# Patient Record
Sex: MALE | Race: WHITE | NOT HISPANIC OR LATINO | URBAN - METROPOLITAN AREA
[De-identification: names, ages, dates, MRNs, and addresses within clinical notes are randomized per-mention and may not be internally consistent; named-entity substitution may affect disease eponyms.]

---

## 2022-08-03 ENCOUNTER — INPATIENT (INPATIENT)
Facility: HOSPITAL | Age: 77
LOS: 7 days | Discharge: NURSING, REHAB OR LONG-TERM CARE | End: 2022-08-11
Attending: STUDENT IN AN ORGANIZED HEALTH CARE EDUCATION/TRAINING PROGRAM | Admitting: STUDENT IN AN ORGANIZED HEALTH CARE EDUCATION/TRAINING PROGRAM

## 2022-08-03 VITALS
HEART RATE: 78 BPM | TEMPERATURE: 97 F | SYSTOLIC BLOOD PRESSURE: 198 MMHG | WEIGHT: 214.95 LBS | OXYGEN SATURATION: 96 % | DIASTOLIC BLOOD PRESSURE: 114 MMHG | RESPIRATION RATE: 20 BRPM

## 2022-08-03 LAB
ALBUMIN SERPL ELPH-MCNC: 4.1 G/DL — SIGNIFICANT CHANGE UP (ref 3.5–5.2)
ALP SERPL-CCNC: 96 U/L — SIGNIFICANT CHANGE UP (ref 30–115)
ALT FLD-CCNC: 13 U/L — SIGNIFICANT CHANGE UP (ref 0–41)
ANION GAP SERPL CALC-SCNC: 12 MMOL/L — SIGNIFICANT CHANGE UP (ref 7–14)
APTT BLD: 29.6 SEC — SIGNIFICANT CHANGE UP (ref 27–39.2)
AST SERPL-CCNC: 14 U/L — SIGNIFICANT CHANGE UP (ref 0–41)
BASOPHILS # BLD AUTO: 0.05 K/UL — SIGNIFICANT CHANGE UP (ref 0–0.2)
BASOPHILS NFR BLD AUTO: 0.5 % — SIGNIFICANT CHANGE UP (ref 0–1)
BILIRUB SERPL-MCNC: 0.9 MG/DL — SIGNIFICANT CHANGE UP (ref 0.2–1.2)
BUN SERPL-MCNC: 18 MG/DL — SIGNIFICANT CHANGE UP (ref 10–20)
CALCIUM SERPL-MCNC: 9.1 MG/DL — SIGNIFICANT CHANGE UP (ref 8.5–10.1)
CHLORIDE SERPL-SCNC: 101 MMOL/L — SIGNIFICANT CHANGE UP (ref 98–110)
CO2 SERPL-SCNC: 26 MMOL/L — SIGNIFICANT CHANGE UP (ref 17–32)
CREAT SERPL-MCNC: 1.2 MG/DL — SIGNIFICANT CHANGE UP (ref 0.7–1.5)
EGFR: 63 ML/MIN/1.73M2 — SIGNIFICANT CHANGE UP
EOSINOPHIL # BLD AUTO: 0.32 K/UL — SIGNIFICANT CHANGE UP (ref 0–0.7)
EOSINOPHIL NFR BLD AUTO: 3.5 % — SIGNIFICANT CHANGE UP (ref 0–8)
GLUCOSE BLDC GLUCOMTR-MCNC: 137 MG/DL — HIGH (ref 70–99)
GLUCOSE BLDC GLUCOMTR-MCNC: 189 MG/DL — HIGH (ref 70–99)
GLUCOSE SERPL-MCNC: 142 MG/DL — HIGH (ref 70–99)
HCT VFR BLD CALC: 41.5 % — LOW (ref 42–52)
HGB BLD-MCNC: 14.6 G/DL — SIGNIFICANT CHANGE UP (ref 14–18)
IMM GRANULOCYTES NFR BLD AUTO: 0.2 % — SIGNIFICANT CHANGE UP (ref 0.1–0.3)
INR BLD: 0.97 RATIO — SIGNIFICANT CHANGE UP (ref 0.65–1.3)
LYMPHOCYTES # BLD AUTO: 2.29 K/UL — SIGNIFICANT CHANGE UP (ref 1.2–3.4)
LYMPHOCYTES # BLD AUTO: 25.1 % — SIGNIFICANT CHANGE UP (ref 20.5–51.1)
MCHC RBC-ENTMCNC: 29.3 PG — SIGNIFICANT CHANGE UP (ref 27–31)
MCHC RBC-ENTMCNC: 35.2 G/DL — SIGNIFICANT CHANGE UP (ref 32–37)
MCV RBC AUTO: 83.2 FL — SIGNIFICANT CHANGE UP (ref 80–94)
MONOCYTES # BLD AUTO: 1.02 K/UL — HIGH (ref 0.1–0.6)
MONOCYTES NFR BLD AUTO: 11.2 % — HIGH (ref 1.7–9.3)
NEUTROPHILS # BLD AUTO: 5.41 K/UL — SIGNIFICANT CHANGE UP (ref 1.4–6.5)
NEUTROPHILS NFR BLD AUTO: 59.5 % — SIGNIFICANT CHANGE UP (ref 42.2–75.2)
NRBC # BLD: 0 /100 WBCS — SIGNIFICANT CHANGE UP (ref 0–0)
PLATELET # BLD AUTO: 190 K/UL — SIGNIFICANT CHANGE UP (ref 130–400)
POTASSIUM SERPL-MCNC: 3.4 MMOL/L — LOW (ref 3.5–5)
POTASSIUM SERPL-SCNC: 3.4 MMOL/L — LOW (ref 3.5–5)
PROT SERPL-MCNC: 7 G/DL — SIGNIFICANT CHANGE UP (ref 6–8)
PROTHROM AB SERPL-ACNC: 11.2 SEC — SIGNIFICANT CHANGE UP (ref 9.95–12.87)
RBC # BLD: 4.99 M/UL — SIGNIFICANT CHANGE UP (ref 4.7–6.1)
RBC # FLD: 13.2 % — SIGNIFICANT CHANGE UP (ref 11.5–14.5)
SARS-COV-2 RNA SPEC QL NAA+PROBE: SIGNIFICANT CHANGE UP
SODIUM SERPL-SCNC: 139 MMOL/L — SIGNIFICANT CHANGE UP (ref 135–146)
TROPONIN T SERPL-MCNC: <0.01 NG/ML — SIGNIFICANT CHANGE UP
WBC # BLD: 9.11 K/UL — SIGNIFICANT CHANGE UP (ref 4.8–10.8)
WBC # FLD AUTO: 9.11 K/UL — SIGNIFICANT CHANGE UP (ref 4.8–10.8)

## 2022-08-03 PROCEDURE — 0042T: CPT | Mod: MA

## 2022-08-03 PROCEDURE — 99291 CRITICAL CARE FIRST HOUR: CPT

## 2022-08-03 PROCEDURE — 99223 1ST HOSP IP/OBS HIGH 75: CPT

## 2022-08-03 PROCEDURE — 70496 CT ANGIOGRAPHY HEAD: CPT | Mod: 26,MA

## 2022-08-03 PROCEDURE — 70450 CT HEAD/BRAIN W/O DYE: CPT | Mod: 26,59

## 2022-08-03 PROCEDURE — 93010 ELECTROCARDIOGRAM REPORT: CPT

## 2022-08-03 PROCEDURE — 70498 CT ANGIOGRAPHY NECK: CPT | Mod: 26,MA

## 2022-08-03 RX ORDER — GLUCAGON INJECTION, SOLUTION 0.5 MG/.1ML
1 INJECTION, SOLUTION SUBCUTANEOUS ONCE
Refills: 0 | Status: DISCONTINUED | OUTPATIENT
Start: 2022-08-03 | End: 2022-08-11

## 2022-08-03 RX ORDER — ALTEPLASE 100 MG
81 KIT INTRAVENOUS ONCE
Refills: 0 | Status: COMPLETED | OUTPATIENT
Start: 2022-08-03 | End: 2022-08-03

## 2022-08-03 RX ORDER — SODIUM CHLORIDE 9 MG/ML
500 INJECTION INTRAMUSCULAR; INTRAVENOUS; SUBCUTANEOUS ONCE
Refills: 0 | Status: COMPLETED | OUTPATIENT
Start: 2022-08-03 | End: 2022-08-03

## 2022-08-03 RX ORDER — LABETALOL HCL 100 MG
20 TABLET ORAL ONCE
Refills: 0 | Status: COMPLETED | OUTPATIENT
Start: 2022-08-03 | End: 2022-08-03

## 2022-08-03 RX ORDER — INSULIN LISPRO 100/ML
VIAL (ML) SUBCUTANEOUS
Refills: 0 | Status: DISCONTINUED | OUTPATIENT
Start: 2022-08-03 | End: 2022-08-11

## 2022-08-03 RX ORDER — NICARDIPINE HYDROCHLORIDE 30 MG/1
5 CAPSULE, EXTENDED RELEASE ORAL
Qty: 40 | Refills: 0 | Status: DISCONTINUED | OUTPATIENT
Start: 2022-08-03 | End: 2022-08-04

## 2022-08-03 RX ORDER — SODIUM CHLORIDE 9 MG/ML
1000 INJECTION, SOLUTION INTRAVENOUS
Refills: 0 | Status: DISCONTINUED | OUTPATIENT
Start: 2022-08-03 | End: 2022-08-11

## 2022-08-03 RX ORDER — DEXTROSE 50 % IN WATER 50 %
25 SYRINGE (ML) INTRAVENOUS ONCE
Refills: 0 | Status: DISCONTINUED | OUTPATIENT
Start: 2022-08-03 | End: 2022-08-11

## 2022-08-03 RX ORDER — DEXTROSE 50 % IN WATER 50 %
15 SYRINGE (ML) INTRAVENOUS ONCE
Refills: 0 | Status: DISCONTINUED | OUTPATIENT
Start: 2022-08-03 | End: 2022-08-11

## 2022-08-03 RX ORDER — ALTEPLASE 100 MG
9 KIT INTRAVENOUS ONCE
Refills: 0 | Status: COMPLETED | OUTPATIENT
Start: 2022-08-03 | End: 2022-08-03

## 2022-08-03 RX ORDER — DEXTROSE 50 % IN WATER 50 %
12.5 SYRINGE (ML) INTRAVENOUS ONCE
Refills: 0 | Status: DISCONTINUED | OUTPATIENT
Start: 2022-08-03 | End: 2022-08-11

## 2022-08-03 RX ORDER — LANOLIN ALCOHOL/MO/W.PET/CERES
5 CREAM (GRAM) TOPICAL AT BEDTIME
Refills: 0 | Status: DISCONTINUED | OUTPATIENT
Start: 2022-08-03 | End: 2022-08-11

## 2022-08-03 RX ORDER — CLEVIDIPINE BUTYRATE 50MG/100ML
2 VIAL (ML) INTRAVENOUS
Qty: 50 | Refills: 0 | Status: DISCONTINUED | OUTPATIENT
Start: 2022-08-03 | End: 2022-08-03

## 2022-08-03 RX ORDER — POTASSIUM CHLORIDE 20 MEQ
20 PACKET (EA) ORAL ONCE
Refills: 0 | Status: COMPLETED | OUTPATIENT
Start: 2022-08-03 | End: 2022-08-03

## 2022-08-03 RX ADMIN — SODIUM CHLORIDE 1000 MILLILITER(S): 9 INJECTION INTRAMUSCULAR; INTRAVENOUS; SUBCUTANEOUS at 18:26

## 2022-08-03 RX ADMIN — ALTEPLASE 81 MILLIGRAM(S): KIT at 09:49

## 2022-08-03 RX ADMIN — ALTEPLASE 540 MILLIGRAM(S): KIT at 08:48

## 2022-08-03 RX ADMIN — Medication 5 MILLIGRAM(S): at 21:34

## 2022-08-03 RX ADMIN — Medication 20 MILLIGRAM(S): at 08:30

## 2022-08-03 RX ADMIN — ALTEPLASE 81 MILLIGRAM(S): KIT at 08:49

## 2022-08-03 RX ADMIN — NICARDIPINE HYDROCHLORIDE 25 MG/HR: 30 CAPSULE, EXTENDED RELEASE ORAL at 08:56

## 2022-08-03 RX ADMIN — Medication 50 MILLIEQUIVALENT(S): at 11:29

## 2022-08-03 RX ADMIN — Medication 20 MILLIGRAM(S): at 08:54

## 2022-08-03 NOTE — H&P ADULT - HISTORY OF PRESENT ILLNESS
Patient is a 77 year old Male with HTN, DM, HLD, GERD, CAD with stents who presents with a chief complaint of RUE weakness, L facial, slurred speech, presents to the ED BIBEMS as a Stroke activation at 8:14am. NIH at the time was 2 for RUE and RLE drift. Facial and slurred speech at this point has resolved. mRS of 0. CTH was completed and was negative for bleed. CTA/P negative for perfusion deficit or LVO. Patient only takes ASA 81 at home for heart ppx. Denies any neurologic history or stroke in the past. tPA given at 8:48am as well as started on Cardene for BP control. BP prior to tPA administration was 183/93.

## 2022-08-03 NOTE — ED ADULT NURSE NOTE - NSIMPLEMENTINTERV_GEN_ALL_ED
Implemented All Fall with Harm Risk Interventions:  [place] to call system. Call bell, personal items and telephone within reach. Instruct patient to call for assistance. Room bathroom lighting operational. Non-slip footwear when patient is off stretcher. Physically safe environment: no spills, clutter or unnecessary equipment. Stretcher in lowest position, wheels locked, appropriate side rails in place. Provide visual cue, wrist band, yellow gown, etc. Monitor gait and stability. Monitor for mental status changes and reorient to person, place, and time. Review medications for side effects contributing to fall risk. Reinforce activity limits and safety measures with patient and family. Provide visual clues: red socks.

## 2022-08-03 NOTE — ED PROVIDER NOTE - OBJECTIVE STATEMENT
37-year-old male with past medical history of hyperlipidemia and hypertension who presents with right arm weakness that started 1 hour prior to arrival.  Denies history of CVA and anticoagulant use.  Denies change in vision, slurred speech.  Denies fever, shortness breath, chest pain, nausea, vomiting, abdominal pain, urinary symptoms, change in bowel movement.

## 2022-08-03 NOTE — CHART NOTE - NSCHARTNOTEFT_GEN_A_CORE
Nurse notified day team that patient c/o of difficulty expressing words. Night neurovascular team examined the patient. Patient's attention and concentration intact. Fund of knowledge appropriate Language: Naming, repetition, fluency, and comprehension intact. Would give patient an NIH-3/4 for the right arm, able to shrug the shoulder minimally but could not keep up the arm antigravity and no wiggling of the fingers. Discussed with Dr. koehler, recommend to repeat CTH. CTH-negative for acute findings.

## 2022-08-03 NOTE — CONSULT NOTE ADULT - SUBJECTIVE AND OBJECTIVE BOX
Neurology Consult    Patient is a 77y old  Male with HTN, DM, CAD with stents who presents with a chief complaint of RUE weakness, L facial, slurred speech, presents to the ED BIBEMS as a Stroke activation at 8:14am. NIH at the time was 2 for RUE and RLE drift. Facial and slurred speech at this point has resolved. mRS of 0. CTH was completed and was negative for bleed. CTA/P negative for perfusion deficit or LVO. Patient only takes ASA 81 at home for heart ppx. Denies any neurologic history or stroke in the past. tPA given at 8:48am as well as started on Cardene for BP control. BP prior to tPA administration was 183/93.       PAST MEDICAL & SURGICAL HISTORY:  HTN  DMII   CAD had multiple stents in the past    FAMILY HISTORY:    Social History: (-) x 3  Allergies  Allergy Status Unknown  Intolerances    MEDICATIONS  (STANDING):  niCARdipine Infusion 5 mG/Hr (25 mL/Hr) IV Continuous <Continuous>  potassium chloride  20 mEq/100 mL IVPB 20 milliEquivalent(s) IV Intermittent once  MEDICATIONS  (PRN):      Review of systems:    Constitutional: as per HPI  Neurological: As per HPI    Vital Signs Last 24 Hrs  T(C): 36.4 (03 Aug 2022 08:15), Max: 36.4 (03 Aug 2022 08:15)  T(F): 97.5 (03 Aug 2022 08:15), Max: 97.5 (03 Aug 2022 08:15)  HR: 95 (03 Aug 2022 09:40) (78 - 96)  BP: 150/73 (03 Aug 2022 09:40) (133/80 - 205/101)  BP(mean): --  RR: 20 (03 Aug 2022 09:40) (16 - 25)  SpO2: 97% (03 Aug 2022 09:40) (96% - 99%)    Parameters below as of 03 Aug 2022 09:29  Patient On (Oxygen Delivery Method): room air      Examination:  (on arrival in ED)  AxOx3 speech fluent, able to follow simple and complex commands  PERRL, EMOI, VFF, FS  RUE Drift but still strong 4/5, LUE 5/5, RLE Drift, LLE 5/5   Sensations intact   Able to walk on his own but slightly unsteady and slow (says its not his baseline)    (after tPA administration)  AxOx3 speech fluent, able to follow simple and complex commands  PERRL, EMOI, questionable R lower quadrant blurry vision, FS   RUE 2/5, RLE Drift but strong, L side 5/5   Sensations intact    Labs:   CBC Full  -  ( 03 Aug 2022 08:28 )  WBC Count : 9.11 K/uL  RBC Count : 4.99 M/uL  Hemoglobin : 14.6 g/dL  Hematocrit : 41.5 %  Platelet Count - Automated : 190 K/uL  Mean Cell Volume : 83.2 fL  Mean Cell Hemoglobin : 29.3 pg  Mean Cell Hemoglobin Concentration : 35.2 g/dL  Auto Neutrophil # : 5.41 K/uL  Auto Lymphocyte # : 2.29 K/uL  Auto Monocyte # : 1.02 K/uL  Auto Eosinophil # : 0.32 K/uL  Auto Basophil # : 0.05 K/uL  Auto Neutrophil % : 59.5 %  Auto Lymphocyte % : 25.1 %  Auto Monocyte % : 11.2 %  Auto Eosinophil % : 3.5 %  Auto Basophil % : 0.5 %    08-03    139  |  101  |  18  ----------------------------<  142<H>  3.4<L>   |  26  |  1.2    Ca    9.1      03 Aug 2022 08:28    TPro  7.0  /  Alb  4.1  /  TBili  0.9  /  DBili  x   /  AST  14  /  ALT  13  /  AlkPhos  96  08-03    LIVER FUNCTIONS - ( 03 Aug 2022 08:28 )  Alb: 4.1 g/dL / Pro: 7.0 g/dL / ALK PHOS: 96 U/L / ALT: 13 U/L / AST: 14 U/L / GGT: x           PT/INR - ( 03 Aug 2022 08:28 )   PT: 11.20 sec;   INR: 0.97 ratio    PTT - ( 03 Aug 2022 08:28 )  PTT:29.6 sec    Neuroimaging:  NCHCT: CT Head No Cont:   ACC: [id number] EXAM:  CT BRAIN                          PROCEDURE DATE:  08/03/2022    INTERPRETATION:  CLINICAL INDICATION: CVA. Right arm weakness, bilateral   leg weakness, slurred speech and left facial droop.    Technique: CT of the head was performed without contrast.    Multiple contiguous axial images were acquired from the skullbase to the   vertex without the administration of intravenous contrast.  Coronal and   sagittal reformations were made.    COMPARISON:  prior head CT dated 8/3/2022 at 8:24 AM.    FINDINGS:    There is hyperdensity noted of the dura and intracranial vessels from   recent contrast administration which limits evaluation for intracranial   hemorrhage..    The ventricles and sulci are unremarkable in appearance.     There is no intraparenchymal hematoma, mass effect or midline shift. No   abnormal extra-axial fluid collections are present.    The calvarium is intact. The visualized intraorbital compartments,   paranasal sinuses and mastoid complexes appear free of acute disease.   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    IMPRESSION:  No CT evidence of large acute territorial infarct.    Chronic left occipital lobe infarct.    --- End of Report ---    JESS COTO MD; Attending Radiologist  This document has been electronically signed. Aug  3 2022  9:34AM (08-03-22 @ 09:28)  08-03-22 @ 09:47

## 2022-08-03 NOTE — CONSULT NOTE ADULT - ASSESSMENT
77y old  Male with HTN, DM, CAD with stents who presents with a chief complaint of RUE weakness, L facial, slurred speech, presents to the ED BIBEMS as a Stroke activation at 8:14am. NIH at the time was 2 for RUE and RLE drift. Facial and slurred speech at this point has resolved. mRS of 0. CTH was completed and was negative for bleed. CTA/P negative for perfusion deficit or LVO. Patient only takes ASA 81 and PLX 75 at home for heart ppx. Denies any neurologic history or stroke in the past. tPA given at 8:48am as well as started on Cardene and 40mg labetalol for BP control. BP prior to tPA administration was 183/93.     Of note CTH shows chronic L occipital infarct. Patient admitted to 3E stroke unit for rest of neurologic work up and post tPA monitoring   Home medications:   - Glipizide 5mg BID   - Metformin 1g BID  - Lantus 30U nightly  - Ramipril 10mg daily  - HCTZ tablet 25mg daily   - Plavix 75mg daily  - Metop 50mg BID  - Simvastatin 40mg daily  - Famotidine 40mg daily    Plan:  Admit to 3E stroke unit for monitoring  - once alteplase drip completed, please flush IV line with 50cc NS 0.9%  - Please perform dysphagia screen now   - Once dysphagia screen passed, start atorvastatin 80 once daily  - Notify provider if patient passes dysphasgia screen able to have diet if no pending intervention  - Keep BP <180/105, notify provider if out of range  - During infusion, neuro exams every 15 minutes.   - Check every 15 minutes for first hour after infusion is stopped; every 30 minutes for the next 6 hours; q1 until 24 hours from end of infusion.   - Minimize arterial and venous punctures, able to draw blood 4hr s/p alteplase  - Keep temp <100F and maintain glucose 140-180.   - Notify provider for "HR >100 or <55 or SaO2 <95%"  - Maintain strict bed rest for 12 hours with HOB <30 degrees  - Repeat CT head with any acute change in mental status.   - No antiplatelet, anticoagulation, and heparin sq until 24 hour CT head negative for bleed.   - Continue Cardene 7.5mg gtt  - ISS   - Labs: Obtain Hemoglobin A1c, Lipid profile set, and TSH  Other tests:  - Repeat CT head noncon 24 hours post-alteplase to rule out bleed  - MRI brain without contrast   - echo with bubble, may eventually need EDSON  - PT/OT order  - Swallow evaluation if fails dysphagia screen  - SLP order if patient with dysarthria  - Stroke education    4)DVT ppx - SCDs (NO heparin SQ as post alteplase protocol)    Discussed with Neurology Attending Dr. Irwin   Case discussed with Admitting 3E resident, sign out given.   Patient pending transport and RN report for bed. Confirmed has bed in the stroke unit

## 2022-08-03 NOTE — ED ADULT NURSE NOTE - OBJECTIVE STATEMENT
Pt reports to ed w/ L side facial droop & R arm weakness noted upon waking up this am. Slurred speech since waking up this am.  @ triage. Escorted to CT.

## 2022-08-03 NOTE — ED PROVIDER NOTE - NS ED ROS FT
Constitutional: Negative for fever, chills, and fatigue.  HENT: Negative for headache, hearing change, and sore throat.  Eyes: Negative for eye pain, and vision change.  Cardiovascular: Negative for chest pain, and palpitation.  Respiratory: Negative for SOB, wheezing, cough and sputum production.  Gastrointestinal: Negative for nausea, vomiting, abdominal pain, constipation, diarrhea, hematochezia, and melena.  Genitourinary: Negative for flank pain, dysuria, frequency, and hematuria.  Neurological: + R arm weakness. Negative for dizziness, syncope, and loss of consciousness.  Musculoskeletal: Negative for joint swelling, arthralgias, back pain, neck pain, and calf cramps.  Hematological: Does not bruise/bleed easily.

## 2022-08-03 NOTE — PATIENT PROFILE ADULT - FALL HARM RISK - HARM RISK INTERVENTIONS

## 2022-08-03 NOTE — ED PROVIDER NOTE - CLINICAL SUMMARY MEDICAL DECISION MAKING FREE TEXT BOX
77 year old Male with HTN, DM, HLD, GERD, CAD with stents who presents with a chief complaint of RUE weakness, L facial, slurred speech, presents to the ED BIBEMS as a Stroke activation at 8:14am. NIH at the time was 2 for RUE and RLE drift. Facial and slurred speech at this point has resolved. mRS of 0. CTH was completed and was negative for bleed. CTA/P negative for perfusion deficit or LVO. Patient only takes ASA 81 at home for heart ppx. Denies any neurologic history or stroke in the past. tPA given at 8:48am as well as started on Cardene for BP control. BP prior to tPA administration was 183/93.

## 2022-08-03 NOTE — H&P ADULT - NSICDXPASTMEDICALHX_GEN_ALL_CORE_FT
PAST MEDICAL HISTORY:  Coronary artery disease     Diabetes     GERD (gastroesophageal reflux disease)     Hyperlipidemia     Hypertension

## 2022-08-03 NOTE — ED ADULT TRIAGE NOTE - CHIEF COMPLAINT QUOTE
c/o right arm weakness x 30 min ago, +drift noted, code stroke called in ED, +bilateral leg weakness, +slurred speech x this morning with left sided facial droop,  in triage

## 2022-08-03 NOTE — H&P ADULT - NSHPPHYSICALEXAM_GEN_ALL_CORE
T(C): 36.8 (08-04-22 @ 07:48), Max: 36.8 (08-04-22 @ 03:56)  HR: 100 (08-04-22 @ 07:55) (87 - 110)  BP: 186/103 (08-04-22 @ 07:55) (106/79 - 205/101)  RR: 18 (08-04-22 @ 07:55) (16 - 25)  SpO2: 94% (08-04-22 @ 07:55) (93% - 99%)    CONSTITUTIONAL: Well groomed, no apparent distress    EYES: PERRLA and symmetric, EOMI, No conjunctival or scleral injection, non-icteric    ENMT: Oral mucosa with moist membranes. No external nasal lesions; nasal mucosa not inflamed; normal dentition; no pharyngeal injection or exudates. Otoscopic exam with normal tympanic membranes; no gross hearing impairment noted.  	NECK: Supple, symmetric and without tracheal deviation; thyroid gland not enlarged and without palpable masses    RESPIRATORY: No respiratory distress, no use of accessory muscles; CTA b/l, no wheezes, rales or rhonchi, no dullness or hyperresonance to percussion, no tactile fremitus, no subcutaneous emphysema    CARDIOVASCULAR: RRRR, +S1S2, no murmurs, no rubs, no gallops; no JVD; no peripheral edema  	Vascular: no carotid bruits; no abdominal bruit; carotid pulse palpable, radial pulse palpable, femoral pulse palpable, dorsalis pedis pulse palpable, posterior tibialis pulse palpable    GASTROINTESTINAL: Soft, non tender, non distended, no rebound, no guarding; No palpable masses; no hepatosplenomegaly; no hernia palpated;  	Rectal: normal sphincter tone and no masses palpated; stool negative for blood    LYMPHATIC: No cervical LAD or tenderness; no axillary LAD or tenderness; no inguinal LAD or tenderness    MUSCULOSKELETAL: Normal gait and station; no digital clubbing or cyanosis; examination of the (head/neck, spine/ribs/pelvis, RUE, LUE, RLE, LLE) without misalignment, normal range of motion without pain, no spinal tenderness, normal muscle strength/tone    SKIN: No rashes or ulcers noted; no subcutaneous nodules or induration palpable    PSYCHIATRIC: Appropriate insight/judgment; mood and affect appropriate, recent/remote memory intact    AxOx3 speech fluent, able to follow simple and complex commands  PERRL, EMOI, VFF, FS  RUE Drift but still strong 4/5, LUE 5/5, RLE Drift, LLE 5/5   Sensations intact   Able to walk on his own but slightly unsteady and slow (says its not his baseline)    (after tPA administration)  AxOx3 speech fluent, able to follow simple and complex commands  PERRL, EMOI, questionable R lower quadrant blurry vision, FS   RUE 2/5, RLE Drift but strong, L side 5/5   Sensations intact

## 2022-08-03 NOTE — H&P ADULT - NSHPLABSRESULTS_GEN_ALL_CORE
Labs:  CBC Full  -  ( 03 Aug 2022 08:28 )  WBC Count : 9.11 K/uL  RBC Count : 4.99 M/uL  Hemoglobin : 14.6 g/dL  Hematocrit : 41.5 %  Platelet Count - Automated : 190 K/uL  Mean Cell Volume : 83.2 fL  Mean Cell Hemoglobin : 29.3 pg  Mean Cell Hemoglobin Concentration : 35.2 g/dL  Auto Neutrophil # : 5.41 K/uL  Auto Lymphocyte # : 2.29 K/uL  Auto Monocyte # : 1.02 K/uL  Auto Eosinophil # : 0.32 K/uL  Auto Basophil # : 0.05 K/uL  Auto Neutrophil % : 59.5 %  Auto Lymphocyte % : 25.1 %  Auto Monocyte % : 11.2 %  Auto Eosinophil % : 3.5 %  Auto Basophil % : 0.5 %    08-03    139  |  101  |  18  ----------------------------<  142<H>  3.4<L>   |  26  |  1.2    Ca    9.1      03 Aug 2022 08:28    TPro  7.0  /  Alb  4.1  /  TBili  0.9  /  DBili  x   /  AST  14  /  ALT  13  /  AlkPhos  96  08-03    LIVER FUNCTIONS - ( 03 Aug 2022 08:28 )  Alb: 4.1 g/dL / Pro: 7.0 g/dL / ALK PHOS: 96 U/L / ALT: 13 U/L / AST: 14 U/L / GGT: x           PT/INR - ( 03 Aug 2022 08:28 )   PT: 11.20 sec;   INR: 0.97 ratio       PTT - ( 03 Aug 2022 08:28 )  PTT:29.6 sec    < from: CT Brain Stroke Protocol (08.03.22 @ 08:30) >    PROCEDURE DATE:  08/03/2022          INTERPRETATION:  Clinical History / Reason for exam: Stroke code. Right   arm weakness, bilateral leg weakness, slurred speech, left facial droop.    Technique: Noncontrast head CT.  Contiguous unenhanced CT axial images of   the head from the base to the vertex with coronal and sagittal reformats.    Comparison: None available    Findings:    The ventricles and cortical sulci are compatible with a moderate degree   of parenchymal volume loss.    There are patchy hypodensities throughout the hemispheric white matter   without mass effect compatible with mild chronic microvascular changes.    There is no acute intracranial hemorrhage, extra-axial fluid collection   or midline shift.  Gray white matter differentiation is maintained.   ASPECT score is 10.    Vascular calcifications are noted.    The visualized paranasal sinuses and mastoids are clear. There is an   osteoma within theleft frontal sinus measuring 1 cm.    IMPRESSION:    No evidence of acute intracranial hemorrhage or large territory infarct.    Case was discussed with Saima Heard 8:32 AM 8/3/2022.    --- End of Report ---    < end of copied text >    < from: CT Angio Brain Stroke Protocol  w/ IV Cont (08.03.22 @ 08:55) >

## 2022-08-03 NOTE — H&P ADULT - ASSESSMENT
This is a 77 year old Male with PMHx HTN, DM, CAD with stents who presents with a chief complaint of RUE weakness, L facial, slurred speech, presents to the ED BIBEMS as a Stroke activation at 8:14am. NIH at the time was 2 for RUE and RLE drift. Facial and slurred speech at this point has resolved. mRS of 0. CTH was completed and was negative for bleed. CTA/P negative for perfusion deficit or LVO. Patient only takes ASA 81 and PLX 75 at home for heart ppx. Denies any neurologic history or stroke in the past. tPA given at 8:48am 8/3 as well as started on Cardene and 40 mg labetalol for BP control. BP prior to tPA administration was 183/93. Of note CTH shows chronic L occipital infarct. Patient admitted to 3E stroke unit for rest of neurologic work up and post tPA monitoring.    Plan:  - Admitted to 3E stroke unit for monitoring  - Once alteplase drip completed, please flush IV line with 50cc NS 0.9%  - Please perform dysphagia screen now   - Once dysphagia screen passed, start atorvastatin 80 once daily  - Notify provider if patient passes dysphasgia screen able to have diet if no pending intervention  - Keep BP <180/105, notify provider if out of range  - During infusion, neuro exams every 15 minutes.   - Check every 15 minutes for first hour after infusion is stopped; every 30 minutes for the next 6 hours; q1 until 24 hours from end of infusion.   - Minimize arterial and venous punctures, able to draw blood 4hr s/p alteplase  - Keep temp <100F and maintain glucose 140-180.   - Notify provider for "HR >100 or <55 or SaO2 <95%"  - Maintain strict bed rest for 12 hours with HOB <30 degrees  - Repeat CT head with any acute change in mental status.   - No antiplatelet, anticoagulation, and heparin sq until 24 hour CT head negative for bleed.   - Continue Cardene 7.5mg gtt  - ISS   - Labs: Obtain Hemoglobin A1c, Lipid profile set, and TSH  Other tests:  - Repeat CT head noncon 24 hours post-alteplase to rule out bleed  - MRI brain without contrast pending  - TTE with bubble pending, may eventually need EDSON  - PT/OT eval  - Swallow evaluation if fails dysphagia screen  - SLP order if patient with dysarthria  - Stroke education    DVT ppx - SCDs (NO heparin SQ as post alteplase protocol)

## 2022-08-03 NOTE — CONSULT NOTE ADULT - NS ATTEND AMEND GEN_ALL_CORE FT
Pt is a 78 yo M with PMHx of HTN, HLD  CAD s/p PCI, who presented with left arm weakness and slurred speech. LKW 0700. NIHSS 2 for LUE drift on arrival. CT head without acute process. Discussed risks vs benefit of IV alteplase with patient, agreed to proceed. Blood pressure elevated, given labetalol 10mg IV x 2, BP decreased to  184/93. Bolus of IV alteplase administered at 0849. Repeat BP elevated again, cardene started immediately (was already hanging), increased rate and given an additional 20mg IV labetalol push. BP decreased to <180/105 within 6 minutes. LUE paresis increased, NIHSS 5 with mild RLQ visual defect. pt taken for STAT CT head, which did not show any hemorrhagic transformation.   Admit to stroke unit  Please use post alteplase stroke orderset  Maintain BP <180/105 x 24 hrs then slowly decrease to 120-160  CT head tomorrow at 0900  MRI brain without cotnrast, TTE, tele monitoring, SCD's for DVT ppx  Neurochecks and vitals as per pot tPA stroke protocol Pt is a 76 yo M, right handed, with PMHx of HTN, HLD  CAD s/p PCI, who presented with right arm weakness and slurred speech. LKW 0700. NIHSS 2 for RUE drift on arrival. CT head without acute process. Discussed risks vs benefit of IV alteplase with patient, agreed to proceed. Blood pressure elevated, given labetalol 10mg IV x 2, BP decreased to  184/93. Bolus of IV alteplase administered at 0849. Repeat BP elevated again, cardene started immediately (was already hanging), increased rate and given an additional 20mg IV labetalol push. BP decreased to <180/105 within 6 minutes. RUE paresis increased, NIHSS 5 with mild RLQ visual defect. pt taken for STAT CT head, which did not show any hemorrhagic transformation. On re-examination, NIHSS 3 for LUE paresis (distal >proximal)  Admit to stroke unit  Please use post alteplase stroke orderset  Maintain BP <180/105 x 24 hrs then slowly decrease to 120-160  CT head tomorrow at 0900  MRI brain without cotnrast, TTE, tele monitoring, SCD's for DVT ppx  Neurochecks and vitals as per pot tPA stroke protocol

## 2022-08-04 LAB
A1C WITH ESTIMATED AVERAGE GLUCOSE RESULT: 7.9 % — HIGH (ref 4–5.6)
A1C WITH ESTIMATED AVERAGE GLUCOSE RESULT: 8.2 % — HIGH (ref 4–5.6)
ALBUMIN SERPL ELPH-MCNC: 3.9 G/DL — SIGNIFICANT CHANGE UP (ref 3.5–5.2)
ALP SERPL-CCNC: 109 U/L — SIGNIFICANT CHANGE UP (ref 30–115)
ALT FLD-CCNC: 13 U/L — SIGNIFICANT CHANGE UP (ref 0–41)
ANION GAP SERPL CALC-SCNC: 12 MMOL/L — SIGNIFICANT CHANGE UP (ref 7–14)
AST SERPL-CCNC: 13 U/L — SIGNIFICANT CHANGE UP (ref 0–41)
BILIRUB SERPL-MCNC: 1 MG/DL — SIGNIFICANT CHANGE UP (ref 0.2–1.2)
BUN SERPL-MCNC: 14 MG/DL — SIGNIFICANT CHANGE UP (ref 10–20)
CALCIUM SERPL-MCNC: 9.2 MG/DL — SIGNIFICANT CHANGE UP (ref 8.5–10.1)
CHLORIDE SERPL-SCNC: 100 MMOL/L — SIGNIFICANT CHANGE UP (ref 98–110)
CHOLEST SERPL-MCNC: 156 MG/DL — SIGNIFICANT CHANGE UP
CO2 SERPL-SCNC: 26 MMOL/L — SIGNIFICANT CHANGE UP (ref 17–32)
CREAT SERPL-MCNC: 1.1 MG/DL — SIGNIFICANT CHANGE UP (ref 0.7–1.5)
EGFR: 70 ML/MIN/1.73M2 — SIGNIFICANT CHANGE UP
ESTIMATED AVERAGE GLUCOSE: 180 MG/DL — HIGH (ref 68–114)
ESTIMATED AVERAGE GLUCOSE: 189 MG/DL — HIGH (ref 68–114)
GLUCOSE BLDC GLUCOMTR-MCNC: 188 MG/DL — HIGH (ref 70–99)
GLUCOSE BLDC GLUCOMTR-MCNC: 190 MG/DL — HIGH (ref 70–99)
GLUCOSE BLDC GLUCOMTR-MCNC: 214 MG/DL — HIGH (ref 70–99)
GLUCOSE BLDC GLUCOMTR-MCNC: 255 MG/DL — HIGH (ref 70–99)
GLUCOSE SERPL-MCNC: 240 MG/DL — HIGH (ref 70–99)
HCT VFR BLD CALC: 41.1 % — LOW (ref 42–52)
HDLC SERPL-MCNC: 63 MG/DL — SIGNIFICANT CHANGE UP
HGB BLD-MCNC: 14.5 G/DL — SIGNIFICANT CHANGE UP (ref 14–18)
LIPID PNL WITH DIRECT LDL SERPL: 72 MG/DL — SIGNIFICANT CHANGE UP
MAGNESIUM SERPL-MCNC: 1.4 MG/DL — LOW (ref 1.8–2.4)
MCHC RBC-ENTMCNC: 29.4 PG — SIGNIFICANT CHANGE UP (ref 27–31)
MCHC RBC-ENTMCNC: 35.3 G/DL — SIGNIFICANT CHANGE UP (ref 32–37)
MCV RBC AUTO: 83.4 FL — SIGNIFICANT CHANGE UP (ref 80–94)
NON HDL CHOLESTEROL: 93 MG/DL — SIGNIFICANT CHANGE UP
NRBC # BLD: 0 /100 WBCS — SIGNIFICANT CHANGE UP (ref 0–0)
PHOSPHATE SERPL-MCNC: 3.2 MG/DL — SIGNIFICANT CHANGE UP (ref 2.1–4.9)
PLATELET # BLD AUTO: 209 K/UL — SIGNIFICANT CHANGE UP (ref 130–400)
POTASSIUM SERPL-MCNC: 3.4 MMOL/L — LOW (ref 3.5–5)
POTASSIUM SERPL-SCNC: 3.4 MMOL/L — LOW (ref 3.5–5)
PROT SERPL-MCNC: 7 G/DL — SIGNIFICANT CHANGE UP (ref 6–8)
RBC # BLD: 4.93 M/UL — SIGNIFICANT CHANGE UP (ref 4.7–6.1)
RBC # FLD: 13.3 % — SIGNIFICANT CHANGE UP (ref 11.5–14.5)
SODIUM SERPL-SCNC: 138 MMOL/L — SIGNIFICANT CHANGE UP (ref 135–146)
TRIGL SERPL-MCNC: 105 MG/DL — SIGNIFICANT CHANGE UP
TSH SERPL-MCNC: 1.71 UIU/ML — SIGNIFICANT CHANGE UP (ref 0.27–4.2)
WBC # BLD: 8.76 K/UL — SIGNIFICANT CHANGE UP (ref 4.8–10.8)
WBC # FLD AUTO: 8.76 K/UL — SIGNIFICANT CHANGE UP (ref 4.8–10.8)

## 2022-08-04 PROCEDURE — 70450 CT HEAD/BRAIN W/O DYE: CPT | Mod: 26

## 2022-08-04 PROCEDURE — 99232 SBSQ HOSP IP/OBS MODERATE 35: CPT

## 2022-08-04 PROCEDURE — 99221 1ST HOSP IP/OBS SF/LOW 40: CPT

## 2022-08-04 PROCEDURE — 99223 1ST HOSP IP/OBS HIGH 75: CPT

## 2022-08-04 PROCEDURE — 93306 TTE W/DOPPLER COMPLETE: CPT | Mod: 26

## 2022-08-04 RX ORDER — METOPROLOL TARTRATE 50 MG
1 TABLET ORAL
Qty: 0 | Refills: 0 | DISCHARGE

## 2022-08-04 RX ORDER — LABETALOL HCL 100 MG
10 TABLET ORAL ONCE
Refills: 0 | Status: COMPLETED | OUTPATIENT
Start: 2022-08-04 | End: 2022-08-04

## 2022-08-04 RX ORDER — ASPIRIN/CALCIUM CARB/MAGNESIUM 324 MG
1 TABLET ORAL
Qty: 0 | Refills: 0 | DISCHARGE

## 2022-08-04 RX ORDER — AMLODIPINE BESYLATE 2.5 MG/1
5 TABLET ORAL DAILY
Refills: 0 | Status: DISCONTINUED | OUTPATIENT
Start: 2022-08-04 | End: 2022-08-11

## 2022-08-04 RX ORDER — POTASSIUM CHLORIDE 20 MEQ
40 PACKET (EA) ORAL ONCE
Refills: 0 | Status: COMPLETED | OUTPATIENT
Start: 2022-08-04 | End: 2022-08-04

## 2022-08-04 RX ORDER — MAGNESIUM SULFATE 500 MG/ML
2 VIAL (ML) INJECTION
Refills: 0 | Status: COMPLETED | OUTPATIENT
Start: 2022-08-04 | End: 2022-08-04

## 2022-08-04 RX ORDER — CLOPIDOGREL BISULFATE 75 MG/1
75 TABLET, FILM COATED ORAL DAILY
Refills: 0 | Status: DISCONTINUED | OUTPATIENT
Start: 2022-08-04 | End: 2022-08-06

## 2022-08-04 RX ORDER — METFORMIN HYDROCHLORIDE 850 MG/1
1 TABLET ORAL
Qty: 0 | Refills: 0 | DISCHARGE

## 2022-08-04 RX ORDER — FAMOTIDINE 10 MG/ML
20 INJECTION INTRAVENOUS DAILY
Refills: 0 | Status: DISCONTINUED | OUTPATIENT
Start: 2022-08-04 | End: 2022-08-11

## 2022-08-04 RX ORDER — ATORVASTATIN CALCIUM 80 MG/1
80 TABLET, FILM COATED ORAL AT BEDTIME
Refills: 0 | Status: DISCONTINUED | OUTPATIENT
Start: 2022-08-04 | End: 2022-08-11

## 2022-08-04 RX ORDER — ACETAMINOPHEN 500 MG
650 TABLET ORAL ONCE
Refills: 0 | Status: COMPLETED | OUTPATIENT
Start: 2022-08-04 | End: 2022-08-04

## 2022-08-04 RX ORDER — ASPIRIN/CALCIUM CARB/MAGNESIUM 324 MG
81 TABLET ORAL DAILY
Refills: 0 | Status: DISCONTINUED | OUTPATIENT
Start: 2022-08-04 | End: 2022-08-11

## 2022-08-04 RX ORDER — FAMOTIDINE 10 MG/ML
1 INJECTION INTRAVENOUS
Qty: 0 | Refills: 0 | DISCHARGE

## 2022-08-04 RX ORDER — MAGNESIUM OXIDE 400 MG ORAL TABLET 241.3 MG
400 TABLET ORAL
Refills: 0 | Status: DISCONTINUED | OUTPATIENT
Start: 2022-08-04 | End: 2022-08-08

## 2022-08-04 RX ORDER — METOPROLOL TARTRATE 50 MG
50 TABLET ORAL
Refills: 0 | Status: DISCONTINUED | OUTPATIENT
Start: 2022-08-04 | End: 2022-08-11

## 2022-08-04 RX ORDER — LISINOPRIL 2.5 MG/1
40 TABLET ORAL DAILY
Refills: 0 | Status: DISCONTINUED | OUTPATIENT
Start: 2022-08-04 | End: 2022-08-11

## 2022-08-04 RX ORDER — ENOXAPARIN SODIUM 100 MG/ML
40 INJECTION SUBCUTANEOUS EVERY 24 HOURS
Refills: 0 | Status: DISCONTINUED | OUTPATIENT
Start: 2022-08-04 | End: 2022-08-11

## 2022-08-04 RX ORDER — HYDROCHLOROTHIAZIDE 25 MG
25 TABLET ORAL DAILY
Refills: 0 | Status: DISCONTINUED | OUTPATIENT
Start: 2022-08-04 | End: 2022-08-08

## 2022-08-04 RX ORDER — ASPIRIN/CALCIUM CARB/MAGNESIUM 324 MG
300 TABLET ORAL DAILY
Refills: 0 | Status: DISCONTINUED | OUTPATIENT
Start: 2022-08-04 | End: 2022-08-04

## 2022-08-04 RX ADMIN — AMLODIPINE BESYLATE 5 MILLIGRAM(S): 2.5 TABLET ORAL at 18:26

## 2022-08-04 RX ADMIN — MAGNESIUM OXIDE 400 MG ORAL TABLET 400 MILLIGRAM(S): 241.3 TABLET ORAL at 17:23

## 2022-08-04 RX ADMIN — Medication 10 MILLIGRAM(S): at 08:21

## 2022-08-04 RX ADMIN — CLOPIDOGREL BISULFATE 75 MILLIGRAM(S): 75 TABLET, FILM COATED ORAL at 12:14

## 2022-08-04 RX ADMIN — LISINOPRIL 40 MILLIGRAM(S): 2.5 TABLET ORAL at 13:54

## 2022-08-04 RX ADMIN — Medication 650 MILLIGRAM(S): at 20:30

## 2022-08-04 RX ADMIN — FAMOTIDINE 20 MILLIGRAM(S): 10 INJECTION INTRAVENOUS at 12:14

## 2022-08-04 RX ADMIN — Medication 3: at 12:09

## 2022-08-04 RX ADMIN — Medication 40 MILLIEQUIVALENT(S): at 15:46

## 2022-08-04 RX ADMIN — MAGNESIUM OXIDE 400 MG ORAL TABLET 400 MILLIGRAM(S): 241.3 TABLET ORAL at 15:28

## 2022-08-04 RX ADMIN — Medication 10 MILLIGRAM(S): at 16:51

## 2022-08-04 RX ADMIN — Medication 50 MILLIGRAM(S): at 17:23

## 2022-08-04 RX ADMIN — Medication 1: at 08:26

## 2022-08-04 RX ADMIN — Medication 10 MILLIGRAM(S): at 06:17

## 2022-08-04 RX ADMIN — ENOXAPARIN SODIUM 40 MILLIGRAM(S): 100 INJECTION SUBCUTANEOUS at 12:14

## 2022-08-04 RX ADMIN — Medication 650 MILLIGRAM(S): at 20:06

## 2022-08-04 RX ADMIN — Medication 25 GRAM(S): at 15:27

## 2022-08-04 RX ADMIN — Medication 25 GRAM(S): at 17:24

## 2022-08-04 RX ADMIN — Medication 5 MILLIGRAM(S): at 21:24

## 2022-08-04 RX ADMIN — Medication 25 MILLIGRAM(S): at 13:54

## 2022-08-04 RX ADMIN — Medication 81 MILLIGRAM(S): at 12:14

## 2022-08-04 RX ADMIN — Medication 2: at 16:26

## 2022-08-04 NOTE — PROVIDER CONTACT NOTE (OTHER) - ACTION/TREATMENT ORDERED:
Labatolol 10mg IVP given as ordered x1
MD to order Norvasc and to repeat BP 30 min after dose is given.
MD to order Labetalol 10 mg IVP.

## 2022-08-04 NOTE — PROVIDER CONTACT NOTE (OTHER) - SITUATION
/103 . SBP parameter 120-160.
/98 
BP has been above parameter throughout shift. Dr. Arvizu and Dr. Harrell aware. No interventions done after 17:20 pm. /96 HR 86.

## 2022-08-04 NOTE — CONSULT NOTE ADULT - ASSESSMENT
IMPRESSION: Rehab of CVA / R HP    PRECAUTIONS: [   ] Cardiac  [   ] Respiratory  [   ] Seizures [   ] Contact Isolation  [   ] Droplet Isolation  [   ] Other    Weight Bearing Status:     RECOMMENDATION:    Out of Bed to Chair     DVT/Decubiti Prophylaxis    REHAB PLAN:     [ x   ] Bedside P/T 3-5 times a week   [   x ]   Bedside O/T  2-3 times a week             [  x  ] Speech Therapy               [    ]  No Rehab Therapy Indicated   Conditioning/ROM                                    ADL  Bed Mobility                                               Conditioning/ROM  Transfers                                                     Bed Mobility  Sitting /Standing Balance                         Transfers                                        Gait Training                                               Sitting/Standing Balance  Stair Training [   ]Applicable                    Home equipment Eval                                                                        Splinting  [   ] Only      GOALS:   ADL   [ x   ]   Independent                    Transfers  [x    ] Independent                          Ambulation  [ x   ] Independent     [  x   ] With device                            [    ]  CG                                                         [    ]  CG                                                                  [    ] CG                            [    ] Min A                                                   [    ] Min A                                                              [    ] Min  A          DISCHARGE PLAN:   [    ]  Good candidate for Intensive Rehabilitation/Hospital based                                             Will tolerate 3hrs Intensive Rehab Daily                                       [     ]  Short Term Rehab in Skilled Nursing Facility                                       [     ]  Home with Outpatient or  services                                         [   x  ]  Possible Candidate for Intensive Hospital based Rehab

## 2022-08-04 NOTE — SWALLOW BEDSIDE ASSESSMENT ADULT - NS SPL SWALLOW CLINIC TRIAL FT
No overt s/s of penetration/aspiration observed with regular consistency, thin liquids.  Recommend a regular diet with thin liquids.

## 2022-08-04 NOTE — CONSULT NOTE ADULT - SUBJECTIVE AND OBJECTIVE BOX
HPI:  Patient is a 77 year old Male with HTN, DM, HLD, GERD, CAD with stents who presents with a chief complaint of RUE weakness, L facial, slurred speech, presents to the ED BIBEMS as a Stroke activation at 8:14am. NIH at the time was 2 for RUE and RLE drift. Facial and slurred speech at this point has resolved. mRS of 0. CTH was completed and was negative for bleed. CTA/P negative for perfusion deficit or LVO. Patient only takes ASA 81 at home for heart ppx. Denies any neurologic history or stroke in the past. tPA given at 8:48am as well as started on Cardene for BP control. BP prior to tPA administration was 183/93.    #Acute ischemic stroke s/p tPA  - Admitted to 3E stroke unit for monitoring  - atorvastatin 80 once daily  - Notify provider if patient passes dysphasgia screen able to have diet if no pending intervention  - Keep BP <180/105, notify provider if out of range  - repeat CTH stable  - MRI brain without contrast pending  - TTE with bubble pending, may eventually need EDSON      PAST MEDICAL & SURGICAL HISTORY:  Hypertension      Diabetes      Hyperlipidemia      GERD (gastroesophageal reflux disease)      Coronary artery disease          Hospital Course:    TODAY'S SUBJECTIVE & REVIEW OF SYMPTOMS:     Constitutional WNL   Cardio WNL   Resp WNL   GI WNL  Heme WNL  Endo WNL  Skin WNL  MSK WNL  Neuro right UE weakness / slurred  speech  Cognitive WNL  Psych WNL      MEDICATIONS  (STANDING):  aspirin enteric coated 81 milliGRAM(s) Oral daily  atorvastatin 80 milliGRAM(s) Oral at bedtime  clopidogrel Tablet 75 milliGRAM(s) Oral daily  dextrose 5%. 1000 milliLiter(s) (100 mL/Hr) IV Continuous <Continuous>  dextrose 5%. 1000 milliLiter(s) (50 mL/Hr) IV Continuous <Continuous>  dextrose 50% Injectable 25 Gram(s) IV Push once  dextrose 50% Injectable 12.5 Gram(s) IV Push once  dextrose 50% Injectable 25 Gram(s) IV Push once  enoxaparin Injectable 40 milliGRAM(s) SubCutaneous every 24 hours  famotidine    Tablet 20 milliGRAM(s) Oral daily  glucagon  Injectable 1 milliGRAM(s) IntraMuscular once  hydrochlorothiazide 25 milliGRAM(s) Oral daily  insulin lispro (ADMELOG) corrective regimen sliding scale   SubCutaneous three times a day before meals  lisinopril 40 milliGRAM(s) Oral daily  magnesium oxide 400 milliGRAM(s) Oral three times a day with meals  metoprolol tartrate 50 milliGRAM(s) Oral two times a day    MEDICATIONS  (PRN):  dextrose Oral Gel 15 Gram(s) Oral once PRN Blood Glucose LESS THAN 70 milliGRAM(s)/deciliter  melatonin 5 milliGRAM(s) Oral at bedtime PRN Insomnia      FAMILY HISTORY:      Allergies    No Known Allergies    Intolerances        SOCIAL HISTORY:    [  ] Etoh  [  ] Smoking  [  ] Substance abuse     Home Environment:  [ x  ] Home Alone  [   ] Lives with Family  [   ] Home Health Aid    Dwelling:  [   ] Apartment  [ x  ] Private House  [   ] Adult Home  [   ] Skilled Nursing Facility      [   ] Short Term  [   ] Long Term  [ x  ] Stairs       Elevator [   ]    FUNCTIONAL STATUS PTA: (Check all that apply)  Ambulation: [  x  ]Independent    [   ] Dependent     [   ] Non-Ambulatory  Assistive Device: [   ] SA Cane  [   ]  Q Cane  [   ] Walker  [   ]  Wheelchair  ADL : [ x  ] Independent  [    ]  Dependent       Vital Signs Last 24 Hrs  T(C): 36.8 (04 Aug 2022 07:48), Max: 36.8 (04 Aug 2022 03:56)  T(F): 98.3 (04 Aug 2022 07:48), Max: 98.3 (04 Aug 2022 03:56)  HR: 92 (04 Aug 2022 16:18) (92 - 110)  BP: 170/92 (04 Aug 2022 16:18) (140/79 - 186/103)  BP(mean): 129 (04 Aug 2022 15:06) (90 - 148)  RR: 18 (04 Aug 2022 16:18) (18 - 18)  SpO2: 95% (04 Aug 2022 16:18) (93% - 97%)    Parameters below as of 04 Aug 2022 16:18  Patient On (Oxygen Delivery Method): room air          PHYSICAL EXAM: Awake & Alert  GENERAL: NAD  HEAD:  Normocephalic  CHEST/LUNG: Clear   HEART: S1S2+  ABDOMEN: Soft, Nontender  EXTREMITIES:  no calf tenderness    NERVOUS SYSTEM:  Cranial Nerves 2-12 intact [ x  ] Abnormal  [   ]  ROM: WFL all extremities [   ]  Abnormal [ x  ]limited RUE  Motor Strength: WFL all extremities  [   ]  Abnormal [ x  ]0/5 RUE, 4+/5 RLE, 5/5 left side   Sensation: intact to light touch [ x  ] Abnormal [   ]    FUNCTIONAL STATUS:  Bed Mobility: Independent [   ]  Supervision [   ]  Needs Assistance [  x ]  N/A [   ]  Transfers: Independent [   ]  Supervision [   ]  Needs Assistance [  x ]  N/A [   ]   Ambulation: Independent [   ]  Supervision [   ]  Needs Assistance [ x  ]  N/A [   ]  ADL: Independent [   ] Requires Assistance [   ] N/A [   ]      LABS:                        14.5   8.76  )-----------( 209      ( 04 Aug 2022 13:08 )             41.1     08-04    138  |  100  |  14  ----------------------------<  240<H>  3.4<L>   |  26  |  1.1    Ca    9.2      04 Aug 2022 13:08  Phos  3.2     08-04  Mg     1.4     08-04    TPro  7.0  /  Alb  3.9  /  TBili  1.0  /  DBili  x   /  AST  13  /  ALT  13  /  AlkPhos  109  08-04    PT/INR - ( 03 Aug 2022 08:28 )   PT: 11.20 sec;   INR: 0.97 ratio         PTT - ( 03 Aug 2022 08:28 )  PTT:29.6 sec      RADIOLOGY & ADDITIONAL STUDIES:

## 2022-08-04 NOTE — CONSULT NOTE ADULT - SUBJECTIVE AND OBJECTIVE BOX
MARIA M CROFT  77y  Male    Patient is a 77y old  Male who presents with a chief complaint of RUE weakness    HPI:  Patient is a 77 year old Male with HTN, DM, HLD, GERD, CAD with stents who presents with a chief complaint of RUE weakness, L facial, slurred speech, presents to the ED BIBEMS as a Stroke activation at 8:14am. NIH at the time was 2 for RUE and RLE drift. Facial and slurred speech at this point has resolved. mRS of 0. CTH was completed and was negative for bleed. CTA/P negative for perfusion deficit or LVO. Patient only takes ASA 81 at home for heart ppx. Denies any neurologic history or stroke in the past. tPA given at 8:48am as well as started on Cardene for BP control. BP prior to tPA administration was 183/93.    (03 Aug 2022 11:22)    S: Patient was examined and seen at bedside. This morning pt is resting comfortably in bed and reports no new issues or overnight events. No complaints, feels better  Denies CP, SOB, N/V/D/C/AP, cough, F, chills, dizziness, new focal weakness, HA, vision changes, dysuria, or urinary symptoms, blood in stool.  ROS: all other systems reviewed and are negative    PAST MEDICAL & SURGICAL HISTORY:  Hypertension      Diabetes      Hyperlipidemia      GERD (gastroesophageal reflux disease)      Coronary artery disease        SOCIAL HISTORY:  Tobacco: denies  Illicit drugs: denies  Alcohol: social  Family history reviewed and otherwise non-contributory No clotting disorders, CVAs at early age.  ALLERGIES: NKDA    MEDICATIONS  (STANDING):  aspirin enteric coated 81 milliGRAM(s) Oral daily  atorvastatin 80 milliGRAM(s) Oral at bedtime  clopidogrel Tablet 75 milliGRAM(s) Oral daily  dextrose 5%. 1000 milliLiter(s) (100 mL/Hr) IV Continuous <Continuous>  dextrose 5%. 1000 milliLiter(s) (50 mL/Hr) IV Continuous <Continuous>  dextrose 50% Injectable 25 Gram(s) IV Push once  dextrose 50% Injectable 12.5 Gram(s) IV Push once  dextrose 50% Injectable 25 Gram(s) IV Push once  enoxaparin Injectable 40 milliGRAM(s) SubCutaneous every 24 hours  famotidine    Tablet 20 milliGRAM(s) Oral daily  glucagon  Injectable 1 milliGRAM(s) IntraMuscular once  hydrochlorothiazide 25 milliGRAM(s) Oral daily  insulin lispro (ADMELOG) corrective regimen sliding scale   SubCutaneous three times a day before meals  lisinopril 40 milliGRAM(s) Oral daily  metoprolol tartrate 50 milliGRAM(s) Oral two times a day  niCARdipine Infusion 5 mG/Hr (25 mL/Hr) IV Continuous <Continuous>    MEDICATIONS  (PRN):  dextrose Oral Gel 15 Gram(s) Oral once PRN Blood Glucose LESS THAN 70 milliGRAM(s)/deciliter  melatonin 5 milliGRAM(s) Oral at bedtime PRN Insomnia    Home Medications:  famotidine 40 mg oral tablet: 1 tab(s) orally once a day (at bedtime) (04 Aug 2022 08:06)  hydroCHLOROthiazide 25 mg oral tablet: 1 tab(s) orally once a day (04 Aug 2022 08:05)  Lantus 100 units/mL subcutaneous solution: 30 unit(s) subcutaneous once a day (at bedtime) (04 Aug 2022 08:01)  metoprolol tartrate 50 mg oral tablet: 1 tab(s) orally 2 times a day (04 Aug 2022 08:06)  ramipril 10 mg oral capsule: 1 cap(s) orally once a day (04 Aug 2022 08:01)  simvastatin 40 mg oral tablet: 1 tab(s) orally once a day (at bedtime) (04 Aug 2022 08:06)          Vital Signs Last 24 Hrs  T(C): 36.8 (04 Aug 2022 07:48), Max: 36.8 (04 Aug 2022 03:56)  T(F): 98.3 (04 Aug 2022 07:48), Max: 98.3 (04 Aug 2022 03:56)  HR: 94 (04 Aug 2022 09:06) (94 - 110)  BP: 173/97 (04 Aug 2022 09:06) (106/79 - 186/103)  BP(mean): 122 (04 Aug 2022 09:06) (90 - 148)  RR: 18 (04 Aug 2022 09:06) (16 - 24)  SpO2: 96% (04 Aug 2022 09:06) (93% - 99%)    Parameters below as of 04 Aug 2022 09:06  Patient On (Oxygen Delivery Method): room air      CAPILLARY BLOOD GLUCOSE      POCT Blood Glucose.: 190 mg/dL (04 Aug 2022 08:14)  POCT Blood Glucose.: 189 mg/dL (03 Aug 2022 21:12)  POCT Blood Glucose.: 137 mg/dL (03 Aug 2022 16:55)      General: NAD. Looks stated age.  HEENT: clean oropharynx, EOMI, no LAD  Neck: trachea midline, no thyromegaly  CV: nl S1 S2; no m/r/g  Resp: decreased breath sounds at base  GI: NT/ND/S +BS  MS: no clubbing/cyanosis/edema, +pulses  Neuro: Ry side drift, sensory intact; + reflexes  Skin: no rashes, nl turgor  Psychiatric: AA0x3 w/ intact insight and judgement    tele: SR, nonspecific changes (on my own evaluation of tele monitor)        LABS:                        14.6   9.11  )-----------( 190      ( 03 Aug 2022 08:28 )             41.5     08-03    139  |  101  |  18  ----------------------------<  142<H>  3.4<L>   |  26  |  1.2    Ca    9.1      03 Aug 2022 08:28    TPro  7.0  /  Alb  4.1  /  TBili  0.9  /  DBili  x   /  AST  14  /  ALT  13  /  AlkPhos  96  08-03    LIVER FUNCTIONS - ( 03 Aug 2022 08:28 )  Alb: 4.1 g/dL / Pro: 7.0 g/dL / ALK PHOS: 96 U/L / ALT: 13 U/L / AST: 14 U/L / GGT: x           CARDIAC MARKERS ( 03 Aug 2022 08:28 )  x     / <0.01 ng/mL / x     / x     / x          PT/INR - ( 03 Aug 2022 08:28 )   PT: 11.20 sec;   INR: 0.97 ratio         PTT - ( 03 Aug 2022 08:28 )  PTT:29.6 sec          EKG - SR, nonspecific changes (my read)  Chart and consultant noted personally reviewed.  Care Discussed with Consultants/Other Providers/ Housestaff [ x] YES [ ] NO   Radiology, labs, old records personally reviewed.           MARIA M CROFT  77y  Male    Patient is a 77y old  Male who presents with a chief complaint of RUE weakness    HPI:  Patient is a 77 year old Male with HTN, DM, HLD, GERD, CAD with stents who presents with a chief complaint of RUE weakness, L facial, slurred speech, presents to the ED BIBEMS as a Stroke activation at 8:14am. NIH at the time was 2 for RUE and RLE drift. Facial and slurred speech at this point has resolved. mRS of 0. CTH was completed and was negative for bleed. CTA/P negative for perfusion deficit or LVO. Patient only takes ASA 81 at home for heart ppx. Denies any neurologic history or stroke in the past. tPA given at 8:48am as well as started on Cardene for BP control. BP prior to tPA administration was 183/93.    (03 Aug 2022 11:22)    S: Patient was examined and seen at bedside. This morning pt is resting comfortably in bed and reports no new issues or overnight events. No new complaints. Daughter at bedside.  Denies CP, SOB, N/V/D/C/AP, cough, F, chills, dizziness, new focal weakness, HA, vision changes, dysuria, or urinary symptoms, blood in stool.  ROS: all other systems reviewed and are negative    PAST MEDICAL & SURGICAL HISTORY:  Hypertension      Diabetes      Hyperlipidemia      GERD (gastroesophageal reflux disease)      Coronary artery disease        SOCIAL HISTORY:  Tobacco: denies  Illicit drugs: denies  Alcohol: social  Family history reviewed and otherwise non-contributory No clotting disorders, CVAs at early age.  ALLERGIES: NKDA    MEDICATIONS  (STANDING):  aspirin enteric coated 81 milliGRAM(s) Oral daily  atorvastatin 80 milliGRAM(s) Oral at bedtime  clopidogrel Tablet 75 milliGRAM(s) Oral daily  dextrose 5%. 1000 milliLiter(s) (100 mL/Hr) IV Continuous <Continuous>  dextrose 5%. 1000 milliLiter(s) (50 mL/Hr) IV Continuous <Continuous>  dextrose 50% Injectable 25 Gram(s) IV Push once  dextrose 50% Injectable 12.5 Gram(s) IV Push once  dextrose 50% Injectable 25 Gram(s) IV Push once  enoxaparin Injectable 40 milliGRAM(s) SubCutaneous every 24 hours  famotidine    Tablet 20 milliGRAM(s) Oral daily  glucagon  Injectable 1 milliGRAM(s) IntraMuscular once  hydrochlorothiazide 25 milliGRAM(s) Oral daily  insulin lispro (ADMELOG) corrective regimen sliding scale   SubCutaneous three times a day before meals  lisinopril 40 milliGRAM(s) Oral daily  metoprolol tartrate 50 milliGRAM(s) Oral two times a day  niCARdipine Infusion 5 mG/Hr (25 mL/Hr) IV Continuous <Continuous>    MEDICATIONS  (PRN):  dextrose Oral Gel 15 Gram(s) Oral once PRN Blood Glucose LESS THAN 70 milliGRAM(s)/deciliter  melatonin 5 milliGRAM(s) Oral at bedtime PRN Insomnia    Home Medications:  famotidine 40 mg oral tablet: 1 tab(s) orally once a day (at bedtime) (04 Aug 2022 08:06)  hydroCHLOROthiazide 25 mg oral tablet: 1 tab(s) orally once a day (04 Aug 2022 08:05)  Lantus 100 units/mL subcutaneous solution: 30 unit(s) subcutaneous once a day (at bedtime) (04 Aug 2022 08:01)  metoprolol tartrate 50 mg oral tablet: 1 tab(s) orally 2 times a day (04 Aug 2022 08:06)  ramipril 10 mg oral capsule: 1 cap(s) orally once a day (04 Aug 2022 08:01)  simvastatin 40 mg oral tablet: 1 tab(s) orally once a day (at bedtime) (04 Aug 2022 08:06)          Vital Signs Last 24 Hrs  T(C): 36.8 (04 Aug 2022 07:48), Max: 36.8 (04 Aug 2022 03:56)  T(F): 98.3 (04 Aug 2022 07:48), Max: 98.3 (04 Aug 2022 03:56)  HR: 94 (04 Aug 2022 09:06) (94 - 110)  BP: 173/97 (04 Aug 2022 09:06) (106/79 - 186/103)  BP(mean): 122 (04 Aug 2022 09:06) (90 - 148)  RR: 18 (04 Aug 2022 09:06) (16 - 24)  SpO2: 96% (04 Aug 2022 09:06) (93% - 99%)    Parameters below as of 04 Aug 2022 09:06  Patient On (Oxygen Delivery Method): room air      CAPILLARY BLOOD GLUCOSE      POCT Blood Glucose.: 190 mg/dL (04 Aug 2022 08:14)  POCT Blood Glucose.: 189 mg/dL (03 Aug 2022 21:12)  POCT Blood Glucose.: 137 mg/dL (03 Aug 2022 16:55)      General: NAD. Looks stated age.  HEENT: clean oropharynx, EOMI, no LAD  Neck: trachea midline, no thyromegaly  CV: nl S1 S2; no m/r/g  Resp: decreased breath sounds at base  GI: NT/ND/S +BS  MS: no clubbing/cyanosis/edema, +pulses  Neuro: Ry side drift, sensory intact; + reflexes  Skin: no rashes, nl turgor  Psychiatric: AA0x3 w/ intact insight and judgement    tele: SR, nonspecific changes (on my own evaluation of tele monitor)        LABS:                        14.6   9.11  )-----------( 190      ( 03 Aug 2022 08:28 )             41.5     08-03    139  |  101  |  18  ----------------------------<  142<H>  3.4<L>   |  26  |  1.2    Ca    9.1      03 Aug 2022 08:28    TPro  7.0  /  Alb  4.1  /  TBili  0.9  /  DBili  x   /  AST  14  /  ALT  13  /  AlkPhos  96  08-03    LIVER FUNCTIONS - ( 03 Aug 2022 08:28 )  Alb: 4.1 g/dL / Pro: 7.0 g/dL / ALK PHOS: 96 U/L / ALT: 13 U/L / AST: 14 U/L / GGT: x           CARDIAC MARKERS ( 03 Aug 2022 08:28 )  x     / <0.01 ng/mL / x     / x     / x          PT/INR - ( 03 Aug 2022 08:28 )   PT: 11.20 sec;   INR: 0.97 ratio         PTT - ( 03 Aug 2022 08:28 )  PTT:29.6 sec          EKG - SR, nonspecific changes (my read)  Chart and consultant noted personally reviewed.  Care Discussed with Consultants/Other Providers/ Housestaff [ x] YES [ ] NO   Radiology, labs, old records personally reviewed.

## 2022-08-04 NOTE — CONSULT NOTE ADULT - ASSESSMENT
77 year old patient, known to have CAD s/p 3 stents (at [employer]), HTN, DM, HLD and GERD, presented with RUE and left facial weakness with slurred speech. Code stroke was activated and CT imaging negative for acute pathology. On admission, he had hypertensive urgency and he is s/p Cardene drip.   Patient denies CP, SOB, palpitations. No headache, light-headedness or pre-syncope/syncope.     Impression  Stroke   Hypertensive urgency s/p Cardene drip   CAD s/p 3 stents (at [employer])  HTN, DM, HLD     Plan  - CT head, CTP and CTA H and N: negative for acute pathology  - TTE pending report  - Will do EDSON to rule out cardioembolic cause   - c/w current anti-hypertensives     INCOMPLETE NOTE  77 year old patient, known to have CAD s/p 3 stents (at [employer]), HTN, DM, HLD and GERD, presented with RUE and left facial weakness with slurred speech. Code stroke was activated and CT imaging negative for acute pathology. On admission, he had hypertensive urgency and he is s/p Cardene drip.   Patient denies CP, SOB, palpitations. No headache, light-headedness or pre-syncope/syncope. No dysphagia.     Impression  Stroke   Hypertensive urgency s/p Cardene drip   CAD s/p 3 stents (at [employer])  HTN, DM, HLD     Plan  - CT head, CTP and CTA H and N: negative for acute pathology  - TTE: EF 59%  - Will proceed with EDSON tomorrow to rule out cardioembolic cause   - Keep NPO after midnight   - c/w current anti-hypertensives

## 2022-08-04 NOTE — PHYSICAL THERAPY INITIAL EVALUATION ADULT - PERTINENT HX OF CURRENT PROBLEM, REHAB EVAL
Patient is a 77 year old Male with HTN, DM, HLD, GERD, CAD with stents who presents with a chief complaint of RUE weakness, L facial, slurred speech, presents to the ED BIBEMS as a Stroke activation at 8:14am. Facial and slurred speech at this point has resolved.

## 2022-08-04 NOTE — PROGRESS NOTE ADULT - ATTENDING COMMENTS
Pt is a 78 yo M, right handed, with PMHx of HTN, HLD  CAD s/p PCI, who presented with right arm weakness and slurred speech.    Impr: RUE plegia, suspicion for left frontal cortical acute ischemic stroke. S/p IV tPA on 8/3  MRI brain pending  24 hr f/u CT head with ICH, resume home DAPT  TTE unrevealing, plan for EDSON and likely ILR placement  PT/OT/ST, tele, -160

## 2022-08-04 NOTE — CONSULT NOTE ADULT - ATTENDING COMMENTS
pt seen and examined  risks and benefits of EDSON d/w the pt  keep npo after midnight  COVID PCR within 72 hours

## 2022-08-04 NOTE — CONSULT NOTE ADULT - ASSESSMENT
This is a 77 year old Male with PMHx HTN, DM, CAD with stents who presents with a chief complaint of RUE weakness, L facial, slurred speech, presents to the ED BIBEMS as a Stroke activation at 8:14am. NIH at the time was 2 for RUE and RLE drift. Facial and slurred speech at this point has resolved. mRS of 0. CTH was completed and was negative for bleed. CTA/P negative for perfusion deficit or LVO. Patient only takes ASA 81 and PLX 75 at home for heart ppx. Denies any neurologic history or stroke in the past. tPA given at 8:48am 8/3 as well as started on Cardene and 40 mg labetalol for BP control. BP prior to tPA administration was 183/93. Of note CTH shows chronic L occipital infarct. Patient admitted to 3E stroke unit for rest of neurologic work up and post tPA monitoring.    #Acute ischemic stroke s/p tPA  - Admitted to 3E stroke unit for monitoring  - atorvastatin 80 once daily  - Notify provider if patient passes dysphasgia screen able to have diet if no pending intervention  - Keep BP <180/105, notify provider if out of range  - repeat CTH stable  - MRI brain without contrast pending  - TTE with bubble pending, may eventually need EDSON  - PT/OT eval  - Stroke education  -antiplatelets as per neuro  -tele    #DM w/ hyperglycemia  -Insulin  -monitor FS. Goal 140-180 inpt  -A1C=8.2    #HTN  -permissive HTN for now w/ gradual lowering     #HLD  -HD statin  -LDL pending  -pt counselled    #CAD  -cont antiplatelets, BB, statin      DVT ppx - SCDs, Lovenox    #Progress Note Handoff  Pending: Consults____Clinical improvement and stability__x___Tests__MRI, TTE______PT____x____  Pt/Family discussion: Pt/daughter informed and agree with the current plan  Disposition: Home______/SNF_______/4A______/To be determined____x____    My note supersedes the residents note should a discrepancy arise.    Chart and notes personally reviewed.  Care Discussed with Consultants/Other Providers/ Housestaff [ x] YES [ ] NO   Radiology, labs, old records personally reviewed.    discussed w/ housestaff, nursing, case management, neuro team, daughter

## 2022-08-04 NOTE — CONSULT NOTE ADULT - SUBJECTIVE AND OBJECTIVE BOX
Outpt cardiologist:    HPI:  Patient is a 77 year old Male with HTN, DM, HLD, GERD, CAD with stents who presents with a chief complaint of RUE weakness, L facial, slurred speech, presents to the ED BIBEMS as a Stroke activation at 8:14am. NIH at the time was 2 for RUE and RLE drift. Facial and slurred speech at this point has resolved. mRS of 0. CTH was completed and was negative for bleed. CTA/P negative for perfusion deficit or LVO. Patient only takes ASA 81 at home for heart ppx. Denies any neurologic history or stroke in the past. tPA given at 8:48am as well as started on Cardene for BP control. BP prior to tPA administration was 183/93.    (03 Aug 2022 11:22)      ---  cardio fellow additional notes:        PAST MEDICAL & SURGICAL HISTORY  Hypertension    Diabetes    Hyperlipidemia    GERD (gastroesophageal reflux disease)    Coronary artery disease        FAMILY HISTORY:  FAMILY HISTORY:      SOCIAL HISTORY:  Social History:  Social History: (-) x 3 (03 Aug 2022 11:22)      ALLERGIES:  No Known Allergies      MEDICATIONS:  aspirin enteric coated 81 milliGRAM(s) Oral daily  atorvastatin 80 milliGRAM(s) Oral at bedtime  clopidogrel Tablet 75 milliGRAM(s) Oral daily  dextrose 5%. 1000 milliLiter(s) (100 mL/Hr) IV Continuous <Continuous>  dextrose 5%. 1000 milliLiter(s) (50 mL/Hr) IV Continuous <Continuous>  dextrose 50% Injectable 25 Gram(s) IV Push once  dextrose 50% Injectable 12.5 Gram(s) IV Push once  dextrose 50% Injectable 25 Gram(s) IV Push once  enoxaparin Injectable 40 milliGRAM(s) SubCutaneous every 24 hours  famotidine    Tablet 20 milliGRAM(s) Oral daily  glucagon  Injectable 1 milliGRAM(s) IntraMuscular once  hydrochlorothiazide 25 milliGRAM(s) Oral daily  insulin lispro (ADMELOG) corrective regimen sliding scale   SubCutaneous three times a day before meals  lisinopril 40 milliGRAM(s) Oral daily  metoprolol tartrate 50 milliGRAM(s) Oral two times a day    PRN:  dextrose Oral Gel 15 Gram(s) Oral once PRN  melatonin 5 milliGRAM(s) Oral at bedtime PRN      HOME MEDICATIONS:  Home Medications:  famotidine 40 mg oral tablet: 1 tab(s) orally once a day (at bedtime) (04 Aug 2022 08:06)  hydroCHLOROthiazide 25 mg oral tablet: 1 tab(s) orally once a day (04 Aug 2022 08:05)  Lantus 100 units/mL subcutaneous solution: 30 unit(s) subcutaneous once a day (at bedtime) (04 Aug 2022 08:01)  metoprolol tartrate 50 mg oral tablet: 1 tab(s) orally 2 times a day (04 Aug 2022 08:06)  ramipril 10 mg oral capsule: 1 cap(s) orally once a day (04 Aug 2022 08:01)  simvastatin 40 mg oral tablet: 1 tab(s) orally once a day (at bedtime) (04 Aug 2022 08:06)      VITALS:   T(F): 98.3 (08-04 @ 07:48), Max: 98.3 (08-04 @ 03:56)  HR: 94 (08-04 @ 09:06) (78 - 110)  BP: 173/97 (08-04 @ 09:06) (106/79 - 205/101)  BP(mean): 122 (08-04 @ 09:06) (90 - 148)  RR: 18 (08-04 @ 09:06) (16 - 25)  SpO2: 96% (08-04 @ 09:06) (93% - 99%)    I&O's Summary    03 Aug 2022 07:01  -  04 Aug 2022 07:00  --------------------------------------------------------  IN: 0 mL / OUT: 450 mL / NET: -450 mL    04 Aug 2022 07:01  -  04 Aug 2022 13:18  --------------------------------------------------------  IN: 0 mL / OUT: 525 mL / NET: -525 mL        REVIEW OF SYSTEMS:  CONSTITUTIONAL: No weakness, fevers or chills  HEENT: No visual changes, neck/ear pain  RESPIRATORY: No cough, sob  CARDIOVASCULAR: See HPI  GASTROINTESTINAL: No abdominal pain. No nausea, vomiting, diarrhea   GENITOURINARY: No dysuria, frequency or hematuria  NEUROLOGICAL: No new focal deficits  SKIN: No new rashes    PHYSICAL EXAM:  General: Not in distress.  Non-toxic appearing.  Cardio: regular, S1, S2, no murmur  Pulm: B/L BS.  No wheezing / crackles / rales  Abdomen: Soft, non-tender, non-distended  Extremities: No edema b/l le  Neuro: A&O x3. Right sided UE weakness; 0/5 motor power RUE compared to LUE      LABS:                        14.6   9.11  )-----------( 190      ( 03 Aug 2022 08:28 )             41.5     08-03    139  |  101  |  18  ----------------------------<  142<H>  3.4<L>   |  26  |  1.2    Ca    9.1      03 Aug 2022 08:28    TPro  7.0  /  Alb  4.1  /  TBili  0.9  /  DBili  x   /  AST  14  /  ALT  13  /  AlkPhos  96  08-03    PT/INR - ( 03 Aug 2022 08:28 )   PT: 11.20 sec;   INR: 0.97 ratio         PTT - ( 03 Aug 2022 08:28 )  PTT:29.6 sec    CARDIAC MARKERS ( 03 Aug 2022 08:28 )  x     / <0.01 ng/mL / x     / x     / x            Troponin trend:        COVID-19 PCR: NotDetec (03 Aug 2022 09:03)      RADIOLOGY:  -CXR: none  -TTE: pending   -CCTA: none  -STRESS TEST: none  -CATHETERIZATION: not available  -OTHER:  ECG:      TELEMETRY EVENTS:   Outpt cardiologist:    HPI:  Patient is a 77 year old Male with HTN, DM, HLD, GERD, CAD with stents who presents with a chief complaint of RUE weakness, L facial, slurred speech, presents to the ED BIBEMS as a Stroke activation at 8:14am. NIH at the time was 2 for RUE and RLE drift. Facial and slurred speech at this point has resolved. mRS of 0. CTH was completed and was negative for bleed. CTA/P negative for perfusion deficit or LVO. Patient only takes ASA 81 at home for heart ppx. Denies any neurologic history or stroke in the past. tPA given at 8:48am as well as started on Cardene for BP control. BP prior to tPA administration was 183/93.    (03 Aug 2022 11:22)        cardio fellow additional notes:        PAST MEDICAL & SURGICAL HISTORY  Hypertension    Diabetes    Hyperlipidemia    GERD (gastroesophageal reflux disease)    Coronary artery disease        FAMILY HISTORY:  FAMILY HISTORY: NA      SOCIAL HISTORY:  Social History:  Social History: (-) x 3 (03 Aug 2022 11:22)      ALLERGIES:  No Known Allergies      MEDICATIONS:  aspirin enteric coated 81 milliGRAM(s) Oral daily  atorvastatin 80 milliGRAM(s) Oral at bedtime  clopidogrel Tablet 75 milliGRAM(s) Oral daily  dextrose 5%. 1000 milliLiter(s) (100 mL/Hr) IV Continuous <Continuous>  dextrose 5%. 1000 milliLiter(s) (50 mL/Hr) IV Continuous <Continuous>  dextrose 50% Injectable 25 Gram(s) IV Push once  dextrose 50% Injectable 12.5 Gram(s) IV Push once  dextrose 50% Injectable 25 Gram(s) IV Push once  enoxaparin Injectable 40 milliGRAM(s) SubCutaneous every 24 hours  famotidine    Tablet 20 milliGRAM(s) Oral daily  glucagon  Injectable 1 milliGRAM(s) IntraMuscular once  hydrochlorothiazide 25 milliGRAM(s) Oral daily  insulin lispro (ADMELOG) corrective regimen sliding scale   SubCutaneous three times a day before meals  lisinopril 40 milliGRAM(s) Oral daily  metoprolol tartrate 50 milliGRAM(s) Oral two times a day    PRN:  dextrose Oral Gel 15 Gram(s) Oral once PRN  melatonin 5 milliGRAM(s) Oral at bedtime PRN      HOME MEDICATIONS:  Home Medications:  famotidine 40 mg oral tablet: 1 tab(s) orally once a day (at bedtime) (04 Aug 2022 08:06)  hydroCHLOROthiazide 25 mg oral tablet: 1 tab(s) orally once a day (04 Aug 2022 08:05)  Lantus 100 units/mL subcutaneous solution: 30 unit(s) subcutaneous once a day (at bedtime) (04 Aug 2022 08:01)  metoprolol tartrate 50 mg oral tablet: 1 tab(s) orally 2 times a day (04 Aug 2022 08:06)  ramipril 10 mg oral capsule: 1 cap(s) orally once a day (04 Aug 2022 08:01)  simvastatin 40 mg oral tablet: 1 tab(s) orally once a day (at bedtime) (04 Aug 2022 08:06)      VITALS:   T(F): 98.3 (08-04 @ 07:48), Max: 98.3 (08-04 @ 03:56)  HR: 94 (08-04 @ 09:06) (78 - 110)  BP: 173/97 (08-04 @ 09:06) (106/79 - 205/101)  BP(mean): 122 (08-04 @ 09:06) (90 - 148)  RR: 18 (08-04 @ 09:06) (16 - 25)  SpO2: 96% (08-04 @ 09:06) (93% - 99%)    I&O's Summary    03 Aug 2022 07:01  -  04 Aug 2022 07:00  --------------------------------------------------------  IN: 0 mL / OUT: 450 mL / NET: -450 mL    04 Aug 2022 07:01  -  04 Aug 2022 13:18  --------------------------------------------------------  IN: 0 mL / OUT: 525 mL / NET: -525 mL        REVIEW OF SYSTEMS:  CONSTITUTIONAL: No weakness, fevers or chills  HEENT: No visual changes, neck/ear pain  RESPIRATORY: No cough, sob  CARDIOVASCULAR: See HPI  GASTROINTESTINAL: No abdominal pain. No nausea, vomiting, diarrhea   GENITOURINARY: No dysuria, frequency or hematuria  NEUROLOGICAL: RUE weakness  SKIN: No new rashes    PHYSICAL EXAM:  General: Not in distress.  Non-toxic appearing.  Cardio: regular, S1, S2, no murmur  Pulm: B/L BS.  No wheezing / crackles / rales  Abdomen: Soft, non-tender, non-distended  Extremities: No edema b/l le  Neuro: A&O x3. Right sided UE weakness; 0/5 motor power RUE compared to LUE      LABS:                        14.6   9.11  )-----------( 190      ( 03 Aug 2022 08:28 )             41.5     08-03    139  |  101  |  18  ----------------------------<  142<H>  3.4<L>   |  26  |  1.2    Ca    9.1      03 Aug 2022 08:28    TPro  7.0  /  Alb  4.1  /  TBili  0.9  /  DBili  x   /  AST  14  /  ALT  13  /  AlkPhos  96  08-03    PT/INR - ( 03 Aug 2022 08:28 )   PT: 11.20 sec;   INR: 0.97 ratio         PTT - ( 03 Aug 2022 08:28 )  PTT:29.6 sec    CARDIAC MARKERS ( 03 Aug 2022 08:28 )  x     / <0.01 ng/mL / x     / x     / x            Troponin trend:        COVID-19 PCR: NotDetec (03 Aug 2022 09:03)      RADIOLOGY:  -CXR: none  -TTE: EF 59%  -CCTA: none  -STRESS TEST: none  -CATHETERIZATION: not available  -OTHER:  ECG:      TELEMETRY EVENTS:

## 2022-08-04 NOTE — PROGRESS NOTE ADULT - SUBJECTIVE AND OBJECTIVE BOX
Neurology Progress Note    Interval History:  The patient was seen and examined at the bedside. There were no acute overnight events. NIH was 2 for RUE and RLE drift.       PAST MEDICAL & SURGICAL HISTORY:  Hypertension    Diabetes    Hyperlipidemia    GERD (gastroesophageal reflux disease)            Medications:  atorvastatin 80 milliGRAM(s) Oral at bedtime  dextrose 5%. 1000 milliLiter(s) IV Continuous <Continuous>  dextrose 5%. 1000 milliLiter(s) IV Continuous <Continuous>  dextrose 50% Injectable 25 Gram(s) IV Push once  dextrose 50% Injectable 12.5 Gram(s) IV Push once  dextrose 50% Injectable 25 Gram(s) IV Push once  dextrose Oral Gel 15 Gram(s) Oral once PRN  famotidine    Tablet 20 milliGRAM(s) Oral daily  glucagon  Injectable 1 milliGRAM(s) IntraMuscular once  hydrochlorothiazide 25 milliGRAM(s) Oral daily  insulin lispro (ADMELOG) corrective regimen sliding scale   SubCutaneous three times a day before meals  lisinopril 40 milliGRAM(s) Oral daily  melatonin 5 milliGRAM(s) Oral at bedtime PRN  metoprolol tartrate 50 milliGRAM(s) Oral two times a day  niCARdipine Infusion 5 mG/Hr IV Continuous <Continuous>      Vital Signs Last 24 Hrs  T(C): 36.8 (04 Aug 2022 07:48), Max: 36.8 (04 Aug 2022 03:56)  T(F): 98.3 (04 Aug 2022 07:48), Max: 98.3 (04 Aug 2022 03:56)  HR: 100 (04 Aug 2022 07:55) (87 - 110)  BP: 186/103 (04 Aug 2022 07:55) (106/79 - 205/101)  BP(mean): 148 (04 Aug 2022 07:55) (90 - 148)  RR: 18 (04 Aug 2022 07:55) (16 - 25)  SpO2: 94% (04 Aug 2022 07:55) (93% - 99%)    Parameters below as of 04 Aug 2022 07:48  Patient On (Oxygen Delivery Method): room air        Neurological Exam:   Mental status: Awake, alert and oriented x3.  Naming, repetition and comprehension intact.  No dysarthria, no aphasia.    Cranial nerves: Pupils equally round and reactive to light, visual fields full, no nystagmus, extraocular muscles intact, V1 through V3 intact bilaterally and symmetric, face symmetric, hearing intact to finger rub, palate elevation symmetric, tongue was midline.  Motor:  MRC grading 5/5 b/l UE/LE.   strength 5/5.  Normal tone and bulk.  No abnormal movements.    Sensation: Intact to light touch, proprioception, and pinprick.   Coordination: No dysmetria on finger-to-nose and heel-to-shin.  No dysdiadokinesia.  Reflexes: 2+ in bilateral UE/LE, downgoing toes bilaterally. (-) Beltre.  Gait: Narrow and steady. No ataxia.  Romberg negative  RUE and RLE drift   NIH 2     Labs:  CBC Full  -  ( 03 Aug 2022 08:28 )  WBC Count : 9.11 K/uL  RBC Count : 4.99 M/uL  Hemoglobin : 14.6 g/dL  Hematocrit : 41.5 %  Platelet Count - Automated : 190 K/uL  Mean Cell Volume : 83.2 fL  Mean Cell Hemoglobin : 29.3 pg  Mean Cell Hemoglobin Concentration : 35.2 g/dL  Auto Neutrophil # : 5.41 K/uL  Auto Lymphocyte # : 2.29 K/uL  Auto Monocyte # : 1.02 K/uL  Auto Eosinophil # : 0.32 K/uL  Auto Basophil # : 0.05 K/uL  Auto Neutrophil % : 59.5 %  Auto Lymphocyte % : 25.1 %  Auto Monocyte % : 11.2 %  Auto Eosinophil % : 3.5 %  Auto Basophil % : 0.5 %    08-03    139  |  101  |  18  ----------------------------<  142<H>  3.4<L>   |  26  |  1.2    Ca    9.1      03 Aug 2022 08:28    TPro  7.0  /  Alb  4.1  /  TBili  0.9  /  DBili  x   /  AST  14  /  ALT  13  /  AlkPhos  96  08-03    LIVER FUNCTIONS - ( 03 Aug 2022 08:28 )  Alb: 4.1 g/dL / Pro: 7.0 g/dL / ALK PHOS: 96 U/L / ALT: 13 U/L / AST: 14 U/L / GGT: x           PT/INR - ( 03 Aug 2022 08:28 )   PT: 11.20 sec;   INR: 0.97 ratio         PTT - ( 03 Aug 2022 08:28 )  PTT:29.6 sec       Neurology Progress Note    Interval History:  The patient was seen and examined at the bedside. There were no acute overnight events. This morning reports inability to lift R arm or keep it up for long. Otherwise denies any other issues.     PAST MEDICAL & SURGICAL HISTORY:  Hypertension    Diabetes    Hyperlipidemia    GERD (gastroesophageal reflux disease)      Medications:  atorvastatin 80 milliGRAM(s) Oral at bedtime  dextrose 5%. 1000 milliLiter(s) IV Continuous <Continuous>  dextrose 5%. 1000 milliLiter(s) IV Continuous <Continuous>  dextrose 50% Injectable 25 Gram(s) IV Push once  dextrose 50% Injectable 12.5 Gram(s) IV Push once  dextrose 50% Injectable 25 Gram(s) IV Push once  dextrose Oral Gel 15 Gram(s) Oral once PRN  famotidine    Tablet 20 milliGRAM(s) Oral daily  glucagon  Injectable 1 milliGRAM(s) IntraMuscular once  hydrochlorothiazide 25 milliGRAM(s) Oral daily  insulin lispro (ADMELOG) corrective regimen sliding scale   SubCutaneous three times a day before meals  lisinopril 40 milliGRAM(s) Oral daily  melatonin 5 milliGRAM(s) Oral at bedtime PRN  metoprolol tartrate 50 milliGRAM(s) Oral two times a day  niCARdipine Infusion 5 mG/Hr IV Continuous <Continuous>      Vital Signs Last 24 Hrs  T(C): 36.8 (04 Aug 2022 07:48), Max: 36.8 (04 Aug 2022 03:56)  T(F): 98.3 (04 Aug 2022 07:48), Max: 98.3 (04 Aug 2022 03:56)  HR: 100 (04 Aug 2022 07:55) (87 - 110)  BP: 186/103 (04 Aug 2022 07:55) (106/79 - 205/101)  BP(mean): 148 (04 Aug 2022 07:55) (90 - 148)  RR: 18 (04 Aug 2022 07:55) (16 - 25)  SpO2: 94% (04 Aug 2022 07:55) (93% - 99%)    Parameters below as of 04 Aug 2022 07:48  Patient On (Oxygen Delivery Method): room air            Neurological Exam:   Mental status: Awake, alert and oriented x3.  Naming, repetition and comprehension intact.  No dysarthria, no aphasia.    Cranial nerves: Pupils equally round and reactive to light, visual fields full, no nystagmus, extraocular muscles intact, V1 through V3 intact bilaterally and symmetric, face symmetric, hearing intact to finger rub, palate elevation symmetric, tongue was midline.  Motor:  MRC grading 5/5 b/l UE/LE.   strength 5/5.  Normal tone and bulk.  No abnormal movements.    Sensation: Intact to light touch, proprioception, and pinprick.   Coordination: No dysmetria on finger-to-nose and heel-to-shin.  No dysdiadokinesia.  Reflexes: 2+ in bilateral UE/LE, downgoing toes bilaterally. (-) Beltre.  Gait: Narrow and steady. No ataxia.  Romberg negative  RUE and RLE drift   NIH 2     Labs:  CBC Full  -  ( 03 Aug 2022 08:28 )  WBC Count : 9.11 K/uL  RBC Count : 4.99 M/uL  Hemoglobin : 14.6 g/dL  Hematocrit : 41.5 %  Platelet Count - Automated : 190 K/uL  Mean Cell Volume : 83.2 fL  Mean Cell Hemoglobin : 29.3 pg  Mean Cell Hemoglobin Concentration : 35.2 g/dL  Auto Neutrophil # : 5.41 K/uL  Auto Lymphocyte # : 2.29 K/uL  Auto Monocyte # : 1.02 K/uL  Auto Eosinophil # : 0.32 K/uL  Auto Basophil # : 0.05 K/uL  Auto Neutrophil % : 59.5 %  Auto Lymphocyte % : 25.1 %  Auto Monocyte % : 11.2 %  Auto Eosinophil % : 3.5 %  Auto Basophil % : 0.5 %    08-03    139  |  101  |  18  ----------------------------<  142<H>  3.4<L>   |  26  |  1.2    Ca    9.1      03 Aug 2022 08:28    TPro  7.0  /  Alb  4.1  /  TBili  0.9  /  DBili  x   /  AST  14  /  ALT  13  /  AlkPhos  96  08-03    LIVER FUNCTIONS - ( 03 Aug 2022 08:28 )  Alb: 4.1 g/dL / Pro: 7.0 g/dL / ALK PHOS: 96 U/L / ALT: 13 U/L / AST: 14 U/L / GGT: x           PT/INR - ( 03 Aug 2022 08:28 )   PT: 11.20 sec;   INR: 0.97 ratio         PTT - ( 03 Aug 2022 08:28 )  PTT:29.6 sec       Neurology Progress Note    Interval History:  The patient was seen and examined at the bedside. There were no acute overnight events. This morning reports inability to lift R arm or keep it up for long. Otherwise denies any other issues.     PAST MEDICAL & SURGICAL HISTORY:  Hypertension    Diabetes    Hyperlipidemia    GERD (gastroesophageal reflux disease)      Medications:  atorvastatin 80 milliGRAM(s) Oral at bedtime  dextrose 5%. 1000 milliLiter(s) IV Continuous <Continuous>  dextrose 5%. 1000 milliLiter(s) IV Continuous <Continuous>  dextrose 50% Injectable 25 Gram(s) IV Push once  dextrose 50% Injectable 12.5 Gram(s) IV Push once  dextrose 50% Injectable 25 Gram(s) IV Push once  dextrose Oral Gel 15 Gram(s) Oral once PRN  famotidine    Tablet 20 milliGRAM(s) Oral daily  glucagon  Injectable 1 milliGRAM(s) IntraMuscular once  hydrochlorothiazide 25 milliGRAM(s) Oral daily  insulin lispro (ADMELOG) corrective regimen sliding scale   SubCutaneous three times a day before meals  lisinopril 40 milliGRAM(s) Oral daily  melatonin 5 milliGRAM(s) Oral at bedtime PRN  metoprolol tartrate 50 milliGRAM(s) Oral two times a day  niCARdipine Infusion 5 mG/Hr IV Continuous <Continuous>      Vital Signs Last 24 Hrs  T(C): 36.8 (04 Aug 2022 07:48), Max: 36.8 (04 Aug 2022 03:56)  T(F): 98.3 (04 Aug 2022 07:48), Max: 98.3 (04 Aug 2022 03:56)  HR: 100 (04 Aug 2022 07:55) (87 - 110)  BP: 186/103 (04 Aug 2022 07:55) (106/79 - 205/101)  BP(mean): 148 (04 Aug 2022 07:55) (90 - 148)  RR: 18 (04 Aug 2022 07:55) (16 - 25)  SpO2: 94% (04 Aug 2022 07:55) (93% - 99%)    Parameters below as of 04 Aug 2022 07:48  Patient On (Oxygen Delivery Method): room air      Neurological Exam:   General: No acute distress, awake and alert  Eyes: Anicteric sclerae, moist conjunctivae, see below for CNs  Neck: trachea midline, FROM, supple, no thyromegaly or lymphadenopathy  Cardiovascular: Regular rate and rhythm, no murmurs  Pulmonary: Anterior breath sounds clear bilaterally, no crackles or wheezing. No use of accessory muscles  GI: Abdomen soft, non-distended, non-tender  Extremities:  no edema    Neurologic:  -Mental status: Awake, alert, oriented to person, place, and time. Speech is fluent with intact naming, repetition, and comprehension, no dysarthria. Recent and remote memory intact. Follows commands. Attention/concentration intact. Fund of knowledge appropriate.  -Cranial nerves:   II: Visual fields are full to confrontation.  III, IV, VI: Extraocular movements are intact without nystagmus. Pupils equally round and reactive to light  V:  Facial sensation V1-V3 equal and intact   VII: Face is symmetric with normal eye closure and smile  VIII: Hearing is bilaterally intact to finger rub  IX, X: Uvula is midline and soft palate rises symmetrically  XI: Head turning and shoulder shrug are intact.  XII: Tongue protrudes midline  Motor: Normal bulk and tone. No pronator drift. Strength bilateral lower extremities 5/5, 5/5 in LUE and 2/5 in RUE  Rapid alternating movements intact and symmetric  Sensation: Intact to light touch bilaterally. No neglect or extinction on double simultaneous testing.  Coordination: No dysmetria on finger-to-nose and heel-to-shin bilaterally  Reflexes: Downgoing toes bilaterally   Gait: Deferred  NIH 2 for RUE drift to bed        Labs:  CBC Full  -  ( 03 Aug 2022 08:28 )  WBC Count : 9.11 K/uL  RBC Count : 4.99 M/uL  Hemoglobin : 14.6 g/dL  Hematocrit : 41.5 %  Platelet Count - Automated : 190 K/uL  Mean Cell Volume : 83.2 fL  Mean Cell Hemoglobin : 29.3 pg  Mean Cell Hemoglobin Concentration : 35.2 g/dL  Auto Neutrophil # : 5.41 K/uL  Auto Lymphocyte # : 2.29 K/uL  Auto Monocyte # : 1.02 K/uL  Auto Eosinophil # : 0.32 K/uL  Auto Basophil # : 0.05 K/uL  Auto Neutrophil % : 59.5 %  Auto Lymphocyte % : 25.1 %  Auto Monocyte % : 11.2 %  Auto Eosinophil % : 3.5 %  Auto Basophil % : 0.5 %    08-03    139  |  101  |  18  ----------------------------<  142<H>  3.4<L>   |  26  |  1.2    Ca    9.1      03 Aug 2022 08:28    TPro  7.0  /  Alb  4.1  /  TBili  0.9  /  DBili  x   /  AST  14  /  ALT  13  /  AlkPhos  96  08-03    LIVER FUNCTIONS - ( 03 Aug 2022 08:28 )  Alb: 4.1 g/dL / Pro: 7.0 g/dL / ALK PHOS: 96 U/L / ALT: 13 U/L / AST: 14 U/L / GGT: x           PT/INR - ( 03 Aug 2022 08:28 )   PT: 11.20 sec;   INR: 0.97 ratio         PTT - ( 03 Aug 2022 08:28 )  PTT:29.6 sec

## 2022-08-04 NOTE — PROGRESS NOTE ADULT - ASSESSMENT
Assessment: 77y old  Male with HTN, DM, CAD with stents who presents with a chief complaint of RUE weakness, L facial, slurred speech, presents to the ED BIBEMS as a Stroke activation at 8:14am. NIH at the time was 2 for RUE and RLE drift. Facial and slurred speech at this point has resolved. mRS of 0. CTH was completed and was negative for bleed. CTA/P negative for perfusion deficit or LVO. Patient only takes ASA 81 and PLX 75 at home for heart ppx. Denies any neurologic history or stroke in the past. tPA given at 8:48am as well as started on Cardene and 40mg labetalol for BP control. BP prior to tPA administration was 183/93.       Plan:     - Labs: Obtain Hemoglobin A1c, Lipid profile set, and TSH  Other tests:  - Repeat CT head noncon 24 hours post-alteplase to rule out bleed  - MRI brain without contrast   - echo with bubble, may eventually need EDSON  - PT/OT order  - Swallow evaluation if fails dysphagia screen  - SLP order if patient with dysarthria  - Stroke education    )DVT ppx - SCDs (NO heparin SQ as post alteplase protocol)    Discussed with Neurology Attending Dr. Irwin   Case discussed with Admitting 3E resident, sign out given.   Patient pending transport and RN report for bed. Confirmed has bed in the stroke unit    Assessment: 77y old  Male with HTN, DM, CAD with stents who presents with a chief complaint of RUE weakness, L facial, slurred speech, presents to the ED BIBEMS as a Stroke activation at 8:14am. NIH at the time was 2 for RUE and RLE drift. Facial and slurred speech at this point has resolved. mRS of 0. CTH was completed and was negative for bleed. CTA/P negative for perfusion deficit or LVO. Patient only takes ASA 81 and PLX 75 at home for heart ppx. Denies any neurologic history or stroke in the past. tPA given at 8:48am as well as started on Cardene and 40mg labetalol for BP control. BP prior to tPA administration was 183/93.       Plan:     - Labs: Obtain Hemoglobin A1c, Lipid profile set, and TSH  Other tests:  - Repeat CT head noncon 24 hours post-alteplase to rule out bleed  - MRI brain without contrast   - echo with bubble  -Order EDSON and loop   - PT/OT order  - Swallow evaluation if fails dysphagia screen  - SLP order if patient with dysarthria  - Stroke education    DVT ppx - SCDs (NO heparin SQ as post alteplase protocol)    Discussed with Neurology Attending Dr. Irwin   Case discussed with Admitting 3E resident, sign out given.   Patient pending transport and RN report for bed. Confirmed has bed in the stroke unit    This is a 77y old  Male with HTN, DM, CAD with stents who presented to the ED via EMS with a chief complaint of RUE weakness, L facial, slurred speech, most likely due to left sided hemispheric dysfunction in the frontal MCA territory from suspected cardioembolic cause.     #RUE hemiparesis most likely 2/2 L MCA infarct  - On admission NIH 2 for RUE and RLE weakness  - CTH: No evidence of acute intracranial hemorrhage or large territory infarct  - S/p Tpa 8/3   - s/p Cardene drip for BP control post tPA  - CTA H&N: No evidence of major vascular stenosis or occlusion. Normal perfusion images.Scattered mild atheromatous changes as above.  - CTP: There is no evidence of focal perfusion deficit to suggest acute cerebral ischemia  - repeat CTH 24h after tPA- no bleed or acute pathology  - Labs: Obtain Hemoglobin A1c 8.2, LDL 72  - MRI brain without contrast pending  - echo with bubble: 59% G1DD  - Cardiology consulted for EDSON   - patient will most likely need a ILR  - PT/OT, physiatry  - Stroke education    #HTN  - resume home meds  - c/w lisinopril 40mg  - c/w metoprolol tart 50mg  - goal -160    #DM   - A1c 8.2  - monitor fs, if consistently >180, then start regimen    Msc  Diet: carb consistent  GI ppx: none  DVT ppx: lovenox  Activity: Out of bed with assist  Code: FUll    Pending: EDSON, possible ILR, MRI H

## 2022-08-04 NOTE — SWALLOW BEDSIDE ASSESSMENT ADULT - SLP PERTINENT HISTORY OF CURRENT PROBLEM
Patient is a 77 year old Male with HTN, DM, HLD, GERD, CAD with stents who presents with a chief complaint of RUE weakness, L facial, slurred speech, presented to [facility] ED as a Stroke activation at 8:14am on 08/03/22.  No prior h/o dysphagia or speech-language difficulties. Patient is a 77 year old Male with HTN, DM, HLD, GERD, CAD with stents who presents with a chief complaint of RUE weakness, L facial, slurred speech, presented to [facility] ED as a Stroke activation at 8:14am on 08/03/22, with tpa administration at 8:48am.  No prior h/o dysphagia or speech-language difficulties.

## 2022-08-04 NOTE — SWALLOW BEDSIDE ASSESSMENT ADULT - SLP GENERAL OBSERVATIONS
Pt received semi-reclined in bed, alert and oriented to all concepts. Daughter present for evaluation. Pt with clear speech, adequately responded to interview questions and followed multi-step commands. Pt received semi-reclined in bed, alert and oriented to all concepts. Pt denied pain. Daughter present for evaluation. Pt with clear speech, adequately responded to interview questions and followed multi-step commands.

## 2022-08-04 NOTE — PHYSICAL THERAPY INITIAL EVALUATION ADULT - GENERAL OBSERVATIONS, REHAB EVAL
2:20pm-3:05pm Pt encountered semi reclined in bed in No apparent distress with daughter, Cami Villarreal, in room, +PulseOx, +tele, +BP cuff. Pt agreed to PT initial evaluation.

## 2022-08-05 LAB
ALBUMIN SERPL ELPH-MCNC: 4.2 G/DL — SIGNIFICANT CHANGE UP (ref 3.5–5.2)
ALP SERPL-CCNC: 114 U/L — SIGNIFICANT CHANGE UP (ref 30–115)
ALT FLD-CCNC: 13 U/L — SIGNIFICANT CHANGE UP (ref 0–41)
ANION GAP SERPL CALC-SCNC: 19 MMOL/L — HIGH (ref 7–14)
AST SERPL-CCNC: 17 U/L — SIGNIFICANT CHANGE UP (ref 0–41)
BASOPHILS # BLD AUTO: 0.04 K/UL — SIGNIFICANT CHANGE UP (ref 0–0.2)
BASOPHILS NFR BLD AUTO: 0.5 % — SIGNIFICANT CHANGE UP (ref 0–1)
BILIRUB SERPL-MCNC: 1.1 MG/DL — SIGNIFICANT CHANGE UP (ref 0.2–1.2)
BUN SERPL-MCNC: 12 MG/DL — SIGNIFICANT CHANGE UP (ref 10–20)
CALCIUM SERPL-MCNC: 9.5 MG/DL — SIGNIFICANT CHANGE UP (ref 8.5–10.1)
CHLORIDE SERPL-SCNC: 99 MMOL/L — SIGNIFICANT CHANGE UP (ref 98–110)
CO2 SERPL-SCNC: 21 MMOL/L — SIGNIFICANT CHANGE UP (ref 17–32)
CREAT SERPL-MCNC: 1.1 MG/DL — SIGNIFICANT CHANGE UP (ref 0.7–1.5)
EGFR: 70 ML/MIN/1.73M2 — SIGNIFICANT CHANGE UP
EOSINOPHIL # BLD AUTO: 0.28 K/UL — SIGNIFICANT CHANGE UP (ref 0–0.7)
EOSINOPHIL NFR BLD AUTO: 3.2 % — SIGNIFICANT CHANGE UP (ref 0–8)
GLUCOSE BLDC GLUCOMTR-MCNC: 203 MG/DL — HIGH (ref 70–99)
GLUCOSE BLDC GLUCOMTR-MCNC: 211 MG/DL — HIGH (ref 70–99)
GLUCOSE BLDC GLUCOMTR-MCNC: 256 MG/DL — HIGH (ref 70–99)
GLUCOSE BLDC GLUCOMTR-MCNC: 263 MG/DL — HIGH (ref 70–99)
GLUCOSE SERPL-MCNC: 234 MG/DL — HIGH (ref 70–99)
HCT VFR BLD CALC: 45 % — SIGNIFICANT CHANGE UP (ref 42–52)
HGB BLD-MCNC: 15.9 G/DL — SIGNIFICANT CHANGE UP (ref 14–18)
IMM GRANULOCYTES NFR BLD AUTO: 0.2 % — SIGNIFICANT CHANGE UP (ref 0.1–0.3)
LYMPHOCYTES # BLD AUTO: 1.37 K/UL — SIGNIFICANT CHANGE UP (ref 1.2–3.4)
LYMPHOCYTES # BLD AUTO: 15.6 % — LOW (ref 20.5–51.1)
MAGNESIUM SERPL-MCNC: 2.1 MG/DL — SIGNIFICANT CHANGE UP (ref 1.8–2.4)
MCHC RBC-ENTMCNC: 29.9 PG — SIGNIFICANT CHANGE UP (ref 27–31)
MCHC RBC-ENTMCNC: 35.3 G/DL — SIGNIFICANT CHANGE UP (ref 32–37)
MCV RBC AUTO: 84.6 FL — SIGNIFICANT CHANGE UP (ref 80–94)
MONOCYTES # BLD AUTO: 0.82 K/UL — HIGH (ref 0.1–0.6)
MONOCYTES NFR BLD AUTO: 9.4 % — HIGH (ref 1.7–9.3)
NEUTROPHILS # BLD AUTO: 6.23 K/UL — SIGNIFICANT CHANGE UP (ref 1.4–6.5)
NEUTROPHILS NFR BLD AUTO: 71.1 % — SIGNIFICANT CHANGE UP (ref 42.2–75.2)
NRBC # BLD: 0 /100 WBCS — SIGNIFICANT CHANGE UP (ref 0–0)
PLATELET # BLD AUTO: 205 K/UL — SIGNIFICANT CHANGE UP (ref 130–400)
POTASSIUM SERPL-MCNC: 4.4 MMOL/L — SIGNIFICANT CHANGE UP (ref 3.5–5)
POTASSIUM SERPL-SCNC: 4.4 MMOL/L — SIGNIFICANT CHANGE UP (ref 3.5–5)
PROT SERPL-MCNC: 7.3 G/DL — SIGNIFICANT CHANGE UP (ref 6–8)
RBC # BLD: 5.32 M/UL — SIGNIFICANT CHANGE UP (ref 4.7–6.1)
RBC # FLD: 13.6 % — SIGNIFICANT CHANGE UP (ref 11.5–14.5)
SODIUM SERPL-SCNC: 139 MMOL/L — SIGNIFICANT CHANGE UP (ref 135–146)
WBC # BLD: 8.76 K/UL — SIGNIFICANT CHANGE UP (ref 4.8–10.8)
WBC # FLD AUTO: 8.76 K/UL — SIGNIFICANT CHANGE UP (ref 4.8–10.8)

## 2022-08-05 PROCEDURE — 93312 ECHO TRANSESOPHAGEAL: CPT | Mod: 26,XU

## 2022-08-05 PROCEDURE — 93320 DOPPLER ECHO COMPLETE: CPT | Mod: 26

## 2022-08-05 PROCEDURE — 99232 SBSQ HOSP IP/OBS MODERATE 35: CPT

## 2022-08-05 PROCEDURE — 99233 SBSQ HOSP IP/OBS HIGH 50: CPT

## 2022-08-05 PROCEDURE — 93325 DOPPLER ECHO COLOR FLOW MAPG: CPT | Mod: 26

## 2022-08-05 RX ORDER — BENZOCAINE AND MENTHOL 5; 1 G/100ML; G/100ML
1 LIQUID ORAL DAILY
Refills: 0 | Status: DISCONTINUED | OUTPATIENT
Start: 2022-08-05 | End: 2022-08-05

## 2022-08-05 RX ORDER — HYDRALAZINE HCL 50 MG
10 TABLET ORAL ONCE
Refills: 0 | Status: COMPLETED | OUTPATIENT
Start: 2022-08-05 | End: 2022-08-05

## 2022-08-05 RX ORDER — BENZOCAINE 10 %
1 GEL (GRAM) MUCOUS MEMBRANE ONCE
Refills: 0 | Status: DISCONTINUED | OUTPATIENT
Start: 2022-08-05 | End: 2022-08-11

## 2022-08-05 RX ORDER — POLYETHYLENE GLYCOL 3350 17 G/17G
17 POWDER, FOR SOLUTION ORAL
Refills: 0 | Status: DISCONTINUED | OUTPATIENT
Start: 2022-08-05 | End: 2022-08-11

## 2022-08-05 RX ORDER — SENNA PLUS 8.6 MG/1
2 TABLET ORAL AT BEDTIME
Refills: 0 | Status: DISCONTINUED | OUTPATIENT
Start: 2022-08-05 | End: 2022-08-11

## 2022-08-05 RX ADMIN — SENNA PLUS 2 TABLET(S): 8.6 TABLET ORAL at 12:17

## 2022-08-05 RX ADMIN — Medication 10 MILLIGRAM(S): at 04:23

## 2022-08-05 RX ADMIN — Medication 81 MILLIGRAM(S): at 11:22

## 2022-08-05 RX ADMIN — ATORVASTATIN CALCIUM 80 MILLIGRAM(S): 80 TABLET, FILM COATED ORAL at 00:19

## 2022-08-05 RX ADMIN — Medication 50 MILLIGRAM(S): at 05:38

## 2022-08-05 RX ADMIN — Medication 2: at 11:42

## 2022-08-05 RX ADMIN — LISINOPRIL 40 MILLIGRAM(S): 2.5 TABLET ORAL at 05:37

## 2022-08-05 RX ADMIN — Medication 25 MILLIGRAM(S): at 05:37

## 2022-08-05 RX ADMIN — Medication 50 MILLIGRAM(S): at 17:18

## 2022-08-05 RX ADMIN — FAMOTIDINE 20 MILLIGRAM(S): 10 INJECTION INTRAVENOUS at 11:22

## 2022-08-05 RX ADMIN — POLYETHYLENE GLYCOL 3350 17 GRAM(S): 17 POWDER, FOR SOLUTION ORAL at 11:25

## 2022-08-05 RX ADMIN — SENNA PLUS 2 TABLET(S): 8.6 TABLET ORAL at 21:19

## 2022-08-05 RX ADMIN — MAGNESIUM OXIDE 400 MG ORAL TABLET 400 MILLIGRAM(S): 241.3 TABLET ORAL at 17:18

## 2022-08-05 RX ADMIN — ATORVASTATIN CALCIUM 80 MILLIGRAM(S): 80 TABLET, FILM COATED ORAL at 21:19

## 2022-08-05 RX ADMIN — Medication 5 MILLIGRAM(S): at 21:19

## 2022-08-05 RX ADMIN — AMLODIPINE BESYLATE 5 MILLIGRAM(S): 2.5 TABLET ORAL at 05:37

## 2022-08-05 RX ADMIN — CLOPIDOGREL BISULFATE 75 MILLIGRAM(S): 75 TABLET, FILM COATED ORAL at 11:22

## 2022-08-05 RX ADMIN — MAGNESIUM OXIDE 400 MG ORAL TABLET 400 MILLIGRAM(S): 241.3 TABLET ORAL at 12:15

## 2022-08-05 RX ADMIN — POLYETHYLENE GLYCOL 3350 17 GRAM(S): 17 POWDER, FOR SOLUTION ORAL at 17:19

## 2022-08-05 RX ADMIN — ENOXAPARIN SODIUM 40 MILLIGRAM(S): 100 INJECTION SUBCUTANEOUS at 11:22

## 2022-08-05 RX ADMIN — Medication 3: at 17:17

## 2022-08-05 NOTE — CONSULT NOTE ADULT - ASSESSMENT
Imp ; rehab of CVA / right hemiparesis   Plan : continue bedside therapy as tolerated            Pt would be able to tolerate 3hr/day PT / OT / speech  and motivated           Good 4a acute inpatient rehab candidate

## 2022-08-05 NOTE — PROGRESS NOTE ADULT - ASSESSMENT
This is a 77 year old Male with PMHx HTN, DM, CAD with stents who presents with a chief complaint of RUE weakness, L facial, slurred speech, presents to the ED BIBEMS as a Stroke activation at 8:14am. NIH at the time was 2 for RUE and RLE drift. Facial and slurred speech at this point has resolved. mRS of 0. CTH was completed and was negative for bleed. CTA/P negative for perfusion deficit or LVO. Patient only takes ASA 81 and PLX 75 at home for heart ppx. Denies any neurologic history or stroke in the past. tPA given at 8:48am 8/3 as well as started on Cardene and 40 mg labetalol for BP control. BP prior to tPA administration was 183/93. Of note CTH shows chronic L occipital infarct. Patient admitted to 3E stroke unit for rest of neurologic work up and post tPA monitoring.    #Acute ischemic stroke s/p tPA  - Admitted to 3E stroke unit for monitoring  - atorvastatin 80 once daily  - Notify provider if patient passes dysphasgia screen able to have diet if no pending intervention  - Keep BP <180/105, notify provider if out of range  - repeat CTH stable  - MRI brain without contrast pending  - EDSON: nl EF, no PFO  - PT/OT eval  - Stroke education  - antiplatelets as per neuro  - tele  -antiplatelets as per neuro    #DM w/ hyperglycemia  -Insulin  -monitor FS. Goal 140-180 inpt  -A1C=8.2  -pt counselled    #HTN  -permissive HTN for now w/ gradual lowering     #HLD  -HD statin  -LDL 72    #CAD  -cont antiplatelets, BB, statin      DVT ppx - SCDs, Lovenox    #Progress Note Handoff  Pending: Consults____Clinical improvement and stability__x___Tests__MRI___PT____x____  Pt/Family discussion: Pt/daughter informed and agree with the current plan  Disposition: Home______/SNF_______/4A______/To be determined____x____    My note supersedes the residents note should a discrepancy arise.    Chart and notes personally reviewed.  Care Discussed with Consultants/Other Providers/ Housestaff [ x] YES [ ] NO   Radiology, labs, old records personally reviewed.    discussed w/ housestaff, nursing, case management, neuro team

## 2022-08-05 NOTE — PROGRESS NOTE ADULT - ASSESSMENT
This is a 77y old  Male with HTN, DM, CAD with stents who presented to the ED via EMS with a chief complaint of RUE weakness, L facial, slurred speech, most likely due to left sided hemispheric dysfunction in the frontal MCA territory from suspected cardioembolic cause.     #RUE hemiparesis most likely 2/2 L MCA infarct  - On admission NIH 2 for RUE and RLE weakness  - CTH: No evidence of acute intracranial hemorrhage or large territory infarct  - S/p Tpa 8/3   - s/p Cardene drip for BP control post tPA  - CTA H&N: No evidence of major vascular stenosis or occlusion. Normal perfusion images.Scattered mild atheromatous changes as above.  - CTP: There is no evidence of focal perfusion deficit to suggest acute cerebral ischemia  - repeat CTH 24h after tPA- no bleed or acute pathology  - Labs: Obtain Hemoglobin A1c 8.2, LDL 72  - MRI brain without contrast pending  - echo with bubble: 59% G1DD  - s/p EDSON 8/5 - EF 55-60, no PFO or clot burden  - patient will most likely need a ILR  - PT/OT, physiatry  - Stroke education    #HTN  - resume home meds  - c/w lisinopril 40mg  - c/w metoprolol tart 50mg  - goal -160    #DM   - A1c 8.2  - monitor fs, if consistently >180, then start regimen    Msc  Diet: carb consistent  GI ppx: none  DVT ppx: lovenox  Activity: Out of bed with assist  Code: Full    Pending:  possible ILR, MRI H, possible DC to 4a on Monday

## 2022-08-05 NOTE — PROGRESS NOTE ADULT - SUBJECTIVE AND OBJECTIVE BOX
MARIA M CROFT  77y  Male    Patient is a 77y old  Male who presents with a chief complaint of RUE weakness    HPI:  Patient is a 77 year old Male with HTN, DM, HLD, GERD, CAD with stents who presents with a chief complaint of RUE weakness, L facial, slurred speech, presents to the ED BIBEMS as a Stroke activation at 8:14am. NIH at the time was 2 for RUE and RLE drift. Facial and slurred speech at this point has resolved. mRS of 0. CTH was completed and was negative for bleed. CTA/P negative for perfusion deficit or LVO. Patient only takes ASA 81 at home for heart ppx. Denies any neurologic history or stroke in the past. tPA given at 8:48am as well as started on Cardene for BP control. BP prior to tPA administration was 183/93.    (03 Aug 2022 11:22)    S: Patient was examined and seen at bedside. This morning pt is resting comfortably in bed and reports no new issues or overnight events. No new complaints.   Denies CP, SOB, N/V/D/C/AP, cough, F, chills, dizziness, new focal weakness, HA, vision changes, dysuria, or urinary symptoms, blood in stool.  ROS: all other systems reviewed and are negative    PAST MEDICAL & SURGICAL HISTORY:  Hypertension      Diabetes      Hyperlipidemia      GERD (gastroesophageal reflux disease)      Coronary artery disease        SOCIAL HISTORY:  Tobacco: denies  Illicit drugs: denies  Alcohol: social  Family history reviewed and otherwise non-contributory No clotting disorders, CVAs at early age.  ALLERGIES: NKDA    General: NAD. Looks stated age.  HEENT: clean oropharynx, EOMI, no LAD  Neck: trachea midline, no thyromegaly  CV: nl S1 S2; no m/r/g  Resp: decreased breath sounds at base  GI: NT/ND/S +BS  MS: no clubbing/cyanosis/edema, +pulses  Neuro: RUE 1-/5, RLE 5-/5, sensory intact; + reflexes  Skin: no rashes, nl turgor  Psychiatric: AA0x3 w/ intact insight and judgement    tele: SR, nonspecific changes (on my own evaluation of tele monitor)      MEDICATIONS  (STANDING):  amLODIPine   Tablet 5 milliGRAM(s) Oral daily  aspirin enteric coated 81 milliGRAM(s) Oral daily  atorvastatin 80 milliGRAM(s) Oral at bedtime  benzocaine 20%/menthol 0.5% Spray 1 Spray(s) Topical once  clopidogrel Tablet 75 milliGRAM(s) Oral daily  dextrose 5%. 1000 milliLiter(s) (100 mL/Hr) IV Continuous <Continuous>  dextrose 5%. 1000 milliLiter(s) (50 mL/Hr) IV Continuous <Continuous>  dextrose 50% Injectable 25 Gram(s) IV Push once  dextrose 50% Injectable 12.5 Gram(s) IV Push once  dextrose 50% Injectable 25 Gram(s) IV Push once  enoxaparin Injectable 40 milliGRAM(s) SubCutaneous every 24 hours  famotidine    Tablet 20 milliGRAM(s) Oral daily  glucagon  Injectable 1 milliGRAM(s) IntraMuscular once  hydrochlorothiazide 25 milliGRAM(s) Oral daily  insulin lispro (ADMELOG) corrective regimen sliding scale   SubCutaneous three times a day before meals  lisinopril 40 milliGRAM(s) Oral daily  magnesium oxide 400 milliGRAM(s) Oral three times a day with meals  metoprolol tartrate 50 milliGRAM(s) Oral two times a day  polyethylene glycol 3350 17 Gram(s) Oral two times a day  senna 2 Tablet(s) Oral at bedtime    MEDICATIONS  (PRN):  dextrose Oral Gel 15 Gram(s) Oral once PRN Blood Glucose LESS THAN 70 milliGRAM(s)/deciliter  melatonin 5 milliGRAM(s) Oral at bedtime PRN Insomnia    Home Medications:  famotidine 40 mg oral tablet: 1 tab(s) orally once a day (at bedtime) (04 Aug 2022 08:06)  hydroCHLOROthiazide 25 mg oral tablet: 1 tab(s) orally once a day (04 Aug 2022 08:05)  Lantus 100 units/mL subcutaneous solution: 30 unit(s) subcutaneous once a day (at bedtime) (04 Aug 2022 08:01)  metoprolol tartrate 50 mg oral tablet: 1 tab(s) orally 2 times a day (04 Aug 2022 08:06)  ramipril 10 mg oral capsule: 1 cap(s) orally once a day (04 Aug 2022 08:01)  simvastatin 40 mg oral tablet: 1 tab(s) orally once a day (at bedtime) (04 Aug 2022 08:06)    Vital Signs Last 24 Hrs  T(C): 36.1 (05 Aug 2022 11:54), Max: 36.7 (04 Aug 2022 20:12)  T(F): 97 (05 Aug 2022 11:54), Max: 98 (04 Aug 2022 20:12)  HR: 90 (05 Aug 2022 11:54) (70 - 98)  BP: 155/99 (05 Aug 2022 11:54) (153/88 - 185/104)  BP(mean): 120 (05 Aug 2022 11:54) (110 - 143)  RR: 18 (05 Aug 2022 11:54) (16 - 18)  SpO2: 96% (05 Aug 2022 11:54) (95% - 100%)    Parameters below as of 05 Aug 2022 11:54  Patient On (Oxygen Delivery Method): room air      CAPILLARY BLOOD GLUCOSE      POCT Blood Glucose.: 211 mg/dL (05 Aug 2022 11:34)  POCT Blood Glucose.: 263 mg/dL (05 Aug 2022 07:55)  POCT Blood Glucose.: 188 mg/dL (04 Aug 2022 22:14)  POCT Blood Glucose.: 214 mg/dL (04 Aug 2022 16:22)    LABS:                        15.9   8.76  )-----------( 205      ( 05 Aug 2022 06:22 )             45.0     08-05    139  |  99  |  12  ----------------------------<  234<H>  4.4   |  21  |  1.1    Ca    9.5      05 Aug 2022 06:22  Phos  3.2     08-04  Mg     2.1     08-05    TPro  7.3  /  Alb  4.2  /  TBili  1.1  /  DBili  x   /  AST  17  /  ALT  13  /  AlkPhos  114  08-05    LIVER FUNCTIONS - ( 05 Aug 2022 06:22 )  Alb: 4.2 g/dL / Pro: 7.3 g/dL / ALK PHOS: 114 U/L / ALT: 13 U/L / AST: 17 U/L / GGT: x                           Consultant Notes Reviewed:  [x ] YES  [ ] NO  Care Discussed with Consultants/Other Providers/ Housestaff [ x] YES  [ ] NO  Radiology, labs, new studies personally reviewed.                                  EKG - SR, nonspecific changes (my read)  Chart and consultant noted personally reviewed.  Care Discussed with Consultants/Other Providers/ Housestaff [ x] YES [ ] NO   Radiology, labs, old records personally reviewed.

## 2022-08-05 NOTE — CONSULT NOTE ADULT - SUBJECTIVE AND OBJECTIVE BOX
Patient is a 77y old  Male who presents with a chief complaint of stroke like Sx       HPI:  Patient is a 77 year old Male with HTN, DM, HLD, GERD, CAD with stents who presents with a chief complaint of RUE weakness, L facial, slurred speech, presents to the ED BIBEMS as a Stroke activation at 8:14am. NIH at the time was 2 for RUE and RLE drift. Facial and slurred speech at this point has resolved. mRS of 0. CTH was completed and was negative for bleed. CTA/P negative for perfusion deficit or LVO. Patient only takes ASA 81 at home for heart ppx. Denies any neurologic history or stroke in the past. tPA given at 8:48am as well as started on Cardene for BP control. BP prior to tPA administration was 183/93.   #RUE hemiparesis most likely 2/2 L MCA infarct  - On admission NIH 2 for RUE and RLE weakness  - CTH: No evidence of acute intracranial hemorrhage or large territory infarct  - S/p Tpa 8/3   - s/p Cardene drip for BP control post tPA  - CTA H&N: No evidence of major vascular stenosis or occlusion. Normal perfusion images.Scattered mild atheromatous changes as above.  - CTP: There is no evidence of focal perfusion deficit to suggest acute cerebral ischemia  - repeat CTH 24h after tPA- no bleed or acute pathology  - Labs: Obtain Hemoglobin A1c 8.2, LDL 72  - MRI brain without contrast pending  - echo with bubble: 59% G1DD  - s/p EDSON 8/5 - EF 55-60, no PFO or clot burden  - patient will most likely need a ILR          PHYSICAL EXAM    Vital Signs Last 24 Hrs  T(C): 36.1 (05 Aug 2022 11:54), Max: 36.7 (04 Aug 2022 20:12)  T(F): 97 (05 Aug 2022 11:54), Max: 98 (04 Aug 2022 20:12)  HR: 90 (05 Aug 2022 11:54) (70 - 98)  BP: 155/99 (05 Aug 2022 11:54) (153/88 - 185/104)  BP(mean): 120 (05 Aug 2022 11:54) (110 - 143)  RR: 18 (05 Aug 2022 11:54) (16 - 18)  SpO2: 96% (05 Aug 2022 11:54) (95% - 100%)    Parameters below as of 05 Aug 2022 11:54  Patient On (Oxygen Delivery Method): room air        Constitutional - NAD  Chest - CTA  Cardiovascular - S1S2+  Abdomen -  Soft  Extremities -  No calf tenderness   Function : bed mobility max A / transfer mod A                 ambulate bed to chair mod A    amLODIPine   Tablet 5 milliGRAM(s) Oral daily  aspirin enteric coated 81 milliGRAM(s) Oral daily  atorvastatin 80 milliGRAM(s) Oral at bedtime  benzocaine 20%/menthol 0.5% Spray 1 Spray(s) Topical once  clopidogrel Tablet 75 milliGRAM(s) Oral daily  dextrose 5%. 1000 milliLiter(s) IV Continuous <Continuous>  dextrose 5%. 1000 milliLiter(s) IV Continuous <Continuous>  dextrose 50% Injectable 25 Gram(s) IV Push once  dextrose 50% Injectable 12.5 Gram(s) IV Push once  dextrose 50% Injectable 25 Gram(s) IV Push once  dextrose Oral Gel 15 Gram(s) Oral once PRN  enoxaparin Injectable 40 milliGRAM(s) SubCutaneous every 24 hours  famotidine    Tablet 20 milliGRAM(s) Oral daily  glucagon  Injectable 1 milliGRAM(s) IntraMuscular once  hydrochlorothiazide 25 milliGRAM(s) Oral daily  insulin lispro (ADMELOG) corrective regimen sliding scale   SubCutaneous three times a day before meals  lisinopril 40 milliGRAM(s) Oral daily  magnesium oxide 400 milliGRAM(s) Oral three times a day with meals  melatonin 5 milliGRAM(s) Oral at bedtime PRN  metoprolol tartrate 50 milliGRAM(s) Oral two times a day  polyethylene glycol 3350 17 Gram(s) Oral two times a day  senna 2 Tablet(s) Oral at bedtime      RECENT LABS/IMAGING                        15.9   8.76  )-----------( 205      ( 05 Aug 2022 06:22 )             45.0     08-05    139  |  99  |  12  ----------------------------<  234<H>  4.4   |  21  |  1.1    Ca    9.5      05 Aug 2022 06:22  Phos  3.2     08-04  Mg     2.1     08-05    TPro  7.3  /  Alb  4.2  /  TBili  1.1  /  DBili  x   /  AST  17  /  ALT  13  /  AlkPhos  114  08-05

## 2022-08-05 NOTE — OCCUPATIONAL THERAPY INITIAL EVALUATION ADULT - GENERAL OBSERVATIONS, REHAB EVAL
Pt encountered semi reclined in bed. NAD. +tele, +pulse ox, Agreeable to OT IE. /99 mmHg. RN aware. Pt left in b/s chair  in NAD. All lines and monitors intact.  RN aware.

## 2022-08-05 NOTE — PROGRESS NOTE ADULT - ATTENDING COMMENTS
Pt is a 76 yo M, right handed, with PMHx of HTN, HLD  CAD s/p PCI, who presented with right arm weakness and slurred speech.    Impr: RUE plegia, suspicion for left frontal cortical acute ischemic stroke. S/p IV tPA on 8/3  MRI brain pending  EDSON without evidence of clot/shunt  resumed home DAPT  likely ILR placement  PT/OT/ST, tele, -160 .

## 2022-08-05 NOTE — OCCUPATIONAL THERAPY INITIAL EVALUATION ADULT - RANGE OF MOTION, PT EVAL
Pt will demo good understanding and carryover of self ROM exercises/ positioning strategies for RUE to maintain mobility, optimize functional gains.

## 2022-08-05 NOTE — PROGRESS NOTE ADULT - SUBJECTIVE AND OBJECTIVE BOX
Neurology Stroke Progress Note    INTERVAL HPI/OVERNIGHT EVENTS:  Patient seen and examined. No acute events overnight. Patient denies any pain or complaints this am.     MEDICATIONS  (STANDING):  amLODIPine   Tablet 5 milliGRAM(s) Oral daily  aspirin enteric coated 81 milliGRAM(s) Oral daily  atorvastatin 80 milliGRAM(s) Oral at bedtime  benzocaine 20%/menthol 0.5% Spray 1 Spray(s) Topical once  clopidogrel Tablet 75 milliGRAM(s) Oral daily  dextrose 5%. 1000 milliLiter(s) (100 mL/Hr) IV Continuous <Continuous>  dextrose 5%. 1000 milliLiter(s) (50 mL/Hr) IV Continuous <Continuous>  dextrose 50% Injectable 25 Gram(s) IV Push once  dextrose 50% Injectable 12.5 Gram(s) IV Push once  dextrose 50% Injectable 25 Gram(s) IV Push once  enoxaparin Injectable 40 milliGRAM(s) SubCutaneous every 24 hours  famotidine    Tablet 20 milliGRAM(s) Oral daily  glucagon  Injectable 1 milliGRAM(s) IntraMuscular once  hydrochlorothiazide 25 milliGRAM(s) Oral daily  insulin lispro (ADMELOG) corrective regimen sliding scale   SubCutaneous three times a day before meals  lisinopril 40 milliGRAM(s) Oral daily  magnesium oxide 400 milliGRAM(s) Oral three times a day with meals  metoprolol tartrate 50 milliGRAM(s) Oral two times a day  polyethylene glycol 3350 17 Gram(s) Oral two times a day  senna 2 Tablet(s) Oral at bedtime    MEDICATIONS  (PRN):  dextrose Oral Gel 15 Gram(s) Oral once PRN Blood Glucose LESS THAN 70 milliGRAM(s)/deciliter  melatonin 5 milliGRAM(s) Oral at bedtime PRN Insomnia    Allergies    No Known Allergies    Intolerances      Vital Signs Last 24 Hrs  T(C): 36.1 (05 Aug 2022 11:54), Max: 36.7 (04 Aug 2022 20:12)  T(F): 97 (05 Aug 2022 11:54), Max: 98 (04 Aug 2022 20:12)  HR: 90 (05 Aug 2022 11:54) (70 - 98)  BP: 155/99 (05 Aug 2022 11:54) (153/88 - 185/104)  BP(mean): 120 (05 Aug 2022 11:54) (110 - 143)  RR: 18 (05 Aug 2022 11:54) (16 - 18)  SpO2: 96% (05 Aug 2022 11:54) (95% - 100%)    Parameters below as of 05 Aug 2022 11:54  Patient On (Oxygen Delivery Method): room air        Physical exam:  General: No acute distress, awake and alert  Eyes: Anicteric sclerae, moist conjunctivae, see below for CNs  Neck: trachea midline, FROM, supple, no thyromegaly or lymphadenopathy  Cardiovascular: Regular rate and rhythm, no murmurs  Pulmonary: Anterior breath sounds clear bilaterally, no crackles or wheezing. No use of accessory muscles  GI: Abdomen soft, non-distended, non-tender  Extremities:  no edema    Neurologic:  -Mental status: Awake, alert, oriented to person, place, and time. Speech is fluent with intact naming, repetition, and comprehension, no dysarthria. Recent and remote memory intact. Follows commands. Attention/concentration intact. Fund of knowledge appropriate.  -Cranial nerves:   II: Visual fields are full to confrontation.  III, IV, VI: Extraocular movements are intact without nystagmus. Pupils equally round and reactive to light  V:  Facial sensation V1-V3 equal and intact   VII: Face is symmetric with normal eye closure and smile  VIII: Hearing is bilaterally intact to finger rub  IX, X: Uvula is midline and soft palate rises symmetrically  XI: Head turning and shoulder shrug are intact.  XII: Tongue protrudes midline  Motor: Normal bulk and tone. No pronator drift. Strength bilateral lower extremities 5/5, 5/5 in LUE and 2/5 in RUE  Rapid alternating movements intact and symmetric  Sensation: Intact to light touch bilaterally. No neglect or extinction on double simultaneous testing.  Coordination: No dysmetria on finger-to-nose and heel-to-shin bilaterally  Reflexes: Downgoing toes bilaterally   Gait: Deferred  NIH 2/3 for RUE drift to bed    LABS:                        15.9   8.76  )-----------( 205      ( 05 Aug 2022 06:22 )             45.0     08-05    139  |  99  |  12  ----------------------------<  234<H>  4.4   |  21  |  1.1    Ca    9.5      05 Aug 2022 06:22  Phos  3.2     08-04  Mg     2.1     08-05    TPro  7.3  /  Alb  4.2  /  TBili  1.1  /  DBili  x   /  AST  17  /  ALT  13  /  AlkPhos  114  08-05          RADIOLOGY & ADDITIONAL TESTS:

## 2022-08-05 NOTE — PROVIDER CONTACT NOTE (MEDICATION) - ACTION/TREATMENT ORDERED:
Continue to keep Nicardipine drip off at this time. Continue V/S
Hydralazine 10mg IVP x1 given as ordered

## 2022-08-05 NOTE — OCCUPATIONAL THERAPY INITIAL EVALUATION ADULT - PERTINENT HX OF CURRENT PROBLEM, REHAB EVAL
Pt is a right hand dominant male admitted 8/03 with c/o R UE weakness, L facial droop, and slurred speech. CTH(-), TPA administered. MRI pending.

## 2022-08-05 NOTE — OCCUPATIONAL THERAPY INITIAL EVALUATION ADULT - TRANSFER TRAINING, PT EVAL
Pt will increase bed to chair transfer to Min A x1 by discharge to increase [place] with functional transfers

## 2022-08-05 NOTE — OCCUPATIONAL THERAPY INITIAL EVALUATION ADULT - PLANNED THERAPY INTERVENTIONS, OT EVAL
ADL retraining/balance training/bed mobility training/fine motor coordination training/joint mobilization/motor coordination training/neuromuscular re-education/ROM/strengthening/stretching/transfer training

## 2022-08-05 NOTE — CHART NOTE - NSCHARTNOTEFT_GEN_A_CORE
POST OPERATIVE PROCEDURAL DOCUMENTATION  PRE-OP DIAGNOSIS:  CVA      POST-OP DIAGNOSIS:  CVA  No complex atheromas / masses / ASDs detected.     PROCEDURE: Transesophageal echocardiogram    Primary Physician:  Dr. PINK  Assistant: Dr. Elkins    ANESTHESIA TYPE  [  ] General Anesthesia  [ x ] Conscious Sedation  [  ] Local/Regional    CONDITION  [  ] Critical  [  ] Serious  [  ] Fair  [ x ] Good    SPECIMENS REMOVED (IF APPLICABLE): N/A    IMPLANTS (IF APPLICABLE): None    ESTIMATED BLOOD LOSS: None    COMPLICATIONS: None      FINDINGS:    After risks and benefits of procedures were explained, informed consent was obtained and placed in chart. Refer to Anesthesia note for sedation details.  The EDSON probe was passed into the esophagus without difficulty.  Transesophageal and transgastric images were obtained.  The EDSON probe was removed without difficulty and examined.  There was no evidence for bleeding.  The patient tolerated the procedure well without any immediate EDSON-related complications.      Preliminary Findings:    ALTAGRACIA: Left atrial appendage was clear of clot and smoke.  LV: LVEF nL  MV: mild MR, no evidence of MS.   AV: No evidence of AI, no evidence of AS.   RA:  TV: likely mild TR.   PV: trace PI.   IAS: no PFO. No R-> L shunt by bubbles.         DIAGNOSIS/IMPRESSION:  CVA  No complex atheromas / masses / ASDs detected.     PLAN OF CARE:  return to floor  f/u with neurology team. POST OPERATIVE PROCEDURAL DOCUMENTATION  PRE-OP DIAGNOSIS:  CVA      POST-OP DIAGNOSIS:  CVA  No complex atheromas / masses / ASDs detected.     PROCEDURE: Transesophageal echocardiogram    Primary Physician:  Dr. APRYL VALLE [facility]  Assistant: Dr. Elkins    ANESTHESIA TYPE  [  ] General Anesthesia  [ x ] Conscious Sedation  [  ] Local/Regional    CONDITION  [  ] Critical  [  ] Serious  [  ] Fair  [ x ] Good    SPECIMENS REMOVED (IF APPLICABLE): N/A    IMPLANTS (IF APPLICABLE): None    ESTIMATED BLOOD LOSS: None    COMPLICATIONS: None      FINDINGS:    After risks and benefits of procedures were explained, informed consent was obtained and placed in chart. Refer to Anesthesia note for sedation details.  The EDSON probe was passed into the esophagus without difficulty.  Transesophageal and transgastric images were obtained.  The EDSON probe was removed without difficulty and examined.  There was no evidence for bleeding.  The patient tolerated the procedure well without any immediate EDSON-related complications.      Preliminary Findings:    ALTAGRACIA: Left atrial appendage was clear of clot and smoke.  LV: LVEF nL  MV: mild MR, no evidence of MS.   AV: No evidence of AI, no evidence of AS.   RA:  TV: likely mild TR.   PV: trace PI.   IAS: no PFO. No R-> L shunt by bubbles.         DIAGNOSIS/IMPRESSION:  CVA  No complex atheromas / masses / ASDs detected.     PLAN OF CARE:  return to floor  f/u with neurology team.

## 2022-08-05 NOTE — OCCUPATIONAL THERAPY INITIAL EVALUATION ADULT - SPECIFY REASON(S)
Attempted to see pt for OT IE. Pt currently off unit at a test. Will follow up/complete assessment once pt returns to floor

## 2022-08-06 LAB
ALBUMIN SERPL ELPH-MCNC: 4.1 G/DL — SIGNIFICANT CHANGE UP (ref 3.5–5.2)
ALP SERPL-CCNC: 122 U/L — HIGH (ref 30–115)
ALT FLD-CCNC: 14 U/L — SIGNIFICANT CHANGE UP (ref 0–41)
ANION GAP SERPL CALC-SCNC: 18 MMOL/L — HIGH (ref 7–14)
AST SERPL-CCNC: 25 U/L — SIGNIFICANT CHANGE UP (ref 0–41)
BASOPHILS # BLD AUTO: 0.03 K/UL — SIGNIFICANT CHANGE UP (ref 0–0.2)
BASOPHILS NFR BLD AUTO: 0.2 % — SIGNIFICANT CHANGE UP (ref 0–1)
BILIRUB SERPL-MCNC: 1.4 MG/DL — HIGH (ref 0.2–1.2)
BUN SERPL-MCNC: 16 MG/DL — SIGNIFICANT CHANGE UP (ref 10–20)
CALCIUM SERPL-MCNC: 9.3 MG/DL — SIGNIFICANT CHANGE UP (ref 8.5–10.1)
CHLORIDE SERPL-SCNC: 97 MMOL/L — LOW (ref 98–110)
CO2 SERPL-SCNC: 21 MMOL/L — SIGNIFICANT CHANGE UP (ref 17–32)
CREAT SERPL-MCNC: 1.3 MG/DL — SIGNIFICANT CHANGE UP (ref 0.7–1.5)
EGFR: 57 ML/MIN/1.73M2 — LOW
EOSINOPHIL # BLD AUTO: 0.3 K/UL — SIGNIFICANT CHANGE UP (ref 0–0.7)
EOSINOPHIL NFR BLD AUTO: 2.3 % — SIGNIFICANT CHANGE UP (ref 0–8)
GLUCOSE BLDC GLUCOMTR-MCNC: 227 MG/DL — HIGH (ref 70–99)
GLUCOSE BLDC GLUCOMTR-MCNC: 248 MG/DL — HIGH (ref 70–99)
GLUCOSE BLDC GLUCOMTR-MCNC: 252 MG/DL — HIGH (ref 70–99)
GLUCOSE BLDC GLUCOMTR-MCNC: 342 MG/DL — HIGH (ref 70–99)
GLUCOSE SERPL-MCNC: 233 MG/DL — HIGH (ref 70–99)
HCT VFR BLD CALC: 47.1 % — SIGNIFICANT CHANGE UP (ref 42–52)
HGB BLD-MCNC: 16.4 G/DL — SIGNIFICANT CHANGE UP (ref 14–18)
IMM GRANULOCYTES NFR BLD AUTO: 0.4 % — HIGH (ref 0.1–0.3)
LYMPHOCYTES # BLD AUTO: 1.6 K/UL — SIGNIFICANT CHANGE UP (ref 1.2–3.4)
LYMPHOCYTES # BLD AUTO: 12.5 % — LOW (ref 20.5–51.1)
MAGNESIUM SERPL-MCNC: 2.1 MG/DL — SIGNIFICANT CHANGE UP (ref 1.8–2.4)
MCHC RBC-ENTMCNC: 29.4 PG — SIGNIFICANT CHANGE UP (ref 27–31)
MCHC RBC-ENTMCNC: 34.8 G/DL — SIGNIFICANT CHANGE UP (ref 32–37)
MCV RBC AUTO: 84.6 FL — SIGNIFICANT CHANGE UP (ref 80–94)
MONOCYTES # BLD AUTO: 1.46 K/UL — HIGH (ref 0.1–0.6)
MONOCYTES NFR BLD AUTO: 11.4 % — HIGH (ref 1.7–9.3)
NEUTROPHILS # BLD AUTO: 9.33 K/UL — HIGH (ref 1.4–6.5)
NEUTROPHILS NFR BLD AUTO: 73.2 % — SIGNIFICANT CHANGE UP (ref 42.2–75.2)
NRBC # BLD: 0 /100 WBCS — SIGNIFICANT CHANGE UP (ref 0–0)
PLATELET # BLD AUTO: 250 K/UL — SIGNIFICANT CHANGE UP (ref 130–400)
POTASSIUM SERPL-MCNC: 4.6 MMOL/L — SIGNIFICANT CHANGE UP (ref 3.5–5)
POTASSIUM SERPL-SCNC: 4.6 MMOL/L — SIGNIFICANT CHANGE UP (ref 3.5–5)
PROT SERPL-MCNC: 7.2 G/DL — SIGNIFICANT CHANGE UP (ref 6–8)
RBC # BLD: 5.57 M/UL — SIGNIFICANT CHANGE UP (ref 4.7–6.1)
RBC # FLD: 13.5 % — SIGNIFICANT CHANGE UP (ref 11.5–14.5)
SODIUM SERPL-SCNC: 136 MMOL/L — SIGNIFICANT CHANGE UP (ref 135–146)
WBC # BLD: 12.77 K/UL — HIGH (ref 4.8–10.8)
WBC # FLD AUTO: 12.77 K/UL — HIGH (ref 4.8–10.8)

## 2022-08-06 PROCEDURE — 99232 SBSQ HOSP IP/OBS MODERATE 35: CPT

## 2022-08-06 PROCEDURE — 99233 SBSQ HOSP IP/OBS HIGH 50: CPT

## 2022-08-06 PROCEDURE — 93880 EXTRACRANIAL BILAT STUDY: CPT | Mod: 26

## 2022-08-06 PROCEDURE — 70551 MRI BRAIN STEM W/O DYE: CPT | Mod: 26

## 2022-08-06 RX ORDER — TICAGRELOR 90 MG/1
90 TABLET ORAL EVERY 12 HOURS
Refills: 0 | Status: DISCONTINUED | OUTPATIENT
Start: 2022-08-06 | End: 2022-08-11

## 2022-08-06 RX ORDER — INSULIN GLARGINE 100 [IU]/ML
18 INJECTION, SOLUTION SUBCUTANEOUS AT BEDTIME
Refills: 0 | Status: DISCONTINUED | OUTPATIENT
Start: 2022-08-06 | End: 2022-08-08

## 2022-08-06 RX ORDER — INSULIN GLARGINE 100 [IU]/ML
15 INJECTION, SOLUTION SUBCUTANEOUS ONCE
Refills: 0 | Status: DISCONTINUED | OUTPATIENT
Start: 2022-08-06 | End: 2022-08-06

## 2022-08-06 RX ORDER — LACTULOSE 10 G/15ML
20 SOLUTION ORAL
Refills: 0 | Status: DISCONTINUED | OUTPATIENT
Start: 2022-08-06 | End: 2022-08-11

## 2022-08-06 RX ADMIN — TICAGRELOR 90 MILLIGRAM(S): 90 TABLET ORAL at 12:11

## 2022-08-06 RX ADMIN — MAGNESIUM OXIDE 400 MG ORAL TABLET 400 MILLIGRAM(S): 241.3 TABLET ORAL at 07:26

## 2022-08-06 RX ADMIN — Medication 2: at 07:25

## 2022-08-06 RX ADMIN — TICAGRELOR 90 MILLIGRAM(S): 90 TABLET ORAL at 17:03

## 2022-08-06 RX ADMIN — Medication 2: at 16:59

## 2022-08-06 RX ADMIN — POLYETHYLENE GLYCOL 3350 17 GRAM(S): 17 POWDER, FOR SOLUTION ORAL at 17:03

## 2022-08-06 RX ADMIN — LISINOPRIL 40 MILLIGRAM(S): 2.5 TABLET ORAL at 05:48

## 2022-08-06 RX ADMIN — POLYETHYLENE GLYCOL 3350 17 GRAM(S): 17 POWDER, FOR SOLUTION ORAL at 05:47

## 2022-08-06 RX ADMIN — ATORVASTATIN CALCIUM 80 MILLIGRAM(S): 80 TABLET, FILM COATED ORAL at 21:43

## 2022-08-06 RX ADMIN — Medication 50 MILLIGRAM(S): at 05:48

## 2022-08-06 RX ADMIN — Medication 81 MILLIGRAM(S): at 11:37

## 2022-08-06 RX ADMIN — ENOXAPARIN SODIUM 40 MILLIGRAM(S): 100 INJECTION SUBCUTANEOUS at 11:36

## 2022-08-06 RX ADMIN — Medication 25 MILLIGRAM(S): at 05:48

## 2022-08-06 RX ADMIN — INSULIN GLARGINE 18 UNIT(S): 100 INJECTION, SOLUTION SUBCUTANEOUS at 21:52

## 2022-08-06 RX ADMIN — MAGNESIUM OXIDE 400 MG ORAL TABLET 400 MILLIGRAM(S): 241.3 TABLET ORAL at 11:37

## 2022-08-06 RX ADMIN — FAMOTIDINE 20 MILLIGRAM(S): 10 INJECTION INTRAVENOUS at 11:36

## 2022-08-06 RX ADMIN — AMLODIPINE BESYLATE 5 MILLIGRAM(S): 2.5 TABLET ORAL at 05:48

## 2022-08-06 RX ADMIN — Medication 4: at 11:35

## 2022-08-06 RX ADMIN — MAGNESIUM OXIDE 400 MG ORAL TABLET 400 MILLIGRAM(S): 241.3 TABLET ORAL at 17:00

## 2022-08-06 RX ADMIN — LACTULOSE 20 GRAM(S): 10 SOLUTION ORAL at 11:37

## 2022-08-06 RX ADMIN — Medication 50 MILLIGRAM(S): at 17:04

## 2022-08-06 NOTE — PROGRESS NOTE ADULT - ASSESSMENT
This is a 77 year old Male with PMHx HTN, DM, CAD with stents who presents with a chief complaint of RUE weakness, L facial, slurred speech, presents to the ED BIBEMS as a Stroke activation at 8:14am. NIH at the time was 2 for RUE and RLE drift. Facial and slurred speech at this point has resolved. mRS of 0. CTH was completed and was negative for bleed. CTA/P negative for perfusion deficit or LVO. Patient only takes ASA 81 and PLX 75 at home for heart ppx. Denies any neurologic history or stroke in the past. tPA given at 8:48am 8/3 as well as started on Cardene and 40 mg labetalol for BP control. BP prior to tPA administration was 183/93. Of note CTH shows chronic L occipital infarct. Patient admitted to 3E stroke unit for rest of neurologic work up and post tPA monitoring.    #Acute ischemic stroke s/p tPA  - Admitted to 3E stroke unit for monitoring  - atorvastatin 80 once daily  - Notify provider if patient passes dysphasgia screen able to have diet if no pending intervention  - Keep BP <180/105, notify provider if out of range  - repeat CTH stable  - MRI brain without contrast pending  - EDSON: nl EF, no PFO  - PT/OT eval  - Stroke education  - antiplatelets as per neuro  - tele       #DM w/ hyperglycemia  -Insulin  -monitor FS. Goal 140-180 inpt  -A1C=8.2  -pt counselled  changing lantus to 18     #HTN  -permissive HTN for now w/ gradual lowering     #HLD  -HD statin  -LDL 72    #CAD  -cont antiplatelets, BB, statin      DVT ppx - SCDs, Lovenox

## 2022-08-06 NOTE — PROGRESS NOTE ADULT - ASSESSMENT
This is a 77y old  Male with HTN, DM, CAD with stents who presented to the ED via EMS with a chief complaint of RUE weakness, L facial, slurred speech, most likely due to left sided hemispheric dysfunction in the frontal MCA territory from suspected cardioembolic cause.       #RUE hemiparesis most likely 2/2 L MCA infarct (pt was taking aspirin and clopidogrel at home)  - Stroke Code On admission NIH 2 for RUE and RLE weakness - s/p tpa 08/03  - HTN Emergency POA   - s/p Cardene drip for BP control post tPA  - CTA H&N: No evidence of major vascular stenosis or occlusion. Normal perfusion images. Scattered mild atheromatous changes as above.  - CTP: There is no evidence of focal perfusion deficit to suggest acute cerebral ischemia  - CTH 24h post tPA- no bleed or acute pathology  - MRB w/ multiple infarcts w/ prominence in the MCA, PCA territory  and left hemisphere   - A1c 8.2, LDL 72  - echo with bubble: 59% G1DD  - s/p EDSON 8/5 - EF 55-60, no PFO or clot burden  - cont PT/OT  - in light of acute infarct likely failed Clopidogrel therapy -- start Ticagrelor 90mg Q12  - discussed at bedside need for possible ILR   - Stroke education    #HTN  - HTN Emergency on admission s/p Cardene drip for BP control  - c/w Amlodipine 5mg QD  - c/w Hydrochlorothiazide 25mg QD  - c/w metoprolol tart 50mg  - goal -160    #DM - uncontrolled   - A1c 8.2  - start Lantus 18U HS and continue sliding scale   - keep BG < 180    #Constipation - unresolved   - c/w Miralax Q12  - c/w Senna 2 tabs HS  - c/w Lactulose Q12 PRN    Msc  Diet: carb consistent  GI ppx: none  DVT ppx: lovenox  Activity: Out of bed with assist  Code: Full    Dsipo: possible DC to 4a on Monday   This is a 77y old  Male with HTN, DM, CAD with stents who presented to the ED via EMS with a chief complaint of RUE weakness, L facial, slurred speech, most likely due to left sided hemispheric dysfunction in the frontal MCA territory from suspected cardioembolic cause.       #RUE hemiparesis most likely 2/2 L MCA infarct (pt was taking aspirin and clopidogrel at home)  - Stroke Code On admission NIH 2 for RUE and RLE weakness - s/p tpa 08/03  - HTN Emergency POA   - s/p Cardene drip for BP control post tPA  - CTA H&N: No evidence of major vascular stenosis or occlusion. Normal perfusion images. Scattered mild atheromatous changes as above.  - CTP: There is no evidence of focal perfusion deficit to suggest acute cerebral ischemia  - CTH 24h post tPA- no bleed or acute pathology  - MRB w/ multiple infarcts w/ prominence in the MCA, PCA territory  and left hemisphere   - A1c 8.2, LDL 72  - echo with bubble: 59% G1DD  - s/p EDSON 8/5 - EF 55-60, no PFO or clot burden  - cont PT/OT  - in light of acute infarct likely failed Clopidogrel therapy -- start Ticagrelor 90mg Q12  - discussed at bedside need for possible ILR   - Stroke education  f/u Carotid Doppler and MRA neck    #HTN  - HTN Emergency on admission s/p Cardene drip for BP control  - c/w Amlodipine 5mg QD  - c/w Hydrochlorothiazide 25mg QD  - c/w metoprolol tart 50mg  - goal -160    #DM - uncontrolled   - A1c 8.2  - start Lantus 18U HS and continue sliding scale   - keep BG < 180    #Constipation - unresolved   - c/w Miralax Q12  - c/w Senna 2 tabs HS  - c/w Lactulose Q12 PRN    Msc  Diet: carb consistent  GI ppx: none  DVT ppx: lovenox  Activity: Out of bed with assist  Code: Full    Dsipo: possible DC to 4a on Monday

## 2022-08-06 NOTE — PROGRESS NOTE ADULT - SUBJECTIVE AND OBJECTIVE BOX
SUBJECTIVE:    Patient is a 77y old Male who presents with a chief complaint of stroke like Sx (05 Aug 2022 13:41)    Currently admitted to medicine with the primary diagnosis of CVA (cerebrovascular accident)       Today is hospital day 3d. This morning he is resting comfortably in bed and reports no new issues or overnight events.     PAST MEDICAL & SURGICAL HISTORY  Hypertension    Diabetes    Hyperlipidemia    GERD (gastroesophageal reflux disease)    Coronary artery disease      SOCIAL HISTORY:  Negative for smoking/alcohol/drug use.     ALLERGIES:  No Known Allergies    MEDICATIONS:  STANDING MEDICATIONS  amLODIPine   Tablet 5 milliGRAM(s) Oral daily  aspirin enteric coated 81 milliGRAM(s) Oral daily  atorvastatin 80 milliGRAM(s) Oral at bedtime  benzocaine 20%/menthol 0.5% Spray 1 Spray(s) Topical once  clopidogrel Tablet 75 milliGRAM(s) Oral daily  dextrose 5%. 1000 milliLiter(s) IV Continuous <Continuous>  dextrose 5%. 1000 milliLiter(s) IV Continuous <Continuous>  dextrose 50% Injectable 25 Gram(s) IV Push once  dextrose 50% Injectable 12.5 Gram(s) IV Push once  dextrose 50% Injectable 25 Gram(s) IV Push once  enoxaparin Injectable 40 milliGRAM(s) SubCutaneous every 24 hours  famotidine    Tablet 20 milliGRAM(s) Oral daily  glucagon  Injectable 1 milliGRAM(s) IntraMuscular once  hydrochlorothiazide 25 milliGRAM(s) Oral daily  insulin glargine Injectable (LANTUS) 18 Unit(s) SubCutaneous at bedtime  insulin lispro (ADMELOG) corrective regimen sliding scale   SubCutaneous three times a day before meals  lisinopril 40 milliGRAM(s) Oral daily  magnesium oxide 400 milliGRAM(s) Oral three times a day with meals  metoprolol tartrate 50 milliGRAM(s) Oral two times a day  polyethylene glycol 3350 17 Gram(s) Oral two times a day  senna 2 Tablet(s) Oral at bedtime    PRN MEDICATIONS  dextrose Oral Gel 15 Gram(s) Oral once PRN  melatonin 5 milliGRAM(s) Oral at bedtime PRN    VITALS:   T(F): 97.4  HR: 76  BP: 141/85  RR: 18  SpO2: 95%    PHYSICAL EXAM:  GEN: No acute distress  LUNGS: Clear to auscultation bilaterally   HEART: S1/S2 present.   ABD: Soft, non-tender, non-distended. Bowel sounds present  NEURO: AAOX3      LABS:                        15.9   8.76  )-----------( 205      ( 05 Aug 2022 06:22 )             45.0     08-06    136  |  97<L>  |  16  ----------------------------<  233<H>  4.6   |  21  |  1.3    Ca    9.3      06 Aug 2022 05:28  Phos  3.2     08-04  Mg     2.1     08-06    TPro  7.2  /  Alb  4.1  /  TBili  1.4<H>  /  DBili  x   /  AST  25  /  ALT  14  /  AlkPhos  122<H>  08-06                      RADIOLOGY:

## 2022-08-06 NOTE — PROGRESS NOTE ADULT - ATTENDING COMMENTS
Pt is a 78 yo M, right handed, with PMHx of HTN, HLD  CAD s/p PCI, who presented with right arm weakness and slurred speech. S/p IV tPA on 8/3    Impr: multifocal acute ischemic stroke in L MCA/PCA territories, most prominently in left high frontal region  CTA head/neck with mild-moderate bilateral carotid stenosis (estimated at <50%), also with fetal left PCA. Possible underestimation fo left carotid stenosis. Obtain other imaging modalities for confirmation. CUS and MRA neck with/without. Discussed with Dr. Multani.   EDSON without evidence of clot/shunt  Switch from ASA/plavix-> ASA/brilinta  Continue high intensity statin, DM optimization  possible ILR placement  PT/OT/ST, tele, -160 .

## 2022-08-06 NOTE — PROGRESS NOTE ADULT - SUBJECTIVE AND OBJECTIVE BOX
Neurology Progress Note    Interval History:    Patient was seen and examined, no acute event over night. Pt stated that he is still unable to move his right hand. He also expressed haven't going to bathroom even after taking bowel regiment.     Medications:  amLODIPine   Tablet 5 milliGRAM(s) Oral daily  aspirin enteric coated 81 milliGRAM(s) Oral daily  atorvastatin 80 milliGRAM(s) Oral at bedtime  benzocaine 20%/menthol 0.5% Spray 1 Spray(s) Topical once  dextrose 5%. 1000 milliLiter(s) IV Continuous <Continuous>  dextrose 5%. 1000 milliLiter(s) IV Continuous <Continuous>  dextrose 50% Injectable 25 Gram(s) IV Push once  dextrose 50% Injectable 12.5 Gram(s) IV Push once  dextrose 50% Injectable 25 Gram(s) IV Push once  dextrose Oral Gel 15 Gram(s) Oral once PRN  enoxaparin Injectable 40 milliGRAM(s) SubCutaneous every 24 hours  famotidine    Tablet 20 milliGRAM(s) Oral daily  glucagon  Injectable 1 milliGRAM(s) IntraMuscular once  hydrochlorothiazide 25 milliGRAM(s) Oral daily  insulin glargine Injectable (LANTUS) 18 Unit(s) SubCutaneous at bedtime  insulin lispro (ADMELOG) corrective regimen sliding scale   SubCutaneous three times a day before meals  lactulose Syrup 20 Gram(s) Oral two times a day PRN  lisinopril 40 milliGRAM(s) Oral daily  magnesium oxide 400 milliGRAM(s) Oral three times a day with meals  melatonin 5 milliGRAM(s) Oral at bedtime PRN  metoprolol tartrate 50 milliGRAM(s) Oral two times a day  polyethylene glycol 3350 17 Gram(s) Oral two times a day  senna 2 Tablet(s) Oral at bedtime  ticagrelor 90 milliGRAM(s) Oral every 12 hours      Vital Signs Last 24 Hrs  T(C): 36.3 (06 Aug 2022 07:08), Max: 36.7 (05 Aug 2022 14:33)  T(F): 97.4 (06 Aug 2022 07:08), Max: 98 (05 Aug 2022 14:33)  HR: 76 (06 Aug 2022 07:08) (76 - 92)  BP: 141/85 (06 Aug 2022 07:08) (123/76 - 163/100)  BP(mean): 103 (06 Aug 2022 07:08) (91 - 118)  RR: 18 (06 Aug 2022 07:08) (16 - 18)  SpO2: 95% (06 Aug 2022 07:08) (94% - 97%)    Parameters below as of 06 Aug 2022 07:08  Patient On (Oxygen Delivery Method): room air        Neurological Examination:  General:  Appearance is consistent with chronologic age.   Cognitive/Language:  Awake, alert, and oriented to person, place, time and date.   Cranial Nerves  - Eyes: Visual acuity intact, Visual fields full.  EOMI w/o nystagmus    - Face:  Facial sensation normal V1 - 3, no facial asymmetry.    - Ears/Nose/Throat:  Hearing grossly intact b/l to finger rub.  Palate elevates midline.  Tongue and uvula midline.   Motor examination:  (MRC grade R/L) 5/5 LUE; 0/5 RUE; 5/5 b/l LE. No observable drift of LUE and b/l LE. Normal tone and bulk. No tenderness, twitching, tremors or involuntary movements.  Sensory examination:  Intact to light touch and pinprick, vibration in all extremities.  Reflexes:   2+ L biceps, L brachioradialis, L triceps and b/l patella. 0 R biceps and brachiradialis  Cerebellum:   FTN LUE /HKS intact.  No dysmetria.        Labs:  CBC Full  -  ( 05 Aug 2022 06:22 )  WBC Count : 8.76 K/uL  RBC Count : 5.32 M/uL  Hemoglobin : 15.9 g/dL  Hematocrit : 45.0 %  Platelet Count - Automated : 205 K/uL  Mean Cell Volume : 84.6 fL  Mean Cell Hemoglobin : 29.9 pg  Mean Cell Hemoglobin Concentration : 35.3 g/dL  Auto Neutrophil # : 6.23 K/uL  Auto Lymphocyte # : 1.37 K/uL  Auto Monocyte # : 0.82 K/uL  Auto Eosinophil # : 0.28 K/uL  Auto Basophil # : 0.04 K/uL  Auto Neutrophil % : 71.1 %  Auto Lymphocyte % : 15.6 %  Auto Monocyte % : 9.4 %  Auto Eosinophil % : 3.2 %  Auto Basophil % : 0.5 %    08-06    136  |  97<L>  |  16  ----------------------------<  233<H>  4.6   |  21  |  1.3    Ca    9.3      06 Aug 2022 05:28  Phos  3.2     08-0Mg     2.1     08-06    TPro  7.2  /  Alb  4.1  /  TBili  1.4<H>  /  DBili  x   /  AST  25  /  ALT  14  /  AlkPhos  122<H>  08-06    LIVER FUNCTIONS - ( 06 Aug 2022 05:28 )  Alb: 4.1 g/dL / Pro: 7.2 g/dL / ALK PHOS: 122 U/L / ALT: 14 U/L / AST: 25 U/L / GGT: x                 RADIOLOGY & ADDITIONAL TESTS:

## 2022-08-07 LAB
ALBUMIN SERPL ELPH-MCNC: 4 G/DL — SIGNIFICANT CHANGE UP (ref 3.5–5.2)
ALP SERPL-CCNC: 154 U/L — HIGH (ref 30–115)
ALT FLD-CCNC: 20 U/L — SIGNIFICANT CHANGE UP (ref 0–41)
ANION GAP SERPL CALC-SCNC: 15 MMOL/L — HIGH (ref 7–14)
AST SERPL-CCNC: 17 U/L — SIGNIFICANT CHANGE UP (ref 0–41)
BASOPHILS # BLD AUTO: 0.04 K/UL — SIGNIFICANT CHANGE UP (ref 0–0.2)
BASOPHILS NFR BLD AUTO: 0.3 % — SIGNIFICANT CHANGE UP (ref 0–1)
BILIRUB SERPL-MCNC: 1.7 MG/DL — HIGH (ref 0.2–1.2)
BUN SERPL-MCNC: 22 MG/DL — HIGH (ref 10–20)
CALCIUM SERPL-MCNC: 9.5 MG/DL — SIGNIFICANT CHANGE UP (ref 8.5–10.1)
CHLORIDE SERPL-SCNC: 94 MMOL/L — LOW (ref 98–110)
CO2 SERPL-SCNC: 26 MMOL/L — SIGNIFICANT CHANGE UP (ref 17–32)
CREAT SERPL-MCNC: 1.4 MG/DL — SIGNIFICANT CHANGE UP (ref 0.7–1.5)
EGFR: 52 ML/MIN/1.73M2 — LOW
EOSINOPHIL # BLD AUTO: 0.27 K/UL — SIGNIFICANT CHANGE UP (ref 0–0.7)
EOSINOPHIL NFR BLD AUTO: 2.2 % — SIGNIFICANT CHANGE UP (ref 0–8)
GLUCOSE BLDC GLUCOMTR-MCNC: 252 MG/DL — HIGH (ref 70–99)
GLUCOSE BLDC GLUCOMTR-MCNC: 257 MG/DL — HIGH (ref 70–99)
GLUCOSE BLDC GLUCOMTR-MCNC: 279 MG/DL — HIGH (ref 70–99)
GLUCOSE BLDC GLUCOMTR-MCNC: 320 MG/DL — HIGH (ref 70–99)
GLUCOSE SERPL-MCNC: 264 MG/DL — HIGH (ref 70–99)
HCT VFR BLD CALC: 47.1 % — SIGNIFICANT CHANGE UP (ref 42–52)
HGB BLD-MCNC: 16.7 G/DL — SIGNIFICANT CHANGE UP (ref 14–18)
IMM GRANULOCYTES NFR BLD AUTO: 0.4 % — HIGH (ref 0.1–0.3)
LYMPHOCYTES # BLD AUTO: 16.5 % — LOW (ref 20.5–51.1)
LYMPHOCYTES # BLD AUTO: 2.05 K/UL — SIGNIFICANT CHANGE UP (ref 1.2–3.4)
MAGNESIUM SERPL-MCNC: 2.3 MG/DL — SIGNIFICANT CHANGE UP (ref 1.8–2.4)
MCHC RBC-ENTMCNC: 29.7 PG — SIGNIFICANT CHANGE UP (ref 27–31)
MCHC RBC-ENTMCNC: 35.5 G/DL — SIGNIFICANT CHANGE UP (ref 32–37)
MCV RBC AUTO: 83.8 FL — SIGNIFICANT CHANGE UP (ref 80–94)
MONOCYTES # BLD AUTO: 1.55 K/UL — HIGH (ref 0.1–0.6)
MONOCYTES NFR BLD AUTO: 12.5 % — HIGH (ref 1.7–9.3)
NEUTROPHILS # BLD AUTO: 8.44 K/UL — HIGH (ref 1.4–6.5)
NEUTROPHILS NFR BLD AUTO: 68.1 % — SIGNIFICANT CHANGE UP (ref 42.2–75.2)
NRBC # BLD: 0 /100 WBCS — SIGNIFICANT CHANGE UP (ref 0–0)
PLATELET # BLD AUTO: 252 K/UL — SIGNIFICANT CHANGE UP (ref 130–400)
POTASSIUM SERPL-MCNC: 3.8 MMOL/L — SIGNIFICANT CHANGE UP (ref 3.5–5)
POTASSIUM SERPL-SCNC: 3.8 MMOL/L — SIGNIFICANT CHANGE UP (ref 3.5–5)
PROT SERPL-MCNC: 7.2 G/DL — SIGNIFICANT CHANGE UP (ref 6–8)
RBC # BLD: 5.62 M/UL — SIGNIFICANT CHANGE UP (ref 4.7–6.1)
RBC # FLD: 13.4 % — SIGNIFICANT CHANGE UP (ref 11.5–14.5)
SARS-COV-2 RNA SPEC QL NAA+PROBE: SIGNIFICANT CHANGE UP
SODIUM SERPL-SCNC: 135 MMOL/L — SIGNIFICANT CHANGE UP (ref 135–146)
WBC # BLD: 12.4 K/UL — HIGH (ref 4.8–10.8)
WBC # FLD AUTO: 12.4 K/UL — HIGH (ref 4.8–10.8)

## 2022-08-07 PROCEDURE — 93010 ELECTROCARDIOGRAM REPORT: CPT

## 2022-08-07 PROCEDURE — 99223 1ST HOSP IP/OBS HIGH 75: CPT | Mod: 57

## 2022-08-07 PROCEDURE — 99232 SBSQ HOSP IP/OBS MODERATE 35: CPT

## 2022-08-07 PROCEDURE — 99233 SBSQ HOSP IP/OBS HIGH 50: CPT | Mod: 57

## 2022-08-07 PROCEDURE — 70549 MR ANGIOGRAPH NECK W/O&W/DYE: CPT | Mod: 26

## 2022-08-07 RX ADMIN — MAGNESIUM OXIDE 400 MG ORAL TABLET 400 MILLIGRAM(S): 241.3 TABLET ORAL at 08:21

## 2022-08-07 RX ADMIN — ATORVASTATIN CALCIUM 80 MILLIGRAM(S): 80 TABLET, FILM COATED ORAL at 21:32

## 2022-08-07 RX ADMIN — Medication 4: at 12:03

## 2022-08-07 RX ADMIN — Medication 3: at 08:20

## 2022-08-07 RX ADMIN — AMLODIPINE BESYLATE 5 MILLIGRAM(S): 2.5 TABLET ORAL at 05:41

## 2022-08-07 RX ADMIN — LISINOPRIL 40 MILLIGRAM(S): 2.5 TABLET ORAL at 06:10

## 2022-08-07 RX ADMIN — FAMOTIDINE 20 MILLIGRAM(S): 10 INJECTION INTRAVENOUS at 11:21

## 2022-08-07 RX ADMIN — MAGNESIUM OXIDE 400 MG ORAL TABLET 400 MILLIGRAM(S): 241.3 TABLET ORAL at 11:55

## 2022-08-07 RX ADMIN — Medication 81 MILLIGRAM(S): at 11:21

## 2022-08-07 RX ADMIN — Medication 50 MILLIGRAM(S): at 17:20

## 2022-08-07 RX ADMIN — TICAGRELOR 90 MILLIGRAM(S): 90 TABLET ORAL at 17:20

## 2022-08-07 RX ADMIN — TICAGRELOR 90 MILLIGRAM(S): 90 TABLET ORAL at 05:42

## 2022-08-07 RX ADMIN — POLYETHYLENE GLYCOL 3350 17 GRAM(S): 17 POWDER, FOR SOLUTION ORAL at 17:20

## 2022-08-07 RX ADMIN — Medication 3: at 17:19

## 2022-08-07 RX ADMIN — SENNA PLUS 2 TABLET(S): 8.6 TABLET ORAL at 21:48

## 2022-08-07 RX ADMIN — Medication 50 MILLIGRAM(S): at 05:41

## 2022-08-07 RX ADMIN — ENOXAPARIN SODIUM 40 MILLIGRAM(S): 100 INJECTION SUBCUTANEOUS at 11:20

## 2022-08-07 RX ADMIN — MAGNESIUM OXIDE 400 MG ORAL TABLET 400 MILLIGRAM(S): 241.3 TABLET ORAL at 17:20

## 2022-08-07 RX ADMIN — Medication 25 MILLIGRAM(S): at 06:09

## 2022-08-07 RX ADMIN — INSULIN GLARGINE 18 UNIT(S): 100 INJECTION, SOLUTION SUBCUTANEOUS at 21:30

## 2022-08-07 NOTE — PROGRESS NOTE ADULT - SUBJECTIVE AND OBJECTIVE BOX
SUBJECTIVE:    Patient is a 77y old Male who presents with a chief complaint of RUE weakness (06 Aug 2022 12:00)    Currently admitted to medicine with the primary diagnosis of CVA (cerebrovascular accident)       Today is hospital day 4d. This morning he is resting comfortably in bed and reports no new issues or overnight events.     PAST MEDICAL & SURGICAL HISTORY  Hypertension    Diabetes    Hyperlipidemia    GERD (gastroesophageal reflux disease)    Coronary artery disease      SOCIAL HISTORY:  Negative for smoking/alcohol/drug use.     ALLERGIES:  No Known Allergies    MEDICATIONS:  STANDING MEDICATIONS  amLODIPine   Tablet 5 milliGRAM(s) Oral daily  aspirin enteric coated 81 milliGRAM(s) Oral daily  atorvastatin 80 milliGRAM(s) Oral at bedtime  benzocaine 20%/menthol 0.5% Spray 1 Spray(s) Topical once  dextrose 5%. 1000 milliLiter(s) IV Continuous <Continuous>  dextrose 5%. 1000 milliLiter(s) IV Continuous <Continuous>  dextrose 50% Injectable 25 Gram(s) IV Push once  dextrose 50% Injectable 12.5 Gram(s) IV Push once  dextrose 50% Injectable 25 Gram(s) IV Push once  enoxaparin Injectable 40 milliGRAM(s) SubCutaneous every 24 hours  famotidine    Tablet 20 milliGRAM(s) Oral daily  glucagon  Injectable 1 milliGRAM(s) IntraMuscular once  hydrochlorothiazide 25 milliGRAM(s) Oral daily  insulin glargine Injectable (LANTUS) 18 Unit(s) SubCutaneous at bedtime  insulin lispro (ADMELOG) corrective regimen sliding scale   SubCutaneous three times a day before meals  lisinopril 40 milliGRAM(s) Oral daily  magnesium oxide 400 milliGRAM(s) Oral three times a day with meals  metoprolol tartrate 50 milliGRAM(s) Oral two times a day  polyethylene glycol 3350 17 Gram(s) Oral two times a day  senna 2 Tablet(s) Oral at bedtime  ticagrelor 90 milliGRAM(s) Oral every 12 hours    PRN MEDICATIONS  bisacodyl Suppository 10 milliGRAM(s) Rectal daily PRN  dextrose Oral Gel 15 Gram(s) Oral once PRN  lactulose Syrup 20 Gram(s) Oral two times a day PRN  melatonin 5 milliGRAM(s) Oral at bedtime PRN    VITALS:   T(F): 97.8  HR: 78  BP: 162/94  RR: 18  SpO2: 96%    PHYSICAL EXAM:  GEN: No acute distress  LUNGS: Clear to auscultation bilaterally   HEART: S1/S2 present.   ABD: Soft, non-tender, non-distended. Bowel sounds present  NEURO: AAOX3      LABS:                        16.7   12.40 )-----------( 252      ( 07 Aug 2022 06:50 )             47.1     08-06    136  |  97<L>  |  16  ----------------------------<  233<H>  4.6   |  21  |  1.3    Ca    9.3      06 Aug 2022 05:28  Mg     2.1     08-06    TPro  7.2  /  Alb  4.1  /  TBili  1.4<H>  /  DBili  x   /  AST  25  /  ALT  14  /  AlkPhos  122<H>  08-06                      RADIOLOGY:

## 2022-08-07 NOTE — CONSULT NOTE ADULT - CONSULT REQUESTED DATE/TIME
07-Aug-2022 13:07
04-Aug-2022
04-Aug-2022 13:17
03-Aug-2022 09:47
04-Aug-2022 17:33
05-Aug-2022 15:00

## 2022-08-07 NOTE — CONSULT NOTE ADULT - ASSESSMENT
77y Male HTN, DM, HLD, GERD, CAD, PCI, presented with RUE weakness and slurred speech, was treated with TPA. MRI revealed scattered acute left sided infarcts in MCA and PCA territory.  EF 59%    CVA  CAD, PCI  HTN,  DM    con't tele  patient is recommended for ILR  Please sed COVID PCR ASAP, in order to schedule the procedure 77y Male HTN, DM, HLD, GERD, CAD, PCI, presented with RUE weakness and slurred speech, was treated with TPA. MRI revealed scattered acute left sided infarcts in MCA and PCA territory.  EF 59%    CVA  CAD, PCI  HTN,  DM    con't tele  patient is recommended for ILR, family at [facility]  Patient will think about it and decides by AM    Please sed COVID PCR ASAP, in order to schedule the procedure, ot agrees  NO need NPO  will follow up in AM about decision and COVID status update

## 2022-08-07 NOTE — CONSULT NOTE ADULT - SUBJECTIVE AND OBJECTIVE BOX
Patient is a 77y old  Male who presents with a chief complaint of stroke s/p tPA (07 Aug 2022 07:31)        HPI:  Patient is a 77 year old Male with HTN, DM, HLD, GERD, CAD with stents who presents with a chief complaint of RUE weakness, L facial, slurred speech, presents to the ED BIBEMS as a Stroke activation at 8:14am. NIH at the time was 2 for RUE and RLE drift. Facial and slurred speech at this point has resolved. mRS of 0. CTH was completed and was negative for bleed. CTA/P negative for perfusion deficit or LVO. Patient only takes ASA 81 at home for heart ppx. Denies any neurologic history or stroke in the past. tPA given at 8:48am as well as started on Cardene for BP control. BP prior to tPA administration was 183/93.    (03 Aug 2022 11:22)      Electrophysiology:  77y Male HTN, DM, HLD, GERD, CAD, PCI, presented with RUE weakness and slurred speech, was treated with TPA. MRI revealed scattered acute left sided infarcts in MCA and PCA territory. Echocardiogram and EDSON did not reveal any abnormalities  Patient is now recommended for implantable loop reocrder for long term cardiac monitoring to evaluate for cardiac etiology of cryptogenic stroke    REVIEW OF SYSTEMS    [ ] A ten-point review of systems was otherwise negative except as noted.  [ ] Due to altered mental status/intubation, subjective information were not able to be obtained from the patient. History was obtained, to the extent possible, from review of the chart and collateral sources of information.      PAST MEDICAL & SURGICAL HISTORY:  Hypertension      Diabetes      Hyperlipidemia      GERD (gastroesophageal reflux disease)      Coronary artery disease          Home Medications:  famotidine 40 mg oral tablet: 1 tab(s) orally once a day (at bedtime) (04 Aug 2022 08:06)  hydroCHLOROthiazide 25 mg oral tablet: 1 tab(s) orally once a day (04 Aug 2022 08:05)  Lantus 100 units/mL subcutaneous solution: 30 unit(s) subcutaneous once a day (at bedtime) (04 Aug 2022 08:01)  metoprolol tartrate 50 mg oral tablet: 1 tab(s) orally 2 times a day (04 Aug 2022 08:06)  ramipril 10 mg oral capsule: 1 cap(s) orally once a day (04 Aug 2022 08:01)  simvastatin 40 mg oral tablet: 1 tab(s) orally once a day (at bedtime) (04 Aug 2022 08:06)      Allergies:  No Known Allergies      FAMILY HISTORY:      SOCIAL HISTORY: denies tobacco / ETOH / illicit drug use     CIGARETTES:  ALCOHOL:  MARIJUANA:  ILLICIT DRUGS:        PREVIOUS DIAGNOSTIC TESTING:      ECHO  FINDINGS:  < from: TTE Echo Complete w/o Contrast w/ Doppler (08.04.22 @ 09:20) >  Summary:   1. LV Ejection Fraction by Last's Method with a biplane EF of 59 %.   2. Mild left ventricular hypertrophy.   3. Spectral Doppler shows impaired relaxation pattern of left   ventricular myocardial filling (Grade I diastolic dysfunction).   4. Mitral annular calcification.   5. No evidence of mitral valve regurgitation.   6. Sclerotic aortic valve with normal opening.    PHYSICIAN INTERPRETATION:  LeftVentricle: The left ventricular internal cavity size is normal.   There is mild left ventricular hypertrophy. Spectral Doppler shows   impaired relaxation pattern of left ventricular myocardial filling (Grade   I diastolic dysfunction).  Right Ventricle: Normal right ventricular size and function.  Pericardium: There is no evidence of pericardial effusion.  Mitral Valve: Structurally normal mitral valve, with normal leaflet   excursion. There is mitral annular calcification. No evidence of mitral  valve regurgitation is seen.  Tricuspid Valve: Structurally normal tricuspid valve, with normal leaflet   excursion. No tricuspid regurgitation is visualized.  Aortic Valve: The aortic valve is trileaflet. No evidence of aortic   stenosis. Sclerotic aortic valve with normal opening. No evidence of   aortic valve regurgitation is seen.    < end of copied text >    < from: Transesophageal Echocardiogram (08.05.22 @ 09:49) >  Summary:   1. Left ventricular ejection fraction, by visual estimation, is 55 to   60%.   2. Normal global left ventricular systolic function.   3. Normal left ventricular internal cavity size.   4. Normal left atrial size.   5. Normal right atrial size.   6. Mild mitral valve regurgitation.   7. Color flow doppler and intravenous injection of agitated saline   demonstrates the presence of an intact intra atrial septum.    < end of copied text >      STRESS  FINDINGS:    CATHETERIZATION  FINDINGS:    ELECTROPHYSIOLOGY STUDY  FINDINGS:    CAROTID ULTRASOUND:  FINDINGS    VENOUS DUPLEX SCAN:  FINDINGS:    CHEST CT PULMONARY ANGIO with IV Contrast:  FINDINGS:      MEDICATIONS  (STANDING):  amLODIPine   Tablet 5 milliGRAM(s) Oral daily  aspirin enteric coated 81 milliGRAM(s) Oral daily  atorvastatin 80 milliGRAM(s) Oral at bedtime  benzocaine 20%/menthol 0.5% Spray 1 Spray(s) Topical once  dextrose 5%. 1000 milliLiter(s) (100 mL/Hr) IV Continuous <Continuous>  dextrose 5%. 1000 milliLiter(s) (50 mL/Hr) IV Continuous <Continuous>  dextrose 50% Injectable 25 Gram(s) IV Push once  dextrose 50% Injectable 12.5 Gram(s) IV Push once  dextrose 50% Injectable 25 Gram(s) IV Push once  enoxaparin Injectable 40 milliGRAM(s) SubCutaneous every 24 hours  famotidine    Tablet 20 milliGRAM(s) Oral daily  glucagon  Injectable 1 milliGRAM(s) IntraMuscular once  hydrochlorothiazide 25 milliGRAM(s) Oral daily  insulin glargine Injectable (LANTUS) 18 Unit(s) SubCutaneous at bedtime  insulin lispro (ADMELOG) corrective regimen sliding scale   SubCutaneous three times a day before meals  lisinopril 40 milliGRAM(s) Oral daily  magnesium oxide 400 milliGRAM(s) Oral three times a day with meals  metoprolol tartrate 50 milliGRAM(s) Oral two times a day  polyethylene glycol 3350 17 Gram(s) Oral two times a day  senna 2 Tablet(s) Oral at bedtime  ticagrelor 90 milliGRAM(s) Oral every 12 hours    MEDICATIONS  (PRN):  bisacodyl Suppository 10 milliGRAM(s) Rectal daily PRN Constipation  dextrose Oral Gel 15 Gram(s) Oral once PRN Blood Glucose LESS THAN 70 milliGRAM(s)/deciliter  lactulose Syrup 20 Gram(s) Oral two times a day PRN Constipation  melatonin 5 milliGRAM(s) Oral at bedtime PRN Insomnia      Vital Signs Last 24 Hrs  T(C): 36.6 (07 Aug 2022 04:02), Max: 36.8 (06 Aug 2022 23:57)  T(F): 97.8 (07 Aug 2022 04:02), Max: 98.2 (06 Aug 2022 23:57)  HR: 92 (07 Aug 2022 12:02) (78 - 106)  BP: 134/87 (07 Aug 2022 12:02) (121/85 - 178/95)  BP(mean): 104 (07 Aug 2022 12:02) (96 - 126)  RR: 18 (07 Aug 2022 08:02) (16 - 18)  SpO2: 95% (07 Aug 2022 12:02) (95% - 98%)    Parameters below as of 07 Aug 2022 05:46  Patient On (Oxygen Delivery Method): room air        PHYSICAL EXAM:    GENERAL: In no apparent distress, well nourished, and hydrated.  HEAD:  Atraumatic, Normocephalic  EYES: EOMI, PERRLA, conjunctiva and sclera clear  NECK: Supple and normal thyroid.  No JVD or carotid bruit.  Carotid pulse is 2+ bilaterally.  HEART: Regular rate and rhythm; No murmurs, rubs, or gallops.  PULMONARY: Clear to auscultation and perfusion.  No rales, wheezing, or rhonchi bilaterally.  ABDOMEN: Soft, Nontender, Nondistended; Bowel sounds present  EXTREMITIES:  2+ Peripheral Pulses, No clubbing, cyanosis, or edema  NEUROLOGICAL: Grossly nonfocal    I&O's Detail    06 Aug 2022 07:01  -  07 Aug 2022 07:00  --------------------------------------------------------  IN:  Total IN: 0 mL    OUT:    Voided (mL): 300 mL  Total OUT: 300 mL    Total NET: -300 mL        Daily     Daily     INTERPRETATION OF TELEMETRY:    ECG:        LABS:                        16.7   12.40 )-----------( 252      ( 07 Aug 2022 06:50 )             47.1     08-07    135  |  94<L>  |  22<H>  ----------------------------<  264<H>  3.8   |  26  |  1.4    Ca    9.5      07 Aug 2022 06:50  Mg     2.3     08-07    TPro  7.2  /  Alb  4.0  /  TBili  1.7<H>  /  DBili  x   /  AST  17  /  ALT  20  /  AlkPhos  154<H>  08-07      Thyroid Stimulating Hormone, Serum: 1.71 uIU/mL (08.04.22 @ 13:08)       BNP            RADIOLOGY & ADDITIONAL STUDIES:      < from: CT Brain Perfusion Maps Stroke (08.03.22 @ 08:33) >  IMPRESSION:    2.  No evidence of major vascular stenosis or occlusion. Normal perfusion   images.    3.  Scattered mild atheromatous changes as above.    < end of copied text >      < from: MR Head No Cont (08.06.22 @ 08:49) >    IMPRESSION:  Scattered left-sided acute cortical infarcts within the MCA and PCA   territories. Suggestion of trace petechial hemorrhage involving the high  left frontal region    Mild chronic microvascular type changes as well as a chronic left   occipital lobe infarct..    < end of copied text >

## 2022-08-07 NOTE — PROGRESS NOTE ADULT - ASSESSMENT
This is a 77y old  Male with HTN, DM, CAD with stents who presented to the ED via EMS with a chief complaint of RUE weakness, L facial, slurred speech, most likely due to left sided hemispheric dysfunction in the frontal MCA territory from suspected cardioembolic cause.       #RUE hemiparesis most likely 2/2 L MCA infarct (pt was taking aspirin and clopidogrel at home)  - Stroke Code On admission NIH 2 for RUE and RLE weakness - s/p tpa 08/03  - HTN Emergency POA   - s/p Cardene drip for BP control post tPA  - CTA H&N: No evidence of major vascular stenosis or occlusion. Normal perfusion images. Scattered mild atheromatous changes as above.  - CTP: There is no evidence of focal perfusion deficit to suggest acute cerebral ischemia  - CTH 24h post tPA- no bleed or acute pathology  - MRB w/ multiple infarcts w/ prominence in the MCA, PCA territory  and left hemisphere   - A1c 8.2, LDL 72  - echo with bubble: 59% G1DD  - s/p EDSON 8/5 - EF 55-60, no PFO or clot burden  - cont PT/OT  - in light of acute infarct likely failed Clopidogrel therapy -- start Ticagrelor 90mg Q12  - discussed at bedside need for possible ILR   - Stroke education  f/u Carotid Doppler and MRA neck    #HTN  - HTN Emergency on admission s/p Cardene drip for BP control  - c/w Amlodipine 5mg QD  - c/w Hydrochlorothiazide 25mg QD  - c/w metoprolol tart 50mg  - goal -160    #DM - uncontrolled   - A1c 8.2  - start Lantus 18U HS and continue sliding scale   - keep BG < 180    #Constipation - unresolved   - c/w Miralax Q12  - c/w Senna 2 tabs HS  - c/w Lactulose Q12 PRN    Msc  Diet: carb consistent  GI ppx: none  DVT ppx: lovenox  Activity: Out of bed with assist  Code: Full    Dsipo: possible DC to 4a on Monday  ***Preliminary Note***

## 2022-08-07 NOTE — PROGRESS NOTE ADULT - ASSESSMENT
This is a 77 year old Male with PMHx HTN, DM, CAD with stents who presents with a chief complaint of RUE weakness, L facial, slurred speech, presents to the ED BIBEMS as a Stroke activation at 8:14am. NIH at the time was 2 for RUE and RLE drift. Facial and slurred speech at this point has resolved. mRS of 0. CTH was completed and was negative for bleed. CTA/P negative for perfusion deficit or LVO. Patient only takes ASA 81 and PLX 75 at home for heart ppx. Denies any neurologic history or stroke in the past. tPA given at 8:48am 8/3 as well as started on Cardene and 40 mg labetalol for BP control. BP prior to tPA administration was 183/93. Of note CTH shows chronic L occipital infarct. Patient admitted to 3E stroke unit for rest of neurologic work up and post tPA monitoring.    #Acute ischemic stroke s/p tPA  - Admitted to 3E stroke unit for monitoring  - atorvastatin 80 once daily  - Notify provider if patient passes dysphasgia screen able to have diet if no pending intervention  - Keep BP <180/105, notify provider if out of range  - repeat CTH stable  - MRI brain noted   - EDSON: nl EF, no PFO  - PT/OT eval  - Stroke education  - antiplatelets as per neuro  - tele       #DM w/ hyperglycemia  -Insulin  -monitor FS. Goal 140-180 inpt  -A1C=8.2  -pt counselled  changing lantus to 18     #HTN  -permissive HTN for now w/ gradual lowering     #HLD  -HD statin  -LDL 72    #CAD  -cont antiplatelets, BB, statin      DVT ppx - SCDs, Lovenox

## 2022-08-07 NOTE — PROGRESS NOTE ADULT - ATTENDING COMMENTS
Pt is a 78 yo M, right handed, with PMHx of HTN, HLD  CAD s/p PCI, who presented with right arm weakness and slurred speech. S/p IV tPA on 8/3    Impr: multifocal acute ischemic stroke in L MCA/PCA territories, most prominently in left high frontal region  CTA head/neck with mild-moderate bilateral carotid stenosis (estimated at <50%), also with fetal left PCA. Possible underestimation fo left carotid stenosis. Obtain other imaging modalities for confirmation. CUS and MRA neck with/without. Discussed with Dr. Multani.   EDSON without evidence of clot/shunt  Switch from ASA/plavix-> ASA/brilinta  Continue high intensity statin, DM optimization  EP consult for ILR placement  PT/OT/ST, tele, -160. downgrade to 3E Pt is a 78 yo M, right handed, with PMHx of HTN, HLD  CAD s/p PCI, who presented with right arm weakness and slurred speech. S/p IV tPA on 8/3    Impr: multifocal acute ischemic stroke in L MCA/PCA territories, most prominently in left high frontal region  CTA head/neck with mild-moderate bilateral carotid stenosis (estimated at <50%), also with fetal left PCA. Possible underestimation fo left carotid stenosis. Obtain other imaging modalities for confirmation. CUS and MRA neck with/without. Discussed with Dr. Multani.   EDSON without evidence of clot/shunt  Switch from ASA/plavix-> ASA/brilinta  Continue high intensity statin, DM optimization  EP consult for ILR placement  T bili and alk phos elevated, check RUQ US  PT/OT/ST, tele, -160. downgrade to 3E

## 2022-08-07 NOTE — CONSULT NOTE ADULT - TIME BILLING
Review of imaging and chart; obtaining history; examination of pt; discussion and coordination of care.
time spent on review of labs, imaging studies, old records, obtaining history, personally examining patient, counselling and communicating with patient/ family, entering orders for medications/tests/etc, discussions with other health care providers, documentation in electronic health records, independent interpretation of labs, imaging/procedure results and care coordination.
Stroke

## 2022-08-07 NOTE — PROGRESS NOTE ADULT - SUBJECTIVE AND OBJECTIVE BOX
Neurology Progress Note    Interval History:    Patient was seen and examined, no acute event over night.     Medications:  amLODIPine   Tablet 5 milliGRAM(s) Oral daily  aspirin enteric coated 81 milliGRAM(s) Oral daily  atorvastatin 80 milliGRAM(s) Oral at bedtime  benzocaine 20%/menthol 0.5% Spray 1 Spray(s) Topical once  bisacodyl Suppository 10 milliGRAM(s) Rectal daily PRN  dextrose 5%. 1000 milliLiter(s) IV Continuous <Continuous>  dextrose 5%. 1000 milliLiter(s) IV Continuous <Continuous>  dextrose 50% Injectable 25 Gram(s) IV Push once  dextrose 50% Injectable 12.5 Gram(s) IV Push once  dextrose 50% Injectable 25 Gram(s) IV Push once  dextrose Oral Gel 15 Gram(s) Oral once PRN  enoxaparin Injectable 40 milliGRAM(s) SubCutaneous every 24 hours  famotidine    Tablet 20 milliGRAM(s) Oral daily  glucagon  Injectable 1 milliGRAM(s) IntraMuscular once  hydrochlorothiazide 25 milliGRAM(s) Oral daily  insulin glargine Injectable (LANTUS) 18 Unit(s) SubCutaneous at bedtime  insulin lispro (ADMELOG) corrective regimen sliding scale   SubCutaneous three times a day before meals  lactulose Syrup 20 Gram(s) Oral two times a day PRN  lisinopril 40 milliGRAM(s) Oral daily  magnesium oxide 400 milliGRAM(s) Oral three times a day with meals  melatonin 5 milliGRAM(s) Oral at bedtime PRN  metoprolol tartrate 50 milliGRAM(s) Oral two times a day  polyethylene glycol 3350 17 Gram(s) Oral two times a day  senna 2 Tablet(s) Oral at bedtime  ticagrelor 90 milliGRAM(s) Oral every 12 hours      Vital Signs Last 24 Hrs  T(C): 36.6 (07 Aug 2022 04:02), Max: 36.8 (06 Aug 2022 23:57)  T(F): 97.8 (07 Aug 2022 04:02), Max: 98.2 (06 Aug 2022 23:57)  HR: 93 (07 Aug 2022 05:46) (85 - 106)  BP: 144/91 (07 Aug 2022 05:46) (121/85 - 178/95)  BP(mean): 108 (07 Aug 2022 05:46) (96 - 122)  RR: 18 (07 Aug 2022 05:46) (16 - 18)  SpO2: 98% (07 Aug 2022 05:46) (95% - 98%)    Parameters below as of 07 Aug 2022 05:46  Patient On (Oxygen Delivery Method): room air        Neurological Examination:  General:  Appearance is consistent with chronologic age.   Cognitive/Language:  Awake, alert, and oriented to person, place, time and date.   Cranial Nerves  - Eyes: Visual acuity intact, Visual fields full.  EOMI w/o nystagmus    - Face:  Facial sensation normal V1 - 3, no facial asymmetry.    - Ears/Nose/Throat:  Hearing grossly intact b/l to finger rub.  Palate elevates midline.  Tongue and uvula midline.   Motor examination:  (MRC grade R/L) 5/5 LUE; 0/5 RUE; 5/5 b/l LE. No observable drift of LUE and b/l LE. Normal tone and bulk. No tenderness, twitching, tremors or involuntary movements.  Sensory examination:  Intact to light touch and pinprick, vibration in all extremities.  Reflexes:   2+ L biceps, L brachioradialis, L triceps and b/l patella. 0 R biceps and brachiradialis  Cerebellum:   FTN LUE /HKS intact.  No dysmetria.      Labs:  CBC Full  -  ( 06 Aug 2022 05:28 )  WBC Count : 12.77 K/uL  RBC Count : 5.57 M/uL  Hemoglobin : 16.4 g/dL  Hematocrit : 47.1 %  Platelet Count - Automated : 250 K/uL  Mean Cell Volume : 84.6 fL  Mean Cell Hemoglobin : 29.4 pg  Mean Cell Hemoglobin Concentration : 34.8 g/dL  Auto Neutrophil # : 9.33 K/uL  Auto Lymphocyte # : 1.60 K/uL  Auto Monocyte # : 1.46 K/uL  Auto Eosinophil # : 0.30 K/uL  Auto Basophil # : 0.03 K/uL  Auto Neutrophil % : 73.2 %  Auto Lymphocyte % : 12.5 %  Auto Monocyte % : 11.4 %  Auto Eosinophil % : 2.3 %  Auto Basophil % : 0.2 %    08-06    136  |  97<L>  |  16  ----------------------------<  233<H>  4.6   |  21  |  1.3    Ca    9.3      06 Aug 2022 05:28  Mg     2.1     08-06    TPro  7.2  /  Alb  4.1  /  TBili  1.4<H>  /  DBili  x   /  AST  25  /  ALT  14  /  AlkPhos  122<H>  08-06    LIVER FUNCTIONS - ( 06 Aug 2022 05:28 )  Alb: 4.1 g/dL / Pro: 7.2 g/dL / ALK PHOS: 122 U/L / ALT: 14 U/L / AST: 25 U/L / GGT: x           RADIOLOGY & ADDITIONAL TESTS:  < from: CT Head No Cont (08.04.22 @ 09:40) >    IMPRESSION:    There is questionable gray-white distinction loss involving the left   frontal lobe though possibly related to artifact. A brain MRI is   recommended for further evaluation if not clinically contraindicated.    Chronic left occipital lobe infarct as well as mild chronic microvascular   type changes.    --- End of Report ---    < end of copied text >

## 2022-08-08 PROBLEM — Z00.00 ENCOUNTER FOR PREVENTIVE HEALTH EXAMINATION: Status: ACTIVE | Noted: 2022-08-08

## 2022-08-08 LAB
ALBUMIN SERPL ELPH-MCNC: 4.1 G/DL — SIGNIFICANT CHANGE UP (ref 3.5–5.2)
ALP SERPL-CCNC: 157 U/L — HIGH (ref 30–115)
ALT FLD-CCNC: 26 U/L — SIGNIFICANT CHANGE UP (ref 0–41)
ANION GAP SERPL CALC-SCNC: 13 MMOL/L — SIGNIFICANT CHANGE UP (ref 7–14)
AST SERPL-CCNC: 22 U/L — SIGNIFICANT CHANGE UP (ref 0–41)
BASOPHILS # BLD AUTO: 0.03 K/UL — SIGNIFICANT CHANGE UP (ref 0–0.2)
BASOPHILS NFR BLD AUTO: 0.2 % — SIGNIFICANT CHANGE UP (ref 0–1)
BILIRUB SERPL-MCNC: 1.5 MG/DL — HIGH (ref 0.2–1.2)
BUN SERPL-MCNC: 30 MG/DL — HIGH (ref 10–20)
CALCIUM SERPL-MCNC: 9.5 MG/DL — SIGNIFICANT CHANGE UP (ref 8.5–10.1)
CHLORIDE SERPL-SCNC: 96 MMOL/L — LOW (ref 98–110)
CO2 SERPL-SCNC: 29 MMOL/L — SIGNIFICANT CHANGE UP (ref 17–32)
CREAT SERPL-MCNC: 1.5 MG/DL — SIGNIFICANT CHANGE UP (ref 0.7–1.5)
EGFR: 48 ML/MIN/1.73M2 — LOW
EOSINOPHIL # BLD AUTO: 0.36 K/UL — SIGNIFICANT CHANGE UP (ref 0–0.7)
EOSINOPHIL NFR BLD AUTO: 2.8 % — SIGNIFICANT CHANGE UP (ref 0–8)
GLUCOSE BLDC GLUCOMTR-MCNC: 190 MG/DL — HIGH (ref 70–99)
GLUCOSE BLDC GLUCOMTR-MCNC: 235 MG/DL — HIGH (ref 70–99)
GLUCOSE BLDC GLUCOMTR-MCNC: 268 MG/DL — HIGH (ref 70–99)
GLUCOSE SERPL-MCNC: 241 MG/DL — HIGH (ref 70–99)
HCT VFR BLD CALC: 48.3 % — SIGNIFICANT CHANGE UP (ref 42–52)
HGB BLD-MCNC: 16.5 G/DL — SIGNIFICANT CHANGE UP (ref 14–18)
IMM GRANULOCYTES NFR BLD AUTO: 0.8 % — HIGH (ref 0.1–0.3)
LYMPHOCYTES # BLD AUTO: 1.84 K/UL — SIGNIFICANT CHANGE UP (ref 1.2–3.4)
LYMPHOCYTES # BLD AUTO: 14.2 % — LOW (ref 20.5–51.1)
MAGNESIUM SERPL-MCNC: 2.5 MG/DL — HIGH (ref 1.8–2.4)
MCHC RBC-ENTMCNC: 29 PG — SIGNIFICANT CHANGE UP (ref 27–31)
MCHC RBC-ENTMCNC: 34.2 G/DL — SIGNIFICANT CHANGE UP (ref 32–37)
MCV RBC AUTO: 84.9 FL — SIGNIFICANT CHANGE UP (ref 80–94)
MONOCYTES # BLD AUTO: 1.47 K/UL — HIGH (ref 0.1–0.6)
MONOCYTES NFR BLD AUTO: 11.4 % — HIGH (ref 1.7–9.3)
NEUTROPHILS # BLD AUTO: 9.12 K/UL — HIGH (ref 1.4–6.5)
NEUTROPHILS NFR BLD AUTO: 70.6 % — SIGNIFICANT CHANGE UP (ref 42.2–75.2)
NRBC # BLD: 0 /100 WBCS — SIGNIFICANT CHANGE UP (ref 0–0)
PHOSPHATE SERPL-MCNC: 3.7 MG/DL — SIGNIFICANT CHANGE UP (ref 2.1–4.9)
PLATELET # BLD AUTO: 277 K/UL — SIGNIFICANT CHANGE UP (ref 130–400)
POTASSIUM SERPL-MCNC: 4.3 MMOL/L — SIGNIFICANT CHANGE UP (ref 3.5–5)
POTASSIUM SERPL-SCNC: 4.3 MMOL/L — SIGNIFICANT CHANGE UP (ref 3.5–5)
PROT SERPL-MCNC: 7.3 G/DL — SIGNIFICANT CHANGE UP (ref 6–8)
RBC # BLD: 5.69 M/UL — SIGNIFICANT CHANGE UP (ref 4.7–6.1)
RBC # FLD: 13.3 % — SIGNIFICANT CHANGE UP (ref 11.5–14.5)
SODIUM SERPL-SCNC: 138 MMOL/L — SIGNIFICANT CHANGE UP (ref 135–146)
WBC # BLD: 12.92 K/UL — HIGH (ref 4.8–10.8)
WBC # FLD AUTO: 12.92 K/UL — HIGH (ref 4.8–10.8)

## 2022-08-08 PROCEDURE — 95816 EEG AWAKE AND DROWSY: CPT | Mod: 26

## 2022-08-08 PROCEDURE — 99232 SBSQ HOSP IP/OBS MODERATE 35: CPT

## 2022-08-08 PROCEDURE — 33285 INSJ SUBQ CAR RHYTHM MNTR: CPT

## 2022-08-08 RX ORDER — SODIUM CHLORIDE 9 MG/ML
250 INJECTION INTRAMUSCULAR; INTRAVENOUS; SUBCUTANEOUS ONCE
Refills: 0 | Status: COMPLETED | OUTPATIENT
Start: 2022-08-08 | End: 2022-08-08

## 2022-08-08 RX ORDER — CEPHALEXIN 500 MG
500 CAPSULE ORAL ONCE
Refills: 0 | Status: DISCONTINUED | OUTPATIENT
Start: 2022-08-08 | End: 2022-08-11

## 2022-08-08 RX ORDER — SODIUM CHLORIDE 9 MG/ML
500 INJECTION INTRAMUSCULAR; INTRAVENOUS; SUBCUTANEOUS ONCE
Refills: 0 | Status: COMPLETED | OUTPATIENT
Start: 2022-08-08 | End: 2022-08-08

## 2022-08-08 RX ORDER — INSULIN LISPRO 100/ML
4 VIAL (ML) SUBCUTANEOUS
Refills: 0 | Status: DISCONTINUED | OUTPATIENT
Start: 2022-08-08 | End: 2022-08-08

## 2022-08-08 RX ORDER — INSULIN LISPRO 100/ML
7 VIAL (ML) SUBCUTANEOUS
Refills: 0 | Status: DISCONTINUED | OUTPATIENT
Start: 2022-08-08 | End: 2022-08-10

## 2022-08-08 RX ORDER — CITALOPRAM 10 MG/1
5 TABLET, FILM COATED ORAL DAILY
Refills: 0 | Status: DISCONTINUED | OUTPATIENT
Start: 2022-08-08 | End: 2022-08-11

## 2022-08-08 RX ORDER — INSULIN GLARGINE 100 [IU]/ML
24 INJECTION, SOLUTION SUBCUTANEOUS EVERY MORNING
Refills: 0 | Status: DISCONTINUED | OUTPATIENT
Start: 2022-08-09 | End: 2022-08-10

## 2022-08-08 RX ADMIN — Medication 50 MILLIGRAM(S): at 05:31

## 2022-08-08 RX ADMIN — SODIUM CHLORIDE 500 MILLILITER(S): 9 INJECTION INTRAMUSCULAR; INTRAVENOUS; SUBCUTANEOUS at 18:09

## 2022-08-08 RX ADMIN — LISINOPRIL 40 MILLIGRAM(S): 2.5 TABLET ORAL at 05:31

## 2022-08-08 RX ADMIN — ATORVASTATIN CALCIUM 80 MILLIGRAM(S): 80 TABLET, FILM COATED ORAL at 21:15

## 2022-08-08 RX ADMIN — AMLODIPINE BESYLATE 5 MILLIGRAM(S): 2.5 TABLET ORAL at 05:31

## 2022-08-08 RX ADMIN — Medication 2: at 08:19

## 2022-08-08 RX ADMIN — Medication 25 MILLIGRAM(S): at 06:31

## 2022-08-08 RX ADMIN — TICAGRELOR 90 MILLIGRAM(S): 90 TABLET ORAL at 05:41

## 2022-08-08 RX ADMIN — SENNA PLUS 2 TABLET(S): 8.6 TABLET ORAL at 21:16

## 2022-08-08 RX ADMIN — Medication 50 MILLIGRAM(S): at 18:08

## 2022-08-08 RX ADMIN — Medication 7 UNIT(S): at 16:33

## 2022-08-08 RX ADMIN — Medication 81 MILLIGRAM(S): at 11:55

## 2022-08-08 RX ADMIN — SODIUM CHLORIDE 1000 MILLILITER(S): 9 INJECTION INTRAMUSCULAR; INTRAVENOUS; SUBCUTANEOUS at 01:36

## 2022-08-08 RX ADMIN — Medication 4 UNIT(S): at 11:51

## 2022-08-08 RX ADMIN — MAGNESIUM OXIDE 400 MG ORAL TABLET 400 MILLIGRAM(S): 241.3 TABLET ORAL at 08:19

## 2022-08-08 RX ADMIN — Medication 3: at 11:52

## 2022-08-08 RX ADMIN — MAGNESIUM OXIDE 400 MG ORAL TABLET 400 MILLIGRAM(S): 241.3 TABLET ORAL at 11:56

## 2022-08-08 RX ADMIN — CITALOPRAM 5 MILLIGRAM(S): 10 TABLET, FILM COATED ORAL at 11:53

## 2022-08-08 RX ADMIN — ENOXAPARIN SODIUM 40 MILLIGRAM(S): 100 INJECTION SUBCUTANEOUS at 11:56

## 2022-08-08 RX ADMIN — POLYETHYLENE GLYCOL 3350 17 GRAM(S): 17 POWDER, FOR SOLUTION ORAL at 05:41

## 2022-08-08 RX ADMIN — TICAGRELOR 90 MILLIGRAM(S): 90 TABLET ORAL at 18:08

## 2022-08-08 RX ADMIN — FAMOTIDINE 20 MILLIGRAM(S): 10 INJECTION INTRAVENOUS at 11:56

## 2022-08-08 RX ADMIN — Medication 2: at 16:33

## 2022-08-08 NOTE — PROGRESS NOTE ADULT - ATTENDING COMMENTS
Pt is a 78 yo M, right handed, with PMHx of HTN, HLD  CAD s/p PCI, who presented with right arm weakness and slurred speech. S/p IV tPA on 8/3    Impr: multifocal acute ischemic stroke in L MCA/PCA territories, most prominently in left high frontal region  CTA head/neck with mild-moderate bilateral carotid stenosis (estimated at <50%), also with fetal left PCA. Possible underestimation fo left carotid stenosis. Obtain other imaging modalities for confirmation. CUS and MRA neck consistent with <50% of left ICA, thus not a candidate for intervention.   EDSON without evidence of clot/shunt  Switch from ASA/plavix-> ASA/brilinta x 1 mo, then resume ASA/plavix  Continue high intensity statin, DM optimization  S/p ILR placement  T bili and alk phos elevated, RUQ US pending  Start escitalopram 5mg daily for post-stroke anxiety/depression  PT/OT/ST, tele, -160. dispo planning- likely to 4A tomorrow

## 2022-08-08 NOTE — PROGRESS NOTE ADULT - ASSESSMENT
This is a 77y old  Male with HTN, DM, CAD with stents who presented to the ED via EMS with a chief complaint of RUE weakness, L facial, slurred speech, most likely due to left sided hemispheric dysfunction in the frontal MCA territory from suspected cardioembolic cause.       #RUE hemiparesis most likely 2/2 L MCA infarcts (pt was taking aspirin and clopidogrel at home) in multiple lobes  - Stroke Code On admission NIH 2 for RUE and RLE weakness - s/p tpa 08/03  - HTN Emergency POA   - s/p Cardene drip for BP control post tPA  - CTA H&N: No evidence of major vascular stenosis or occlusion. Normal perfusion images. Scattered mild atheromatous changes as above.  - CTP: There is no evidence of focal perfusion deficit to suggest acute cerebral ischemia  - CTH 24h post tPA- no bleed or acute pathology  - MRB w/ multiple infarcts w/ prominence in the MCA, PCA territory  and left hemisphere   - A1c 8.2, LDL 72  - echo with bubble: 59% G1DD  - s/p EDSON 8/5 - EF 55-60, no PFO or clot burden  - cont PT/OT  - in light of acute infarct likely failed Clopidogrel therapy -- start Ticagrelor 90mg Q12  - s/p ILR today by EP. Will need to follow up outpatient  - Stroke education  - Carotid Doppler: 20-39% stenosis in BL ICA  - MRA neck: Moderate stenosis in right ICA  - Start 5mg celexa  - c/w ASA 81mg  - c/w Brilanta 90mg BID  - c/w lipitor 80mg daily    #HTN  - HTN Emergency on admission s/p Cardene drip for BP control  - c/w Amlodipine 5mg QD  - c/w Hydrochlorothiazide 25mg QD  - c/w metoprolol tart 50mg  - goal -160    #DM - uncontrolled   - A1c 8.2  - increase lantus to 24U HS and start pre-meal at 4units  - keep BG < 180  - diet: Dash and CC    #Constipation - unresolved   - c/w Miralax Q12  - c/w Senna 2 tabs HS  - c/w Lactulose Q12 PRN  - cont. to monitor    #Elevated T bili/Alk Phos  - patient asymptomatic  - will get RUQ US      Msc  Diet: carb consistent, DASH  GI ppx: none  DVT ppx: lovenox  Activity: Out of bed with assist  Code: Full    Dsipo: possible DC to 4a on Tuesday

## 2022-08-08 NOTE — PROGRESS NOTE ADULT - SUBJECTIVE AND OBJECTIVE BOX
Electrophysiology Brief Post-Op Note    I have personally seen and examined the patient.  I agree with the history and physical which I have reviewed and noted any changes below.  08-08-22 @ 11:05    PRE-OP DIAGNOSIS: Cryptogenic CVA    POST-OP DIAGNOSIS:  Cryptogenic CVA    PROCEDURE: Loop Implnat    Physician: FABIAN Palma MD  Assistant: Sincere LUCIANO    ESTIMATED BLOOD LOSS:  2    mL    ANESTHESIA TYPE:  [  ]General Anesthesia  [  ] Sedation  [X  ] Local/Regional    CONDITION  [  ] Critical  [  ] Serious  [  ]Fair  [ X ]Good    SPECIMENS REMOVED (IF APPLICABLE):  none    IMPLANTS (IF APPLICABLE)  Loop Recorder ([employer])    FINDINGS  PLAN OF CARE  - May remove bandaid in 2 days  - May shower in 2 days  Follow up in EP office ___with NP in 1 month for wound check  Dr Beaver office   [address] [place] [address], [address]  [phone]

## 2022-08-08 NOTE — PROGRESS NOTE ADULT - ASSESSMENT
This is a 77 year old Male with PMHx HTN, DM, CAD with stents who presents with a chief complaint of RUE weakness, L facial, slurred speech, presents to the ED BIBEMS as a Stroke activation at 8:14am. NIH at the time was 2 for RUE and RLE drift. Facial and slurred speech at this point has resolved. mRS of 0. CTH was completed and was negative for bleed. CTA/P negative for perfusion deficit or LVO. Patient only takes ASA 81 and PLX 75 at home for heart ppx. Denies any neurologic history or stroke in the past. tPA given at 8:48am 8/3 as well as started on Cardene and 40 mg labetalol for BP control. BP prior to tPA administration was 183/93. Of note CTH shows chronic L occipital infarct. Patient admitted to 3E stroke unit for rest of neurologic work up and post tPA monitoring.    #Acute ischemic stroke s/p tPA  - Admitted to 3E stroke unit for monitoring  - atorvastatin 80 once daily  - Keep BP <180/105, notify provider if out of range  - repeat CTH stable  - MRI brain without contrast w/ +stroke  - EDSON: nl EF, no PFO  - PT/OT eval  - Stroke education  - antiplatelets as per neuro  - s/p ILR    #DM w/ hyperglycemia  -Insulin adjusted and can cont tapering up as needed  -monitor FS. Goal 140-180 inpt  -A1C=8.2  -pt counselled    #HTN  -permissive HTN for now w/ gradual lowering   -avoid hypotension    #HLD  -HD statin  -LDL 72    #CAD  -cont antiplatelets, BB, statin      DVT ppx - SCDs, Lovenox    #Progress Note Handoff  Pending: Consults____Clinical improvement and stability__x_____PT____x____  Pt/Family discussion: Pt/family informed and agree with the current plan  Disposition: Home______/SNF_______/4A__?____/To be determined____x____    My note supersedes the residents note should a discrepancy arise.    Chart and notes personally reviewed.  Care Discussed with Consultants/Other Providers/ Housestaff [ x] YES [ ] NO   Radiology, labs, old records personally reviewed.    discussed w/ housestaff, nursing, case management, neuro team, family (daughter, son, etc)   This is a 77 year old Male with PMHx HTN, DM, CAD with stents who presents with a chief complaint of RUE weakness, L facial, slurred speech, presents to the ED BIBEMS as a Stroke activation at 8:14am. NIH at the time was 2 for RUE and RLE drift. Facial and slurred speech at this point has resolved. mRS of 0. CTH was completed and was negative for bleed. CTA/P negative for perfusion deficit or LVO. Patient only takes ASA 81 and PLX 75 at home for heart ppx. Denies any neurologic history or stroke in the past. tPA given at 8:48am 8/3 as well as started on Cardene and 40 mg labetalol for BP control. BP prior to tPA administration was 183/93. Of note CTH shows chronic L occipital infarct. Patient admitted to 3E stroke unit for rest of neurologic work up and post tPA monitoring.    #Acute ischemic stroke s/p tPA  - Admitted to 3E stroke unit for monitoring  - atorvastatin 80 once daily  - Keep BP <180/105, notify provider if out of range  - repeat CTH stable  - MRI brain without contrast w/ +stroke  - EDSON: nl EF, no PFO  - PT/OT eval  - Stroke education  - antiplatelets as per neuro  - s/p ILR    #DM w/ hyperglycemia  -Insulin adjusted and can cont tapering up as needed  -monitor FS. Goal 140-180 inpt  -A1C=8.2  -pt counselled    #HTN/DONOVAN  -permissive HTN for now w/ gradual lowering   -avoid hypotension  -recommend holding HCTZ    #HLD  -HD statin  -LDL 72    #CAD  -cont antiplatelets, BB, statin      DVT ppx - SCDs, Lovenox    #Progress Note Handoff  Pending: Consults____Clinical improvement and stability__x_____PT____x____  Pt/Family discussion: Pt/family informed and agree with the current plan  Disposition: Home______/SNF_______/4A__?____/To be determined____x____    My note supersedes the residents note should a discrepancy arise.    Chart and notes personally reviewed.  Care Discussed with Consultants/Other Providers/ Housestaff [ x] YES [ ] NO   Radiology, labs, old records personally reviewed.    discussed w/ housestaff, nursing, case management, neuro team, family (daughter, son, etc)   This is a 77 year old Male with PMHx HTN, DM, CAD with stents who presents with a chief complaint of RUE weakness, L facial, slurred speech, presents to the ED BIBEMS as a Stroke activation at 8:14am. NIH at the time was 2 for RUE and RLE drift. Facial and slurred speech at this point has resolved. mRS of 0. CTH was completed and was negative for bleed. CTA/P negative for perfusion deficit or LVO. Patient only takes ASA 81 and PLX 75 at home for heart ppx. Denies any neurologic history or stroke in the past. tPA given at 8:48am 8/3 as well as started on Cardene and 40 mg labetalol for BP control. BP prior to tPA administration was 183/93. Of note CTH shows chronic L occipital infarct. Patient admitted to 3E stroke unit for rest of neurologic work up and post tPA monitoring.    #Acute ischemic stroke s/p tPA  - Admitted to 3E stroke unit for monitoring  - atorvastatin 80 once daily  - Keep BP <180/105, notify provider if out of range  - repeat CTH stable  - MRI brain without contrast w/ +stroke  - EDSON: nl EF, no PFO  - PT/OT eval  - Stroke education  - antiplatelets as per neuro  - s/p ILR    #DM w/ hyperglycemia  -Insulin adjusted and can cont tapering up as needed  -monitor FS. Goal 140-180 inpt  -A1C=8.2  -pt counselled    #HTN/DONOVAN  -permissive HTN for now w/ gradual lowering   -avoid hypotension  -recommend holding HCTZ    #HLD  -HD statin  -LDL 72    #CAD  -cont antiplatelets, BB, statin    #Leukocytosis  a-sx  monitor      DVT ppx - SCDs, Lovenox    #Progress Note Handoff  Pending: Consults____Clinical improvement and stability__x_____PT____x____  Pt/Family discussion: Pt/family informed and agree with the current plan  Disposition: Home______/SNF_______/4A__?____/To be determined____x____    My note supersedes the residents note should a discrepancy arise.    Chart and notes personally reviewed.  Care Discussed with Consultants/Other Providers/ Housestaff [ x] YES [ ] NO   Radiology, labs, old records personally reviewed.    discussed w/ housestaff, nursing, case management, neuro team, family (daughter, son, etc)

## 2022-08-08 NOTE — PROGRESS NOTE ADULT - SUBJECTIVE AND OBJECTIVE BOX
MARIA M CROFT  77y  Male    Patient is a 77y old  Male who presents with a chief complaint of RUE weakness    HPI:  Patient is a 77 year old Male with HTN, DM, HLD, GERD, CAD with stents who presents with a chief complaint of RUE weakness, L facial, slurred speech, presents to the ED BIBEMS as a Stroke activation at 8:14am. NIH at the time was 2 for RUE and RLE drift. Facial and slurred speech at this point has resolved. mRS of 0. CTH was completed and was negative for bleed. CTA/P negative for perfusion deficit or LVO. Patient only takes ASA 81 at home for heart ppx. Denies any neurologic history or stroke in the past. tPA given at 8:48am as well as started on Cardene for BP control. BP prior to tPA administration was 183/93.    (03 Aug 2022 11:22)    S: Patient was examined and seen at bedside. This morning pt is resting comfortably in bed and reports no new issues or overnight events. No new complaints. Still constipated.  Denies CP, SOB, N/V/D/AP, cough, F, chills, dizziness, new focal weakness, HA, vision changes, dysuria, or urinary symptoms, blood in stool.  ROS: all other systems reviewed and are negative    PAST MEDICAL & SURGICAL HISTORY:  Hypertension      Diabetes      Hyperlipidemia      GERD (gastroesophageal reflux disease)      Coronary artery disease        SOCIAL HISTORY:  Tobacco: denies  Illicit drugs: denies  Alcohol: social  Family history reviewed and otherwise non-contributory No clotting disorders, CVAs at early age.  ALLERGIES: NKDA    General: NAD. Looks stated age.  HEENT: clean oropharynx, EOMI, no LAD  Neck: trachea midline, no thyromegaly  CV: nl S1 S2; no m/r/g  Resp: decreased breath sounds at base  GI: NT/ND/S +BS  MS: no clubbing/cyanosis/edema, +pulses  Neuro: RUE 0/5, RLE 5-/5, sensory intact; + reflexes  Skin: no rashes, nl turgor  Psychiatric: AA0x3 w/ intact insight and judgement    tele: SR, nonspecific changes (on my own evaluation of tele monitor)            MEDICATIONS  (STANDING):  amLODIPine   Tablet 5 milliGRAM(s) Oral daily  aspirin enteric coated 81 milliGRAM(s) Oral daily  atorvastatin 80 milliGRAM(s) Oral at bedtime  benzocaine 20%/menthol 0.5% Spray 1 Spray(s) Topical once  cephalexin 500 milliGRAM(s) Oral once  citalopram 5 milliGRAM(s) Oral daily  dextrose 5%. 1000 milliLiter(s) (100 mL/Hr) IV Continuous <Continuous>  dextrose 5%. 1000 milliLiter(s) (50 mL/Hr) IV Continuous <Continuous>  dextrose 50% Injectable 25 Gram(s) IV Push once  dextrose 50% Injectable 12.5 Gram(s) IV Push once  dextrose 50% Injectable 25 Gram(s) IV Push once  enoxaparin Injectable 40 milliGRAM(s) SubCutaneous every 24 hours  famotidine    Tablet 20 milliGRAM(s) Oral daily  glucagon  Injectable 1 milliGRAM(s) IntraMuscular once  hydrochlorothiazide 25 milliGRAM(s) Oral daily  insulin lispro (ADMELOG) corrective regimen sliding scale   SubCutaneous three times a day before meals  insulin lispro Injectable (ADMELOG) 4 Unit(s) SubCutaneous three times a day before meals  lisinopril 40 milliGRAM(s) Oral daily  magnesium oxide 400 milliGRAM(s) Oral three times a day with meals  metoprolol tartrate 50 milliGRAM(s) Oral two times a day  polyethylene glycol 3350 17 Gram(s) Oral two times a day  senna 2 Tablet(s) Oral at bedtime  ticagrelor 90 milliGRAM(s) Oral every 12 hours    MEDICATIONS  (PRN):  bisacodyl Suppository 10 milliGRAM(s) Rectal daily PRN Constipation  dextrose Oral Gel 15 Gram(s) Oral once PRN Blood Glucose LESS THAN 70 milliGRAM(s)/deciliter  lactulose Syrup 20 Gram(s) Oral two times a day PRN Constipation  melatonin 5 milliGRAM(s) Oral at bedtime PRN Insomnia    Home Medications:  famotidine 40 mg oral tablet: 1 tab(s) orally once a day (at bedtime) (04 Aug 2022 08:06)  hydroCHLOROthiazide 25 mg oral tablet: 1 tab(s) orally once a day (04 Aug 2022 08:05)  Lantus 100 units/mL subcutaneous solution: 30 unit(s) subcutaneous once a day (at bedtime) (04 Aug 2022 08:01)  metoprolol tartrate 50 mg oral tablet: 1 tab(s) orally 2 times a day (04 Aug 2022 08:06)  ramipril 10 mg oral capsule: 1 cap(s) orally once a day (04 Aug 2022 08:01)  simvastatin 40 mg oral tablet: 1 tab(s) orally once a day (at bedtime) (04 Aug 2022 08:06)    Vital Signs Last 24 Hrs  T(C): 36.4 (08 Aug 2022 12:04), Max: 36.9 (08 Aug 2022 00:05)  T(F): 97.6 (08 Aug 2022 12:04), Max: 98.4 (08 Aug 2022 00:05)  HR: 97 (08 Aug 2022 12:04) (76 - 102)  BP: 134/82 (08 Aug 2022 12:04) (106/64 - 165/99)  BP(mean): 98 (08 Aug 2022 12:04) (77 - 122)  RR: 18 (08 Aug 2022 12:04) (18 - 18)  SpO2: 97% (08 Aug 2022 12:04) (95% - 98%)    Parameters below as of 08 Aug 2022 12:04  Patient On (Oxygen Delivery Method): room air      CAPILLARY BLOOD GLUCOSE      POCT Blood Glucose.: 268 mg/dL (08 Aug 2022 11:42)  POCT Blood Glucose.: 252 mg/dL (07 Aug 2022 21:26)  POCT Blood Glucose.: 279 mg/dL (07 Aug 2022 17:15)    LABS:                        16.5   12.92 )-----------( 277      ( 08 Aug 2022 06:22 )             48.3     08-08    138  |  96<L>  |  30<H>  ----------------------------<  241<H>  4.3   |  29  |  1.5    Ca    9.5      08 Aug 2022 06:22  Phos  3.7     08-08  Mg     2.5     08-08    TPro  7.3  /  Alb  4.1  /  TBili  1.5<H>  /  DBili  x   /  AST  22  /  ALT  26  /  AlkPhos  157<H>  08-08    LIVER FUNCTIONS - ( 08 Aug 2022 06:22 )  Alb: 4.1 g/dL / Pro: 7.3 g/dL / ALK PHOS: 157 U/L / ALT: 26 U/L / AST: 22 U/L / GGT: x                           Consultant Notes Reviewed:  [x ] YES  [ ] NO  Care Discussed with Consultants/Other Providers/ Housestaff [ x] YES  [ ] NO  Radiology, labs, new studies personally reviewed.                                                        EKG - SR, nonspecific changes (my read)  Chart and consultant noted personally reviewed.  Care Discussed with Consultants/Other Providers/ Housestaff [ x] YES [ ] NO   Radiology, labs, old records personally reviewed.

## 2022-08-08 NOTE — CHART NOTE - NSCHARTNOTEFT_GEN_A_CORE
EP PROCEDURE NOTE    Date of Procedure: 08-08-22  EP Attending:   Assistant: JAY GARCIA  Referring Physician:   Procedure: Loop Recorder Implant    Indication: 77y Male with history of ***CVA referred for implantable loop recorder.     Anesthesia: Local    EQUIPMENT IMPLANTED  : [employer] Lin[employer]  Model Number: [id number]  Serial Number: [id number]    PROCEDURE DESCRIPTION  The patient was brought to the electrophysiology procedure area in a non-sedated state and received preoperative antibiotics. Informed, written consent was obtained prior to the procedure. The left anterior chest region was prepped and cleaned from the nipple to the upper left clavicular border with serial applications of Chlorhexidine. Patient was then covered with sterile drapes in the usual manner. Blood pressure, oxygenation, and level of comfort were stable throughout.     Following infiltration with local anesthetic, a 1-cm incision was made along the left sternal border at the fourth intercostal space. Using the tunneling device the implantable loop recorder was inserted into the subcutaneous tissue. Direct pressure was applied to the wound to obtain hemostasis. The wound was approximated with 4.0 Vicryl,  Dermabond. Steri-strips and a dry, sterile dressing were placed over the wound. R waves were noted to be ***0.41mV.     The patient tolerated the procedure well.     COMPLICATIONS  No immediate complications    CONCLUSIONS  Successful loop recorder implant    EBL: 2 cc EP PROCEDURE NOTE    Date of Procedure: 08-08-22  EP Attending: FABIAN Palma MD  Assistant: JAY GARCIA  Referring Physician: DIMITRIS Beaver MD  Procedure: Loop Recorder Implant    Indication: 77y Male with history of ***CVA referred for implantable loop recorder.     Anesthesia: Local    EQUIPMENT IMPLANTED  : [employer] Linq  Model Number: [id number]  Serial Number: [id number]    PROCEDURE DESCRIPTION  The patient was brought to the electrophysiology procedure area in a non-sedated state and received preoperative antibiotics. Informed, written consent was obtained prior to the procedure. The left anterior chest region was prepped and cleaned from the nipple to the upper left clavicular border with serial applications of Chlorhexidine. Patient was then covered with sterile drapes in the usual manner. Blood pressure, oxygenation, and level of comfort were stable throughout.     Following infiltration with local anesthetic, a 1-cm incision was made along the left sternal border at the fourth intercostal space. Using the tunneling device the implantable loop recorder was inserted into the subcutaneous tissue. Direct pressure was applied to the wound to obtain hemostasis. The wound was approximated with 4.0 Vicryl,  Dermabond. Steri-strips and a dry, sterile dressing were placed over the wound. R waves were noted to be ***0.41mV.     The patient tolerated the procedure well.     COMPLICATIONS  No immediate complications    CONCLUSIONS  Successful loop recorder implant    EBL: 2 cc EP PROCEDURE NOTE    Date of Procedure: 08-08-22  EP Attending: FABIAN Palma MD  Assistant: JAY GARCIA  Referring Physician: DIMITRIS Beaver MD  Procedure: Loop Recorder Implant    Indication: 77y Male with history of ***CVA referred for implantable loop recorder.     Anesthesia: Local    EQUIPMENT IMPLANTED  : [employer] Lin[employer]  Model Number: [id number]  Serial Number: [id number]    PROCEDURE DESCRIPTION  The patient was brought to the electrophysiology procedure area in a non-sedated state and received preoperative antibiotics. Informed, written consent was obtained prior to the procedure. The left anterior chest region was prepped and cleaned from the nipple to the upper left clavicular border with serial applications of Chlorhexidine. Patient was then covered with sterile drapes in the usual manner. Blood pressure, oxygenation, and level of comfort were stable throughout.     Following infiltration with local anesthetic, a 1-cm incision was made along the left sternal border at the fourth intercostal space. Using the tunneling device the implantable loop recorder was inserted into the subcutaneous tissue. Direct pressure was applied to the wound to obtain hemostasis. The wound was approximated with 4.0 Vicryl,  Dermabond. Steri-strips and a dry, sterile dressing were placed over the wound. R waves were noted to be ***0.41mV.     The patient tolerated the procedure well.     COMPLICATIONS  No immediate complications    CONCLUSIONS  Successful loop recorder implant    EBL: 2 cc      Attending Attestation  I was physically present for the key portion of the evaluation and management service provide.

## 2022-08-08 NOTE — PROGRESS NOTE ADULT - SUBJECTIVE AND OBJECTIVE BOX
Neurology Stroke Progress Note    INTERVAL HPI/OVERNIGHT EVENTS:  Patient seen and examined. No acute events overnight. Patient denies any CP, sob, abd pain this am. No nausea, fever or chills.     MEDICATIONS  (STANDING):  amLODIPine   Tablet 5 milliGRAM(s) Oral daily  aspirin enteric coated 81 milliGRAM(s) Oral daily  atorvastatin 80 milliGRAM(s) Oral at bedtime  benzocaine 20%/menthol 0.5% Spray 1 Spray(s) Topical once  cephalexin 500 milliGRAM(s) Oral once  citalopram 5 milliGRAM(s) Oral daily  dextrose 5%. 1000 milliLiter(s) (100 mL/Hr) IV Continuous <Continuous>  dextrose 5%. 1000 milliLiter(s) (50 mL/Hr) IV Continuous <Continuous>  dextrose 50% Injectable 25 Gram(s) IV Push once  dextrose 50% Injectable 12.5 Gram(s) IV Push once  dextrose 50% Injectable 25 Gram(s) IV Push once  enoxaparin Injectable 40 milliGRAM(s) SubCutaneous every 24 hours  famotidine    Tablet 20 milliGRAM(s) Oral daily  glucagon  Injectable 1 milliGRAM(s) IntraMuscular once  hydrochlorothiazide 25 milliGRAM(s) Oral daily  insulin lispro (ADMELOG) corrective regimen sliding scale   SubCutaneous three times a day before meals  insulin lispro Injectable (ADMELOG) 4 Unit(s) SubCutaneous three times a day before meals  lisinopril 40 milliGRAM(s) Oral daily  magnesium oxide 400 milliGRAM(s) Oral three times a day with meals  metoprolol tartrate 50 milliGRAM(s) Oral two times a day  polyethylene glycol 3350 17 Gram(s) Oral two times a day  senna 2 Tablet(s) Oral at bedtime  ticagrelor 90 milliGRAM(s) Oral every 12 hours    MEDICATIONS  (PRN):  bisacodyl Suppository 10 milliGRAM(s) Rectal daily PRN Constipation  dextrose Oral Gel 15 Gram(s) Oral once PRN Blood Glucose LESS THAN 70 milliGRAM(s)/deciliter  lactulose Syrup 20 Gram(s) Oral two times a day PRN Constipation  melatonin 5 milliGRAM(s) Oral at bedtime PRN Insomnia    Allergies    No Known Allergies    Intolerances      Vital Signs Last 24 Hrs  T(C): 36.4 (08 Aug 2022 12:04), Max: 36.9 (08 Aug 2022 00:05)  T(F): 97.6 (08 Aug 2022 12:04), Max: 98.4 (08 Aug 2022 00:05)  HR: 97 (08 Aug 2022 12:04) (76 - 102)  BP: 134/82 (08 Aug 2022 12:04) (106/64 - 165/99)  BP(mean): 98 (08 Aug 2022 12:04) (77 - 122)  RR: 18 (08 Aug 2022 12:04) (18 - 18)  SpO2: 97% (08 Aug 2022 12:04) (95% - 98%)    Parameters below as of 08 Aug 2022 12:04  Patient On (Oxygen Delivery Method): room air        Physical exam:  General: No acute distress, awake and alert  Eyes: Anicteric sclerae, moist conjunctivae, see below for CNs  Neck: trachea midline, FROM, supple, no thyromegaly or lymphadenopathy  Cardiovascular: Regular rate and rhythm, no murmurs  Pulmonary: Anterior breath sounds clear bilaterally, no crackles or wheezing. No use of accessory muscles  GI: Abdomen soft, non-distended, non-tender  Extremities:  no edema    Neurologic:  -Mental status: Awake, alert, oriented to person, place, and time. Speech is fluent with intact naming, repetition, and comprehension, no dysarthria. Recent and remote memory intact. Follows commands. Attention/concentration intact. Fund of knowledge appropriate.  -Cranial nerves:   II: Visual fields are full to confrontation.  III, IV, VI: Extraocular movements are intact without nystagmus. Pupils equally round and reactive to light  V:  Facial sensation V1-V3 equal and intact   VII: Face is symmetric with normal eye closure and smile  VIII: Hearing is bilaterally intact to finger rub  IX, X: Uvula is midline and soft palate rises symmetrically  XI: Head turning and shoulder shrug are intact.  XII: Tongue protrudes midline  Motor: Normal bulk and tone. No pronator drift. Strength bilateral lower extremities 5/5, 5/5 in LUE and 2/5 in RUE  Rapid alternating movements intact and symmetric  Sensation: Intact to light touch bilaterally. No neglect or extinction on double simultaneous testing.  Coordination: No dysmetria on finger-to-nose and heel-to-shin on L  Reflexes: Downgoing toes bilaterally   Gait: Deferred  NIH 3 for RUE       LABS:                        16.5   12.92 )-----------( 277      ( 08 Aug 2022 06:22 )             48.3     08-08    138  |  96<L>  |  30<H>  ----------------------------<  241<H>  4.3   |  29  |  1.5    Ca    9.5      08 Aug 2022 06:22  Phos  3.7     08-08  Mg     2.5     08-08    TPro  7.3  /  Alb  4.1  /  TBili  1.5<H>  /  DBili  x   /  AST  22  /  ALT  26  /  AlkPhos  157<H>  08-08          RADIOLOGY & ADDITIONAL TESTS:

## 2022-08-09 ENCOUNTER — TRANSCRIPTION ENCOUNTER (OUTPATIENT)
Age: 77
End: 2022-08-09

## 2022-08-09 PROBLEM — E78.5 HYPERLIPIDEMIA, UNSPECIFIED: Chronic | Status: ACTIVE | Noted: 2022-08-04

## 2022-08-09 PROBLEM — K21.9 GASTRO-ESOPHAGEAL REFLUX DISEASE WITHOUT ESOPHAGITIS: Chronic | Status: ACTIVE | Noted: 2022-08-04

## 2022-08-09 PROBLEM — I10 ESSENTIAL (PRIMARY) HYPERTENSION: Chronic | Status: ACTIVE | Noted: 2022-08-04

## 2022-08-09 PROBLEM — E11.9 TYPE 2 DIABETES MELLITUS WITHOUT COMPLICATIONS: Chronic | Status: ACTIVE | Noted: 2022-08-04

## 2022-08-09 PROBLEM — I25.10 ATHEROSCLEROTIC HEART DISEASE OF NATIVE CORONARY ARTERY WITHOUT ANGINA PECTORIS: Chronic | Status: ACTIVE | Noted: 2022-08-04

## 2022-08-09 LAB
ALBUMIN SERPL ELPH-MCNC: 4 G/DL — SIGNIFICANT CHANGE UP (ref 3.5–5.2)
ALP SERPL-CCNC: 161 U/L — HIGH (ref 30–115)
ALT FLD-CCNC: 25 U/L — SIGNIFICANT CHANGE UP (ref 0–41)
ANION GAP SERPL CALC-SCNC: 15 MMOL/L — HIGH (ref 7–14)
AST SERPL-CCNC: 23 U/L — SIGNIFICANT CHANGE UP (ref 0–41)
BASOPHILS # BLD AUTO: 0.04 K/UL — SIGNIFICANT CHANGE UP (ref 0–0.2)
BASOPHILS NFR BLD AUTO: 0.3 % — SIGNIFICANT CHANGE UP (ref 0–1)
BILIRUB SERPL-MCNC: 1.5 MG/DL — HIGH (ref 0.2–1.2)
BUN SERPL-MCNC: 39 MG/DL — HIGH (ref 10–20)
CALCIUM SERPL-MCNC: 9.5 MG/DL — SIGNIFICANT CHANGE UP (ref 8.5–10.1)
CHLORIDE SERPL-SCNC: 97 MMOL/L — LOW (ref 98–110)
CO2 SERPL-SCNC: 27 MMOL/L — SIGNIFICANT CHANGE UP (ref 17–32)
CREAT SERPL-MCNC: 1.7 MG/DL — HIGH (ref 0.7–1.5)
EGFR: 42 ML/MIN/1.73M2 — LOW
EOSINOPHIL # BLD AUTO: 0.39 K/UL — SIGNIFICANT CHANGE UP (ref 0–0.7)
EOSINOPHIL NFR BLD AUTO: 2.8 % — SIGNIFICANT CHANGE UP (ref 0–8)
GGT SERPL-CCNC: 28 U/L — SIGNIFICANT CHANGE UP (ref 1–40)
GLUCOSE BLDC GLUCOMTR-MCNC: 137 MG/DL — HIGH (ref 70–99)
GLUCOSE BLDC GLUCOMTR-MCNC: 137 MG/DL — HIGH (ref 70–99)
GLUCOSE BLDC GLUCOMTR-MCNC: 222 MG/DL — HIGH (ref 70–99)
GLUCOSE BLDC GLUCOMTR-MCNC: 223 MG/DL — HIGH (ref 70–99)
GLUCOSE SERPL-MCNC: 230 MG/DL — HIGH (ref 70–99)
HCT VFR BLD CALC: 46.7 % — SIGNIFICANT CHANGE UP (ref 42–52)
HGB BLD-MCNC: 16.4 G/DL — SIGNIFICANT CHANGE UP (ref 14–18)
IMM GRANULOCYTES NFR BLD AUTO: 0.4 % — HIGH (ref 0.1–0.3)
LYMPHOCYTES # BLD AUTO: 1.7 K/UL — SIGNIFICANT CHANGE UP (ref 1.2–3.4)
LYMPHOCYTES # BLD AUTO: 12 % — LOW (ref 20.5–51.1)
MAGNESIUM SERPL-MCNC: 2.7 MG/DL — HIGH (ref 1.8–2.4)
MCHC RBC-ENTMCNC: 30 PG — SIGNIFICANT CHANGE UP (ref 27–31)
MCHC RBC-ENTMCNC: 35.1 G/DL — SIGNIFICANT CHANGE UP (ref 32–37)
MCV RBC AUTO: 85.4 FL — SIGNIFICANT CHANGE UP (ref 80–94)
MONOCYTES # BLD AUTO: 1.51 K/UL — HIGH (ref 0.1–0.6)
MONOCYTES NFR BLD AUTO: 10.7 % — HIGH (ref 1.7–9.3)
NEUTROPHILS # BLD AUTO: 10.43 K/UL — HIGH (ref 1.4–6.5)
NEUTROPHILS NFR BLD AUTO: 73.8 % — SIGNIFICANT CHANGE UP (ref 42.2–75.2)
NRBC # BLD: 0 /100 WBCS — SIGNIFICANT CHANGE UP (ref 0–0)
PLATELET # BLD AUTO: 306 K/UL — SIGNIFICANT CHANGE UP (ref 130–400)
POTASSIUM SERPL-MCNC: 4.8 MMOL/L — SIGNIFICANT CHANGE UP (ref 3.5–5)
POTASSIUM SERPL-SCNC: 4.8 MMOL/L — SIGNIFICANT CHANGE UP (ref 3.5–5)
PROT SERPL-MCNC: 7.2 G/DL — SIGNIFICANT CHANGE UP (ref 6–8)
RBC # BLD: 5.47 M/UL — SIGNIFICANT CHANGE UP (ref 4.7–6.1)
RBC # FLD: 13.5 % — SIGNIFICANT CHANGE UP (ref 11.5–14.5)
SODIUM SERPL-SCNC: 139 MMOL/L — SIGNIFICANT CHANGE UP (ref 135–146)
WBC # BLD: 14.12 K/UL — HIGH (ref 4.8–10.8)
WBC # FLD AUTO: 14.12 K/UL — HIGH (ref 4.8–10.8)

## 2022-08-09 PROCEDURE — 93970 EXTREMITY STUDY: CPT | Mod: 26

## 2022-08-09 PROCEDURE — 99233 SBSQ HOSP IP/OBS HIGH 50: CPT

## 2022-08-09 PROCEDURE — 76857 US EXAM PELVIC LIMITED: CPT | Mod: 26

## 2022-08-09 PROCEDURE — 71045 X-RAY EXAM CHEST 1 VIEW: CPT | Mod: 26

## 2022-08-09 PROCEDURE — 71045 X-RAY EXAM CHEST 1 VIEW: CPT | Mod: 26,77

## 2022-08-09 PROCEDURE — 76705 ECHO EXAM OF ABDOMEN: CPT | Mod: 26

## 2022-08-09 PROCEDURE — 99232 SBSQ HOSP IP/OBS MODERATE 35: CPT

## 2022-08-09 RX ORDER — INSULIN LISPRO 100/ML
10 VIAL (ML) SUBCUTANEOUS
Refills: 0 | Status: DISCONTINUED | OUTPATIENT
Start: 2022-08-09 | End: 2022-08-11

## 2022-08-09 RX ORDER — INSULIN GLARGINE 100 [IU]/ML
30 INJECTION, SOLUTION SUBCUTANEOUS EVERY MORNING
Refills: 0 | Status: DISCONTINUED | OUTPATIENT
Start: 2022-08-10 | End: 2022-08-11

## 2022-08-09 RX ORDER — SODIUM CHLORIDE 9 MG/ML
1000 INJECTION INTRAMUSCULAR; INTRAVENOUS; SUBCUTANEOUS
Refills: 0 | Status: DISCONTINUED | OUTPATIENT
Start: 2022-08-09 | End: 2022-08-11

## 2022-08-09 RX ADMIN — Medication 10 UNIT(S): at 17:40

## 2022-08-09 RX ADMIN — SODIUM CHLORIDE 60 MILLILITER(S): 9 INJECTION INTRAMUSCULAR; INTRAVENOUS; SUBCUTANEOUS at 13:24

## 2022-08-09 RX ADMIN — SODIUM CHLORIDE 60 MILLILITER(S): 9 INJECTION INTRAMUSCULAR; INTRAVENOUS; SUBCUTANEOUS at 21:34

## 2022-08-09 RX ADMIN — POLYETHYLENE GLYCOL 3350 17 GRAM(S): 17 POWDER, FOR SOLUTION ORAL at 17:48

## 2022-08-09 RX ADMIN — TICAGRELOR 90 MILLIGRAM(S): 90 TABLET ORAL at 17:48

## 2022-08-09 RX ADMIN — Medication 81 MILLIGRAM(S): at 13:15

## 2022-08-09 RX ADMIN — POLYETHYLENE GLYCOL 3350 17 GRAM(S): 17 POWDER, FOR SOLUTION ORAL at 05:22

## 2022-08-09 RX ADMIN — Medication 50 MILLIGRAM(S): at 17:47

## 2022-08-09 RX ADMIN — Medication 50 MILLIGRAM(S): at 05:22

## 2022-08-09 RX ADMIN — CITALOPRAM 5 MILLIGRAM(S): 10 TABLET, FILM COATED ORAL at 13:15

## 2022-08-09 RX ADMIN — ATORVASTATIN CALCIUM 80 MILLIGRAM(S): 80 TABLET, FILM COATED ORAL at 21:35

## 2022-08-09 RX ADMIN — Medication 7 UNIT(S): at 08:21

## 2022-08-09 RX ADMIN — TICAGRELOR 90 MILLIGRAM(S): 90 TABLET ORAL at 05:22

## 2022-08-09 RX ADMIN — INSULIN GLARGINE 24 UNIT(S): 100 INJECTION, SOLUTION SUBCUTANEOUS at 08:23

## 2022-08-09 RX ADMIN — Medication 10 UNIT(S): at 12:11

## 2022-08-09 RX ADMIN — Medication 7 UNIT(S): at 13:25

## 2022-08-09 RX ADMIN — Medication 2: at 08:20

## 2022-08-09 RX ADMIN — LISINOPRIL 40 MILLIGRAM(S): 2.5 TABLET ORAL at 05:22

## 2022-08-09 RX ADMIN — Medication 2: at 12:10

## 2022-08-09 RX ADMIN — ENOXAPARIN SODIUM 40 MILLIGRAM(S): 100 INJECTION SUBCUTANEOUS at 13:17

## 2022-08-09 RX ADMIN — AMLODIPINE BESYLATE 5 MILLIGRAM(S): 2.5 TABLET ORAL at 05:22

## 2022-08-09 RX ADMIN — FAMOTIDINE 20 MILLIGRAM(S): 10 INJECTION INTRAVENOUS at 13:15

## 2022-08-09 NOTE — PROGRESS NOTE ADULT - ASSESSMENT
This is a 77y old  Male with HTN, DM, CAD with stents who presented to the ED via EMS with a chief complaint of RUE weakness, L facial, slurred speech, most likely due to left sided hemispheric dysfunction in the frontal MCA territory from suspected cardioembolic cause.       #RUE hemiparesis most likely 2/2 L MCA infarcts (pt was taking aspirin and clopidogrel at home) in multiple lobes  - Stroke Code On admission NIH 2 for RUE and RLE weakness - s/p tpa 08/03  - HTN Emergency POA   - s/p Cardene drip for BP control post tPA  - CTA H&N: No evidence of major vascular stenosis or occlusion. Normal perfusion images. Scattered mild atheromatous changes as above.  - CTP: There is no evidence of focal perfusion deficit to suggest acute cerebral ischemia  - CTH 24h post tPA- no bleed or acute pathology  - MRB w/ multiple infarcts w/ prominence in the MCA, PCA territory  and left hemisphere   - A1c 8.2, LDL 72  - echo with bubble: 59% G1DD  - s/p EDSON 8/5 - EF 55-60, no PFO or clot burden  - cont PT/OT  - in light of acute infarct likely failed Clopidogrel therapy -- c/w Ticagrelor 90mg Q12  - s/p ILR by EP. Will need to follow up outpatient  - Stroke education  - Carotid Doppler: 20-39% stenosis in BL ICA  - MRA neck: Moderate stenosis in right ICA  - c/w 5mg celexa  - c/w ASA 81mg  - c/w Brilanta 90mg BID  - c/w lipitor 80mg daily    #HTN  - HTN Emergency on admission s/p Cardene drip for BP control  - c/w Amlodipine 5mg QD  - c/w Hydrochlorothiazide 25mg QD  - c/w metoprolol tart 50mg  - goal -160    #DM - uncontrolled   - A1c 8.2  - increase lantus to 30U HS and start pre-meal at 10units  - keep BG < 180  - diet: Dash and CC    #Constipation - unresolved   - c/w Miralax Q12  - c/w Senna 2 tabs HS  - c/w Lactulose Q6 standing  - dulcolax supp  - cont. to monitor    #Elevated T bili/Alk Phos  - patient asymptomatic  - Pending RUQ US    #Leukocytosis - most likely reactive  - CXR - neg  - cont. to monitor off abx  - bladder scan to residual urine  - obtain LE duplex    Msc  Diet: carb consistent, DASH  GI ppx: none  DVT ppx: lovenox  Activity: Out of bed with assist  Code: Full    Dsipo: possible DC to 4a pending auth

## 2022-08-09 NOTE — DISCHARGE NOTE PROVIDER - NSDCCPCAREPLAN_GEN_ALL_CORE_FT
PRINCIPAL DISCHARGE DIAGNOSIS  Diagnosis: Stroke  Assessment and Plan of Treatment: During this hospital admission, you had an ischemic stroke. During an ischemic stroke, blood stops flowing to part of your brain because of a blockage in the blood vessel. This can damage areas in the brain that control other parts of the body.  Please take your aspirin and Brilanta (for one month, then switch back to aspirin and plavix) for blood thinning and Atorvastatin for cholesterol medication/blood vessel protection as prescribed to prevent further strokes. Do not skip doses and do not run low on your medication. If you run low on your medication, please contact your doctor.  You will follow up outpatient with the stroke Nurse Practitioner as scheduled below.  You will also need to follow up with the Cardiologist for the loop recorder monitoring.  Doing your regular tasks may be difficult after you've had a stroke, but you can learn new ways to manage your daily activities. In fact, doing daily activities may help you to regain muscle strength. Be patient, give yourself time to adjust, and appreciate the progress you make. For example, when showering or bathing, test the water temperature with a hand or foot that was not affected by the stroke, use grab bars, a shower seat, a hand-held showerhead, etc. It is normal to feel fatigue after a stroke, while some days may be worse than others, you will continue to improve.  Call 911 right away if you have any of the following symptoms of another stroke:  B: Balance: Sudden: Dizziness, loss of balance, or a sense of falling, difficulty with coordinating movement  E: Eyes: Sudden double vision or trouble seeing in one or both eyes  F: Face: Sudden uneven face  A: Arms (Legs): Sudden weakness, tingling, or loss of feeling on one side of your face or body  S: Speech: Sudden trouble talking or slurred speech, sudden difficulty understanding others  T: Time: Please call 911 right away and go to the emergency room  •Sudden, severe headache  •Blackouts or seizures

## 2022-08-09 NOTE — PROGRESS NOTE ADULT - SUBJECTIVE AND OBJECTIVE BOX
Neurology Stroke Progress Note    INTERVAL HPI/OVERNIGHT EVENTS:  Patient seen and examined. No acute events overnight. Patient denies any pain or discomfort. Currently able to move his bowel.     MEDICATIONS  (STANDING):  amLODIPine   Tablet 5 milliGRAM(s) Oral daily  aspirin enteric coated 81 milliGRAM(s) Oral daily  atorvastatin 80 milliGRAM(s) Oral at bedtime  benzocaine 20%/menthol 0.5% Spray 1 Spray(s) Topical once  cephalexin 500 milliGRAM(s) Oral once  citalopram 5 milliGRAM(s) Oral daily  dextrose 5%. 1000 milliLiter(s) (100 mL/Hr) IV Continuous <Continuous>  dextrose 5%. 1000 milliLiter(s) (50 mL/Hr) IV Continuous <Continuous>  dextrose 50% Injectable 25 Gram(s) IV Push once  dextrose 50% Injectable 12.5 Gram(s) IV Push once  dextrose 50% Injectable 25 Gram(s) IV Push once  enoxaparin Injectable 40 milliGRAM(s) SubCutaneous every 24 hours  famotidine    Tablet 20 milliGRAM(s) Oral daily  glucagon  Injectable 1 milliGRAM(s) IntraMuscular once  insulin glargine Injectable (LANTUS) 24 Unit(s) SubCutaneous every morning  insulin lispro (ADMELOG) corrective regimen sliding scale   SubCutaneous three times a day before meals  insulin lispro Injectable (ADMELOG) 10 Unit(s) SubCutaneous three times a day before meals  insulin lispro Injectable (ADMELOG) 7 Unit(s) SubCutaneous three times a day before meals  lisinopril 40 milliGRAM(s) Oral daily  metoprolol tartrate 50 milliGRAM(s) Oral two times a day  polyethylene glycol 3350 17 Gram(s) Oral two times a day  senna 2 Tablet(s) Oral at bedtime  sodium chloride 0.9%. 1000 milliLiter(s) (60 mL/Hr) IV Continuous <Continuous>  ticagrelor 90 milliGRAM(s) Oral every 12 hours    MEDICATIONS  (PRN):  bisacodyl Suppository 10 milliGRAM(s) Rectal daily PRN Constipation  dextrose Oral Gel 15 Gram(s) Oral once PRN Blood Glucose LESS THAN 70 milliGRAM(s)/deciliter  lactulose Syrup 20 Gram(s) Oral two times a day PRN Constipation  melatonin 5 milliGRAM(s) Oral at bedtime PRN Insomnia    Allergies    No Known Allergies    Intolerances      Vital Signs Last 24 Hrs  T(C): 36.4 (09 Aug 2022 14:01), Max: 36.4 (08 Aug 2022 20:37)  T(F): 97.5 (09 Aug 2022 14:01), Max: 97.6 (08 Aug 2022 20:37)  HR: 92 (09 Aug 2022 14:01) (77 - 92)  BP: 133/82 (09 Aug 2022 14:01) (110/71 - 150/85)  BP(mean): 100 (08 Aug 2022 21:00) (82 - 100)  RR: 18 (09 Aug 2022 05:07) (18 - 18)  SpO2: 95% (08 Aug 2022 18:03) (95% - 95%)    Parameters below as of 08 Aug 2022 16:03  Patient On (Oxygen Delivery Method): room air        Physical exam:  General: No acute distress, awake and alert  Eyes: Anicteric sclerae, moist conjunctivae, see below for CNs  Neck: trachea midline, FROM, supple, no thyromegaly or lymphadenopathy  Cardiovascular: Regular rate and rhythm, no murmurs  Pulmonary: Anterior breath sounds clear bilaterally, no crackles or wheezing. No use of accessory muscles  GI: Abdomen soft, non-distended, non-tender  Extremities:  no edema    Neurologic:  -Mental status: Awake, alert, oriented to person, place, and time. Speech is fluent with intact naming, repetition, and comprehension, no dysarthria. Recent and remote memory intact. Follows commands. Attention/concentration intact. Fund of knowledge appropriate.  -Cranial nerves:   II: Visual fields are full to confrontation.  III, IV, VI: Extraocular movements are intact without nystagmus. Pupils equally round and reactive to light  V:  Facial sensation V1-V3 equal and intact   VII: Face is symmetric with normal eye closure and smile  VIII: Hearing is bilaterally intact to finger rub  IX, X: Uvula is midline and soft palate rises symmetrically  XI: Head turning and shoulder shrug are intact.  XII: Tongue protrudes midline  Motor: Normal bulk and tone. No pronator drift. Strength bilateral lower extremities 5/5, 5/5 in LUE and 2/5 in RUE  Rapid alternating movements intact and symmetric  Sensation: Intact to light touch bilaterally. No neglect or extinction on double simultaneous testing.  Coordination: No dysmetria on finger-to-nose and heel-to-shin on L  Reflexes: Downgoing toes bilaterally   Gait: Deferred  NIH 3 for RUE     LABS:                        16.4   14.12 )-----------( 306      ( 09 Aug 2022 07:34 )             46.7     08-09    139  |  97<L>  |  39<H>  ----------------------------<  230<H>  4.8   |  27  |  1.7<H>    Ca    9.5      09 Aug 2022 07:34  Phos  3.7     08-08  Mg     2.7     08-09    TPro  7.2  /  Alb  4.0  /  TBili  1.5<H>  /  DBili  x   /  AST  23  /  ALT  25  /  AlkPhos  161<H>  08-09          RADIOLOGY & ADDITIONAL TESTS:

## 2022-08-09 NOTE — PROGRESS NOTE ADULT - ASSESSMENT
This is a 77 year old Male with PMHx HTN, DM, CAD with stents who presents with a chief complaint of RUE weakness, L facial, slurred speech, presents to the ED BIBEMS as a Stroke activation at 8:14am. NIH at the time was 2 for RUE and RLE drift. Facial and slurred speech at this point has resolved. mRS of 0. CTH was completed and was negative for bleed. CTA/P negative for perfusion deficit or LVO. Patient only takes ASA 81 and PLX 75 at home for heart ppx. Denies any neurologic history or stroke in the past. tPA given at 8:48am 8/3 as well as started on Cardene and 40 mg labetalol for BP control. BP prior to tPA administration was 183/93. Of note CTH shows chronic L occipital infarct. Patient admitted to 3E stroke unit for rest of neurologic work up and post tPA monitoring.    #Acute ischemic stroke s/p tPA  - Admitted to 3E stroke unit for monitoring  - atorvastatin 80 once daily  - Keep BP <180/105, notify provider if out of range  - repeat CTH stable  - MRI brain without contrast w/ +stroke  - EDSON: nl EF, no PFO  - PT/OT eval  - Stroke education  - antiplatelets as per neuro  - s/p ILR    #DM w/ hyperglycemia  -Insulin adjusted and can cont tapering up as needed  -monitor FS. Goal 140-180 inpt  -A1C=8.2  -pt counselled    #HTN/DONOVAN  -avoid hypotension  - holding HCTZ  -gentle hydration  -check bladder U/s, urine creat, sodium  -daily BMP    #HLD  -HD statin  -LDL 72    #CAD  -cont antiplatelets, BB, statin    #Leukocytosis  a-sx  monitor  check LE doppler      DVT ppx - SCDs, Lovenox    #Progress Note Handoff  Pending: Consults____Clinical improvement and stability__x__LE doppler, Renal U/s, lytes___PT____x____  Pt/Family discussion: Pt/family informed and agree with the current plan  Disposition: Home______/SNF_______/4A__?____/To be determined____x____    My note supersedes the residents note should a discrepancy arise.    Chart and notes personally reviewed.  Care Discussed with Consultants/Other Providers/ Housestaff [ x] YES [ ] NO   Radiology, labs, old records personally reviewed.    discussed w/ housestaff, nursing, case management, neuro team, son

## 2022-08-09 NOTE — DISCHARGE NOTE PROVIDER - CARE PROVIDERS DIRECT ADDRESSES
,DirectAddress_Unknown ,DirectAddress_Unknown,[email or web address]

## 2022-08-09 NOTE — DISCHARGE NOTE PROVIDER - NSDCFUADDAPPT_GEN_ALL_CORE_FT
Electrophysiologist Cardiology  Dr. Beaver, [address], [address]  [phone]   Follow up in 1 month

## 2022-08-09 NOTE — DISCHARGE NOTE PROVIDER - PROVIDER TOKENS
PROVIDER:[TOKEN:[[id number]],FOLLOWUP:[1 month]] PROVIDER:[TOKEN:[[id number]],FOLLOWUP:[1 month]],PROVIDER:[TOKEN:[[id number]],FOLLOWUP:[1 month]]

## 2022-08-09 NOTE — DISCHARGE NOTE PROVIDER - NSDCMRMEDTOKEN_GEN_ALL_CORE_FT
famotidine 40 mg oral tablet: 1 tab(s) orally once a day (at bedtime)  hydroCHLOROthiazide 25 mg oral tablet: 1 tab(s) orally once a day  Lantus 100 units/mL subcutaneous solution: 30 unit(s) subcutaneous once a day (at bedtime)  metoprolol tartrate 50 mg oral tablet: 1 tab(s) orally 2 times a day  ramipril 10 mg oral capsule: 1 cap(s) orally once a day  simvastatin 40 mg oral tablet: 1 tab(s) orally once a day (at bedtime)   amLODIPine 5 mg oral tablet: 1 tab(s) orally once a day  aspirin 81 mg oral delayed release tablet: 1 tab(s) orally once a day  atorvastatin 80 mg oral tablet: 1 tab(s) orally once a day (at bedtime)  citalopram 10 mg oral tablet: 0.5 tab(s) orally once a day  famotidine 40 mg oral tablet: 1 tab(s) orally once a day (at bedtime)  insulin lispro 100 units/mL injectable solution: 10 unit(s) injectable 3 times a day (before meals)  Lantus 100 units/mL subcutaneous solution: 30 unit(s) subcutaneous once a day (at bedtime)  lisinopril 40 mg oral tablet: 1 tab(s) orally once a day  metoprolol tartrate 50 mg oral tablet: 1 tab(s) orally 2 times a day  polyethylene glycol 3350 oral powder for reconstitution: 17 gram(s) orally 2 times a day  senna leaf extract oral tablet: 2 tab(s) orally once a day (at bedtime)  ticagrelor 90 mg oral tablet: 1 tab(s) orally every 12 hours

## 2022-08-09 NOTE — DISCHARGE NOTE PROVIDER - HOSPITAL COURSE
Hospital course:  77y Male with PMH     During this hospital course, patient had a (ischemic/hemorrhagic) stroke located in (left/right.....) as seen on (MRI/CT).   The stroke etiology is likely secondary to:  [] Atrial fibrillation  [] Small vessel disease from atherosclerotic risk factors  [] Other:  [] Etiology workup still in progress    Patient had the following workup done in house:  CT Head:   MR Head Non Contrast:  CT Angio Head:  CT Angio Neck:  [] Echo  [] Labs  [] Other    Physical exam at discharge:    New medications on discharge:  Labs to be followed up:  Imaging to be done as outpatient:  Further outpatient workup:   Hospital course:  Patient is a 77 year old Male with HTN, DM, HLD, GERD, CAD with stents who presented with a chief complaint of RUE weakness, L facial, slurred speech. NIH at the time was 2 for RUE and RLE drift. Facial and slurred speech at this point has resolved. mRS of 0. CTH was completed and was negative for bleed. CTA/P negative for perfusion deficit or LVO. Patient only takes ASA 81 at home for heart ppx. Denies any neurologic history or stroke in the past. tPA given at 8:48am as well as started on Cardene for BP control. BP prior to tPA administration was 183/93.     During this hospital course, patient had a (ischemic/hemorrhagic) stroke located in (left/right.....) as seen on (MRI/CT).   The stroke etiology is likely secondary to:  [] Atrial fibrillation  [] Small vessel disease from atherosclerotic risk factors  [] Other:  [] Etiology workup still in progress    Patient had the following workup done in house:  CT Head:   MR Head Non Contrast:  CT Angio Head:  CT Angio Neck:  [] Echo  [] Labs  [] Other    Physical exam at discharge:    New medications on discharge:  Labs to be followed up:  Imaging to be done as outpatient:  Further outpatient workup:   Hospital course:  Patient is a 77 year old Male with HTN, DM, HLD, GERD, CAD with stents who presented with a chief complaint of RUE weakness, L facial, slurred speech. NIH at the time was 2 for RUE and RLE drift. Facial and slurred speech at this point has resolved. mRS of 0. CTH was completed and was negative for bleed. CTA/P negative for perfusion deficit or LVO. Patient only takes ASA 81 at home for heart ppx. Denies any neurologic history or stroke in the past. tPA given at 8:48am as well as started on Cardene for BP control. BP prior to tPA administration was 183/93.     During this hospital course, patient had a (ischemic/hemorrhagic) stroke located in (left/right.....) as seen on (MRI/CT).   The stroke etiology is likely secondary to:  [] Atrial fibrillation  [] Small vessel disease from atherosclerotic risk factors  [] Other:  [] Etiology workup still in progress    Patient had the following workup done in house:  CT Head:   MR Head Non Contrast:  CT Angio Head:  CT Angio Neck:  [] Echo  [] Labs  [] Other    Physical exam at discharge:    New medications on discharge:  Labs to be followed up:  Imaging to be done as outpatient:  Further outpatient workup:      Stroke attending attestation:  Pt is a 78 yo M, right handed, with PMHx of HTN, HLD  CAD s/p PCI, who presented with right arm weakness and slurred speech. S/p IV tPA on 8/3. NIHSS 4 (RUE plegia), mRS 2.     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 NIH at the time was 2 for RUE and RLE drift. Facial and slurred speech at this point has resolved. mRS of 0. CTH was completed and was negative for bleed. CTA/P negative for perfusion deficit or LVO. Patient only takes ASA 81 at home for heart ppx. Denies any neurologic history or stroke in the past. tPA given at 8:48am as well as started on Cardene for BP control. BP prior to tPA administration was 183/93.     During this hospital course, patient had  multiple infarcts w/ prominence in the MCA, PCA territory  and left hemisphere MRI  The stroke etiology is likely secondary to:  [] Atrial fibrillation  [] Small vessel disease from atherosclerotic risk factors  [X] Other: Cardioembolic  [] Etiology workup still in progress    Patient had the following workup done in house:   CT Brain Stroke Protocol  No evidence of acute intracranial hemorrhage or large territory infarct.    CT Brain Perfusion Maps Stroke (08.03.22 @ 08:33)    No evidence of major vascular stenosis or occlusion. Normal perfusion   images.   Scattered mild atheromatous changes as above.    MR Head No Cont (08.06.22 @ 08:49)   Scattered left-sided acute cortical infarcts within the MCA and PCA territories. Suggestion of trace petechial hemorrhage involving the high left frontal region  Mild chronic microvascular type changes as well as a chronic left occipital lobe infarct..    MR Angio Neck w/wo IV Cont (08.07.22 @ 22:53)   Moderate stenosis of the proximal right internal carotid artery.    TTE with bubble:   59% G1DD     EDSON   - EF 55-60, no PFO or clot burden      Physical exam at discharge:  General: No acute distress, awake and alert  Eyes: Anicteric sclerae, moist conjunctivae, see below for CNs  Neck: trachea midline, FROM, supple, no thyromegaly or lymphadenopathy  Cardiovascular: Regular rate and rhythm, no murmurs  Pulmonary: Anterior breath sounds clear bilaterally, no crackles or wheezing. No use of accessory muscles  GI: Abdomen soft, non-distended, non-tender  Extremities:  no edema    Neurologic:  -Mental status: Awake, alert, oriented to person, place, and time. Speech is fluent with intact naming, repetition, and comprehension, no dysarthria. Recent and remote memory intact. Follows commands. Attention/concentration intact. Fund of knowledge appropriate.  -Cranial nerves:   II: Visual fields are full to confrontation.  III, IV, VI: Extraocular movements are intact without nystagmus. Pupils equally round and reactive to light  V:  Facial sensation V1-V3 equal and intact   VII: Face is symmetric with normal eye closure and smile  VIII: Hearing is bilaterally intact to finger rub  IX, X: Uvula is midline and soft palate rises symmetrically  XI: Head turning and shoulder shrug are intact.  XII: Tongue protrudes midline  Motor: Normal bulk and tone. No pronator drift. Strength bilateral lower extremities 5/5, 5/5 in LUE and 2/5 in RUE  Rapid alternating movements intact and symmetric  Sensation: Intact to light touch bilaterally. No neglect or extinction on double simultaneous testing.  Coordination: No dysmetria on finger-to-nose and heel-to-shin on L  Reflexes: Downgoing toes bilaterally   Gait: Deferred  NIH 3 for RUE     New medications on discharge: Aspirin, Brilanta, lipitor 80           Hospital course:  Patient is a 77 year old Male with HTN, DM, HLD, GERD, CAD with stents who presented with a chief complaint of RUE weakness, L facial, slurred speech. NIH at the time was 2 for RUE and RLE drift. Facial and slurred speech at this point has resolved. mRS of 0. CTH was completed and was negative for bleed. CTA/P negative for perfusion deficit or LVO. Patient only takes ASA 81 at home for heart ppx. Denies any neurologic history or stroke in the past. tPA given at 8:48am as well as started on Cardene for BP control. BP prior to tPA administration was 183/93.     During this hospital course, patient had  multiple infarcts w/ prominence in the MCA, PCA territory  and left hemisphere MRI  The stroke etiology is likely secondary to:  [] Atrial fibrillation  [] Small vessel disease from atherosclerotic risk factors  [X] Other: Cardioembolic  [] Etiology workup still in progress    Patient had the following workup done in house:   CT Brain Stroke Protocol  No evidence of acute intracranial hemorrhage or large territory infarct.    CT Brain Perfusion Maps Stroke (08.03.22 @ 08:33)    No evidence of major vascular stenosis or occlusion. Normal perfusion   images.   Scattered mild atheromatous changes as above.    MR Head No Cont (08.06.22 @ 08:49)   Scattered left-sided acute cortical infarcts within the MCA and PCA territories. Suggestion of trace petechial hemorrhage involving the high left frontal region  Mild chronic microvascular type changes as well as a chronic left occipital lobe infarct..    MR Angio Neck w/wo IV Cont (08.07.22 @ 22:53)   Moderate stenosis of the proximal right internal carotid artery.    TTE with bubble:   59% G1DD     EDSON   - EF 55-60, no PFO or clot burden      Physical exam at discharge:  General: No acute distress, awake and alert  Eyes: Anicteric sclerae, moist conjunctivae, see below for CNs  Neck: trachea midline, FROM, supple, no thyromegaly or lymphadenopathy  Cardiovascular: Regular rate and rhythm, no murmurs  Pulmonary: Anterior breath sounds clear bilaterally, no crackles or wheezing. No use of accessory muscles  GI: Abdomen soft, non-distended, non-tender  Extremities:  no edema    Neurologic:  -Mental status: Awake, alert, oriented to person, place, and time. Speech is fluent with intact naming, repetition, and comprehension, no dysarthria. Recent and remote memory intact. Follows commands. Attention/concentration intact. Fund of knowledge appropriate.  -Cranial nerves:   II: Visual fields are full to confrontation.  III, IV, VI: Extraocular movements are intact without nystagmus. Pupils equally round and reactive to light  V:  Facial sensation V1-V3 equal and intact   VII: Face is symmetric with normal eye closure and smile  VIII: Hearing is bilaterally intact to finger rub  IX, X: Uvula is midline and soft palate rises symmetrically  XI: Head turning and shoulder shrug are intact.  XII: Tongue protrudes midline  Motor: Normal bulk and tone. No pronator drift. Strength bilateral lower extremities 5/5, 5/5 in LUE and 2/5 in RUE  Rapid alternating movements intact and symmetric  Sensation: Intact to light touch bilaterally. No neglect or extinction on double simultaneous testing.  Coordination: No dysmetria on finger-to-nose and heel-to-shin on L  Reflexes: Downgoing toes bilaterally   Gait: Deferred  NIH 3 for RUE     New medications on discharge: Aspirin, Brilanta, lipitor 80    Attending attestation  Etiology of stroke suspected cardioembolic  Transferring to acute rehab  f/u in stroke clinic in 2-3 weeks  jadon 3

## 2022-08-09 NOTE — DISCHARGE NOTE PROVIDER - CARE PROVIDER_API CALL
Chantelle Irwin)  Neurology  [address] [place] [address]  Phone: [phone]  Fax: [phone]  Follow Up Time: 1 month   Chantelle Irwin)  Neurology  [address] [place] [address]  Phone: [phone]  Fax: [phone]  Follow Up Time: 1 month    Bryant Beaver)  Cardiac Electrophysiology; Cardiovascular Disease; [facility]  [address] [place] [address]  Phone: [phone]  Fax: [phone]  Follow Up Time: 1 month

## 2022-08-09 NOTE — PROGRESS NOTE ADULT - ATTENDING COMMENTS
Pt is a 76 yo M, right handed, with PMHx of HTN, HLD  CAD s/p PCI, who presented with right arm weakness and slurred speech. 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 dispo planning- awaiting bed in 4a.

## 2022-08-09 NOTE — PROGRESS NOTE ADULT - SUBJECTIVE AND OBJECTIVE BOX
MARIA M CROFT  77y  Male    Patient is a 77y old  Male who presents with a chief complaint of RUE weakness    HPI:  Patient is a 77 year old Male with HTN, DM, HLD, GERD, CAD with stents who presents with a chief complaint of RUE weakness, L facial, slurred speech, presents to the ED BIBEMS as a Stroke activation at 8:14am. NIH at the time was 2 for RUE and RLE drift. Facial and slurred speech at this point has resolved. mRS of 0. CTH was completed and was negative for bleed. CTA/P negative for perfusion deficit or LVO. Patient only takes ASA 81 at home for heart ppx. Denies any neurologic history or stroke in the past. tPA given at 8:48am as well as started on Cardene for BP control. BP prior to tPA administration was 183/93.    (03 Aug 2022 11:22)    S: Patient was examined and seen at bedside. This morning pt is resting comfortably in bed and reports no new issues or overnight events.  +BMs. +decreased urine outpt. No other complaints.  Denies CP, SOB, AP  ROS: all other systems reviewed and are negative    PAST MEDICAL & SURGICAL HISTORY:  Hypertension      Diabetes      Hyperlipidemia      GERD (gastroesophageal reflux disease)      Coronary artery disease        SOCIAL HISTORY:  Tobacco: denies  Illicit drugs: denies  Alcohol: social  Family history reviewed and otherwise non-contributory No clotting disorders, CVAs at early age.  ALLERGIES: NKDA    General: NAD. Looks stated age.  HEENT: clean oropharynx, EOMI, no LAD  Neck: trachea midline, no thyromegaly  CV: nl S1 S2; no m/r/g  Resp: decreased breath sounds at base  GI: NT/ND/S +BS  MS: no clubbing/cyanosis/edema, +pulses  Neuro: RUE 0/5, RLE 5-/5, sensory intact; + reflexes  Skin: no rashes, nl turgor  Psychiatric: AA0x3 w/ intact insight and judgement    tele: SR, nonspecific changes (on my own evaluation of tele monitor)            MEDICATIONS  (STANDING):  amLODIPine   Tablet 5 milliGRAM(s) Oral daily  aspirin enteric coated 81 milliGRAM(s) Oral daily  atorvastatin 80 milliGRAM(s) Oral at bedtime  benzocaine 20%/menthol 0.5% Spray 1 Spray(s) Topical once  cephalexin 500 milliGRAM(s) Oral once  citalopram 5 milliGRAM(s) Oral daily  dextrose 5%. 1000 milliLiter(s) (100 mL/Hr) IV Continuous <Continuous>  dextrose 5%. 1000 milliLiter(s) (50 mL/Hr) IV Continuous <Continuous>  dextrose 50% Injectable 25 Gram(s) IV Push once  dextrose 50% Injectable 12.5 Gram(s) IV Push once  dextrose 50% Injectable 25 Gram(s) IV Push once  enoxaparin Injectable 40 milliGRAM(s) SubCutaneous every 24 hours  famotidine    Tablet 20 milliGRAM(s) Oral daily  glucagon  Injectable 1 milliGRAM(s) IntraMuscular once  insulin glargine Injectable (LANTUS) 24 Unit(s) SubCutaneous every morning  insulin lispro (ADMELOG) corrective regimen sliding scale   SubCutaneous three times a day before meals  insulin lispro Injectable (ADMELOG) 7 Unit(s) SubCutaneous three times a day before meals  insulin lispro Injectable (ADMELOG) 10 Unit(s) SubCutaneous three times a day before meals  lisinopril 40 milliGRAM(s) Oral daily  metoprolol tartrate 50 milliGRAM(s) Oral two times a day  polyethylene glycol 3350 17 Gram(s) Oral two times a day  senna 2 Tablet(s) Oral at bedtime  sodium chloride 0.9%. 1000 milliLiter(s) (60 mL/Hr) IV Continuous <Continuous>  ticagrelor 90 milliGRAM(s) Oral every 12 hours    MEDICATIONS  (PRN):  bisacodyl Suppository 10 milliGRAM(s) Rectal daily PRN Constipation  dextrose Oral Gel 15 Gram(s) Oral once PRN Blood Glucose LESS THAN 70 milliGRAM(s)/deciliter  lactulose Syrup 20 Gram(s) Oral two times a day PRN Constipation  melatonin 5 milliGRAM(s) Oral at bedtime PRN Insomnia    Home Medications:  famotidine 40 mg oral tablet: 1 tab(s) orally once a day (at bedtime) (04 Aug 2022 08:06)  hydroCHLOROthiazide 25 mg oral tablet: 1 tab(s) orally once a day (04 Aug 2022 08:05)  Lantus 100 units/mL subcutaneous solution: 30 unit(s) subcutaneous once a day (at bedtime) (04 Aug 2022 08:01)  metoprolol tartrate 50 mg oral tablet: 1 tab(s) orally 2 times a day (04 Aug 2022 08:06)  ramipril 10 mg oral capsule: 1 cap(s) orally once a day (04 Aug 2022 08:01)  simvastatin 40 mg oral tablet: 1 tab(s) orally once a day (at bedtime) (04 Aug 2022 08:06)    Vital Signs Last 24 Hrs  T(C): 36.4 (09 Aug 2022 14:01), Max: 36.4 (08 Aug 2022 20:37)  T(F): 97.5 (09 Aug 2022 14:01), Max: 97.6 (08 Aug 2022 20:37)  HR: 92 (09 Aug 2022 14:01) (77 - 92)  BP: 133/82 (09 Aug 2022 14:01) (116/73 - 150/85)  BP(mean): 100 (08 Aug 2022 21:00) (91 - 100)  RR: 18 (09 Aug 2022 05:07) (18 - 18)  SpO2: 95% (08 Aug 2022 18:03) (95% - 95%)      CAPILLARY BLOOD GLUCOSE      POCT Blood Glucose.: 137 mg/dL (09 Aug 2022 16:51)  POCT Blood Glucose.: 222 mg/dL (09 Aug 2022 11:12)  POCT Blood Glucose.: 223 mg/dL (09 Aug 2022 07:51)  POCT Blood Glucose.: 190 mg/dL (08 Aug 2022 22:23)    LABS:                        16.4   14.12 )-----------( 306      ( 09 Aug 2022 07:34 )             46.7     08-09    139  |  97<L>  |  39<H>  ----------------------------<  230<H>  4.8   |  27  |  1.7<H>    Ca    9.5      09 Aug 2022 07:34  Phos  3.7     08-08  Mg     2.7     08-09    TPro  7.2  /  Alb  4.0  /  TBili  1.5<H>  /  DBili  x   /  AST  23  /  ALT  25  /  AlkPhos  161<H>  08-09    LIVER FUNCTIONS - ( 09 Aug 2022 15:37 )  Alb: x     / Pro: x     / ALK PHOS: x     / ALT: x     / AST: x     / GGT: 28 U/L                       Consultant Notes Reviewed:  [x ] YES  [ ] NO  Care Discussed with Consultants/Other Providers/ Housestaff [ x] YES  [ ] NO  Radiology, labs, new studies personally reviewed.

## 2022-08-09 NOTE — DISCHARGE NOTE PROVIDER - NSDCFUSCHEDAPPT_GEN_ALL_CORE_FT
Renate Meyers  [facility] Physician [facility]  CARDIOLOGY [address] [facility]  Scheduled Appointment: 09/30/2022

## 2022-08-10 LAB
ALBUMIN SERPL ELPH-MCNC: 3.8 G/DL — SIGNIFICANT CHANGE UP (ref 3.5–5.2)
ALP SERPL-CCNC: 157 U/L — HIGH (ref 30–115)
ALT FLD-CCNC: 25 U/L — SIGNIFICANT CHANGE UP (ref 0–41)
ANION GAP SERPL CALC-SCNC: 17 MMOL/L — HIGH (ref 7–14)
AST SERPL-CCNC: 26 U/L — SIGNIFICANT CHANGE UP (ref 0–41)
BASOPHILS # BLD AUTO: 0.04 K/UL — SIGNIFICANT CHANGE UP (ref 0–0.2)
BASOPHILS NFR BLD AUTO: 0.4 % — SIGNIFICANT CHANGE UP (ref 0–1)
BILIRUB SERPL-MCNC: 1.4 MG/DL — HIGH (ref 0.2–1.2)
BUN SERPL-MCNC: 40 MG/DL — HIGH (ref 10–20)
CALCIUM SERPL-MCNC: 9.4 MG/DL — SIGNIFICANT CHANGE UP (ref 8.5–10.1)
CHLORIDE SERPL-SCNC: 98 MMOL/L — SIGNIFICANT CHANGE UP (ref 98–110)
CO2 SERPL-SCNC: 22 MMOL/L — SIGNIFICANT CHANGE UP (ref 17–32)
CREAT SERPL-MCNC: 1.5 MG/DL — SIGNIFICANT CHANGE UP (ref 0.7–1.5)
EGFR: 48 ML/MIN/1.73M2 — LOW
EOSINOPHIL # BLD AUTO: 0.33 K/UL — SIGNIFICANT CHANGE UP (ref 0–0.7)
EOSINOPHIL NFR BLD AUTO: 3.1 % — SIGNIFICANT CHANGE UP (ref 0–8)
GLUCOSE BLDC GLUCOMTR-MCNC: 114 MG/DL — HIGH (ref 70–99)
GLUCOSE BLDC GLUCOMTR-MCNC: 132 MG/DL — HIGH (ref 70–99)
GLUCOSE BLDC GLUCOMTR-MCNC: 174 MG/DL — HIGH (ref 70–99)
GLUCOSE BLDC GLUCOMTR-MCNC: 233 MG/DL — HIGH (ref 70–99)
GLUCOSE SERPL-MCNC: 153 MG/DL — HIGH (ref 70–99)
HCT VFR BLD CALC: 47 % — SIGNIFICANT CHANGE UP (ref 42–52)
HGB BLD-MCNC: 16.2 G/DL — SIGNIFICANT CHANGE UP (ref 14–18)
IMM GRANULOCYTES NFR BLD AUTO: 0.3 % — SIGNIFICANT CHANGE UP (ref 0.1–0.3)
LYMPHOCYTES # BLD AUTO: 1.41 K/UL — SIGNIFICANT CHANGE UP (ref 1.2–3.4)
LYMPHOCYTES # BLD AUTO: 13.4 % — LOW (ref 20.5–51.1)
MAGNESIUM SERPL-MCNC: 2.4 MG/DL — SIGNIFICANT CHANGE UP (ref 1.8–2.4)
MCHC RBC-ENTMCNC: 29.3 PG — SIGNIFICANT CHANGE UP (ref 27–31)
MCHC RBC-ENTMCNC: 34.5 G/DL — SIGNIFICANT CHANGE UP (ref 32–37)
MCV RBC AUTO: 85 FL — SIGNIFICANT CHANGE UP (ref 80–94)
MONOCYTES # BLD AUTO: 1.26 K/UL — HIGH (ref 0.1–0.6)
MONOCYTES NFR BLD AUTO: 11.9 % — HIGH (ref 1.7–9.3)
NEUTROPHILS # BLD AUTO: 7.49 K/UL — HIGH (ref 1.4–6.5)
NEUTROPHILS NFR BLD AUTO: 70.9 % — SIGNIFICANT CHANGE UP (ref 42.2–75.2)
NRBC # BLD: 0 /100 WBCS — SIGNIFICANT CHANGE UP (ref 0–0)
PLATELET # BLD AUTO: 285 K/UL — SIGNIFICANT CHANGE UP (ref 130–400)
POTASSIUM SERPL-MCNC: 4.3 MMOL/L — SIGNIFICANT CHANGE UP (ref 3.5–5)
POTASSIUM SERPL-SCNC: 4.3 MMOL/L — SIGNIFICANT CHANGE UP (ref 3.5–5)
PROT SERPL-MCNC: 7.2 G/DL — SIGNIFICANT CHANGE UP (ref 6–8)
RBC # BLD: 5.53 M/UL — SIGNIFICANT CHANGE UP (ref 4.7–6.1)
RBC # FLD: 13.2 % — SIGNIFICANT CHANGE UP (ref 11.5–14.5)
SODIUM SERPL-SCNC: 137 MMOL/L — SIGNIFICANT CHANGE UP (ref 135–146)
WBC # BLD: 10.56 K/UL — SIGNIFICANT CHANGE UP (ref 4.8–10.8)
WBC # FLD AUTO: 10.56 K/UL — SIGNIFICANT CHANGE UP (ref 4.8–10.8)

## 2022-08-10 PROCEDURE — 99233 SBSQ HOSP IP/OBS HIGH 50: CPT

## 2022-08-10 PROCEDURE — 99232 SBSQ HOSP IP/OBS MODERATE 35: CPT

## 2022-08-10 RX ADMIN — CITALOPRAM 5 MILLIGRAM(S): 10 TABLET, FILM COATED ORAL at 12:37

## 2022-08-10 RX ADMIN — Medication 10 UNIT(S): at 18:33

## 2022-08-10 RX ADMIN — ATORVASTATIN CALCIUM 80 MILLIGRAM(S): 80 TABLET, FILM COATED ORAL at 21:59

## 2022-08-10 RX ADMIN — Medication 2: at 12:38

## 2022-08-10 RX ADMIN — LISINOPRIL 40 MILLIGRAM(S): 2.5 TABLET ORAL at 05:57

## 2022-08-10 RX ADMIN — TICAGRELOR 90 MILLIGRAM(S): 90 TABLET ORAL at 17:29

## 2022-08-10 RX ADMIN — AMLODIPINE BESYLATE 5 MILLIGRAM(S): 2.5 TABLET ORAL at 05:57

## 2022-08-10 RX ADMIN — POLYETHYLENE GLYCOL 3350 17 GRAM(S): 17 POWDER, FOR SOLUTION ORAL at 05:57

## 2022-08-10 RX ADMIN — TICAGRELOR 90 MILLIGRAM(S): 90 TABLET ORAL at 05:58

## 2022-08-10 RX ADMIN — SENNA PLUS 2 TABLET(S): 8.6 TABLET ORAL at 21:59

## 2022-08-10 RX ADMIN — Medication 10 UNIT(S): at 08:32

## 2022-08-10 RX ADMIN — FAMOTIDINE 20 MILLIGRAM(S): 10 INJECTION INTRAVENOUS at 12:27

## 2022-08-10 RX ADMIN — Medication 50 MILLIGRAM(S): at 05:57

## 2022-08-10 RX ADMIN — INSULIN GLARGINE 30 UNIT(S): 100 INJECTION, SOLUTION SUBCUTANEOUS at 10:42

## 2022-08-10 RX ADMIN — SODIUM CHLORIDE 60 MILLILITER(S): 9 INJECTION INTRAMUSCULAR; INTRAVENOUS; SUBCUTANEOUS at 06:05

## 2022-08-10 RX ADMIN — Medication 10 UNIT(S): at 12:40

## 2022-08-10 RX ADMIN — Medication 81 MILLIGRAM(S): at 12:27

## 2022-08-10 RX ADMIN — Medication 50 MILLIGRAM(S): at 17:29

## 2022-08-10 RX ADMIN — Medication 1: at 08:32

## 2022-08-10 NOTE — PROGRESS NOTE ADULT - SUBJECTIVE AND OBJECTIVE BOX
MARIA M CROFT  77y  Male    Patient is a 77y old  Male who presents with a chief complaint of RUE weakness    HPI:  Patient is a 77 year old Male with HTN, DM, HLD, GERD, CAD with stents who presents with a chief complaint of RUE weakness, L facial, slurred speech, presents to the ED BIBEMS as a Stroke activation at 8:14am. NIH at the time was 2 for RUE and RLE drift. Facial and slurred speech at this point has resolved. mRS of 0. CTH was completed and was negative for bleed. CTA/P negative for perfusion deficit or LVO. Patient only takes ASA 81 at home for heart ppx. Denies any neurologic history or stroke in the past. tPA given at 8:48am as well as started on Cardene for BP control. BP prior to tPA administration was 183/93.    (03 Aug 2022 11:22)    S: Patient was examined and seen at bedside. This morning pt is resting comfortably in bed and reports no new issues or overnight events.  +BMs. No other complaints.  Denies CP, SOB, AP.  ROS: all other systems reviewed and are negative    PAST MEDICAL & SURGICAL HISTORY:  Hypertension      Diabetes      Hyperlipidemia              MEDICATIONS  (STANDING):  amLODIPine   Tablet 5 milliGRAM(s) Oral daily  aspirin enteric coated 81 milliGRAM(s) Oral daily  atorvastatin 80 milliGRAM(s) Oral at bedtime  benzocaine 20%/menthol 0.5% Spray 1 Spray(s) Topical once  cephalexin 500 milliGRAM(s) Oral once  citalopram 5 milliGRAM(s) Oral daily  dextrose 5%. 1000 milliLiter(s) (100 mL/Hr) IV Continuous <Continuous>  dextrose 5%. 1000 milliLiter(s) (50 mL/Hr) IV Continuous <Continuous>  dextrose 50% Injectable 25 Gram(s) IV Push once  dextrose 50% Injectable 12.5 Gram(s) IV Push once  dextrose 50% Injectable 25 Gram(s) IV Push once  enoxaparin Injectable 40 milliGRAM(s) SubCutaneous every 24 hours  famotidine    Tablet 20 milliGRAM(s) Oral daily  glucagon  Injectable 1 milliGRAM(s) IntraMuscular once  insulin glargine Injectable (LANTUS) 30 Unit(s) SubCutaneous every morning  insulin lispro (ADMELOG) corrective regimen sliding scale   SubCutaneous three times a day before meals  insulin lispro Injectable (ADMELOG) 10 Unit(s) SubCutaneous three times a day before meals  lisinopril 40 milliGRAM(s) Oral daily  metoprolol tartrate 50 milliGRAM(s) Oral two times a day  polyethylene glycol 3350 17 Gram(s) Oral two times a day  senna 2 Tablet(s) Oral at bedtime  sodium chloride 0.9%. 1000 milliLiter(s) (60 mL/Hr) IV Continuous <Continuous>  ticagrelor 90 milliGRAM(s) Oral every 12 hours    MEDICATIONS  (PRN):  bisacodyl Suppository 10 milliGRAM(s) Rectal daily PRN Constipation  dextrose Oral Gel 15 Gram(s) Oral once PRN Blood Glucose LESS THAN 70 milliGRAM(s)/deciliter  lactulose Syrup 20 Gram(s) Oral two times a day PRN Constipation  melatonin 5 milliGRAM(s) Oral at bedtime PRN Insomnia    Home Medications:  famotidine 40 mg oral tablet: 1 tab(s) orally once a day (at bedtime) (04 Aug 2022 08:06)  hydroCHLOROthiazide 25 mg oral tablet: 1 tab(s) orally once a day (04 Aug 2022 08:05)  Lantus 100 units/mL subcutaneous solution: 30 unit(s) subcutaneous once a day (at bedtime) (04 Aug 2022 08:01)  metoprolol tartrate 50 mg oral tablet: 1 tab(s) orally 2 times a day (04 Aug 2022 08:06)  ramipril 10 mg oral capsule: 1 cap(s) orally once a day (04 Aug 2022 08:01)  simvastatin 40 mg oral tablet: 1 tab(s) orally once a day (at bedtime) (04 Aug 2022 08:06)    Vital Signs Last 24 Hrs  T(C): 36.7 (10 Aug 2022 13:45), Max: 36.8 (09 Aug 2022 20:33)  T(F): 98.1 (10 Aug 2022 13:45), Max: 98.2 (09 Aug 2022 20:33)  HR: 89 (10 Aug 2022 13:45) (87 - 89)  BP: 126/68 (10 Aug 2022 13:45) (126/68 - 150/84)  BP(mean): --  RR: 18 (10 Aug 2022 13:45) (18 - 18)  SpO2: --      CAPILLARY BLOOD GLUCOSE      POCT Blood Glucose.: 233 mg/dL (10 Aug 2022 11:20)  POCT Blood Glucose.: 174 mg/dL (10 Aug 2022 07:53)  POCT Blood Glucose.: 137 mg/dL (09 Aug 2022 21:08)  POCT Blood Glucose.: 137 mg/dL (09 Aug 2022 16:51)    LABS:                        16.2   10.56 )-----------( 285      ( 10 Aug 2022 06:03 )             47.0     08-10    137  |  98  |  40<H>  ----------------------------<  153<H>  4.3   |  22  |  1.5    Ca    9.4      10 Aug 2022 06:03  Mg     2.4     08-10    TPro  7.2  /  Alb  3.8  /  TBili  1.4<H>  /  DBili  x   /  AST  26  /  ALT  25  /  AlkPhos  157<H>  08-10    LIVER FUNCTIONS - ( 10 Aug 2022 06:03 )  Alb: 3.8 g/dL / Pro: 7.2 g/dL / ALK PHOS: 157 U/L / ALT: 25 U/L / AST: 26 U/L / GGT: x                           Consultant Notes Reviewed:  [x ] YES  [ ] NO  Care Discussed with Consultants/Other Providers/ Housestaff [ x] YES  [ ] NO  Radiology, labs, new studies personally reviewed.                        GERD (gastroesophageal reflux disease)      Coronary artery disease    SOCIAL HISTORY:  Tobacco: denies  Illicit drugs: denies  Alcohol: social  Family history reviewed and otherwise non-contributory No clotting disorders, CVAs at early age.  ALLERGIES: NKDA    General: NAD. Looks stated age.  HEENT: clean oropharynx, EOMI, no LAD  Neck: trachea midline, no thyromegaly  CV: nl S1 S2; no m/r/g  Resp: decreased breath sounds at base  GI: NT/ND/S +BS  MS: no clubbing/cyanosis/edema, +pulses  Neuro: RUE 0/5, RLE 5-/5, sensory intact; + reflexes  Skin: no rashes, nl turgor  Psychiatric: AA0x3 w/ intact insight and judgement    tele: SR, nonspecific changes (on my own evaluation of tele monitor)      MEDICATIONS  (STANDING):  amLODIPine   Tablet 5 milliGRAM(s) Oral daily  aspirin enteric coated 81 milliGRAM(s) Oral daily  atorvastatin 80 milliGRAM(s) Oral at bedtime  benzocaine 20%/menthol 0.5% Spray 1 Spray(s) Topical once  cephalexin 500 milliGRAM(s) Oral once  citalopram 5 milliGRAM(s) Oral daily  dextrose 5%. 1000 milliLiter(s) (100 mL/Hr) IV Continuous <Continuous>  dextrose 5%. 1000 milliLiter(s) (50 mL/Hr) IV Continuous <Continuous>  dextrose 50% Injectable 25 Gram(s) IV Push once  dextrose 50% Injectable 12.5 Gram(s) IV Push once  dextrose 50% Injectable 25 Gram(s) IV Push once  enoxaparin Injectable 40 milliGRAM(s) SubCutaneous every 24 hours  famotidine    Tablet 20 milliGRAM(s) Oral daily  glucagon  Injectable 1 milliGRAM(s) IntraMuscular once  insulin glargine Injectable (LANTUS) 30 Unit(s) SubCutaneous every morning  insulin lispro (ADMELOG) corrective regimen sliding scale   SubCutaneous three times a day before meals  insulin lispro Injectable (ADMELOG) 10 Unit(s) SubCutaneous three times a day before meals  lisinopril 40 milliGRAM(s) Oral daily  metoprolol tartrate 50 milliGRAM(s) Oral two times a day  polyethylene glycol 3350 17 Gram(s) Oral two times a day  senna 2 Tablet(s) Oral at bedtime  sodium chloride 0.9%. 1000 milliLiter(s) (60 mL/Hr) IV Continuous <Continuous>  ticagrelor 90 milliGRAM(s) Oral every 12 hours    MEDICATIONS  (PRN):  bisacodyl Suppository 10 milliGRAM(s) Rectal daily PRN Constipation  dextrose Oral Gel 15 Gram(s) Oral once PRN Blood Glucose LESS THAN 70 milliGRAM(s)/deciliter  lactulose Syrup 20 Gram(s) Oral two times a day PRN Constipation  melatonin 5 milliGRAM(s) Oral at bedtime PRN Insomnia    Home Medications:  famotidine 40 mg oral tablet: 1 tab(s) orally once a day (at bedtime) (04 Aug 2022 08:06)  hydroCHLOROthiazide 25 mg oral tablet: 1 tab(s) orally once a day (04 Aug 2022 08:05)  Lantus 100 units/mL subcutaneous solution: 30 unit(s) subcutaneous once a day (at bedtime) (04 Aug 2022 08:01)  metoprolol tartrate 50 mg oral tablet: 1 tab(s) orally 2 times a day (04 Aug 2022 08:06)  ramipril 10 mg oral capsule: 1 cap(s) orally once a day (04 Aug 2022 08:01)  simvastatin 40 mg oral tablet: 1 tab(s) orally once a day (at bedtime) (04 Aug 2022 08:06)    Vital Signs Last 24 Hrs  T(C): 36.7 (10 Aug 2022 13:45), Max: 36.8 (09 Aug 2022 20:33)  T(F): 98.1 (10 Aug 2022 13:45), Max: 98.2 (09 Aug 2022 20:33)  HR: 89 (10 Aug 2022 13:45) (87 - 89)  BP: 126/68 (10 Aug 2022 13:45) (126/68 - 150/84)  BP(mean): --  RR: 18 (10 Aug 2022 13:45) (18 - 18)  SpO2: --      CAPILLARY BLOOD GLUCOSE      POCT Blood Glucose.: 233 mg/dL (10 Aug 2022 11:20)  POCT Blood Glucose.: 174 mg/dL (10 Aug 2022 07:53)  POCT Blood Glucose.: 137 mg/dL (09 Aug 2022 21:08)  POCT Blood Glucose.: 137 mg/dL (09 Aug 2022 16:51)    LABS:                        16.2   10.56 )-----------( 285      ( 10 Aug 2022 06:03 )             47.0     08-10    137  |  98  |  40<H>  ----------------------------<  153<H>  4.3   |  22  |  1.5    Ca    9.4      10 Aug 2022 06:03  Mg     2.4     08-10    TPro  7.2  /  Alb  3.8  /  TBili  1.4<H>  /  DBili  x   /  AST  26  /  ALT  25  /  AlkPhos  157<H>  08-10    LIVER FUNCTIONS - ( 10 Aug 2022 06:03 )  Alb: 3.8 g/dL / Pro: 7.2 g/dL / ALK PHOS: 157 U/L / ALT: 25 U/L / AST: 26 U/L / GGT: x                           Consultant Notes Reviewed:  [x ] YES  [ ] NO  Care Discussed with Consultants/Other Providers/ Housestaff [ x] YES  [ ] NO  Radiology, labs, new studies personally reviewed.

## 2022-08-10 NOTE — CDI QUERY NOTE - NSCDIOTHERTXTBX_GEN_ALL_CORE_HH
-----------------------------------------------------------------------------------------------------------------------------------------------  77 M, with Diagnosis:  Acute Ischemic Stroke s/p TPA  Clinical Indicator:   MRI: 8/6/2022:  Scattered left-sided acute cortical infarcts within the MCA and PCA territories. Suggestion of trace petechial hemorrhage involving the high   left frontal region  Documentation:  8/9/2022: D/C Note: Attending Neurologist : Impr: multifocal acute ischemic stroke in L MCA/PCA territories, most prominently in left high frontal region    Meds:  ASA, Plavix, Brilinta    Based on your professional judgment and clinical indicators, can the Multifocal Acute Ischemic Stroke in L MCA/PCA be further specified as:     ---- Multifocal Acute Ischemic Stroke in L MCA/PCA with petechial hemorrhage , s/p TPA  ---- Multifocal Acute Ischemic Stroke in L MCA/PCA without petechial hemorrhage , s/p TPA  ---  Other (please specify)  ---- Clinically unable to determine    Thank you.

## 2022-08-10 NOTE — PROGRESS NOTE ADULT - SUBJECTIVE AND OBJECTIVE BOX
Neurology Stroke Progress Note    INTERVAL HPI/OVERNIGHT EVENTS:  Patient seen and examined. No acute events overnight. Patient denies any issues this am. ROS neg    MEDICATIONS  (STANDING):  amLODIPine   Tablet 5 milliGRAM(s) Oral daily  aspirin enteric coated 81 milliGRAM(s) Oral daily  atorvastatin 80 milliGRAM(s) Oral at bedtime  benzocaine 20%/menthol 0.5% Spray 1 Spray(s) Topical once  cephalexin 500 milliGRAM(s) Oral once  citalopram 5 milliGRAM(s) Oral daily  dextrose 5%. 1000 milliLiter(s) (100 mL/Hr) IV Continuous <Continuous>  dextrose 5%. 1000 milliLiter(s) (50 mL/Hr) IV Continuous <Continuous>  dextrose 50% Injectable 25 Gram(s) IV Push once  dextrose 50% Injectable 12.5 Gram(s) IV Push once  dextrose 50% Injectable 25 Gram(s) IV Push once  enoxaparin Injectable 40 milliGRAM(s) SubCutaneous every 24 hours  famotidine    Tablet 20 milliGRAM(s) Oral daily  glucagon  Injectable 1 milliGRAM(s) IntraMuscular once  insulin glargine Injectable (LANTUS) 30 Unit(s) SubCutaneous every morning  insulin lispro (ADMELOG) corrective regimen sliding scale   SubCutaneous three times a day before meals  insulin lispro Injectable (ADMELOG) 10 Unit(s) SubCutaneous three times a day before meals  lisinopril 40 milliGRAM(s) Oral daily  metoprolol tartrate 50 milliGRAM(s) Oral two times a day  polyethylene glycol 3350 17 Gram(s) Oral two times a day  senna 2 Tablet(s) Oral at bedtime  sodium chloride 0.9%. 1000 milliLiter(s) (60 mL/Hr) IV Continuous <Continuous>  ticagrelor 90 milliGRAM(s) Oral every 12 hours    MEDICATIONS  (PRN):  bisacodyl Suppository 10 milliGRAM(s) Rectal daily PRN Constipation  dextrose Oral Gel 15 Gram(s) Oral once PRN Blood Glucose LESS THAN 70 milliGRAM(s)/deciliter  lactulose Syrup 20 Gram(s) Oral two times a day PRN Constipation  melatonin 5 milliGRAM(s) Oral at bedtime PRN Insomnia    Allergies    No Known Allergies    Intolerances      Vital Signs Last 24 Hrs  T(C): 36.4 (10 Aug 2022 05:54), Max: 36.8 (09 Aug 2022 20:33)  T(F): 97.6 (10 Aug 2022 05:54), Max: 98.2 (09 Aug 2022 20:33)  HR: 89 (10 Aug 2022 05:54) (87 - 92)  BP: 150/84 (10 Aug 2022 05:54) (132/77 - 150/84)  BP(mean): --  RR: 18 (10 Aug 2022 05:54) (18 - 18)  SpO2: --        Physical exam:  General: No acute distress, awake and alert  Eyes: Anicteric sclerae, moist conjunctivae, see below for CNs  Neck: trachea midline, FROM, supple, no thyromegaly or lymphadenopathy  Cardiovascular: Regular rate and rhythm, no murmurs  Pulmonary: Anterior breath sounds clear bilaterally, no crackles or wheezing. No use of accessory muscles  GI: Abdomen soft, non-distended, non-tender  Extremities:  no edema    Neurologic:  -Mental status: Awake, alert, oriented to person, place, and time. Speech is fluent with intact naming, repetition, and comprehension, no dysarthria. Recent and remote memory intact. Follows commands. Attention/concentration intact. Fund of knowledge appropriate.  -Cranial nerves:   II: Visual fields are full to confrontation.  III, IV, VI: Extraocular movements are intact without nystagmus. Pupils equally round and reactive to light  V:  Facial sensation V1-V3 equal and intact   VII: Face is symmetric with normal eye closure and smile  VIII: Hearing is bilaterally intact to finger rub  IX, X: Uvula is midline and soft palate rises symmetrically  XI: Head turning and shoulder shrug are intact.  XII: Tongue protrudes midline  Motor: Normal bulk and tone. No pronator drift. Strength bilateral lower extremities 5/5, 5/5 in LUE and 2/5 in RUE  Rapid alternating movements intact and symmetric  Sensation: Intact to light touch bilaterally. No neglect or extinction on double simultaneous testing.  Coordination: No dysmetria on finger-to-nose and heel-to-shin on L  Reflexes: Downgoing toes bilaterally   Gait: Deferred  [facility] 3 for RUE     LABS:                        16.2   10.56 )-----------( 285      ( 10 Aug 2022 06:03 )             47.0     08-10    137  |  98  |  40<H>  ----------------------------<  153<H>  4.3   |  22  |  1.5    Ca    9.4      10 Aug 2022 06:03  Mg     2.4     08-10    TPro  7.2  /  Alb  3.8  /  TBili  1.4<H>  /  DBili  x   /  AST  26  /  ALT  25  /  AlkPhos  157<H>  08-10          RADIOLOGY & ADDITIONAL TESTS:

## 2022-08-10 NOTE — PROGRESS NOTE ADULT - ATTENDING COMMENTS
Pt is a 76 yo M, right handed, with PMHx of HTN, HLD  CAD s/p PCI, who presented with right arm weakness and slurred speech. S/p IV tPA on 8/3    Impr: multifocal acute ischemic stroke in L MCA/PCA territories, most prominently in left high frontal region  Switch from ASA/plavix-> ASA/brilinta x 1 mo, then resume ASA/plavix  S/p ILR placement  Continue escitalopram 5mg daily   PT/OT/ST, tele, -160. dispo planning- awaiting bed in 4a.

## 2022-08-10 NOTE — PROGRESS NOTE ADULT - ASSESSMENT
This is a 77y old  Male with HTN, DM, CAD with stents who presented to the ED via EMS with a chief complaint of RUE weakness, L facial, slurred speech, most likely due to left sided hemispheric dysfunction in the frontal MCA territory from suspected cardioembolic cause.       #RUE hemiparesis most likely 2/2 L MCA infarcts (pt was taking aspirin and clopidogrel at home) in multiple lobes  - Stroke Code On admission NIH 2 for RUE and RLE weakness - s/p tpa 08/03  - HTN Emergency POA   - s/p Cardene drip for BP control post tPA  - CTA H&N: No evidence of major vascular stenosis or occlusion. Normal perfusion images. Scattered mild atheromatous changes as above.  - CTP: There is no evidence of focal perfusion deficit to suggest acute cerebral ischemia  - CTH 24h post tPA- no bleed or acute pathology  - MRB w/ multiple infarcts w/ prominence in the MCA, PCA territory  and left hemisphere   - A1c 8.2, LDL 72  - echo with bubble: 59% G1DD  - s/p EDSON 8/5 - EF 55-60, no PFO or clot burden  - cont PT/OT  - in light of acute infarct likely failed Clopidogrel therapy -- c/w Ticagrelor 90mg Q12  - s/p ILR by EP. Will need to follow up outpatient  - Stroke education  - Carotid Doppler: 20-39% stenosis in BL ICA  - MRA neck: Moderate stenosis in right ICA  - c/w 5mg celexa  - c/w ASA 81mg  - c/w Brilanta 90mg BID  - c/w lipitor 80mg daily    #HTN  - HTN Emergency on admission s/p Cardene drip for BP control  - c/w Amlodipine 5mg QD  - c/w Hydrochlorothiazide 25mg QD  - c/w metoprolol tart 50mg  - goal -160    #DM - uncontrolled   - A1c 8.2  - increase lantus to 30U HS and start pre-meal at 10units  - keep BG < 180  - diet: Dash and CC    #Constipation - resolved  - c/w Miralax Q12  - c/w Senna 2 tabs HS  - c/w Lactulose Q6 PRN  - dulcolax supp PRN  - cont. to monitor    #Elevated T bili/Alk Phos  - patient asymptomatic  - RUQ US - 1.7 Renal lesion, mod-severe hepatosteatosis - will need o/p renal MRI for lesion    #Leukocytosis - most likely reactive - resolved  - CXR - neg  - cont. to monitor off abx  - bladder scan to residual urine - normal postvoid residual  - obtain LE duplex - pending official read    Msc  Diet: carb consistent, DASH  GI ppx: none  DVT ppx: lovenox  Activity: Out of bed with assist  Code: Full    Dsipo: DC to 4a pending auth

## 2022-08-10 NOTE — PROGRESS NOTE ADULT - ASSESSMENT
This is a 77 year old Male with PMHx HTN, DM, CAD with stents who presents with a chief complaint of RUE weakness, L facial, slurred speech, presents to the ED BIBEMS as a Stroke activation at 8:14am. NIH at the time was 2 for RUE and RLE drift. Facial and slurred speech at this point has resolved. mRS of 0. CTH was completed and was negative for bleed. CTA/P negative for perfusion deficit or LVO. Patient only takes ASA 81 and PLX 75 at home for heart ppx. Denies any neurologic history or stroke in the past. tPA given at 8:48am 8/3 as well as started on Cardene and 40 mg labetalol for BP control. BP prior to tPA administration was 183/93. Of note CTH shows chronic L occipital infarct. Patient admitted to 3E stroke unit for rest of neurologic work up and post tPA monitoring.    #Acute ischemic stroke s/p tPA  - Admitted to 3E stroke unit for monitoring  - atorvastatin 80 once daily  - Keep BP <180/105, notify provider if out of range  - repeat CTH stable  - MRI brain without contrast w/ +stroke  - EDSON: nl EF, no PFO  - PT/OT eval  - Stroke education  - antiplatelets as per neuro  - s/p ILR    #DM w/ hyperglycemia  -Insulin adjusted and can cont tapering up as needed  -monitor FS. Goal 140-180 inpt  -A1C=8.2  -pt counselled    #HTN/DONOVAN  -avoid hypotension  - holding HCTZ  -gentle hydration  -daily BMP    #HLD  -HD statin  -LDL 72    #CAD  -cont antiplatelets, BB, statin    #Leukocytosis  a-sx  monitor  check LE doppler    #Renal lesion  outpt workup      DVT ppx - SCDs, Lovenox    #Progress Note Handoff  Pending: Consults____Clinical improvement and stability__x_Resolution of AKI___PT____x____4A auth  Pt/Family discussion: Pt/family informed and agree with the current plan  Disposition: 4A    My note supersedes the residents note should a discrepancy arise.    Chart and notes personally reviewed.  Care Discussed with Consultants/Other Providers/ Housestaff [ x] YES [ ] NO   Radiology, labs, old records personally reviewed.    discussed w/ housestaff, nursing, case management, neuro team

## 2022-08-11 ENCOUNTER — TRANSCRIPTION ENCOUNTER (OUTPATIENT)
Age: 77
End: 2022-08-11

## 2022-08-11 ENCOUNTER — INPATIENT (INPATIENT)
Facility: HOSPITAL | Age: 77
LOS: 18 days | Discharge: NURSING, REHAB OR LONG-TERM CARE | End: 2022-08-30
Attending: PHYSICAL MEDICINE & REHABILITATION | Admitting: PHYSICAL MEDICINE & REHABILITATION

## 2022-08-11 VITALS
TEMPERATURE: 98 F | SYSTOLIC BLOOD PRESSURE: 162 MMHG | RESPIRATION RATE: 18 BRPM | DIASTOLIC BLOOD PRESSURE: 89 MMHG | WEIGHT: 222.23 LBS | HEIGHT: 72 IN | HEART RATE: 75 BPM

## 2022-08-11 VITALS — HEIGHT: 72 IN | WEIGHT: 219.58 LBS

## 2022-08-11 LAB
ALBUMIN SERPL ELPH-MCNC: 3.7 G/DL — SIGNIFICANT CHANGE UP (ref 3.5–5.2)
ALP SERPL-CCNC: 160 U/L — HIGH (ref 30–115)
ALT FLD-CCNC: 28 U/L — SIGNIFICANT CHANGE UP (ref 0–41)
ANION GAP SERPL CALC-SCNC: 15 MMOL/L — HIGH (ref 7–14)
AST SERPL-CCNC: 29 U/L — SIGNIFICANT CHANGE UP (ref 0–41)
BASOPHILS # BLD AUTO: 0.05 K/UL — SIGNIFICANT CHANGE UP (ref 0–0.2)
BASOPHILS NFR BLD AUTO: 0.4 % — SIGNIFICANT CHANGE UP (ref 0–1)
BILIRUB SERPL-MCNC: 1.2 MG/DL — SIGNIFICANT CHANGE UP (ref 0.2–1.2)
BUN SERPL-MCNC: 31 MG/DL — HIGH (ref 10–20)
CALCIUM SERPL-MCNC: 9.1 MG/DL — SIGNIFICANT CHANGE UP (ref 8.5–10.1)
CHLORIDE SERPL-SCNC: 100 MMOL/L — SIGNIFICANT CHANGE UP (ref 98–110)
CO2 SERPL-SCNC: 23 MMOL/L — SIGNIFICANT CHANGE UP (ref 17–32)
CREAT SERPL-MCNC: 1.2 MG/DL — SIGNIFICANT CHANGE UP (ref 0.7–1.5)
EGFR: 63 ML/MIN/1.73M2 — SIGNIFICANT CHANGE UP
EOSINOPHIL # BLD AUTO: 0.36 K/UL — SIGNIFICANT CHANGE UP (ref 0–0.7)
EOSINOPHIL NFR BLD AUTO: 2.9 % — SIGNIFICANT CHANGE UP (ref 0–8)
GLUCOSE BLDC GLUCOMTR-MCNC: 129 MG/DL — HIGH (ref 70–99)
GLUCOSE BLDC GLUCOMTR-MCNC: 170 MG/DL — HIGH (ref 70–99)
GLUCOSE BLDC GLUCOMTR-MCNC: 183 MG/DL — HIGH (ref 70–99)
GLUCOSE BLDC GLUCOMTR-MCNC: 85 MG/DL — SIGNIFICANT CHANGE UP (ref 70–99)
GLUCOSE SERPL-MCNC: 116 MG/DL — HIGH (ref 70–99)
HCT VFR BLD CALC: 44.8 % — SIGNIFICANT CHANGE UP (ref 42–52)
HGB BLD-MCNC: 15.5 G/DL — SIGNIFICANT CHANGE UP (ref 14–18)
IMM GRANULOCYTES NFR BLD AUTO: 0.3 % — SIGNIFICANT CHANGE UP (ref 0.1–0.3)
LYMPHOCYTES # BLD AUTO: 1.49 K/UL — SIGNIFICANT CHANGE UP (ref 1.2–3.4)
LYMPHOCYTES # BLD AUTO: 11.9 % — LOW (ref 20.5–51.1)
MAGNESIUM SERPL-MCNC: 2.2 MG/DL — SIGNIFICANT CHANGE UP (ref 1.8–2.4)
MCHC RBC-ENTMCNC: 29.3 PG — SIGNIFICANT CHANGE UP (ref 27–31)
MCHC RBC-ENTMCNC: 34.6 G/DL — SIGNIFICANT CHANGE UP (ref 32–37)
MCV RBC AUTO: 84.7 FL — SIGNIFICANT CHANGE UP (ref 80–94)
MONOCYTES # BLD AUTO: 1.7 K/UL — HIGH (ref 0.1–0.6)
MONOCYTES NFR BLD AUTO: 13.6 % — HIGH (ref 1.7–9.3)
NEUTROPHILS # BLD AUTO: 8.85 K/UL — HIGH (ref 1.4–6.5)
NEUTROPHILS NFR BLD AUTO: 70.9 % — SIGNIFICANT CHANGE UP (ref 42.2–75.2)
NRBC # BLD: 0 /100 WBCS — SIGNIFICANT CHANGE UP (ref 0–0)
PLATELET # BLD AUTO: 272 K/UL — SIGNIFICANT CHANGE UP (ref 130–400)
POTASSIUM SERPL-MCNC: 4 MMOL/L — SIGNIFICANT CHANGE UP (ref 3.5–5)
POTASSIUM SERPL-SCNC: 4 MMOL/L — SIGNIFICANT CHANGE UP (ref 3.5–5)
PROT SERPL-MCNC: 6.8 G/DL — SIGNIFICANT CHANGE UP (ref 6–8)
RBC # BLD: 5.29 M/UL — SIGNIFICANT CHANGE UP (ref 4.7–6.1)
RBC # FLD: 13.1 % — SIGNIFICANT CHANGE UP (ref 11.5–14.5)
SODIUM SERPL-SCNC: 138 MMOL/L — SIGNIFICANT CHANGE UP (ref 135–146)
WBC # BLD: 12.49 K/UL — HIGH (ref 4.8–10.8)
WBC # FLD AUTO: 12.49 K/UL — HIGH (ref 4.8–10.8)

## 2022-08-11 PROCEDURE — 93010 ELECTROCARDIOGRAM REPORT: CPT

## 2022-08-11 PROCEDURE — 99239 HOSP IP/OBS DSCHRG MGMT >30: CPT

## 2022-08-11 PROCEDURE — 99232 SBSQ HOSP IP/OBS MODERATE 35: CPT

## 2022-08-11 RX ORDER — GLUCAGON INJECTION, SOLUTION 0.5 MG/.1ML
1 INJECTION, SOLUTION SUBCUTANEOUS ONCE
Refills: 0 | Status: DISCONTINUED | OUTPATIENT
Start: 2022-08-11 | End: 2022-08-30

## 2022-08-11 RX ORDER — POLYETHYLENE GLYCOL 3350 17 G/17G
17 POWDER, FOR SOLUTION ORAL
Qty: 0 | Refills: 0 | DISCHARGE
Start: 2022-08-11

## 2022-08-11 RX ORDER — AMLODIPINE BESYLATE 2.5 MG/1
1 TABLET ORAL
Qty: 0 | Refills: 0 | DISCHARGE
Start: 2022-08-11

## 2022-08-11 RX ORDER — AMLODIPINE BESYLATE 2.5 MG/1
5 TABLET ORAL AT BEDTIME
Refills: 0 | Status: DISCONTINUED | OUTPATIENT
Start: 2022-08-11 | End: 2022-08-19

## 2022-08-11 RX ORDER — ATORVASTATIN CALCIUM 80 MG/1
80 TABLET, FILM COATED ORAL AT BEDTIME
Refills: 0 | Status: DISCONTINUED | OUTPATIENT
Start: 2022-08-11 | End: 2022-08-30

## 2022-08-11 RX ORDER — CITALOPRAM 10 MG/1
5 TABLET, FILM COATED ORAL DAILY
Refills: 0 | Status: DISCONTINUED | OUTPATIENT
Start: 2022-08-11 | End: 2022-08-30

## 2022-08-11 RX ORDER — CITALOPRAM 10 MG/1
0.5 TABLET, FILM COATED ORAL
Qty: 0 | Refills: 0 | DISCHARGE

## 2022-08-11 RX ORDER — CLOPIDOGREL BISULFATE 75 MG/1
1 TABLET, FILM COATED ORAL
Qty: 0 | Refills: 0 | DISCHARGE

## 2022-08-11 RX ORDER — RAMIPRIL 5 MG
1 CAPSULE ORAL
Qty: 0 | Refills: 0 | DISCHARGE

## 2022-08-11 RX ORDER — SODIUM CHLORIDE 9 MG/ML
1000 INJECTION, SOLUTION INTRAVENOUS
Refills: 0 | Status: DISCONTINUED | OUTPATIENT
Start: 2022-08-11 | End: 2022-08-30

## 2022-08-11 RX ORDER — FAMOTIDINE 10 MG/ML
20 INJECTION INTRAVENOUS DAILY
Refills: 0 | Status: DISCONTINUED | OUTPATIENT
Start: 2022-08-11 | End: 2022-08-11

## 2022-08-11 RX ORDER — ASPIRIN/CALCIUM CARB/MAGNESIUM 324 MG
81 TABLET ORAL DAILY
Refills: 0 | Status: DISCONTINUED | OUTPATIENT
Start: 2022-08-11 | End: 2022-08-30

## 2022-08-11 RX ORDER — INSULIN GLARGINE 100 [IU]/ML
30 INJECTION, SOLUTION SUBCUTANEOUS EVERY MORNING
Refills: 0 | Status: DISCONTINUED | OUTPATIENT
Start: 2022-08-11 | End: 2022-08-30

## 2022-08-11 RX ORDER — SENNA PLUS 8.6 MG/1
2 TABLET ORAL
Qty: 0 | Refills: 0 | DISCHARGE
Start: 2022-08-11

## 2022-08-11 RX ORDER — SIMVASTATIN 20 MG/1
1 TABLET, FILM COATED ORAL
Qty: 0 | Refills: 0 | DISCHARGE

## 2022-08-11 RX ORDER — FAMOTIDINE 10 MG/ML
40 INJECTION INTRAVENOUS AT BEDTIME
Refills: 0 | Status: DISCONTINUED | OUTPATIENT
Start: 2022-08-11 | End: 2022-08-30

## 2022-08-11 RX ORDER — METOPROLOL TARTRATE 50 MG
50 TABLET ORAL
Refills: 0 | Status: DISCONTINUED | OUTPATIENT
Start: 2022-08-11 | End: 2022-08-19

## 2022-08-11 RX ORDER — TICAGRELOR 90 MG/1
1 TABLET ORAL
Qty: 0 | Refills: 0 | DISCHARGE
Start: 2022-08-11

## 2022-08-11 RX ORDER — DEXTROSE 50 % IN WATER 50 %
12.5 SYRINGE (ML) INTRAVENOUS ONCE
Refills: 0 | Status: DISCONTINUED | OUTPATIENT
Start: 2022-08-11 | End: 2022-08-30

## 2022-08-11 RX ORDER — LISINOPRIL 2.5 MG/1
1 TABLET ORAL
Qty: 0 | Refills: 0 | DISCHARGE
Start: 2022-08-11

## 2022-08-11 RX ORDER — LACTULOSE 10 G/15ML
20 SOLUTION ORAL
Refills: 0 | Status: DISCONTINUED | OUTPATIENT
Start: 2022-08-11 | End: 2022-08-30

## 2022-08-11 RX ORDER — SENNA PLUS 8.6 MG/1
2 TABLET ORAL AT BEDTIME
Refills: 0 | Status: DISCONTINUED | OUTPATIENT
Start: 2022-08-11 | End: 2022-08-30

## 2022-08-11 RX ORDER — ENOXAPARIN SODIUM 100 MG/ML
40 INJECTION SUBCUTANEOUS EVERY 24 HOURS
Refills: 0 | Status: DISCONTINUED | OUTPATIENT
Start: 2022-08-11 | End: 2022-08-19

## 2022-08-11 RX ORDER — INSULIN LISPRO 100/ML
VIAL (ML) SUBCUTANEOUS
Refills: 0 | Status: DISCONTINUED | OUTPATIENT
Start: 2022-08-11 | End: 2022-08-19

## 2022-08-11 RX ORDER — INSULIN LISPRO 100/ML
10 VIAL (ML) SUBCUTANEOUS
Qty: 0 | Refills: 0 | DISCHARGE
Start: 2022-08-11

## 2022-08-11 RX ORDER — TICAGRELOR 90 MG/1
90 TABLET ORAL EVERY 12 HOURS
Refills: 0 | Status: DISCONTINUED | OUTPATIENT
Start: 2022-08-11 | End: 2022-08-13

## 2022-08-11 RX ORDER — POLYETHYLENE GLYCOL 3350 17 G/17G
17 POWDER, FOR SOLUTION ORAL
Refills: 0 | Status: DISCONTINUED | OUTPATIENT
Start: 2022-08-11 | End: 2022-08-30

## 2022-08-11 RX ORDER — DEXTROSE 50 % IN WATER 50 %
25 SYRINGE (ML) INTRAVENOUS ONCE
Refills: 0 | Status: DISCONTINUED | OUTPATIENT
Start: 2022-08-11 | End: 2022-08-30

## 2022-08-11 RX ORDER — HYDROCHLOROTHIAZIDE 25 MG
25 TABLET ORAL DAILY
Refills: 0 | Status: DISCONTINUED | OUTPATIENT
Start: 2022-08-11 | End: 2022-08-16

## 2022-08-11 RX ORDER — ASPIRIN/CALCIUM CARB/MAGNESIUM 324 MG
1 TABLET ORAL
Qty: 0 | Refills: 0 | DISCHARGE
Start: 2022-08-11

## 2022-08-11 RX ORDER — DEXTROSE 50 % IN WATER 50 %
15 SYRINGE (ML) INTRAVENOUS ONCE
Refills: 0 | Status: DISCONTINUED | OUTPATIENT
Start: 2022-08-11 | End: 2022-08-30

## 2022-08-11 RX ORDER — INSULIN LISPRO 100/ML
10 VIAL (ML) SUBCUTANEOUS
Refills: 0 | Status: DISCONTINUED | OUTPATIENT
Start: 2022-08-11 | End: 2022-08-13

## 2022-08-11 RX ORDER — LISINOPRIL 2.5 MG/1
40 TABLET ORAL DAILY
Refills: 0 | Status: DISCONTINUED | OUTPATIENT
Start: 2022-08-11 | End: 2022-08-19

## 2022-08-11 RX ORDER — ATORVASTATIN CALCIUM 80 MG/1
1 TABLET, FILM COATED ORAL
Qty: 0 | Refills: 0 | DISCHARGE
Start: 2022-08-11

## 2022-08-11 RX ORDER — LANOLIN ALCOHOL/MO/W.PET/CERES
5 CREAM (GRAM) TOPICAL AT BEDTIME
Refills: 0 | Status: DISCONTINUED | OUTPATIENT
Start: 2022-08-11 | End: 2022-08-21

## 2022-08-11 RX ORDER — BENZOCAINE 10 %
1 GEL (GRAM) MUCOUS MEMBRANE ONCE
Refills: 0 | Status: DISCONTINUED | OUTPATIENT
Start: 2022-08-11 | End: 2022-08-11

## 2022-08-11 RX ADMIN — Medication 50 MILLIGRAM(S): at 06:18

## 2022-08-11 RX ADMIN — TICAGRELOR 90 MILLIGRAM(S): 90 TABLET ORAL at 06:19

## 2022-08-11 RX ADMIN — Medication 10 UNIT(S): at 08:19

## 2022-08-11 RX ADMIN — CITALOPRAM 5 MILLIGRAM(S): 10 TABLET, FILM COATED ORAL at 11:15

## 2022-08-11 RX ADMIN — LISINOPRIL 40 MILLIGRAM(S): 2.5 TABLET ORAL at 06:18

## 2022-08-11 RX ADMIN — Medication 10 UNIT(S): at 11:21

## 2022-08-11 RX ADMIN — TICAGRELOR 90 MILLIGRAM(S): 90 TABLET ORAL at 17:17

## 2022-08-11 RX ADMIN — AMLODIPINE BESYLATE 5 MILLIGRAM(S): 2.5 TABLET ORAL at 21:27

## 2022-08-11 RX ADMIN — ENOXAPARIN SODIUM 40 MILLIGRAM(S): 100 INJECTION SUBCUTANEOUS at 11:16

## 2022-08-11 RX ADMIN — ATORVASTATIN CALCIUM 80 MILLIGRAM(S): 80 TABLET, FILM COATED ORAL at 21:27

## 2022-08-11 RX ADMIN — POLYETHYLENE GLYCOL 3350 17 GRAM(S): 17 POWDER, FOR SOLUTION ORAL at 06:58

## 2022-08-11 RX ADMIN — Medication 1: at 11:21

## 2022-08-11 RX ADMIN — AMLODIPINE BESYLATE 5 MILLIGRAM(S): 2.5 TABLET ORAL at 06:18

## 2022-08-11 RX ADMIN — Medication 81 MILLIGRAM(S): at 11:15

## 2022-08-11 RX ADMIN — FAMOTIDINE 20 MILLIGRAM(S): 10 INJECTION INTRAVENOUS at 11:14

## 2022-08-11 RX ADMIN — INSULIN GLARGINE 30 UNIT(S): 100 INJECTION, SOLUTION SUBCUTANEOUS at 08:20

## 2022-08-11 RX ADMIN — ENOXAPARIN SODIUM 40 MILLIGRAM(S): 100 INJECTION SUBCUTANEOUS at 21:27

## 2022-08-11 RX ADMIN — Medication 50 MILLIGRAM(S): at 17:17

## 2022-08-11 RX ADMIN — FAMOTIDINE 40 MILLIGRAM(S): 10 INJECTION INTRAVENOUS at 21:28

## 2022-08-11 NOTE — PROGRESS NOTE ADULT - ASSESSMENT
This is a 77y old  Male with HTN, DM, CAD with stents who presented to the ED via EMS with a chief complaint of RUE weakness, L facial, slurred speech, most likely due to left sided hemispheric dysfunction in the frontal MCA territory from suspected cardioembolic cause.       #RUE hemiparesis most likely 2/2 Multifocal Acute Ischemic Infarcts in L MCA/PCA without petechial hemorrhage   (pt was taking aspirin and clopidogrel at home)   - Stroke Code On admission NIH 2 for RUE and RLE weakness - s/p tpa 08/03  - HTN Emergency POA   - s/p Cardene drip for BP control post tPA  - CTA H&N: No evidence of major vascular stenosis or occlusion. Normal perfusion images. Scattered mild atheromatous changes as above.  - CTP: There is no evidence of focal perfusion deficit to suggest acute cerebral ischemia  - CTH 24h post tPA- no bleed or acute pathology  - MRB w/ multiple infarcts w/ prominence in the MCA, PCA territory  and left hemisphere   - A1c 8.2, LDL 72  - echo with bubble: 59% G1DD  - s/p EDSON 8/5 - EF 55-60, no PFO or clot burden  - cont PT/OT  - in light of acute infarct likely failed Clopidogrel therapy -- c/w Ticagrelor 90mg Q12  - s/p ILR by EP. Will need to follow up outpatient  - Stroke education  - Carotid Doppler: 20-39% stenosis in BL ICA  - MRA neck: Moderate stenosis in right ICA  - c/w 5mg celexa  - c/w ASA 81mg  - c/w Brilanta 90mg BID  - c/w lipitor 80mg daily    #HTN  - HTN Emergency on admission s/p Cardene drip for BP control  - c/w Amlodipine 5mg QD  - c/w Hydrochlorothiazide 25mg QD  - c/w metoprolol tart 50mg  - goal -160    #DM - uncontrolled   - A1c 8.2  - increase lantus to 30U HS and start pre-meal at 10units  - keep BG < 180  - diet: Dash and CC    #Constipation - resolved  - c/w Miralax Q12  - c/w Senna 2 tabs HS  - c/w Lactulose Q6 PRN  - dulcolax supp PRN  - cont. to monitor    #Elevated T bili/Alk Phos  - patient asymptomatic  - RUQ US - 1.7 Renal lesion, mod-severe hepatosteatosis - will need o/p renal MRI for lesion    #Leukocytosis - most likely reactive - resolved  - CXR - neg  - cont. to monitor off abx  - bladder scan to residual urine - normal postvoid residual  - obtain LE duplex - neg    Msc  Diet: carb consistent, DASH  GI ppx: none  DVT ppx: lovenox  Activity: Out of bed with assist  Code: Full    Dsipo: DC to 4a      This is a 77y old  Male with HTN, DM, CAD with stents who presented to the ED via EMS with a chief complaint of RUE weakness, L facial, slurred speech, most likely due to left sided hemispheric dysfunction in the frontal MCA territory from suspected cardioembolic cause.       #RUE hemiparesis most likely 2/2 Multifocal Acute Ischemic Infarcts in L MCA/PCA without petechial hemorrhage   (pt was taking aspirin and clopidogrel at home)   - Stroke Code On admission NIH 2 for RUE and RLE weakness - s/p tpa 08/03  - HTN Emergency POA   - s/p Cardene drip for BP control post tPA  - CTA H&N: No evidence of major vascular stenosis or occlusion. Normal perfusion images. Scattered mild atheromatous changes as above.  - CTP: There is no evidence of focal perfusion deficit to suggest acute cerebral ischemia  - CTH 24h post tPA- no bleed or acute pathology  - MRB w/ multiple infarcts w/ prominence in the MCA, PCA territory  and left hemisphere   - A1c 8.2, LDL 72  - echo with bubble: 59% G1DD  - s/p EDSON 8/5 - EF 55-60, no PFO or clot burden  - cont PT/OT  - in light of acute infarct likely failed Clopidogrel therapy -- c/w Ticagrelor 90mg Q12  - s/p ILR by EP. Will need to follow up outpatient  - Stroke education  - Carotid Doppler: 20-39% stenosis in BL ICA  - MRA neck: Moderate stenosis in right ICA  - c/w 5mg celexa  - c/w ASA 81mg  - c/w Brilanta 90mg BID  - c/w lipitor 80mg daily    #HTN  - HTN Emergency on admission s/p Cardene drip for BP control  - c/w Amlodipine 5mg QD  - c/w Hydrochlorothiazide 25mg QD  - c/w metoprolol tart 50mg  - goal -160    #DM - uncontrolled   - A1c 8.2  - increase lantus to 30U HS and start pre-meal at 10units  - keep BG < 180  - diet: Dash and CC    #Constipation - resolved  - c/w Miralax Q12  - c/w Senna 2 tabs HS  - c/w Lactulose Q6 PRN  - dulcolax supp PRN  - cont. to monitor    #Elevated T bili/Alk Phos  - patient asymptomatic  - RUQ US - 1.7 Renal lesion, mod-severe hepatosteatosis - will need o/p renal MRI for lesion    #Leukocytosis - most likely reactive - resolved  - CXR - neg  - cont. to monitor off abx  - bladder scan to residual urine - normal postvoid residual  -  LE duplex - neg    Msc  Diet: carb consistent, DASH  GI ppx: none  DVT ppx: lovenox  Activity: Out of bed with assist  Code: Full    Dsipo: DC to 4a

## 2022-08-11 NOTE — H&P ADULT - NSHPLABSRESULTS_GEN_ALL_CORE
15.5   12.49 )-----------( 272      ( 11 Aug 2022 06:15 )             44.8     08-11    138  |  100  |  31<H>  ----------------------------<  116<H>  4.0   |  23  |  1.2    Ca    9.1      11 Aug 2022 06:15  Mg     2.2     08-11    TPro  6.8  /  Alb  3.7  /  TBili  1.2  /  DBili  x   /  AST  29  /  ALT  28  /  AlkPhos  160<H>  08-11        POCT Blood Glucose.: 170 mg/dL (08-11-22 @ 11:16)  POCT Blood Glucose.: 129 mg/dL (08-11-22 @ 07:32)  POCT Blood Glucose.: 114 mg/dL (08-10-22 @ 21:53)  POCT Blood Glucose.: 132 mg/dL (08-10-22 @ 16:31)  POCT Blood Glucose.: 233 mg/dL (08-10-22 @ 11:20)  POCT Blood Glucose.: 174 mg/dL (08-10-22 @ 07:53)  POCT Blood Glucose.: 137 mg/dL (08-09-22 @ 21:08)  POCT Blood Glucose.: 137 mg/dL (08-09-22 @ 16:51)  POCT Blood Glucose.: 222 mg/dL (08-09-22 @ 11:12)  POCT Blood Glucose.: 223 mg/dL (08-09-22 @ 07:51)  POCT Blood Glucose.: 190 mg/dL (08-08-22 @ 22:23)  POCT Blood Glucose.: 235 mg/dL (08-08-22 @ 16:01) 15.5   12.49 )-----------( 272      ( 11 Aug 2022 06:15 )             44.8     08-11    138  |  100  |  31<H>  ----------------------------<  116<H>  4.0   |  23  |  1.2    Ca    9.1      11 Aug 2022 06:15  Mg     2.2     08-11    TPro  6.8  /  Alb  3.7  /  TBili  1.2  /  DBili  x   /  AST  29  /  ALT  28  /  AlkPhos  160<H>  08-11    POCT Blood Glucose.: 170 mg/dL (08-11-22 @ 11:16)  POCT Blood Glucose.: 129 mg/dL (08-11-22 @ 07:32)  POCT Blood Glucose.: 114 mg/dL (08-10-22 @ 21:53)  POCT Blood Glucose.: 132 mg/dL (08-10-22 @ 16:31)  POCT Blood Glucose.: 233 mg/dL (08-10-22 @ 11:20)    MRI 8/6/22  Scattered left-sided acute cortical infarcts within the MCA and PCA   territories. Suggestion of trace petechial hemorrhage involving the high  left frontal region    Mild chronic microvascular type changes as well as a chronic left   occipital lobe infarct..

## 2022-08-11 NOTE — H&P ADULT - ATTENDING COMMENTS
I reviewed the chart and examined the patient with the resident and we discussed the findings and treatment plan. I agree with the findings and treatment plan above, which I modified as indicated. The patient requires 3 hrs a day of acute inpatient rehab.  Patient who was fully independent and living alone PTA, admitted to hospital with acute right sided weakness and aphasia and impaired mobility. He was found to have acute ischemic infracts in the right MCA and PCA territories. He received tPA and was started on Brilinta and high dose Lipitor, in addition to his ASA. He requires mod assist for mobility and max assistance for ADL and continues to have a mild to moderate mixed aphasia. He is an excellent candidate for acute inpatient rehab. He requires intensive PT, OT and ST and a Neuropsych eval to maximize his function and requires monitoring by Physiatry for his multi-modal complex stroke symptoms complicated by severe sensory polyneuropathy and to monitor his other comorbidities including CAD, DM2, HTN.    NIH SS:  DATE:  TIME:  1A: Level of consciousness (0-3): 0  1B: Questions (0-2): 0    1C: Commands (0-2): 0  2: Gaze (0-2): 0  3: Visual fields (0-3): 0  4: Facial palsy (0-3): 0  MOTOR:  5A: Left arm motor drift (0-4): 0  5B: Right arm motor drift (0-4): 4  6A: Left leg motor drift (0-4): 0  6B: Right leg motor drift (0-4): 1  7: Limb ataxia (0-2): 0  SENSORY:  8: Sensation (0-2): 1  SPEECH:  9: Language (0-3): 1  10: Dysarthria (0-2): 0  EXTINCTION:  11: Extinction/inattention (0-2): 0    TOTAL SCORE: 7    I read, edited and agree with the Assessment:  #Left acute ischemic stroke with right hemiparesis (dominant) and aphasia  s/p tPA on 8/3  EDSON showed EF of 55-60%  8/6 MRI head: scattered left-sided acute cortical infarcts within the MCA and PCA territories, with trace petechial hemorrhage involving high left frontal region. There is a chronic left occipital lobe infarct.   MRA of the neck: moderate stenosis of the proximal right internal carotid artery  - start PT/OT/ST, Neuropsych eval  - c/w 5mg celexa  - c/w ASA 81mg  - c/w Brilinta 90mg BID  - c/w Lipitor 80mg daily  - monitor vitals    #Diabetic Peripheral Polyneuropathy  - No pain. Teach patient skin monitoring an sensory awareness    #HTN - controlled  - c/w Amlodipine 5mg qd  - c/w Lisinopril 40mg   - c/w Lopressor 50mg BID  - monitor vitals  -holding parameters in place, adjust BP medications as necessary    #Diabetes 2  Ha1c 8.2  - c/w 30U Lantus  - c/w 10U Lispro  - ISS  - monitor FS, will adjust medications and hold as necessary    #CAD  -continue ASA 81, Brilinta, BB, statin    #Renal lesion  On 8/9, a right upper quadrant US was done to rule out cholecystitis, revealing an indeterminate 1.2 cm right renal lesion and moderate-severe hepatic steatosis.  -outpatient followup, non urgent MRI with renal mass protocol    -GI/Bowel Mgmt: senna, dulcolax, lactulose, miralax    - Diet: Dash Carb Consistent diet           Precautions / PROPHYLAXIS:      - Falls    - Ortho: Weight bearing status: WBAT      - DVT prophylaxis: Lovenox 40mg subQ

## 2022-08-11 NOTE — PROGRESS NOTE ADULT - SUBJECTIVE AND OBJECTIVE BOX
Neurology Stroke Progress Note    INTERVAL HPI/OVERNIGHT EVENTS:  Patient seen and examined. No acute events overnight. No issues this am.     MEDICATIONS  (STANDING):  amLODIPine   Tablet 5 milliGRAM(s) Oral daily  aspirin enteric coated 81 milliGRAM(s) Oral daily  atorvastatin 80 milliGRAM(s) Oral at bedtime  benzocaine 20%/menthol 0.5% Spray 1 Spray(s) Topical once  cephalexin 500 milliGRAM(s) Oral once  citalopram 5 milliGRAM(s) Oral daily  dextrose 5%. 1000 milliLiter(s) (100 mL/Hr) IV Continuous <Continuous>  dextrose 50% Injectable 25 Gram(s) IV Push once  dextrose 50% Injectable 12.5 Gram(s) IV Push once  dextrose 50% Injectable 25 Gram(s) IV Push once  enoxaparin Injectable 40 milliGRAM(s) SubCutaneous every 24 hours  famotidine    Tablet 20 milliGRAM(s) Oral daily  glucagon  Injectable 1 milliGRAM(s) IntraMuscular once  insulin glargine Injectable (LANTUS) 30 Unit(s) SubCutaneous every morning  insulin lispro (ADMELOG) corrective regimen sliding scale   SubCutaneous three times a day before meals  insulin lispro Injectable (ADMELOG) 10 Unit(s) SubCutaneous three times a day before meals  lisinopril 40 milliGRAM(s) Oral daily  metoprolol tartrate 50 milliGRAM(s) Oral two times a day  polyethylene glycol 3350 17 Gram(s) Oral two times a day  senna 2 Tablet(s) Oral at bedtime  ticagrelor 90 milliGRAM(s) Oral every 12 hours    MEDICATIONS  (PRN):  bisacodyl Suppository 10 milliGRAM(s) Rectal daily PRN Constipation  dextrose Oral Gel 15 Gram(s) Oral once PRN Blood Glucose LESS THAN 70 milliGRAM(s)/deciliter  lactulose Syrup 20 Gram(s) Oral two times a day PRN Constipation  melatonin 5 milliGRAM(s) Oral at bedtime PRN Insomnia    Allergies    No Known Allergies    Intolerances      Vital Signs Last 24 Hrs  T(C): 37.1 (11 Aug 2022 13:10), Max: 37.1 (11 Aug 2022 13:10)  T(F): 98.8 (11 Aug 2022 13:10), Max: 98.8 (11 Aug 2022 13:10)  HR: 83 (11 Aug 2022 13:10) (77 - 99)  BP: 138/80 (11 Aug 2022 13:10) (117/70 - 175/92)  BP(mean): 88 (10 Aug 2022 17:26) (88 - 88)  RR: 18 (11 Aug 2022 13:10) (18 - 18)  SpO2: --        Physical exam:  General: No acute distress, awake and alert  Eyes: Anicteric sclerae, moist conjunctivae, see below for CNs  Neck: trachea midline, FROM, supple, no thyromegaly or lymphadenopathy  Cardiovascular: Regular rate and rhythm, no murmurs  Pulmonary: Anterior breath sounds clear bilaterally, no crackles or wheezing. No use of accessory muscles  GI: Abdomen soft, non-distended, non-tender  Extremities:  no edema    Neurologic:  -Mental status: Awake, alert, oriented to person, place, and time. Speech is fluent with intact naming, repetition, and comprehension, no dysarthria. Recent and remote memory intact. Follows commands. Attention/concentration intact. Fund of knowledge appropriate.  -Cranial nerves:   II: Visual fields are full to confrontation.  III, IV, VI: Extraocular movements are intact without nystagmus. Pupils equally round and reactive to light  V:  Facial sensation V1-V3 equal and intact   VII: Face is symmetric with normal eye closure and smile  VIII: Hearing is bilaterally intact to finger rub  IX, X: Uvula is midline and soft palate rises symmetrically  XI: Head turning and shoulder shrug are intact.  XII: Tongue protrudes midline  Motor: Normal bulk and tone. No pronator drift. Strength bilateral lower extremities 5/5, 5/5 in LUE and 2/5 in RUE  Rapid alternating movements intact and symmetric  Sensation: Intact to light touch bilaterally. No neglect or extinction on double simultaneous testing.  Coordination: No dysmetria on finger-to-nose and heel-to-shin on L  Reflexes: Downgoing toes bilaterally   Gait: Deferred  NIH 3 for RUE     LABS:                        15.5   12.49 )-----------( 272      ( 11 Aug 2022 06:15 )             44.8     08-11    138  |  100  |  31<H>  ----------------------------<  116<H>  4.0   |  23  |  1.2    Ca    9.1      11 Aug 2022 06:15  Mg     2.2     08-11    TPro  6.8  /  Alb  3.7  /  TBili  1.2  /  DBili  x   /  AST  29  /  ALT  28  /  AlkPhos  160<H>  08-11          RADIOLOGY & ADDITIONAL TESTS:

## 2022-08-11 NOTE — DISCHARGE NOTE NURSING/CASE MANAGEMENT/SOCIAL WORK - NSDCFUADDAPPT_GEN_ALL_CORE_FT
Electrophysiologist Cardiology  Dr. Beaver, [address], [address]  [phone]   Follow up in 1 month

## 2022-08-11 NOTE — H&P ADULT - NSHPPHYSICALEXAM_GEN_ALL_CORE
General:[  x ] NAD, Resting Comfortable,   [   ] other:                                HEENT: [  x ] NC/AT, EOMI, PERRL , Normal Conjunctivae,   [   ] other:  Cardio: [  x ] RRR, no murmer,   [   ] other:                              Pulm: [  x ] No Respiratory Distress,  Lungs CTAB,   [   ] other:                       Abdomen: [ x  ]ND/NT, Soft,   [   ] other:    : [ x  ] NO PRITCHARD CATHETER,   [  ] PRITCHARD CATHETER- no meatal tear, no discharge, [   ] other:                                            MSK: [   ] No joint swelling, Full ROM,   [ x  ] other: no active ROM in R hand                                       Ext: [  x ]No C/C/E, No calf tenderness,   [   ]other:    Skin: [ x  ]intact,   [   ] other:                                                                   Neurological Examination:  Cognitive: [ x   ] AAO x 3,   [    ]  other: AAOx2, (inconsistent secondary to aphasia)                                                                      Attention:  [ x   ] intact, able to spell 'WORLD' forwards and backwards   [    ]  other:   Memory: [    ] intact,    [ x  ]  other:  3/3 in repeating objects, 2/3 in 5 minute recall but 3/3 with prompting  Mood/Affect: [  x  ] wnl,    [    ]  other:                                                                             Communication: [   ]Fluent, no dysarthria, following commands:  [   ] other: partial with  2-step commands, mild dysarthria, slow speech and word-finding  CN II - XII:  [  x  ] intact,  [    ] other: mild right facial droop                                                                                         Motor:   RIGHT UE: [   ] WNL,  [   ] other: 0/5 grossly in all muscle groups  LEFT    UE: [  x ] WNL  RIGHT LE: [ x  ] WNL,  [   ] other: Hip flexion 3/5, knee flexion 4/5, plantarflexion 5/5, dorsiflexion 5/5, toe extension 5/5  LEFT    LE: [  x ] WNL,  Hip flexion 4/5, knee flexion 5/5, plantarflexion 5/5, dorsiflexion 5/5, toe extension 5/5     Tone: [    ] wnl,   [ x   ]  other: mild increased tone in triceps  DTRs: [ x  ]symmetric, 1+ [   ] other:   Coordination:   [  x  ] intact,   [    ]   Sensory: [  x  ] Intact to light touch, but neuropathy of b/l feet due to diabetes  [    ] other  Proprioception: impaired on testing of right toe  (+) drift in RLE. (+) Babinski bilaterally General:[  x ] NAD, Resting Comfortable,   [   ] other:                                HEENT: [  x ] NC/AT, EOMI, PERRL , Normal Conjunctivae,   [   ] other:  Cardio: [  x ] RRR, no murmer,   [   ] other:                              Pulm: [  x ] No Respiratory Distress,  Lungs CTAB,   [   ] other:                       Abdomen: [ x  ]ND/NT, Soft,   [   ] other:    : [ x  ] NO PRITCHARD CATHETER,   [  ] PRITCHARD CATHETER- no meatal tear, no discharge, [   ] other:                                            MSK: [ x  ] No joint swelling, Full PROM,   [    ] other:                                      Ext: [  x ]No C/C/E, No calf tenderness,   [   ]other:    Skin: [ x  ]intact,   [   ] other:                                                                   Neurological Examination:  Cognitive: [ x   ] AAO x 3,   [    ]  other:                                                                 Attention/ Concentration:  [ x   ] intact, able to spell 'WORLD' forwards and backwards   [    ]  other:   Memory: [    ] intact,    [ x  ]  other:  3/3 immediate recall, 2/3 in 5 minute recall but 3/3 with prompting  Mood/Affect: [  x  ] wnl,    [    ]  other:                                                                             Communication: [   ]Fluent, no dysarthria, following commands:  [   ] other:follows partial with  2-step commands, mild dysarthria, slow speech and impaired word-finding. Naming intacty  CN II - XII:  [  x  ] intact, VFF  [    ] other: mild right facial droop                                                                                         Motor:   RIGHT UE: [   ] WNL,  [   ] other: 0/5 grossly in all muscle groups  LEFT    UE: [  x ] WNL  RIGHT LE: [ x  ] WNL,  [   ] other: Hip flexion 3/5 with drift to bed, knee flexion 4/5, plantarflexion 5/5, dorsiflexion 5/5, toe extension 5/5  LEFT    LE: [  x ] WNL,  Hip flexion 4/5, knee flexion 5/5, plantarflexion 5/5, dorsiflexion 5/5, toe extension 5/5     Tone: [    ] wnl,   [ x   ]  other: mild increased tone in right triceps  DTRs: [ x  ]symmetric, 1+ [   ] other:   Coordination:   [  x  ] intact as testable,   [    ]   Sensory: [    ] Intact to light touch [ x   ] other:  decreased both distal LEs  Proprioception: impaired on testing of right great toe  (+) drift in RLE. (+/-) Babinski bilaterally

## 2022-08-11 NOTE — H&P ADULT - NSICDXPASTMEDICALHX_GEN_ALL_CORE_FT
PAST MEDICAL HISTORY:  Coronary artery disease     Diabetes     GERD (gastroesophageal reflux disease)     Hyperlipidemia     Hypertension      PAST MEDICAL HISTORY:  Coronary artery disease     Diabetes     Diabetic polyneuropathy     GERD (gastroesophageal reflux disease)     Hyperlipidemia     Hypertension

## 2022-08-11 NOTE — PROGRESS NOTE ADULT - PROVIDER SPECIALTY LIST ADULT
Electrophysiology
Internal Medicine
Neurology
Hospitalist
Internal Medicine
Neurology
Hospitalist

## 2022-08-11 NOTE — H&P ADULT - NSHPREVIEWOFSYSTEMS_GEN_ALL_CORE
Constiutional:    [ x  ] WNL           [   ] poor appetite   [   ] insomnia   [   ] tired   Cardio:                [  x ] WNL           [   ] CP   [   ] VELASCO   [   ] palpitations               Resp:                   [  x ] WNL           [   ] SOB   [   ] cough   [   ] wheezing   GI:                        [ x  ] WNL           [   ] constipation   [   ] diarrhea   [   ] abdominal pain   [   ] nausea   [   ] emesis                                :                      [ x  ] WNL           [   ] PRITCHARD  [   ] dysuria   [   ] difficulty voiding             Endo:                   [ x  ] WNL          [   ] polyuria   [   ] temperature intolerance                 Skin:                     [ x  ] WNL          [   ] pain   [   ] wound   [   ] rash   MSK:                    [   x] WNL          [   ] muscle pain   [   ] joint pain/ stiffness   [   ] muscle tenderness   [   ] swelling   Neuro:                 [   ] WNL          [   ] HA   [   ] change in vision   [   ] tremor   [ x  ] weakness- R hand   [   ]dysphagia   [x  ] word finding difficulties, apraxia            Cognitive:           [ x  ] WNL           [   ]confusion      Psych:                  [  x ] WNL           [   ] hallucinations   [   ]agitation   [   ] delusion   [   ]depression Constiutional:    [ x  ] WNL           [   ] poor appetite   [   ] insomnia   [   ] tired   Cardio:                [  x ] WNL           [   ] CP   [   ] VELASCO   [   ] palpitations               Resp:                   [  x ] WNL           [   ] SOB   [   ] cough   [   ] wheezing   GI:                        [ x  ] WNL           [   ] constipation   [   ] diarrhea   [   ] abdominal pain   [   ] nausea   [   ] emesis                                :                      [ x  ] WNL           [   ] PRITCHARD  [   ] dysuria   [   ] difficulty voiding             Endo:                   [ x  ] WNL          [   ] polyuria   [   ] temperature intolerance                 Skin:                     [ x  ] WNL          [   ] pain   [   ] wound   [   ] rash   MSK:                    [   x] WNL          [   ] muscle pain   [   ] joint pain/ stiffness   [   ] muscle tenderness   [   ] swelling   Neuro:                 [   ] WNL          [   ] HA   [   ] change in vision   [   ] tremor   [ x  ] weakness- Right side   [   ]dysphagia   [x  ] word finding difficulties,   [ x ] numbness/ neuropathy both feet    Cognitive:           [ x  ] WNL           [   ]confusion   - daughter note some memory loss since stroke   Psych:                  [  x ] WNL           [   ] hallucinations   [   ]agitation   [   ] delusion   [   ]depression

## 2022-08-11 NOTE — H&P ADULT - NSHPSOCIALHISTORY_GEN_ALL_CORE
Tobacco: Former smoker  Alcohol: social drinker (1 glass/month)  Drugs: denies Tobacco: Former smoker over 25 years ago  Alcohol: social drinker (1 glass/month)  Drugs: denies  Social history - see above HPI

## 2022-08-11 NOTE — H&P ADULT - HISTORY OF PRESENT ILLNESS
Patient is a 76yo M with PMH of HTN, DM, HLD, CAD (s/p stents), GERD who presented to the ED on 8/3/2022 for right hand weakness. Patient noticed he started having difficulty with his speech, then developed weakness in his right hand and some visual field deficits. In the ED, patient received tPA. CT head at the time was negative, EDSON showed EF of 55-60%.  MRI head showed scattered left-sided acute cortical infarcts within the MCA and PCA territories, with trace petechial hemorrhage involving high left frontal region. There is a chronic left occipital lobe infarct. MRA of the neck showed moderate stenosis of the proximal right internal carotid artery. On 8/9, a right upper quadrant US was done to rule out cholecystitis, revealing an indeterminate 1.2 cm right renal lesion and moderate-severe hepatic steatosis. During the hospital stay, the visual field deficits improved but patient continued to have no motor function in the right hand.    On evaluation by PM&R, patient endorses no movement in his right hand and notes some difficulty with memory but otherwise has no other complaints. Patient is right handed. He lives by himself in a house with 5 [address] and one flight of stairs inside (5+7 steps). patient was previously independent in ADLs and activities. Currently patient requires mod assist for bed mobility, max assist with transfers, mod assist with ambulation 25 feet with RW.  Patient is a 76yo M with PMH of HTN, DM, HLD, CAD (s/p stents), GERD who presented to the ED on 8/3/2022 for right hand weakness. Patient noticed he started having difficulty with his speech, then developed weakness in his right hand and some visual field deficits. In the ED, patient received tPA. CT head at the time was negative, EDSON showed EF of 55-60%.  MRI head showed scattered left-sided acute cortical infarcts within the MCA and PCA territories, with trace petechial hemorrhage involving high left frontal region. There is a chronic left occipital lobe infarct. MRA of the neck showed moderate stenosis of the proximal right internal carotid artery. On 8/9, a right upper quadrant US was done to rule out cholecystitis, revealing an indeterminate 1.2 cm right renal lesion and moderate-severe hepatic steatosis. During the hospital stay, the visual field deficits improved but patient continued to have no motor function in the right hand and impaired RLE strength and mobility and ADL independence and a persistent aphasia.    On evaluation by PM&R, patient endorses no movement in his right hand and notes some difficulty with memory but otherwise has no other complaints. Patient is right handed. He lives by himself in a house with 5 [address] and one flight of stairs inside (5+7 steps). Patient was previously independent in ADLs and ambulation, occasionally using a cane outdoors for balance. Currently patient requires mod assist for bed mobility, max assist with transfers, mod assist with ambulation 25 feet with RW and max assistance for dressing. He was evaluated by Physiatry on the medical service and was found to be a good acute rehab candidate.

## 2022-08-11 NOTE — PROGRESS NOTE ADULT - NS ATTEST RISK PROBLEM GEN_ALL_CORE FT
acute CVA s/p tPA, uncontrolled risk factors

## 2022-08-11 NOTE — PATIENT PROFILE ADULT - FALL HARM RISK - HARM RISK INTERVENTIONS
Assistance with ambulation/Assistance OOB with selected safe patient handling equipment/Communicate Risk of Fall with Harm to all staff/Discuss with provider need for PT consult/Monitor gait and stability/Reinforce activity limits and safety measures with patient and family/Tailored Fall Risk Interventions/Visual Cue: Yellow wristband and red socks/Bed in lowest position, wheels locked, appropriate side rails in place/Call bell, personal items and telephone in reach/Instruct patient to call for assistance before getting out of bed or chair/Non-slip footwear when patient is out of bed/[place] to call system/Physically safe environment - no spills, clutter or unnecessary equipment/Purposeful Proactive Rounding/Room/bathroom lighting operational, light cord in reach

## 2022-08-11 NOTE — H&P ADULT - ASSESSMENT
Patient is a 78yo M with PMH HTN, DM, CAD (s/p stents), GERD who presented for right arm weakness. Patient was found to have a left acute cortical infarct within the MCA and PCA territories. Patient presented with visual field deficits that has since improved, but is still unable to move his RUE. Patient was previously independent in all ADLs and activities. Current level of function is mod assist for bed mobility, max assist with transfers, mod assist with ambulation 25 feet with RW. Patient is a good candidate for rehab due to significantly decreased level of function from prior.    #Left CVA  s/p tPA on 8/3  EDSON showed EF of 55-60%  8/6 MRI head: scattered left-sided acute cortical infarcts within the MCA and PCA territories, with trace petechial hemorrhage involving high left frontal region. There is a chronic left occipital lobe infarct.   MRA of the neck: moderate stenosis of the proximal right internal carotid artery  - start PT/OT  - c/w 5mg celexa  - c/w ASA 81mg  - c/w Brilanta 90mg BID  - c/w lipitor 80mg daily  - monitor vitals    #HTN  - c/w Amlodipine 5mg qd  - c/w Lisinopril 40mg   - c/w Lopressor 50mg BID  - monitor vitals  -holding parameters in place, adjust BP medications as necessary    #Diabetes  Ha1c 8.2  - c/w 30U Lantus  - c/w 10U Lispro  - ISS  - monitor FS, will adjust medications and hold as necessary    #CAD  -continue ASA81, BB, statin    #Renal lesion  On 8/9, a right upper quadrant US was done to rule out cholecystitis, revealing an indeterminate 1.2 cm right renal lesion and moderate-severe hepatic steatosis.  -outpatient followup, non urgent MRI with renal mass protocol         -Pain control: none    -GI/Bowel Mgmt: senna, dulcolax, lactulose, miralax     -Skin:  No active issues at this time    -FEN     - Diet: Dash diet           Precautions / PROPHYLAXIS:      - Falls    - Ortho: Weight bearing status: WBAT      - DVT prophylaxis: Lovenox 40mg subQ      MEDICAL PROGNOSIS: GOOD            REHAB POTENTIAL: GOOD             ESTIMATED DISPOSITION: HOME WITH HOME CARE       [ x ]  The goals of the IRF admission were discussed with the patient and or family member, who agreed             ELOS:  [ x    ] 7-14    [    ]  14-21    [    ]  Other    THERAPY ORDERS and INITIAL INDIVIDUALIZED PLAN OF CARE:  This initial individualized interdisciplinary plan of care, which was established by me (the attending physiatrist), is based on elements from the post admission evaluation. The interdisciplinary therapy program is to be at least 3 hrs a day, at least 5 days per week from from physical, occupational and/ or speech therapies as ordered by me below.      [ x  ] P.T. 90 mins. /day at least 5 out of 7 days:  [  x ] superficial  modalities prn, [ x  ] A/AAROM, [ x  ] PREs, [ x  ] transfer training,            [ x  ] progressive ambulation, [x   ] stairs                                               [ x  ] O.T. 90 mins. /day at least 5 out of 7 days::  [ x  ] modalities prn,  [ x  ]A/AAROM, [ x  ] PREs, [  x ] functional transfer training, [ x  ] ADL training           [   ] cognitive/ perceptual eval and training, [   ] splint eval                                                  [   ] S.L.P:  [   ] speech eval, [   ] swallow eval     [   ] Neuropsychology eval     [ x  ] Individualized rec. therapy      RATIONALE FOR INPATIENT ADMISSION - Patient demonstrates the following: (check all that apply)  [X] Medically appropriate for acute rehabilitation admission. Requires interdisiplinary therapy consisting of at least PT and OT, at least 3 hrs. a day at least 5 days a week  [X] Has attainable rehab goals with an appropriate initial discharge plan  [X] Has rehabilitation potential (expected to make a significant improvement within a reasonable period of time)  [X] Requires close medical management by a rehab physician, rehab nursing care,  and comprehensive interdisciplinary team (including PT, OT)    [X] Requires evaluation by a physiatrist at least 3 days a week to evaluate and manage and coordinate rehab and medical problems   Patient is a 76yo M with PMH HTN, DM, CAD (s/p stents), GERD who presented for right sided weakness and aphasia. Patient was found to have a left acute cortical infarct within the MCA and PCA territories. Patient presented with visual field deficits that has since improved, but is still with impaired strength, balance, mobility and ADLs and language. Patient was previously independent in all ADLs and activities. Current level of function is mod assist for bed mobility, max assist with transfers, mod assist with ambulation 25 feet with RW. Patient is a good candidate for rehab due to significantly decreased level of function from prior.    #Left acute ischemic stroke with right hemiparesis (dominant) and aphasia  s/p tPA on 8/3  EDSON showed EF of 55-60%  8/6 MRI head: scattered left-sided acute cortical infarcts within the MCA and PCA territories, with trace petechial hemorrhage involving high left frontal region. There is a chronic left occipital lobe infarct.   MRA of the neck: moderate stenosis of the proximal right internal carotid artery  - start PT/OT/ST, Neuropsych eval  - c/w 5mg celexa  - c/w ASA 81mg  - c/w Brilinta 90mg BID  - c/w Lipitor 80mg daily  - monitor vitals    NIHSS =7, mRankin = 4    #Diabetic Peripheral Polyneuropathy  - No pain. Teach patient skin monitoring an sensory awareness    #HTN - controlled  - c/w Amlodipine 5mg qd  - c/w Lisinopril 40mg   - c/w Lopressor 50mg BID  - monitor vitals  -holding parameters in place, adjust BP medications as necessary    #Diabetes 2  Ha1c 8.2  - c/w 30U Lantus  - c/w 10U Lispro  - ISS  - monitor FS, will adjust medications and hold as necessary    #CAD  -continue ASA 81, Brilinta, BB, statin    #Renal lesion  On 8/9, a right upper quadrant US was done to rule out cholecystitis, revealing an indeterminate 1.2 cm right renal lesion and moderate-severe hepatic steatosis.  -outpatient followup, non urgent MRI with renal mass protocol    -GI/Bowel Mgmt: senna, dulcolax, lactulose, miralax    - Diet: Dash Carb Consistent diet           Precautions / PROPHYLAXIS:      - Falls    - Ortho: Weight bearing status: WBAT      - DVT prophylaxis: Lovenox 40mg subQ      MEDICAL PROGNOSIS: GOOD            REHAB POTENTIAL: GOOD             ESTIMATED DISPOSITION: HOME WITH HOME CARE       [ x ]  The goals of the IRF admission were discussed with the patient and family member, who agreed             ELOS:  [ x    ] 7-14    [    ]  14-21    [    ]  Other    THERAPY ORDERS and INITIAL INDIVIDUALIZED PLAN OF CARE:  This initial individualized interdisciplinary plan of care, which was established by me (the attending physiatrist), is based on elements from the post admission evaluation. The interdisciplinary therapy program is to be at least 3 hrs a day, at least 5 days per week from from physical, occupational and/ or speech therapies as ordered by me below.      [ x  ] P.T. 90 mins. /day at least 5 out of 7 days:  [  x ] superficial  modalities prn, [ x  ] A/AAROM, [ x  ] PREs, [ x  ] transfer training,            [ x  ] progressive ambulation, [x   ] stairs                                               [ x  ] O.T. 90 mins. /day at least 5 out of 7 days::  [ x  ] modalities prn,  [ x  ]A/AAROM, [ x  ] PREs, [  x ] functional transfer training, [ x  ] ADL training           [ x  ] cognitive/ perceptual eval and training, [   ] splint eval                                                  [x   ] S.L.P:  [ x  ] speech eval, [   ] swallow eval     [   ] Neuropsychology eval     [ x  ] Individualized rec. therapy      RATIONALE FOR INPATIENT ADMISSION - Patient demonstrates the following: (check all that apply)  [X] Medically appropriate for acute rehabilitation admission. Requires interdisiplinary therapy consisting of at least PT and OT, at least 3 hrs. a day at least 5 days a week  [X] Has attainable rehab goals with an appropriate initial discharge plan  [X] Has rehabilitation potential (expected to make a significant improvement within a reasonable period of time)  [X] Requires close medical management by a rehab physician, rehab nursing care,  and comprehensive interdisciplinary team (including PT, OT)    [X] Requires evaluation by a physiatrist at least 3 days a week to evaluate and manage and coordinate rehab and medical problems   Patient is a 78yo M with PMH HTN, DM, CAD (s/p stents), GERD who presented for right sided weakness and aphasia. Patient was found to have a left acute cortical infarct within the MCA and PCA territories. Patient presented with visual field deficits that has since improved, but is still with impaired strength, balance, mobility and ADLs and language. Patient was previously independent in all ADLs and activities. Current level of function is mod assist for bed mobility, max assist with transfers, mod assist with ambulation 25 feet with RW. Patient is a good candidate for rehab due to significantly decreased level of function from prior.    #Left acute ischemic stroke with right hemiparesis (dominant) and aphasia  s/p tPA on 8/3  EDSON showed EF of 55-60%  8/6 MRI head: scattered left-sided acute cortical infarcts within the MCA and PCA territories, with trace petechial hemorrhage involving high left frontal region. There is a chronic left occipital lobe infarct.   MRA of the neck: moderate stenosis of the proximal right internal carotid artery  - start PT/OT/ST, Neuropsych eval  - c/w 5mg celexa  - c/w ASA 81mg  - c/w Brilinta 90mg BID  - c/w Lipitor 80mg daily  - monitor vitals    NIHSS =7, mRankin = 4    #Diabetic Peripheral Polyneuropathy  - No pain. Teach patient skin monitoring an sensory awareness    #HTN - controlled  - c/w Amlodipine 5mg qd  - c/w Lisinopril 40mg   - c/w Lopressor 50mg BID  - monitor vitals  -holding parameters in place, adjust BP medications as necessary    #Diabetes 2  Ha1c 8.2  - c/w 30U Lantus  - c/w 10U Lispro  - ISS  - monitor FS, will adjust medications and hold as necessary    #CAD  -continue ASA 81, Brilinta, BB, statin    #Renal lesion  On 8/9, a right upper quadrant US was done to rule out cholecystitis, revealing an indeterminate 1.2 cm right renal lesion and moderate-severe hepatic steatosis.  -outpatient followup, non urgent MRI with renal mass protocol    -GI/Bowel Mgmt: senna, dulcolax, lactulose, miralax    - Diet: Dash Carb Consistent diet           Precautions / PROPHYLAXIS:      - Falls    - Ortho: Weight bearing status: WBAT      - DVT prophylaxis: Lovenox 40mg subQ      MEDICAL PROGNOSIS: GOOD            REHAB POTENTIAL: GOOD             ESTIMATED DISPOSITION: HOME WITH HOME CARE       [ x ]  The goals of the IRF admission were discussed with the patient and family member, who agreed             ELOS:  [ x    ] 7-14    [    ]  14-21    [    ]  Other    THERAPY ORDERS and INITIAL INDIVIDUALIZED PLAN OF CARE:  This initial individualized interdisciplinary plan of care, which was established by me (the attending physiatrist), is based on elements from the post admission evaluation. The interdisciplinary therapy program is to be at least 3 hrs a day, at least 5 days per week from from physical, occupational and/ or speech therapies as ordered by me below.      [ x  ] P.T. 90 mins. /day at least 5 out of 7 days:  [  x ] superficial  modalities prn, [ x  ] A/AAROM, [ x  ] PREs, [ x  ] transfer training,            [ x  ] progressive ambulation, [x   ] stairs                                               [ x  ] O.T. 90 mins. /day at least 5 out of 7 days::  [ x  ] modalities prn,  [ x  ]A/AAROM, [ x  ] PREs, [  x ] functional transfer training, [ x  ] ADL training           [ x  ] cognitive/ perceptual eval and training, [   ] splint eval                                                  [x   ] S.L.P:  [ x  ] speech eval, [   ] swallow eval     [ x  ] Neuropsychology eval     [ x  ] Individualized rec. therapy      RATIONALE FOR INPATIENT ADMISSION - Patient demonstrates the following: (check all that apply)  [X] Medically appropriate for acute rehabilitation admission. Requires interdisiplinary therapy consisting of at least PT and OT, at least 3 hrs. a day at least 5 days a week  [X] Has attainable rehab goals with an appropriate initial discharge plan  [X] Has rehabilitation potential (expected to make a significant improvement within a reasonable period of time)  [X] Requires close medical management by a rehab physician, rehab nursing care,  and comprehensive interdisciplinary team (including PT, OT)    [X] Requires evaluation by a physiatrist at least 3 days a week to evaluate and manage and coordinate rehab and medical problems

## 2022-08-11 NOTE — PROGRESS NOTE ADULT - REASON FOR ADMISSION
RUE weakness
RUE weakness
Stroke
stroke s/p tPA
stroke like Sx
stroke
stroke like Sx

## 2022-08-11 NOTE — PATIENT PROFILE ADULT - FUNCTIONAL ASSESSMENT - BASIC MOBILITY 6.
2-calculated by average/Not able to assess (calculate score using [facility] averaging method)

## 2022-08-11 NOTE — PROGRESS NOTE ADULT - SUBJECTIVE AND OBJECTIVE BOX
MARIA M CROFT  77y  Male    Patient is a 77y old  Male who presents with a chief complaint of RUE weakness    HPI:  Patient is a 77 year old Male with HTN, DM, HLD, GERD, CAD with stents who presents with a chief complaint of RUE weakness, L facial, slurred speech, presents to the ED BIBEMS as a Stroke activation at 8:14am. NIH at the time was 2 for RUE and RLE drift. Facial and slurred speech at this point has resolved. mRS of 0. CTH was completed and was negative for bleed. CTA/P negative for perfusion deficit or LVO. Patient only takes ASA 81 at home for heart ppx. Denies any neurologic history or stroke in the past. tPA given at 8:48am as well as started on Cardene for BP control. BP prior to tPA administration was 183/93.    (03 Aug 2022 11:22)    S: Patient was examined and seen at bedside. This morning pt is resting comfortably in bed and reports no new issues or overnight events.  +BMs. No other complaints. Daughter at bedside.  Denies CP, SOB, AP.  ROS: all other systems reviewed and are negative    PAST MEDICAL & SURGICAL HISTORY:  Hypertension      Diabetes      Hyperlipidemia        GERD (gastroesophageal reflux disease)      Coronary artery disease    SOCIAL HISTORY:  Tobacco: denies  Illicit drugs: denies  Alcohol: social  Family history reviewed and otherwise non-contributory No clotting disorders, CVAs at early age.  ALLERGIES: NKDA    General: NAD. Looks stated age.  HEENT: clean oropharynx, EOMI, no LAD  Neck: trachea midline, no thyromegaly  CV: nl S1 S2; no m/r/g  Resp: decreased breath sounds at base  GI: NT/ND/S +BS  MS: no clubbing/cyanosis/edema, +pulses  Neuro: RUE 0/5, RLE 5-/5, sensory intact; + reflexes  Skin: no rashes, nl turgor  Psychiatric: AA0x3 w/ intact insight and judgement    tele: SR, nonspecific changes (on my own evaluation of tele monitor)            MEDICATIONS  (STANDING):  amLODIPine   Tablet 5 milliGRAM(s) Oral daily  aspirin enteric coated 81 milliGRAM(s) Oral daily  atorvastatin 80 milliGRAM(s) Oral at bedtime  benzocaine 20%/menthol 0.5% Spray 1 Spray(s) Topical once  cephalexin 500 milliGRAM(s) Oral once  citalopram 5 milliGRAM(s) Oral daily  dextrose 5%. 1000 milliLiter(s) (100 mL/Hr) IV Continuous <Continuous>  dextrose 50% Injectable 25 Gram(s) IV Push once  dextrose 50% Injectable 12.5 Gram(s) IV Push once  dextrose 50% Injectable 25 Gram(s) IV Push once  enoxaparin Injectable 40 milliGRAM(s) SubCutaneous every 24 hours  famotidine    Tablet 20 milliGRAM(s) Oral daily  glucagon  Injectable 1 milliGRAM(s) IntraMuscular once  insulin glargine Injectable (LANTUS) 30 Unit(s) SubCutaneous every morning  insulin lispro (ADMELOG) corrective regimen sliding scale   SubCutaneous three times a day before meals  insulin lispro Injectable (ADMELOG) 10 Unit(s) SubCutaneous three times a day before meals  lisinopril 40 milliGRAM(s) Oral daily  metoprolol tartrate 50 milliGRAM(s) Oral two times a day  polyethylene glycol 3350 17 Gram(s) Oral two times a day  senna 2 Tablet(s) Oral at bedtime  ticagrelor 90 milliGRAM(s) Oral every 12 hours    MEDICATIONS  (PRN):  bisacodyl Suppository 10 milliGRAM(s) Rectal daily PRN Constipation  dextrose Oral Gel 15 Gram(s) Oral once PRN Blood Glucose LESS THAN 70 milliGRAM(s)/deciliter  lactulose Syrup 20 Gram(s) Oral two times a day PRN Constipation  melatonin 5 milliGRAM(s) Oral at bedtime PRN Insomnia    Home Medications:  amLODIPine 5 mg oral tablet: 1 tab(s) orally once a day (11 Aug 2022 16:20)  aspirin 81 mg oral delayed release tablet: 1 tab(s) orally once a day (11 Aug 2022 16:20)  atorvastatin 80 mg oral tablet: 1 tab(s) orally once a day (at bedtime) (11 Aug 2022 16:20)  citalopram 10 mg oral tablet: 0.5 tab(s) orally once a day (11 Aug 2022 16:20)  famotidine 40 mg oral tablet: 1 tab(s) orally once a day (at bedtime) (04 Aug 2022 08:06)  insulin lispro 100 units/mL injectable solution: 10 unit(s) injectable 3 times a day (before meals) (11 Aug 2022 16:20)  Lantus 100 units/mL subcutaneous solution: 30 unit(s) subcutaneous once a day (at bedtime) (04 Aug 2022 08:01)  lisinopril 40 mg oral tablet: 1 tab(s) orally once a day (11 Aug 2022 16:20)  metoprolol tartrate 50 mg oral tablet: 1 tab(s) orally 2 times a day (04 Aug 2022 08:06)  polyethylene glycol 3350 oral powder for reconstitution: 17 gram(s) orally 2 times a day (11 Aug 2022 16:20)  senna leaf extract oral tablet: 2 tab(s) orally once a day (at bedtime) (11 Aug 2022 16:20)  ticagrelor 90 mg oral tablet: 1 tab(s) orally every 12 hours (11 Aug 2022 16:20)    Vital Signs Last 24 Hrs  T(C): 37.1 (11 Aug 2022 13:10), Max: 37.1 (11 Aug 2022 13:10)  T(F): 98.8 (11 Aug 2022 13:10), Max: 98.8 (11 Aug 2022 13:10)  HR: 83 (11 Aug 2022 13:10) (77 - 87)  BP: 138/80 (11 Aug 2022 13:10) (121/73 - 175/92)  BP(mean): --  RR: 18 (11 Aug 2022 13:10) (18 - 18)  SpO2: --      CAPILLARY BLOOD GLUCOSE      POCT Blood Glucose.: 85 mg/dL (11 Aug 2022 16:31)  POCT Blood Glucose.: 170 mg/dL (11 Aug 2022 11:16)  POCT Blood Glucose.: 129 mg/dL (11 Aug 2022 07:32)  POCT Blood Glucose.: 114 mg/dL (10 Aug 2022 21:53)    LABS:                        15.5   12.49 )-----------( 272      ( 11 Aug 2022 06:15 )             44.8     08-11    138  |  100  |  31<H>  ----------------------------<  116<H>  4.0   |  23  |  1.2    Ca    9.1      11 Aug 2022 06:15  Mg     2.2     08-11    TPro  6.8  /  Alb  3.7  /  TBili  1.2  /  DBili  x   /  AST  29  /  ALT  28  /  AlkPhos  160<H>  08-11    LIVER FUNCTIONS - ( 11 Aug 2022 06:15 )  Alb: 3.7 g/dL / Pro: 6.8 g/dL / ALK PHOS: 160 U/L / ALT: 28 U/L / AST: 29 U/L / GGT: x                           Consultant Notes Reviewed:  [x ] YES  [ ] NO  Care Discussed with Consultants/Other Providers/ Housestaff [ x] YES  [ ] NO  Radiology, labs, new studies personally reviewed.

## 2022-08-11 NOTE — PROGRESS NOTE ADULT - ASSESSMENT
This is a 77 year old Male with PMHx HTN, DM, CAD with stents who presents with a chief complaint of RUE weakness, L facial, slurred speech, presents to the ED BIBEMS as a Stroke activation at 8:14am. NIH at the time was 2 for RUE and RLE drift. Facial and slurred speech at this point has resolved. mRS of 0. CTH was completed and was negative for bleed. CTA/P negative for perfusion deficit or LVO. Patient only takes ASA 81 and PLX 75 at home for heart ppx. Denies any neurologic history or stroke in the past. tPA given at 8:48am 8/3 as well as started on Cardene and 40 mg labetalol for BP control. BP prior to tPA administration was 183/93. Of note CTH shows chronic L occipital infarct. Patient admitted to 3E stroke unit for rest of neurologic work up and post tPA monitoring.    #Acute ischemic stroke s/p tPA  - Admitted to 3E stroke unit for monitoring  - atorvastatin 80 once daily  - Keep BP <180/105, notify provider if out of range  - repeat CTH stable  - MRI brain without contrast w/ +stroke  - EDSON: nl EF, no PFO  - PT/OT eval  - Stroke education  - antiplatelets as per neuro  - s/p ILR    #DM w/ hyperglycemia  -Insulin adjusted and can cont tapering up as needed  -monitor FS. Goal 140-180 inpt  -A1C=8.2  -pt/family counselled    #HTN/DONOVAN  -avoid hypotension  - holding HCTZ  -gentle hydration  -resolving    #HLD  -HD statin  -LDL 72    #CAD  -cont antiplatelets, BB, statin    #Leukocytosis  a-sx  monitor  doppler negative    #Renal lesion  outpt workup      DVT ppx - SCDs, Lovenox        My note supersedes the residents note should a discrepancy arise.    Chart and notes personally reviewed.  Care Discussed with Consultants/Other Providers/ Housestaff [ x] YES [ ] NO   Radiology, labs, old records personally reviewed.    discussed w/ housestaff, nursing, case management, neuro team

## 2022-08-11 NOTE — H&P ADULT - NSICDXFAMILYHX_GEN_ALL_CORE_FT
FAMILY HISTORY:  Mother  Still living? Unknown  Family history of heart attack, Age at diagnosis: Age Unknown  Maternal history of diabetes mellitus, Age at diagnosis: Age Unknown

## 2022-08-11 NOTE — PROGRESS NOTE ADULT - TIME BILLING
Review of imaging and chart; obtaining history; examination of pt; discussion and coordination of care.
time spent on review of labs, imaging studies, old records, obtaining history, personally examining patient, counselling and communicating with patient/ family, entering orders for medications/tests/etc, discussions with other health care providers, documentation in electronic health records, independent interpretation of labs, imaging/procedure results and care coordination.
time spent on review of labs, imaging studies, old records, obtaining history, personally examining patient, counselling and communicating with patient/ family, entering orders for medications/tests/etc, discussions with other health care providers, documentation in electronic health records, independent interpretation of labs, imaging/procedure results and care coordination.
Review of imaging and chart; obtaining history; examination of pt; discussion and coordination of care.
time spent on review of labs, imaging studies, old records, obtaining history, personally examining patient, counselling and communicating with patient/ family, entering orders for medications/tests/etc, discussions with other health care providers, documentation in electronic health records, independent interpretation of labs, imaging/procedure results and care coordination.

## 2022-08-11 NOTE — PROGRESS NOTE ADULT - ATTENDING COMMENTS
Patient seen and examined and agree with above except as noted.  Patients history, notes, labs, imaging, vitals and meds reviewed personally.  No new complaints  Right UE 0/5 with some movement at shoulder    Plan as above

## 2022-08-11 NOTE — DISCHARGE NOTE NURSING/CASE MANAGEMENT/SOCIAL WORK - PATIENT PORTAL LINK FT
You can access the FollowMyHealth Patient Portal offered by [facility] by registering at the following website: [email or web address]. By joining [email or web address]’s FollowMyHealth portal, you will also be able to view your health information using other applications (apps) compatible with our system.

## 2022-08-12 LAB
ALBUMIN SERPL ELPH-MCNC: 3.8 G/DL — SIGNIFICANT CHANGE UP (ref 3.5–5.2)
ALP SERPL-CCNC: 180 U/L — HIGH (ref 30–115)
ALT FLD-CCNC: 32 U/L — SIGNIFICANT CHANGE UP (ref 0–41)
ANION GAP SERPL CALC-SCNC: 13 MMOL/L — SIGNIFICANT CHANGE UP (ref 7–14)
AST SERPL-CCNC: 37 U/L — SIGNIFICANT CHANGE UP (ref 0–41)
BASOPHILS # BLD AUTO: 0.04 K/UL — SIGNIFICANT CHANGE UP (ref 0–0.2)
BASOPHILS NFR BLD AUTO: 0.3 % — SIGNIFICANT CHANGE UP (ref 0–1)
BILIRUB SERPL-MCNC: 1.2 MG/DL — SIGNIFICANT CHANGE UP (ref 0.2–1.2)
BUN SERPL-MCNC: 20 MG/DL — SIGNIFICANT CHANGE UP (ref 10–20)
CALCIUM SERPL-MCNC: 9.6 MG/DL — SIGNIFICANT CHANGE UP (ref 8.5–10.1)
CHLORIDE SERPL-SCNC: 100 MMOL/L — SIGNIFICANT CHANGE UP (ref 98–110)
CO2 SERPL-SCNC: 23 MMOL/L — SIGNIFICANT CHANGE UP (ref 17–32)
CREAT SERPL-MCNC: 1.1 MG/DL — SIGNIFICANT CHANGE UP (ref 0.7–1.5)
EGFR: 70 ML/MIN/1.73M2 — SIGNIFICANT CHANGE UP
EOSINOPHIL # BLD AUTO: 0.35 K/UL — SIGNIFICANT CHANGE UP (ref 0–0.7)
EOSINOPHIL NFR BLD AUTO: 3 % — SIGNIFICANT CHANGE UP (ref 0–8)
GLUCOSE BLDC GLUCOMTR-MCNC: 130 MG/DL — HIGH (ref 70–99)
GLUCOSE BLDC GLUCOMTR-MCNC: 140 MG/DL — HIGH (ref 70–99)
GLUCOSE BLDC GLUCOMTR-MCNC: 143 MG/DL — HIGH (ref 70–99)
GLUCOSE BLDC GLUCOMTR-MCNC: 201 MG/DL — HIGH (ref 70–99)
GLUCOSE BLDC GLUCOMTR-MCNC: 226 MG/DL — HIGH (ref 70–99)
GLUCOSE BLDC GLUCOMTR-MCNC: 258 MG/DL — HIGH (ref 70–99)
GLUCOSE BLDC GLUCOMTR-MCNC: 65 MG/DL — LOW (ref 70–99)
GLUCOSE BLDC GLUCOMTR-MCNC: 80 MG/DL — SIGNIFICANT CHANGE UP (ref 70–99)
GLUCOSE SERPL-MCNC: 159 MG/DL — HIGH (ref 70–99)
HCT VFR BLD CALC: 44.6 % — SIGNIFICANT CHANGE UP (ref 42–52)
HCV AB S/CO SERPL IA: 0.04 COI — SIGNIFICANT CHANGE UP
HCV AB SERPL-IMP: SIGNIFICANT CHANGE UP
HGB BLD-MCNC: 15.6 G/DL — SIGNIFICANT CHANGE UP (ref 14–18)
IMM GRANULOCYTES NFR BLD AUTO: 0.3 % — SIGNIFICANT CHANGE UP (ref 0.1–0.3)
LYMPHOCYTES # BLD AUTO: 1.24 K/UL — SIGNIFICANT CHANGE UP (ref 1.2–3.4)
LYMPHOCYTES # BLD AUTO: 10.6 % — LOW (ref 20.5–51.1)
MAGNESIUM SERPL-MCNC: 1.9 MG/DL — SIGNIFICANT CHANGE UP (ref 1.8–2.4)
MCHC RBC-ENTMCNC: 29.5 PG — SIGNIFICANT CHANGE UP (ref 27–31)
MCHC RBC-ENTMCNC: 35 G/DL — SIGNIFICANT CHANGE UP (ref 32–37)
MCV RBC AUTO: 84.3 FL — SIGNIFICANT CHANGE UP (ref 80–94)
MONOCYTES # BLD AUTO: 1.4 K/UL — HIGH (ref 0.1–0.6)
MONOCYTES NFR BLD AUTO: 12 % — HIGH (ref 1.7–9.3)
NEUTROPHILS # BLD AUTO: 8.6 K/UL — HIGH (ref 1.4–6.5)
NEUTROPHILS NFR BLD AUTO: 73.8 % — SIGNIFICANT CHANGE UP (ref 42.2–75.2)
NRBC # BLD: 0 /100 WBCS — SIGNIFICANT CHANGE UP (ref 0–0)
PLATELET # BLD AUTO: 307 K/UL — SIGNIFICANT CHANGE UP (ref 130–400)
POTASSIUM SERPL-MCNC: 4.2 MMOL/L — SIGNIFICANT CHANGE UP (ref 3.5–5)
POTASSIUM SERPL-SCNC: 4.2 MMOL/L — SIGNIFICANT CHANGE UP (ref 3.5–5)
PROT SERPL-MCNC: 7.5 G/DL — SIGNIFICANT CHANGE UP (ref 6–8)
RBC # BLD: 5.29 M/UL — SIGNIFICANT CHANGE UP (ref 4.7–6.1)
RBC # FLD: 13.1 % — SIGNIFICANT CHANGE UP (ref 11.5–14.5)
SODIUM SERPL-SCNC: 136 MMOL/L — SIGNIFICANT CHANGE UP (ref 135–146)
WBC # BLD: 11.66 K/UL — HIGH (ref 4.8–10.8)
WBC # FLD AUTO: 11.66 K/UL — HIGH (ref 4.8–10.8)

## 2022-08-12 RX ADMIN — AMLODIPINE BESYLATE 5 MILLIGRAM(S): 2.5 TABLET ORAL at 21:15

## 2022-08-12 RX ADMIN — Medication 81 MILLIGRAM(S): at 12:47

## 2022-08-12 RX ADMIN — INSULIN GLARGINE 30 UNIT(S): 100 INJECTION, SOLUTION SUBCUTANEOUS at 08:58

## 2022-08-12 RX ADMIN — CITALOPRAM 5 MILLIGRAM(S): 10 TABLET, FILM COATED ORAL at 12:47

## 2022-08-12 RX ADMIN — Medication 50 MILLIGRAM(S): at 06:07

## 2022-08-12 RX ADMIN — Medication 2: at 12:47

## 2022-08-12 RX ADMIN — ENOXAPARIN SODIUM 40 MILLIGRAM(S): 100 INJECTION SUBCUTANEOUS at 21:15

## 2022-08-12 RX ADMIN — Medication 50 MILLIGRAM(S): at 17:27

## 2022-08-12 RX ADMIN — TICAGRELOR 90 MILLIGRAM(S): 90 TABLET ORAL at 06:07

## 2022-08-12 RX ADMIN — LISINOPRIL 40 MILLIGRAM(S): 2.5 TABLET ORAL at 06:07

## 2022-08-12 RX ADMIN — Medication 10 UNIT(S): at 12:49

## 2022-08-12 RX ADMIN — Medication 25 MILLIGRAM(S): at 06:07

## 2022-08-12 RX ADMIN — TICAGRELOR 90 MILLIGRAM(S): 90 TABLET ORAL at 17:27

## 2022-08-12 RX ADMIN — ATORVASTATIN CALCIUM 80 MILLIGRAM(S): 80 TABLET, FILM COATED ORAL at 21:15

## 2022-08-12 RX ADMIN — Medication 10 UNIT(S): at 08:57

## 2022-08-12 RX ADMIN — Medication 10 UNIT(S): at 17:28

## 2022-08-12 RX ADMIN — FAMOTIDINE 40 MILLIGRAM(S): 10 INJECTION INTRAVENOUS at 21:14

## 2022-08-12 NOTE — PROGRESS NOTE ADULT - ASSESSMENT
Patient is a 76yo M with PMH HTN, DM, CAD (s/p stents), GERD who presented for right sided weakness and aphasia. Patient was found to have a left acute cortical infarct within the MCA and PCA territories. Patient presented with visual field deficits that has since improved, but is still with impaired strength, balance, mobility and ADLs and language. Patient was previously independent in all ADLs and activities. Current level of function is mod assist for bed mobility, max assist with transfers, mod assist with ambulation 25 feet with RW. Patient is a good candidate for rehab due to significantly decreased level of function from prior.    #Left acute ischemic stroke with right hemiparesis (dominant) and aphasia  s/p tPA on 8/3  EDSON showed EF of 55-60%  8/6 MRI head: scattered left-sided acute cortical infarcts within the MCA and PCA territories, with trace petechial hemorrhage involving high left frontal region. There is a chronic left occipital lobe infarct.   MRA of the neck: moderate stenosis of the proximal right internal carotid artery  - start PT/OT/ST, Neuropsych eval  - c/w 5mg celexa  - c/w ASA 81mg  - c/w Brilinta 90mg BID  - c/w Lipitor 80mg daily  - monitor vitals    NIHSS =7, mRankin = 4    #Diabetic Peripheral Polyneuropathy  - No pain. Teach patient skin monitoring an sensory awareness    #HTN - controlled  - c/w Amlodipine 5mg qd  - c/w Lisinopril 40mg   - c/w Lopressor 50mg BID  - monitor vitals  -holding parameters in place, adjust BP medications as necessary    #Diabetes 2  Ha1c 8.2  - c/w 30U Lantus  - c/w 10U Lispro  - ISS  - monitor FS, will adjust medications and hold as necessary    #CAD  -continue ASA 81, Brilinta, BB, statin    #Renal lesion  On 8/9, a right upper quadrant US was done to rule out cholecystitis, revealing an indeterminate 1.2 cm right renal lesion and moderate-severe hepatic steatosis.  -outpatient followup, non urgent MRI with renal mass protocol    -GI/Bowel Mgmt: senna, dulcolax, lactulose, miralax    - Diet: Dash Carb Consistent diet           Precautions / PROPHYLAXIS:      - Falls    - Ortho: Weight bearing status: WBAT      - DVT prophylaxis: Lovenox 40mg subQ   Patient is a 78yo M with PMH HTN, DM, CAD (s/p stents), GERD who presented for right sided weakness and aphasia. Patient was found to have a left acute cortical infarct within the MCA and PCA territories. Patient presented with visual field deficits that has since improved, but is still with impaired strength, balance, mobility and ADLs and language. Patient was previously independent in all ADLs and activities. Current level of function is mod assist for bed mobility, max assist with transfers, mod assist with ambulation 25 feet with RW. Patient is a good candidate for rehab due to significantly decreased level of function from prior.    #Left acute ischemic stroke with right hemiparesis (dominant) and aphasia  s/p tPA on 8/3  EDSON showed EF of 55-60%  8/6 MRI head: scattered left-sided acute cortical infarcts within the MCA and PCA territories, with trace petechial hemorrhage involving high left frontal region. There is a chronic left occipital lobe infarct.   MRA of the neck: moderate stenosis of the proximal right internal carotid artery  - start PT/OT/ST, Neuropsych eval  - c/w 5mg celexa  - c/w ASA 81mg  - c/w Brilinta 90mg BID  - c/w Lipitor 80mg daily  - monitor vitals    NIHSS =7, mRankin = 4    #Diabetic Peripheral Polyneuropathy  - No pain. Teach patient skin monitoring an sensory awareness    #HTN - controlled  - c/w Amlodipine 5mg qd  - c/w Lisinopril 40mg   - c/w Lopressor 50mg BID  - monitor vitals  -holding parameters in place, adjust BP medications as necessary    #Diabetes 2  Ha1c 8.2  - c/w 30U Lantus  - c/w 10U Lispro  - ISS  - monitor FS, will adjust medications and hold as necessary    #Leukocytosis  8/12 WBC 12.5->11.6  -repeat CBC tomorrow  -UA  -will continue to follow    #CAD  -continue ASA 81, Brilinta, BB, statin    #Renal lesion  On 8/9, a right upper quadrant US was done to rule out cholecystitis, revealing an indeterminate 1.2 cm right renal lesion and moderate-severe hepatic steatosis.  -outpatient followup, non urgent MRI with renal mass protocol    -GI/Bowel Mgmt: senna, dulcolax, lactulose, miralax    - Diet: Dash Carb Consistent diet           Precautions / PROPHYLAXIS:      - Falls    - Ortho: Weight bearing status: WBAT      - DVT prophylaxis: Lovenox 40mg subQ   Patient is a 76yo M with PMH HTN, DM, CAD (s/p stents), GERD who presented for right sided weakness and aphasia. Patient was found to have a left acute cortical infarct within the MCA and PCA territories. Patient presented with visual field deficits that has since improved, but is still with impaired strength, balance, mobility and ADLs and language. Patient was previously independent in all ADLs and activities. Current level of function is mod assist for bed mobility, max assist with transfers, mod assist with ambulation 25 feet with RW. Patient is a good candidate for rehab due to significantly decreased level of function from prior.    #Left acute ischemic stroke with right hemiparesis (dominant) and aphasia  s/p tPA on 8/3  EDSON showed EF of 55-60%  8/6 MRI head: scattered left-sided acute cortical infarcts within the MCA and PCA territories, with trace petechial hemorrhage involving high left frontal region. There is a chronic left occipital lobe infarct.   MRA of the neck: moderate stenosis of the proximal right internal carotid artery  - start PT/OT/ST, Neuropsych eval  - c/w 5mg celexa  - c/w ASA 81mg  - c/w Brilinta 90mg BID  - c/w Lipitor 80mg daily  - monitor vitals    NIHSS =7, mRankin = 4    #Diabetic Peripheral Polyneuropathy  - No pain. Teach patient skin monitoring an sensory awareness    #HTN - controlled  - c/w Amlodipine 5mg qd  - c/w Lisinopril 40mg   - c/w Lopressor 50mg BID  - monitor vitals  -holding parameters in place, adjust BP medications as necessary    #Diabetes 2  Ha1c 8.2  - c/w 30U Lantus  - c/w 10U Lispro  - ISS  - monitor FS, will adjust medications and hold as necessary    #Leukocytosis  8/12 WBC 11.7  -repeat CBC tomorrow  -UA  -will continue to follow    #CAD  -continue ASA 81, Brilinta, BB, statin    #Renal lesion  On 8/9, a right upper quadrant US was done to rule out cholecystitis, revealing an indeterminate 1.2 cm right renal lesion and moderate-severe hepatic steatosis.  -outpatient followup, non urgent MRI with renal mass protocol    -GI/Bowel Mgmt: senna, dulcolax, lactulose, miralax    - Diet: Dash Carb Consistent diet       Precautions / PROPHYLAXIS:      - Falls    - Ortho: Weight bearing status: WBAT      - DVT prophylaxis: Lovenox 40mg subQ

## 2022-08-12 NOTE — PROGRESS NOTE ADULT - SUBJECTIVE AND OBJECTIVE BOX
Patient is a 77y old  Male who presents with a chief complaint of rehab s/p left ischemic stroke with aphasia and right hemiparesis (11 Aug 2022 11:30)      HPI:  Patient is a 78yo M with PMH of HTN, DM, HLD, CAD (s/p stents), GERD who presented to the ED on 8/3/2022 for right hand weakness. Patient noticed he started having difficulty with his speech, then developed weakness in his right hand and some visual field deficits. In the ED, patient received tPA. CT head at the time was negative, EDSON showed EF of 55-60%.  MRI head showed scattered left-sided acute cortical infarcts within the MCA and PCA territories, with trace petechial hemorrhage involving high left frontal region. There is a chronic left occipital lobe infarct. MRA of the neck showed moderate stenosis of the proximal right internal carotid artery. On 8/9, a right upper quadrant US was done to rule out cholecystitis, revealing an indeterminate 1.2 cm right renal lesion and moderate-severe hepatic steatosis. During the hospital stay, the visual field deficits improved but patient continued to have no motor function in the right hand and impaired RLE strength and mobility and ADL independence and a persistent aphasia.    On evaluation by PM&R, patient endorses no movement in his right hand and notes some difficulty with memory but otherwise has no other complaints. Patient is right handed. He lives by himself in a house with 5 [address] and one flight of stairs inside (5+7 steps). Patient was previously independent in ADLs and ambulation, occasionally using a cane outdoors for balance. Currently patient requires mod assist for bed mobility, max assist with transfers, mod assist with ambulation 25 feet with RW and max assistance for dressing. He was evaluated by Physiatry on the medical service and was found to be a good acute rehab candidate.  (11 Aug 2022 11:30)      I examined the patient and reviewed the chart. There have been no significant changes since my history and physical except where documented below.    TODAY'S SUBJECTIVE & REVIEW OF SYMPTOMS:  Patient was seen and assessed at bedside. No overnight events. Patient denies any complaints at this time. Tolerating PT/OT. Tolerating oral diet. Voiding and passing stool spontaneously. Vital signs are stable.    Constiutional:    [ x  ] WNL           [   ] poor appetite   [   ] insomnia   [   ] tired   Cardio:                [  x ] WNL           [   ] CP   [   ] VELASCO   [   ] palpitations               Resp:                   [  x ] WNL           [   ] SOB   [   ] cough   [   ] wheezing   GI:                        [ x  ] WNL           [   ] constipation   [   ] diarrhea   [   ] abdominal pain   [   ] nausea   [   ] emesis                                :                      [ x  ] WNL           [   ] PRITCHARD  [   ] dysuria   [   ] difficulty voiding             Endo:                   [ x  ] WNL          [   ] polyuria   [   ] temperature intolerance                 Skin:                     [ x  ] WNL          [   ] pain   [   ] wound   [   ] rash   MSK:                    [   x] WNL          [   ] muscle pain   [   ] joint pain/ stiffness   [   ] muscle tenderness   [   ] swelling   Neuro:                 [   ] WNL          [   ] HA   [   ] change in vision   [   ] tremor   [ x  ] weakness- Right side   [   ]dysphagia   [x  ] word finding difficulties,   [ x ] numbness/ neuropathy both feet    Cognitive:           [ x  ] WNL           [   ]confusion   - daughter note some memory loss since stroke   Psych:                  [  x ] WNL           [   ] hallucinations   [   ]agitation   [   ] delusion   [   ]depression      PHYSICAL EXAM    Vital Signs Last 24 Hrs  T(C): 36.5 (12 Aug 2022 05:35), Max: 37.1 (11 Aug 2022 13:10)  T(F): 97.7 (12 Aug 2022 05:35), Max: 98.8 (11 Aug 2022 13:10)  HR: 81 (12 Aug 2022 05:35) (75 - 83)  BP: 147/85 (12 Aug 2022 05:35) (138/80 - 162/89)  BP(mean): --  RR: 18 (12 Aug 2022 05:35) (18 - 18)  SpO2: --    General:[  x ] NAD, Resting Comfortable,   [   ] other:                                HEENT: [  x ] NC/AT, EOMI, PERRL , Normal Conjunctivae,   [   ] other:  Cardio: [  x ] RRR, no murmer,   [   ] other:                              Pulm: [  x ] No Respiratory Distress,  Lungs CTAB,   [   ] other:                       Abdomen: [ x  ]ND/NT, Soft,   [   ] other:    : [ x  ] NO PRITCHARD CATHETER,   [  ] PRITCHARD CATHETER- no meatal tear, no discharge, [   ] other:                                            MSK: [ x  ] No joint swelling, Full PROM,   [    ] other:                                      Ext: [  x ]No C/C/E, No calf tenderness,   [   ]other:    Skin: [ x  ]intact,   [   ] other:                                                                   Neurological Examination:  Cognitive: [ x   ] AAO x 3,   [    ]  other:                                                                 Attention/ Concentration:  [ x   ] intact, able to spell 'WORLD' forwards and backwards   [    ]  other:   Memory: [    ] intact,    [ x  ]  other:  3/3 immediate recall, 2/3 in 5 minute recall but 3/3 with prompting  Mood/Affect: [  x  ] wnl,    [    ]  other:                                                                             Communication: [   ]Fluent, no dysarthria, following commands:  [   ] other:follows partial with  2-step commands, mild dysarthria, slow speech and impaired word-finding. Naming intacty  CN II - XII:  [  x  ] intact, VFF  [    ] other: mild right facial droop                                                                                         Motor:   RIGHT UE: [   ] WNL,  [   ] other: 0/5 grossly in all muscle groups  LEFT    UE: [  x ] WNL  RIGHT LE: [ x  ] WNL,  [   ] other: Hip flexion 3/5 with drift to bed, knee flexion 4/5, plantarflexion 5/5, dorsiflexion 5/5, toe extension 5/5  LEFT    LE: [  x ] WNL,  Hip flexion 4/5, knee flexion 5/5, plantarflexion 5/5, dorsiflexion 5/5, toe extension 5/5     Tone: [    ] wnl,   [ x   ]  other: mild increased tone in right triceps  DTRs: [ x  ]symmetric, 1+ [   ] other:   Coordination:   [  x  ] intact as testable,   [    ]   Sensory: [    ] Intact to light touch [ x   ] other:  decreased both distal LEs  Proprioception: impaired on testing of right great toe  (+) drift in RLE. (+/-) Babinski bilaterally          amLODIPine   Tablet 5 milliGRAM(s) Oral at bedtime  aspirin enteric coated 81 milliGRAM(s) Oral daily  atorvastatin 80 milliGRAM(s) Oral at bedtime  bisacodyl Suppository 10 milliGRAM(s) Rectal daily PRN  citalopram 5 milliGRAM(s) Oral daily  dextrose 5%. 1000 milliLiter(s) IV Continuous <Continuous>  dextrose 50% Injectable 25 Gram(s) IV Push once  dextrose 50% Injectable 12.5 Gram(s) IV Push once  dextrose 50% Injectable 25 Gram(s) IV Push once  dextrose Oral Gel 15 Gram(s) Oral once PRN  enoxaparin Injectable 40 milliGRAM(s) SubCutaneous every 24 hours  famotidine    Tablet 40 milliGRAM(s) Oral at bedtime  glucagon  Injectable 1 milliGRAM(s) IntraMuscular once  hydrochlorothiazide 25 milliGRAM(s) Oral daily  insulin glargine Injectable (LANTUS) 30 Unit(s) SubCutaneous every morning  insulin lispro (ADMELOG) corrective regimen sliding scale   SubCutaneous three times a day before meals  insulin lispro Injectable (ADMELOG) 10 Unit(s) SubCutaneous three times a day before meals  lactulose Syrup 20 Gram(s) Oral two times a day PRN  lisinopril 40 milliGRAM(s) Oral daily  melatonin 5 milliGRAM(s) Oral at bedtime PRN  metoprolol tartrate 50 milliGRAM(s) Oral two times a day  polyethylene glycol 3350 17 Gram(s) Oral two times a day  senna 2 Tablet(s) Oral at bedtime  ticagrelor 90 milliGRAM(s) Oral every 12 hours      RECENT LABS/IMAGING                        15.5   12.49 )-----------( 272      ( 11 Aug 2022 06:15 )             44.8     08-11    138  |  100  |  31<H>  ----------------------------<  116<H>  4.0   |  23  |  1.2    Ca    9.1      11 Aug 2022 06:15  Mg     2.2     08-11    TPro  6.8  /  Alb  3.7  /  TBili  1.2  /  DBili  x   /  AST  29  /  ALT  28  /  AlkPhos  160<H>  08-11             Patient is a 77y old  Male who presents with a chief complaint of rehab s/p left ischemic stroke with aphasia and right hemiparesis (11 Aug 2022 11:30)      HPI:  Patient is a 76yo M with PMH of HTN, DM, HLD, CAD (s/p stents), GERD who presented to the ED on 8/3/2022 for right hand weakness. Patient noticed he started having difficulty with his speech, then developed weakness in his right hand and some visual field deficits. In the ED, patient received tPA. CT head at the time was negative, EDSON showed EF of 55-60%.  MRI head showed scattered left-sided acute cortical infarcts within the MCA and PCA territories, with trace petechial hemorrhage involving high left frontal region. There is a chronic left occipital lobe infarct. MRA of the neck showed moderate stenosis of the proximal right internal carotid artery. On 8/9, a right upper quadrant US was done to rule out cholecystitis, revealing an indeterminate 1.2 cm right renal lesion and moderate-severe hepatic steatosis. During the hospital stay, the visual field deficits improved but patient continued to have no motor function in the right hand and impaired RLE strength and mobility and ADL independence and a persistent aphasia.    On evaluation by PM&R, patient endorses no movement in his right hand and notes some difficulty with memory but otherwise has no other complaints. Patient is right handed. He lives by himself in a house with [address] and one flight of stairs inside (5+7 steps). Patient was previously independent in ADLs and ambulation, occasionally using a cane outdoors for balance. Currently patient requires mod assist for bed mobility, max assist with transfers, mod assist with ambulation 25 feet with RW and max assistance for dressing. He was evaluated by Physiatry on the medical service and was found to be a good acute rehab candidate.  (11 Aug 2022 11:30)      I examined the patient and reviewed the chart. There have been no significant changes since my history and physical except where documented below.    TODAY'S SUBJECTIVE & REVIEW OF SYMPTOMS:  Patient was seen and assessed at bedside. No overnight events. Patient denies any complaints at this time. Tolerating PT/OT. Tolerating oral diet. Voiding and passing stool spontaneously. Vital signs are stable. Following WBC, 12.5->11.6.     Constiutional:    [ x  ] WNL           [   ] poor appetite   [   ] insomnia   [   ] tired   Cardio:                [  x ] WNL           [   ] CP   [   ] VELASCO   [   ] palpitations               Resp:                   [  x ] WNL           [   ] SOB   [   ] cough   [   ] wheezing   GI:                        [ x  ] WNL           [   ] constipation   [   ] diarrhea   [   ] abdominal pain   [   ] nausea   [   ] emesis                                :                      [ x  ] WNL           [   ] PRITCHARD  [   ] dysuria   [   ] difficulty voiding             Endo:                   [ x  ] WNL          [   ] polyuria   [   ] temperature intolerance                 Skin:                     [ x  ] WNL          [   ] pain   [   ] wound   [   ] rash   MSK:                    [   x] WNL          [   ] muscle pain   [   ] joint pain/ stiffness   [   ] muscle tenderness   [   ] swelling   Neuro:                 [   ] WNL          [   ] HA   [   ] change in vision   [   ] tremor   [ x  ] weakness- Right side   [   ]dysphagia   [x  ] word finding difficulties,   [ x ] numbness/ neuropathy both feet    Cognitive:           [ x  ] WNL           [   ]confusion   - daughter note some memory loss since stroke   Psych:                  [  x ] WNL           [   ] hallucinations   [   ]agitation   [   ] delusion   [   ]depression      PHYSICAL EXAM    Vital Signs Last 24 Hrs  T(C): 36.5 (12 Aug 2022 05:35), Max: 37.1 (11 Aug 2022 13:10)  T(F): 97.7 (12 Aug 2022 05:35), Max: 98.8 (11 Aug 2022 13:10)  HR: 81 (12 Aug 2022 05:35) (75 - 83)  BP: 147/85 (12 Aug 2022 05:35) (138/80 - 162/89)  BP(mean): --  RR: 18 (12 Aug 2022 05:35) (18 - 18)  SpO2: --    General:[  x ] NAD, Resting Comfortable,   [   ] other:                                HEENT: [  x ] NC/AT, EOMI, PERRL , Normal Conjunctivae,   [   ] other:  Cardio: [  x ] RRR, no murmer,   [   ] other:                              Pulm: [  x ] No Respiratory Distress,  Lungs CTAB,   [   ] other:                       Abdomen: [ x  ]ND/NT, Soft,   [   ] other:    : [ x  ] NO PRITCHARD CATHETER,   [  ] PRITCHARD CATHETER- no meatal tear, no discharge, [   ] other:                                            MSK: [ x  ] No joint swelling, Full PROM,   [    ] other:                                      Ext: [  x ]No C/C/E, No calf tenderness,   [   ]other:    Skin: [ x  ]intact,   [   ] other:                                                                   Neurological Examination:  Cognitive: [ x   ] AAO x 3,   [    ]  other:                                                                 Attention/ Concentration:  [ x   ] intact, able to spell 'WORLD' forwards and backwards   [    ]  other:   Memory: [    ] intact,    [ x  ]  other:  3/3 immediate recall, 2/3 in 5 minute recall but 3/3 with prompting  Mood/Affect: [  x  ] wnl,    [    ]  other:                                                                             Communication: [   ]Fluent, no dysarthria, following commands:  [   ] other:follows partial with  2-step commands, mild dysarthria, slow speech and impaired word-finding. Naming intacty  CN II - XII:  [  x  ] intact, VFF  [    ] other: mild right facial droop                                                                                         Motor:   RIGHT UE: [   ] WNL,  [   ] other: 0/5 grossly in all muscle groups  LEFT    UE: [  x ] WNL  RIGHT LE: [ x  ] WNL,  [   ] other: Hip flexion 3/5 with drift to bed, knee flexion 4/5, plantarflexion 5/5, dorsiflexion 5/5, toe extension 5/5  LEFT    LE: [  x ] WNL,  Hip flexion 4/5, knee flexion 5/5, plantarflexion 5/5, dorsiflexion 5/5, toe extension 5/5     Tone: [    ] wnl,   [ x   ]  other: mild increased tone in right triceps  DTRs: [ x  ]symmetric, 1+ [   ] other:   Coordination:   [  x  ] intact as testable,   [    ]   Sensory: [    ] Intact to light touch [ x   ] other:  decreased both distal LEs  Proprioception: impaired on testing of right great toe  (+) drift in RLE. (+/-) Babinski bilaterally          amLODIPine   Tablet 5 milliGRAM(s) Oral at bedtime  aspirin enteric coated 81 milliGRAM(s) Oral daily  atorvastatin 80 milliGRAM(s) Oral at bedtime  bisacodyl Suppository 10 milliGRAM(s) Rectal daily PRN  citalopram 5 milliGRAM(s) Oral daily  dextrose 5%. 1000 milliLiter(s) IV Continuous <Continuous>  dextrose 50% Injectable 25 Gram(s) IV Push once  dextrose 50% Injectable 12.5 Gram(s) IV Push once  dextrose 50% Injectable 25 Gram(s) IV Push once  dextrose Oral Gel 15 Gram(s) Oral once PRN  enoxaparin Injectable 40 milliGRAM(s) SubCutaneous every 24 hours  famotidine    Tablet 40 milliGRAM(s) Oral at bedtime  glucagon  Injectable 1 milliGRAM(s) IntraMuscular once  hydrochlorothiazide 25 milliGRAM(s) Oral daily  insulin glargine Injectable (LANTUS) 30 Unit(s) SubCutaneous every morning  insulin lispro (ADMELOG) corrective regimen sliding scale   SubCutaneous three times a day before meals  insulin lispro Injectable (ADMELOG) 10 Unit(s) SubCutaneous three times a day before meals  lactulose Syrup 20 Gram(s) Oral two times a day PRN  lisinopril 40 milliGRAM(s) Oral daily  melatonin 5 milliGRAM(s) Oral at bedtime PRN  metoprolol tartrate 50 milliGRAM(s) Oral two times a day  polyethylene glycol 3350 17 Gram(s) Oral two times a day  senna 2 Tablet(s) Oral at bedtime  ticagrelor 90 milliGRAM(s) Oral every 12 hours      RECENT LABS/IMAGING                        15.5   12.49 )-----------( 272      ( 11 Aug 2022 06:15 )             44.8     08-11    138  |  100  |  31<H>  ----------------------------<  116<H>  4.0   |  23  |  1.2    Ca    9.1      11 Aug 2022 06:15  Mg     2.2     08-11    TPro  6.8  /  Alb  3.7  /  TBili  1.2  /  DBili  x   /  AST  29  /  ALT  28  /  AlkPhos  160<H>  08-11             Patient is a 77y old  Male who presents with a chief complaint of rehab s/p left ischemic stroke with aphasia and right hemiparesis (11 Aug 2022 11:30)      HPI:  Patient is a 76yo M with PMH of HTN, DM, HLD, CAD (s/p stents), GERD who presented to the ED on 8/3/2022 for right hand weakness. Patient noticed he started having difficulty with his speech, then developed weakness in his right hand and some visual field deficits. In the ED, patient received tPA. CT head at the time was negative, EDSON showed EF of 55-60%.  MRI head showed scattered left-sided acute cortical infarcts within the MCA and PCA territories, with trace petechial hemorrhage involving high left frontal region. There is a chronic left occipital lobe infarct. MRA of the neck showed moderate stenosis of the proximal right internal carotid artery. On 8/9, a right upper quadrant US was done to rule out cholecystitis, revealing an indeterminate 1.2 cm right renal lesion and moderate-severe hepatic steatosis. During the hospital stay, the visual field deficits improved but patient continued to have no motor function in the right hand and impaired RLE strength and mobility and ADL independence and a persistent aphasia.    On evaluation by PM&R, patient endorses no movement in his right hand and notes some difficulty with memory but otherwise has no other complaints. Patient is right handed. He lives by himself in a house with [address] and one flight of stairs inside (5+7 steps). Patient was previously independent in ADLs and ambulation, occasionally using a cane outdoors for balance. Currently patient requires mod assist for bed mobility, max assist with transfers, mod assist with ambulation 25 feet with RW and max assistance for dressing. He was evaluated by Physiatry on the medical service and was found to be a good acute rehab candidate.  (11 Aug 2022 11:30)      I examined the patient and reviewed the chart. There have been no significant changes since my history and physical except where documented below.    TODAY'S SUBJECTIVE & REVIEW OF SYMPTOMS:  Patient was seen and assessed at bedside. No overnight events. Patient denies any complaints at this time. Tolerating PT/OT. Tolerating oral diet. Voiding and passing stool spontaneously. Vital signs are stable. Following WBC, 12.5->11.6.     Constiutional:    [ x  ] WNL           [   ] poor appetite   [   ] insomnia   [   ] tired   Cardio:                [  x ] WNL           [   ] CP   [   ] VELASCO   [   ] palpitations               Resp:                   [  x ] WNL           [   ] SOB   [   ] cough   [   ] wheezing   GI:                        [ x  ] WNL           [   ] constipation   [   ] diarrhea   [   ] abdominal pain   [   ] nausea   [   ] emesis                                :                      [ x  ] WNL           [   ] PRITCHARD  [   ] dysuria   [   ] difficulty voiding             Endo:                   [ x  ] WNL          [   ] polyuria   [   ] temperature intolerance                 Skin:                     [ x  ] WNL          [   ] pain   [   ] wound   [   ] rash   MSK:                    [   x] WNL          [   ] muscle pain   [   ] joint pain/ stiffness   [   ] muscle tenderness   [   ] swelling   Neuro:                 [   ] WNL          [   ] HA   [   ] change in vision   [   ] tremor   [ x  ] weakness- Right side   [   ]dysphagia   [x  ] word finding difficulties,   [ x ] numbness/ neuropathy both feet    Cognitive:           [ x  ] WNL           [   ]confusion   - daughter note some memory loss since stroke   Psych:                  [  x ] WNL           [   ] hallucinations   [   ]agitation   [   ] delusion   [   ]depression      PHYSICAL EXAM    Vital Signs Last 24 Hrs  T(C): 36.5 (12 Aug 2022 05:35), Max: 37.1 (11 Aug 2022 13:10)  T(F): 97.7 (12 Aug 2022 05:35), Max: 98.8 (11 Aug 2022 13:10)  HR: 81 (12 Aug 2022 05:35) (75 - 83)  BP: 147/85 (12 Aug 2022 05:35) (138/80 - 162/89)  BP(mean): --  RR: 18 (12 Aug 2022 05:35) (18 - 18)  SpO2: --    General:[  x ] NAD, Resting Comfortable,   [   ] other:                                HEENT: [  x ] NC/AT, EOMI, PERRL , Normal Conjunctivae,   [   ] other:  Cardio: [  x ] RRR, no murmer,   [   ] other:                              Pulm: [  x ] No Respiratory Distress,  Lungs CTAB,   [   ] other:                       Abdomen: [ x  ]ND/NT, Soft,   [   ] other:    : [ x  ] NO PRITCHARD CATHETER,   [  ] PRITCHARD CATHETER- no meatal tear, no discharge, [   ] other:                                            MSK: [ x  ] No joint swelling, Full PROM,   [    ] other:                                      Ext: [  x ]No C/C/E, No calf tenderness,   [   ]other:    Skin: [ x  ]intact,   [   ] other:                                                                   Neurological Examination:  Cognitive: [ x   ] AAO x 3,   [    ]  other:                                                                 Attention/ Concentration:  [ x   ] intact   [    ]  other:   Memory: [    ] intact,    [ x  ]  other:  mildly impaired  Mood/Affect: [  x  ] wnl,    [    ]  other:                                                                             Communication: [   ]Fluent, no dysarthria, following commands:  [x   ] other:follows partial with  2-step commands, mild dysarthria, slow speech and impaired word-finding. Naming intact  CN II - XII:  [  x  ] intact, VFF  [    ] other: mild right facial droop                                                                                         Motor:   RIGHT UE: [   ] WNL,  [  x ] other: 0/5 grossly in all muscle groups  LEFT    UE: [  x ] WNL  RIGHT LE: [ x  ] WNL,  [   ] other: Hip flexion 3/5 with drift to bed, knee flexion 4/5, plantarflexion 5/5, dorsiflexion 5/5, toe extension 5/5  LEFT    LE: [  x ] WNL,  Hip flexion 4/5, knee flexion 5/5, plantarflexion 5/5, dorsiflexion 5/5, toe extension 5/5     Tone: [    ] wnl,   [ x   ]  other: mild increased tone in right triceps  DTRs: [ x  ]symmetric, 1+ [   ] other:   Coordination:   [  x  ] intact as testable,   [    ]   Sensory: [    ] Intact to light touch [ x   ] other:  decreased both distal LEs  Proprioception: impaired on testing of right great toe  (+) drift in RLE. (+/-) Babinski bilaterally      amLODIPine   Tablet 5 milliGRAM(s) Oral at bedtime  aspirin enteric coated 81 milliGRAM(s) Oral daily  atorvastatin 80 milliGRAM(s) Oral at bedtime  bisacodyl Suppository 10 milliGRAM(s) Rectal daily PRN  citalopram 5 milliGRAM(s) Oral daily  dextrose 5%. 1000 milliLiter(s) IV Continuous <Continuous>  dextrose 50% Injectable 25 Gram(s) IV Push once  dextrose 50% Injectable 12.5 Gram(s) IV Push once  dextrose 50% Injectable 25 Gram(s) IV Push once  dextrose Oral Gel 15 Gram(s) Oral once PRN  enoxaparin Injectable 40 milliGRAM(s) SubCutaneous every 24 hours  famotidine    Tablet 40 milliGRAM(s) Oral at bedtime  glucagon  Injectable 1 milliGRAM(s) IntraMuscular once  hydrochlorothiazide 25 milliGRAM(s) Oral daily  insulin glargine Injectable (LANTUS) 30 Unit(s) SubCutaneous every morning  insulin lispro (ADMELOG) corrective regimen sliding scale   SubCutaneous three times a day before meals  insulin lispro Injectable (ADMELOG) 10 Unit(s) SubCutaneous three times a day before meals  lactulose Syrup 20 Gram(s) Oral two times a day PRN  lisinopril 40 milliGRAM(s) Oral daily  melatonin 5 milliGRAM(s) Oral at bedtime PRN  metoprolol tartrate 50 milliGRAM(s) Oral two times a day  polyethylene glycol 3350 17 Gram(s) Oral two times a day  senna 2 Tablet(s) Oral at bedtime  ticagrelor 90 milliGRAM(s) Oral every 12 hours      RECENT LABS/IMAGING                          15.6   11.66 )-----------( 307      ( 12 Aug 2022 07:57 )             44.6     08-12    136  |  100  |  20  ----------------------------<  159<H>  4.2   |  23  |  1.1    Ca    9.6      12 Aug 2022 07:57  Mg     1.9     08-12    TPro  7.5  /  Alb  3.8  /  TBili  1.2  /  DBili  x   /  AST  37  /  ALT  32  /  AlkPhos  180<H>  08-12        POCT Blood Glucose.: 201 mg/dL (08-12-22 @ 11:25)  POCT Blood Glucose.: 143 mg/dL (08-12-22 @ 07:13)  POCT Blood Glucose.: 183 mg/dL (08-11-22 @ 21:32)  POCT Blood Glucose.: 85 mg/dL (08-11-22 @ 16:31)  POCT Blood Glucose.: 170 mg/dL (08-11-22 @ 11:16)  POCT Blood Glucose.: 129 mg/dL (08-11-22 @ 07:32)  POCT Blood Glucose.: 114 mg/dL (08-10-22 @ 21:53)  POCT Blood Glucose.: 132 mg/dL (08-10-22 @ 16:31)  POCT Blood Glucose.: 233 mg/dL (08-10-22 @ 11:20)  POCT Blood Glucose.: 174 mg/dL (08-10-22 @ 07:53)  POCT Blood Glucose.: 137 mg/dL (08-09-22 @ 21:08)  POCT Blood Glucose.: 137 mg/dL (08-09-22 @ 16:51)

## 2022-08-12 NOTE — PROGRESS NOTE ADULT - ASSESSMENT
Primary Contact Name: Tegan Slade                       Relationship: Daughter     Pertinent Social History: According to his daughter, Mr. Live lives by himself in a home in [facility]. He is currently retired but was previously the  of multiple hospitals and also owned a medical laboratory. 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 His daughter stated that Mr. Live has been experiencing severe depression following the death of his wife in April 2020. He recently began a relationship with a woman that he met through his teaching job, who has been helping him to cope and manage his ongoing grief.       Premorbid Functioning:      · ADLs: Independent     · IADLs: Independent     · Psychiatric History/Treatment: Since the death of his wife in 2020, he has been experiencing depression. His daughter stated that his doctor recommended medication to treat his depression, but that this has not yet been started.     · Previous Coping: Visiting with friends, watching tv, especially science-related documentaries     · Cognition: His daughter stated that he would have occasional word-finding difficulties prior to his admission. She expressed concern due to the fact that a paternal aunt of her father’s had Alzheimer’s disease.      · Substance Use: His daughter stated that he has not smoked for 25 years, and that prior to quitting, he smoked 1 pack every 2-3 days. He rarely drinks alcohol (1-2 drinks per month).       Current Status:     Mood Changes: His daughter stated that he experienced multiple panic attacks after his admission to the hospital, which she had never seen him experience before. She stated that his mood fluctuates, as he is very worried about not being able to move his right arm. Per his daughter, he became very upset earlier this week when a doctor said to him something related to recovery of the sensation back in his arm. She also stated that he has been frustrated due to his limitations during PT and OT.     Cognition Changes: His daughter stated that since he has been in the hospital, he is having increased word-finding difficulties.      Behavior Changes: Denied     Patient’s Primary Language: English     a) Changes in understanding primary language after Neurological event. No     b) Preferred language for health care information? English     Brain Injury / Cognitive Behavioral Protocol Education Provided: Not Applicable      Family Education: His daughter was educated about the rehabilitation process, current status, and family education sessions.      Family Concerns: Cognition and positive coping / Frustration since being in the hospital. She also expressed concerns about her father’s emotional state as this is the first major stressful event that he’s been through without his wife.      Family Goals: Support positive coping and cognitive assessment.

## 2022-08-13 LAB
ANION GAP SERPL CALC-SCNC: 13 MMOL/L — SIGNIFICANT CHANGE UP (ref 7–14)
BASOPHILS # BLD AUTO: 0.03 K/UL — SIGNIFICANT CHANGE UP (ref 0–0.2)
BASOPHILS NFR BLD AUTO: 0.3 % — SIGNIFICANT CHANGE UP (ref 0–1)
BUN SERPL-MCNC: 19 MG/DL — SIGNIFICANT CHANGE UP (ref 10–20)
CALCIUM SERPL-MCNC: 9.8 MG/DL — SIGNIFICANT CHANGE UP (ref 8.5–10.1)
CHLORIDE SERPL-SCNC: 100 MMOL/L — SIGNIFICANT CHANGE UP (ref 98–110)
CO2 SERPL-SCNC: 23 MMOL/L — SIGNIFICANT CHANGE UP (ref 17–32)
CREAT SERPL-MCNC: 1.1 MG/DL — SIGNIFICANT CHANGE UP (ref 0.7–1.5)
EGFR: 70 ML/MIN/1.73M2 — SIGNIFICANT CHANGE UP
EOSINOPHIL # BLD AUTO: 0.34 K/UL — SIGNIFICANT CHANGE UP (ref 0–0.7)
EOSINOPHIL NFR BLD AUTO: 3.3 % — SIGNIFICANT CHANGE UP (ref 0–8)
GLUCOSE BLDC GLUCOMTR-MCNC: 110 MG/DL — HIGH (ref 70–99)
GLUCOSE BLDC GLUCOMTR-MCNC: 145 MG/DL — HIGH (ref 70–99)
GLUCOSE BLDC GLUCOMTR-MCNC: 203 MG/DL — HIGH (ref 70–99)
GLUCOSE BLDC GLUCOMTR-MCNC: 71 MG/DL — SIGNIFICANT CHANGE UP (ref 70–99)
GLUCOSE SERPL-MCNC: 144 MG/DL — HIGH (ref 70–99)
HCT VFR BLD CALC: 44.4 % — SIGNIFICANT CHANGE UP (ref 42–52)
HGB BLD-MCNC: 15.9 G/DL — SIGNIFICANT CHANGE UP (ref 14–18)
IMM GRANULOCYTES NFR BLD AUTO: 0.3 % — SIGNIFICANT CHANGE UP (ref 0.1–0.3)
LYMPHOCYTES # BLD AUTO: 1.45 K/UL — SIGNIFICANT CHANGE UP (ref 1.2–3.4)
LYMPHOCYTES # BLD AUTO: 14.3 % — LOW (ref 20.5–51.1)
MCHC RBC-ENTMCNC: 29.8 PG — SIGNIFICANT CHANGE UP (ref 27–31)
MCHC RBC-ENTMCNC: 35.8 G/DL — SIGNIFICANT CHANGE UP (ref 32–37)
MCV RBC AUTO: 83.1 FL — SIGNIFICANT CHANGE UP (ref 80–94)
MONOCYTES # BLD AUTO: 1.27 K/UL — HIGH (ref 0.1–0.6)
MONOCYTES NFR BLD AUTO: 12.5 % — HIGH (ref 1.7–9.3)
NEUTROPHILS # BLD AUTO: 7.03 K/UL — HIGH (ref 1.4–6.5)
NEUTROPHILS NFR BLD AUTO: 69.3 % — SIGNIFICANT CHANGE UP (ref 42.2–75.2)
NRBC # BLD: 0 /100 WBCS — SIGNIFICANT CHANGE UP (ref 0–0)
PLATELET # BLD AUTO: 304 K/UL — SIGNIFICANT CHANGE UP (ref 130–400)
POTASSIUM SERPL-MCNC: 3.8 MMOL/L — SIGNIFICANT CHANGE UP (ref 3.5–5)
POTASSIUM SERPL-SCNC: 3.8 MMOL/L — SIGNIFICANT CHANGE UP (ref 3.5–5)
RBC # BLD: 5.34 M/UL — SIGNIFICANT CHANGE UP (ref 4.7–6.1)
RBC # FLD: 13 % — SIGNIFICANT CHANGE UP (ref 11.5–14.5)
SODIUM SERPL-SCNC: 136 MMOL/L — SIGNIFICANT CHANGE UP (ref 135–146)
WBC # BLD: 10.15 K/UL — SIGNIFICANT CHANGE UP (ref 4.8–10.8)
WBC # FLD AUTO: 10.15 K/UL — SIGNIFICANT CHANGE UP (ref 4.8–10.8)

## 2022-08-13 RX ORDER — TICAGRELOR 90 MG/1
90 TABLET ORAL EVERY 12 HOURS
Refills: 0 | Status: DISCONTINUED | OUTPATIENT
Start: 2022-08-13 | End: 2022-08-30

## 2022-08-13 RX ORDER — HYDROCORTISONE 1 %
1 OINTMENT (GRAM) TOPICAL
Refills: 0 | Status: DISCONTINUED | OUTPATIENT
Start: 2022-08-13 | End: 2022-08-30

## 2022-08-13 RX ORDER — INSULIN LISPRO 100/ML
8 VIAL (ML) SUBCUTANEOUS
Refills: 0 | Status: DISCONTINUED | OUTPATIENT
Start: 2022-08-13 | End: 2022-08-30

## 2022-08-13 RX ADMIN — Medication 10 UNIT(S): at 11:49

## 2022-08-13 RX ADMIN — TICAGRELOR 90 MILLIGRAM(S): 90 TABLET ORAL at 05:58

## 2022-08-13 RX ADMIN — LISINOPRIL 40 MILLIGRAM(S): 2.5 TABLET ORAL at 05:58

## 2022-08-13 RX ADMIN — CITALOPRAM 5 MILLIGRAM(S): 10 TABLET, FILM COATED ORAL at 11:51

## 2022-08-13 RX ADMIN — Medication 25 MILLIGRAM(S): at 05:58

## 2022-08-13 RX ADMIN — TICAGRELOR 90 MILLIGRAM(S): 90 TABLET ORAL at 17:43

## 2022-08-13 RX ADMIN — Medication 81 MILLIGRAM(S): at 11:52

## 2022-08-13 RX ADMIN — Medication 50 MILLIGRAM(S): at 05:58

## 2022-08-13 RX ADMIN — Medication 2: at 11:49

## 2022-08-13 RX ADMIN — ATORVASTATIN CALCIUM 80 MILLIGRAM(S): 80 TABLET, FILM COATED ORAL at 22:00

## 2022-08-13 RX ADMIN — AMLODIPINE BESYLATE 5 MILLIGRAM(S): 2.5 TABLET ORAL at 21:59

## 2022-08-13 RX ADMIN — ENOXAPARIN SODIUM 40 MILLIGRAM(S): 100 INJECTION SUBCUTANEOUS at 22:01

## 2022-08-13 RX ADMIN — INSULIN GLARGINE 30 UNIT(S): 100 INJECTION, SOLUTION SUBCUTANEOUS at 08:09

## 2022-08-13 RX ADMIN — FAMOTIDINE 40 MILLIGRAM(S): 10 INJECTION INTRAVENOUS at 22:02

## 2022-08-13 RX ADMIN — Medication 10 UNIT(S): at 08:08

## 2022-08-13 NOTE — PROGRESS NOTE ADULT - SUBJECTIVE AND OBJECTIVE BOX
T(C): 35.7 (08-13-22 @ 05:41), Max: 36.5 (08-12-22 @ 20:47)  HR: 81 (08-13-22 @ 05:41) (73 - 82)  BP: 167/95 (08-13-22 @ 05:41) (143/86 - 167/95)  RR: 20 (08-13-22 @ 05:41) (17 - 20)  SpO2: --      Patient was stable overnight and expresses no new complaints     PE: ptn seen i  the  gym  doing  very  well  no  new  c,o     Alert   LUNGS- clear  COR- RRR  ABD- SOFT, NT  EXTR- w/o edema  NEURO- stable    08-13    136  |  100  |  19  ----------------------------<  144<H>  3.8   |  23  |  1.1    Ca    9.8      13 Aug 2022 07:03  Mg     1.9     08-12    TPro  7.5  /  Alb  3.8  /  TBili  1.2  /  DBili  x   /  AST  37  /  ALT  32  /  AlkPhos  180<H>  08-12                            15.9   10.15 )-----------( 304      ( 13 Aug 2022 07:03 )             44.4       rehab 77  y.o m  s/p left ischemic stroke with aphasia and right hemiparesis    Continue acute rehab program.pt ot and   [facility]

## 2022-08-14 LAB
GLUCOSE BLDC GLUCOMTR-MCNC: 111 MG/DL — HIGH (ref 70–99)
GLUCOSE BLDC GLUCOMTR-MCNC: 141 MG/DL — HIGH (ref 70–99)
GLUCOSE BLDC GLUCOMTR-MCNC: 210 MG/DL — HIGH (ref 70–99)
GLUCOSE BLDC GLUCOMTR-MCNC: 83 MG/DL — SIGNIFICANT CHANGE UP (ref 70–99)

## 2022-08-14 RX ADMIN — LISINOPRIL 40 MILLIGRAM(S): 2.5 TABLET ORAL at 06:38

## 2022-08-14 RX ADMIN — Medication 8 UNIT(S): at 07:58

## 2022-08-14 RX ADMIN — POLYETHYLENE GLYCOL 3350 17 GRAM(S): 17 POWDER, FOR SOLUTION ORAL at 17:17

## 2022-08-14 RX ADMIN — TICAGRELOR 90 MILLIGRAM(S): 90 TABLET ORAL at 06:38

## 2022-08-14 RX ADMIN — CITALOPRAM 5 MILLIGRAM(S): 10 TABLET, FILM COATED ORAL at 12:08

## 2022-08-14 RX ADMIN — SENNA PLUS 2 TABLET(S): 8.6 TABLET ORAL at 06:37

## 2022-08-14 RX ADMIN — Medication 50 MILLIGRAM(S): at 17:17

## 2022-08-14 RX ADMIN — Medication 8 UNIT(S): at 12:06

## 2022-08-14 RX ADMIN — ATORVASTATIN CALCIUM 80 MILLIGRAM(S): 80 TABLET, FILM COATED ORAL at 21:22

## 2022-08-14 RX ADMIN — SENNA PLUS 2 TABLET(S): 8.6 TABLET ORAL at 21:23

## 2022-08-14 RX ADMIN — TICAGRELOR 90 MILLIGRAM(S): 90 TABLET ORAL at 17:18

## 2022-08-14 RX ADMIN — Medication 81 MILLIGRAM(S): at 12:08

## 2022-08-14 RX ADMIN — POLYETHYLENE GLYCOL 3350 17 GRAM(S): 17 POWDER, FOR SOLUTION ORAL at 06:39

## 2022-08-14 RX ADMIN — Medication 50 MILLIGRAM(S): at 06:38

## 2022-08-14 RX ADMIN — ENOXAPARIN SODIUM 40 MILLIGRAM(S): 100 INJECTION SUBCUTANEOUS at 21:22

## 2022-08-14 RX ADMIN — FAMOTIDINE 40 MILLIGRAM(S): 10 INJECTION INTRAVENOUS at 21:23

## 2022-08-14 RX ADMIN — Medication 2: at 12:06

## 2022-08-14 RX ADMIN — Medication 25 MILLIGRAM(S): at 06:38

## 2022-08-14 RX ADMIN — INSULIN GLARGINE 30 UNIT(S): 100 INJECTION, SOLUTION SUBCUTANEOUS at 07:58

## 2022-08-14 RX ADMIN — AMLODIPINE BESYLATE 5 MILLIGRAM(S): 2.5 TABLET ORAL at 21:22

## 2022-08-14 RX ADMIN — Medication 1 APPLICATION(S): at 06:39

## 2022-08-14 NOTE — PROGRESS NOTE ADULT - SUBJECTIVE AND OBJECTIVE BOX
Patient is a 77y old  Male who presents with a chief complaint of rehab s/p left ischemic stroke with aphasia and right hemiparesis (11 Aug 2022 11:30)      HPI:  Patient is a 76yo M with PMH of HTN, DM, HLD, CAD (s/p stents), GERD who presented to the ED on 8/3/2022 for right hand weakness. Patient noticed he started having difficulty with his speech, then developed weakness in his right hand and some visual field deficits. In the ED, patient received tPA. CT head at the time was negative, EDSON showed EF of 55-60%.  MRI head showed scattered left-sided acute cortical infarcts within the MCA and PCA territories, with trace petechial hemorrhage involving high left frontal region. There is a chronic left occipital lobe infarct. MRA of the neck showed moderate stenosis of the proximal right internal carotid artery. On 8/9, a right upper quadrant US was done to rule out cholecystitis, revealing an indeterminate 1.2 cm right renal lesion and moderate-severe hepatic steatosis. During the hospital stay, the visual field deficits improved but patient continued to have no motor function in the right hand and impaired RLE strength and mobility and ADL independence and a persistent aphasia.    On evaluation by PM&R, patient endorses no movement in his right hand and notes some difficulty with memory but otherwise has no other complaints. Patient is right handed. He lives by himself in a house with [address] and one flight of stairs inside (5+7 steps). Patient was previously independent in ADLs and ambulation, occasionally using a cane outdoors for balance. Currently patient requires mod assist for bed mobility, max assist with transfers, mod assist with ambulation 25 feet with RW and max assistance for dressing. He was evaluated by Physiatry on the medical service and was found to be a good acute rehab candidate.  (11 Aug 2022 11:30)      I examined the patient and reviewed the chart. There have been no significant changes since my history and physical except where documented below.    TODAY'S SUBJECTIVE & REVIEW OF SYMPTOMS:  Seen this AM. VSS.  No acute events. No complaints.     Constitutional    [ x  ] WNL           [   ] poor appetite   [   ] insomnia   [   ] tired   Cardio:                [  x ] WNL           [   ] CP   [   ] VELASCO   [   ] palpitations               Resp:                   [  x ] WNL           [   ] SOB   [   ] cough   [   ] wheezing   GI:                        [ x  ] WNL           [   ] constipation   [   ] diarrhea   [   ] abdominal pain   [   ] nausea   [   ] emesis                                :                      [ x  ] WNL           [   ] PRITCHARD  [   ] dysuria   [   ] difficulty voiding             Endo:                   [ x  ] WNL          [   ] polyuria   [   ] temperature intolerance                 Skin:                     [ x  ] WNL          [   ] pain   [   ] wound   [   ] rash   MSK:                    [   x] WNL          [   ] muscle pain   [   ] joint pain/ stiffness   [   ] muscle tenderness   [   ] swelling   Neuro:                 [   ] WNL          [   ] HA   [   ] change in vision   [   ] tremor   [ x  ] weakness- Right side   [   ]dysphagia   [x  ] word finding difficulties,   [ x ] numbness/ neuropathy both feet    Cognitive:           [ x  ] WNL           [   ]confusion   - daughter note some memory loss since stroke   Psych:                  [  x ] WNL           [   ] hallucinations   [   ]agitation   [   ] delusion   [   ]depression      PHYSICAL EXAM    ICU Vital Signs Last 24 Hrs  T(C): 36.4 (14 Aug 2022 05:58), Max: 36.5 (13 Aug 2022 21:35)  T(F): 97.6 (14 Aug 2022 05:58), Max: 97.7 (13 Aug 2022 21:35)  HR: 102 (14 Aug 2022 05:58) (90 - 102)  BP: 142/86 (14 Aug 2022 05:58) (136/86 - 142/86)  BP(mean): --  ABP: --  ABP(mean): --  RR: 18 (14 Aug 2022 05:58) (18 - 18)  SpO2: --        General:[  x ] NAD, Resting Comfortable,   [   ] other:                                HEENT: [  x ] NC/AT, EOMI, PERRL , Normal Conjunctivae,   [   ] other:  Cardio: [  x ] RRR, no murmer,   [   ] other:                              Pulm: [  x ] No Respiratory Distress,  Lungs CTAB,   [   ] other:                       Abdomen: [ x  ]ND/NT, Soft,   [   ] other:    : [ x  ] NO PRITCHARD CATHETER,   [  ] PRITCHARD CATHETER- no meatal tear, no discharge, [   ] other:                                            MSK: [ x  ] No joint swelling, Full PROM,   [    ] other:                                      Ext: [  x ]No C/C/E, No calf tenderness,   [   ]other:    Skin: [ x  ]intact,   [   ] other:                                                                   Neurological Examination:  Cognitive: [ x   ] AAO x 3,   [    ]  other:                                                                 Attention/ Concentration:  [ x   ] intact   [    ]  other:   Memory: [    ] intact,    [ x  ]  other:  mildly impaired  Mood/Affect: [  x  ] wnl,    [    ]  other:                                                                             Communication: [ x  ]Fluent, no dysarthria, following commands:  [   ] other:  CN II - XII:  [  x  ] intact, VFF  [    ] other: mild right facial droop                                                                                         Motor:   RIGHT UE: [   ] WNL,  [  x ] other: 0/5 grossly in all muscle groups  LEFT    UE: [  x ] WNL  RIGHT LE: [ x  ] WNL,  [   ] other: Hip flexion 3/5 with drift to bed, knee flexion 4/5, plantarflexion 5/5, dorsiflexion 5/5, toe extension 5/5  LEFT    LE: [  x ] WNL,  Hip flexion 4/5, knee flexion 5/5, plantarflexion 5/5, dorsiflexion 5/5, toe extension 5/5     Tone: [    ] wnl,   [ x   ]  other: mild increased tone in right triceps  DTRs: [ x  ]symmetric, 1+ [   ] other:   Coordination:   [  x  ] intact as testable,   [    ]   Sensory: [    ] Intact to light touch [ x   ] other:  decreased both distal LEs  Proprioception: impaired on testing of right great toe      MEDICATIONS  (STANDING):  amLODIPine   Tablet 5 milliGRAM(s) Oral at bedtime  aspirin enteric coated 81 milliGRAM(s) Oral daily  atorvastatin 80 milliGRAM(s) Oral at bedtime  citalopram 5 milliGRAM(s) Oral daily  dextrose 5%. 1000 milliLiter(s) (100 mL/Hr) IV Continuous <Continuous>  dextrose 50% Injectable 25 Gram(s) IV Push once  dextrose 50% Injectable 12.5 Gram(s) IV Push once  dextrose 50% Injectable 25 Gram(s) IV Push once  enoxaparin Injectable 40 milliGRAM(s) SubCutaneous every 24 hours  famotidine    Tablet 40 milliGRAM(s) Oral at bedtime  glucagon  Injectable 1 milliGRAM(s) IntraMuscular once  hydrochlorothiazide 25 milliGRAM(s) Oral daily  hydrocortisone 1% Cream 1 Application(s) Topical two times a day  insulin glargine Injectable (LANTUS) 30 Unit(s) SubCutaneous every morning  insulin lispro (ADMELOG) corrective regimen sliding scale   SubCutaneous three times a day before meals  insulin lispro Injectable (ADMELOG) 8 Unit(s) SubCutaneous three times a day before meals  lisinopril 40 milliGRAM(s) Oral daily  metoprolol tartrate 50 milliGRAM(s) Oral two times a day  polyethylene glycol 3350 17 Gram(s) Oral two times a day  senna 2 Tablet(s) Oral at bedtime  ticagrelor 90 milliGRAM(s) Oral every 12 hours    MEDICATIONS  (PRN):  bisacodyl Suppository 10 milliGRAM(s) Rectal daily PRN Constipation  dextrose Oral Gel 15 Gram(s) Oral once PRN Blood Glucose LESS THAN 70 milliGRAM(s)/deciliter  lactulose Syrup 20 Gram(s) Oral two times a day PRN Constipation  melatonin 5 milliGRAM(s) Oral at bedtime PRN Insomnia        RECENT LABS/IMAGING                          15.6   11.66 )-----------( 307      ( 12 Aug 2022 07:57 )             44.6     08-12    136  |  100  |  20  ----------------------------<  159<H>  4.2   |  23  |  1.1    Ca    9.6      12 Aug 2022 07:57  Mg     1.9     08-12    TPro  7.5  /  Alb  3.8  /  TBili  1.2  /  DBili  x   /  AST  37  /  ALT  32  /  AlkPhos  180<H>  08-12        POCT Blood Glucose.: 201 mg/dL (08-12-22 @ 11:25)  POCT Blood Glucose.: 143 mg/dL (08-12-22 @ 07:13)  POCT Blood Glucose.: 183 mg/dL (08-11-22 @ 21:32)  POCT Blood Glucose.: 85 mg/dL (08-11-22 @ 16:31)  POCT Blood Glucose.: 170 mg/dL (08-11-22 @ 11:16)  POCT Blood Glucose.: 129 mg/dL (08-11-22 @ 07:32)  POCT Blood Glucose.: 114 mg/dL (08-10-22 @ 21:53)  POCT Blood Glucose.: 132 mg/dL (08-10-22 @ 16:31)  POCT Blood Glucose.: 233 mg/dL (08-10-22 @ 11:20)  POCT Blood Glucose.: 174 mg/dL (08-10-22 @ 07:53)  POCT Blood Glucose.: 137 mg/dL (08-09-22 @ 21:08)  POCT Blood Glucose.: 137 mg/dL (08-09-22 @ 16:51)

## 2022-08-14 NOTE — PROGRESS NOTE ADULT - ASSESSMENT
Patient is a 76yo M with PMH HTN, DM, CAD (s/p stents), GERD who presented for right sided weakness and aphasia. Patient was found to have a left acute cortical infarct within the MCA and PCA territories. Patient presented with visual field deficits that has since improved, but is still with impaired strength, balance, mobility and ADLs and language. Patient was previously independent in all ADLs and activities. Current level of function is mod assist for bed mobility, max assist with transfers, mod assist with ambulation 25 feet with RW. Patient is a good candidate for rehab due to significantly decreased level of function from prior.    #Left acute ischemic stroke with right hemiparesis (dominant) and aphasia  s/p tPA on 8/3  EDSON showed EF of 55-60%  8/6 MRI head: scattered left-sided acute cortical infarcts within the MCA and PCA territories, with trace petechial hemorrhage involving high left frontal region. There is a chronic left occipital lobe infarct.   MRA of the neck: moderate stenosis of the proximal right internal carotid artery  - start PT/OT/ST, Neuropsych eval  - c/w 5mg celexa  - c/w ASA 81mg  - c/w Brilinta 90mg BID  - c/w Lipitor 80mg daily  - monitor vitals    NIHSS =7, mRankin = 4    #Diabetic Peripheral Polyneuropathy  - No pain. Teach patient skin monitoring an sensory awareness    #HTN   - c/w Amlodipine 5mg qd  - c/w Lisinopril 40mg   - c/w Lopressor 50mg BID    #Diabetes 2  Ha1c 8.2  - c/w 30U Lantus  - c/w 10U Lispro    #Leukocytosis - resolved   8/12 WBC 11.7  -repeat CBC tomorrow  -trended down, afebrile     #CAD  -continue ASA 81, Brilinta, BB, statin    #Renal lesion  On 8/9, a right upper quadrant US was done to rule out cholecystitis, revealing an indeterminate 1.2 cm right renal lesion and moderate-severe hepatic steatosis.  -outpatient followup, non urgent MRI with renal mass protocol    -GI/Bowel Mgmt: senna, dulcolax, lactulose, miralax    - Diet: Dash Carb Consistent diet       Precautions / PROPHYLAXIS:      - Falls    - Ortho: Weight bearing status: WBAT      - DVT prophylaxis: Lovenox 40mg subQ

## 2022-08-15 LAB
GLUCOSE BLDC GLUCOMTR-MCNC: 121 MG/DL — HIGH (ref 70–99)
GLUCOSE BLDC GLUCOMTR-MCNC: 156 MG/DL — HIGH (ref 70–99)
GLUCOSE BLDC GLUCOMTR-MCNC: 178 MG/DL — HIGH (ref 70–99)
GLUCOSE BLDC GLUCOMTR-MCNC: 201 MG/DL — HIGH (ref 70–99)

## 2022-08-15 RX ADMIN — ENOXAPARIN SODIUM 40 MILLIGRAM(S): 100 INJECTION SUBCUTANEOUS at 21:32

## 2022-08-15 RX ADMIN — Medication 2: at 12:37

## 2022-08-15 RX ADMIN — Medication 25 MILLIGRAM(S): at 06:09

## 2022-08-15 RX ADMIN — Medication 81 MILLIGRAM(S): at 12:36

## 2022-08-15 RX ADMIN — Medication 8 UNIT(S): at 08:11

## 2022-08-15 RX ADMIN — Medication 1: at 17:58

## 2022-08-15 RX ADMIN — Medication 8 UNIT(S): at 18:00

## 2022-08-15 RX ADMIN — ATORVASTATIN CALCIUM 80 MILLIGRAM(S): 80 TABLET, FILM COATED ORAL at 21:31

## 2022-08-15 RX ADMIN — Medication 8 UNIT(S): at 12:38

## 2022-08-15 RX ADMIN — Medication 1 APPLICATION(S): at 06:09

## 2022-08-15 RX ADMIN — SENNA PLUS 2 TABLET(S): 8.6 TABLET ORAL at 21:31

## 2022-08-15 RX ADMIN — Medication 1 APPLICATION(S): at 17:58

## 2022-08-15 RX ADMIN — TICAGRELOR 90 MILLIGRAM(S): 90 TABLET ORAL at 18:00

## 2022-08-15 RX ADMIN — AMLODIPINE BESYLATE 5 MILLIGRAM(S): 2.5 TABLET ORAL at 21:31

## 2022-08-15 RX ADMIN — CITALOPRAM 5 MILLIGRAM(S): 10 TABLET, FILM COATED ORAL at 12:36

## 2022-08-15 RX ADMIN — INSULIN GLARGINE 30 UNIT(S): 100 INJECTION, SOLUTION SUBCUTANEOUS at 08:11

## 2022-08-15 RX ADMIN — FAMOTIDINE 40 MILLIGRAM(S): 10 INJECTION INTRAVENOUS at 21:31

## 2022-08-15 RX ADMIN — Medication 50 MILLIGRAM(S): at 18:00

## 2022-08-15 RX ADMIN — TICAGRELOR 90 MILLIGRAM(S): 90 TABLET ORAL at 06:08

## 2022-08-15 NOTE — PROGRESS NOTE ADULT - SUBJECTIVE AND OBJECTIVE BOX
Patient is a 77y old  Male who presents with a chief complaint of rehab s/p left ischemic stroke with aphasia and right hemiparesis (11 Aug 2022 11:30)      HPI:  Patient is a 78yo M with PMH of HTN, DM, HLD, CAD (s/p stents), GERD who presented to the ED on 8/3/2022 for right hand weakness. Patient noticed he started having difficulty with his speech, then developed weakness in his right hand and some visual field deficits. In the ED, patient received tPA. CT head at the time was negative, EDSON showed EF of 55-60%.  MRI head showed scattered left-sided acute cortical infarcts within the MCA and PCA territories, with trace petechial hemorrhage involving high left frontal region. There is a chronic left occipital lobe infarct. MRA of the neck showed moderate stenosis of the proximal right internal carotid artery. On 8/9, a right upper quadrant US was done to rule out cholecystitis, revealing an indeterminate 1.2 cm right renal lesion and moderate-severe hepatic steatosis. During the hospital stay, the visual field deficits improved but patient continued to have no motor function in the right hand and impaired RLE strength and mobility and ADL independence and a persistent aphasia.    On evaluation by PM&R, patient endorses no movement in his right hand and notes some difficulty with memory but otherwise has no other complaints. Patient is right handed. He lives by himself in a house with 5 [address] and one flight of stairs inside (5+7 steps). Patient was previously independent in ADLs and ambulation, occasionally using a cane outdoors for balance. Currently patient requires mod assist for bed mobility, max assist with transfers, mod assist with ambulation 25 feet with RW and max assistance for dressing. He was evaluated by Physiatry on the medical service and was found to be a good acute rehab candidate.  (11 Aug 2022 11:30)      I examined the patient and reviewed the chart. There have been no significant changes since my history and physical except where documented below.    TODAY'S SUBJECTIVE & REVIEW OF SYMPTOMS:  Seen this AM. VSS.  No acute events. No complaints.     Constitutional    [ x  ] WNL           [   ] poor appetite   [   ] insomnia   [   ] tired   Cardio:                [  x ] WNL           [   ] CP   [   ] VELASCO   [   ] palpitations               Resp:                   [  x ] WNL           [   ] SOB   [   ] cough   [   ] wheezing   GI:                        [ x  ] WNL           [   ] constipation   [   ] diarrhea   [   ] abdominal pain   [   ] nausea   [   ] emesis                                :                      [ x  ] WNL           [   ] PRITCHARD  [   ] dysuria   [   ] difficulty voiding             Endo:                   [ x  ] WNL          [   ] polyuria   [   ] temperature intolerance                 Skin:                     [ x  ] WNL          [   ] pain   [   ] wound   [   ] rash   MSK:                    [   x] WNL          [   ] muscle pain   [   ] joint pain/ stiffness   [   ] muscle tenderness   [   ] swelling   Neuro:                 [   ] WNL          [   ] HA   [   ] change in vision   [   ] tremor   [ x  ] weakness- Right side   [   ]dysphagia   [x  ] word finding difficulties,   [ x ] numbness/ neuropathy both feet    Cognitive:           [ x  ] WNL           [   ]confusion   - daughter note some memory loss since stroke   Psych:                  [  x ] WNL           [   ] hallucinations   [   ]agitation   [   ] delusion   [   ]depression      PHYSICAL EXAM    ICU Vital Signs Last 24 Hrs  T(C): 36.4 (15 Aug 2022 06:15), Max: 36.6 (14 Aug 2022 20:44)  T(F): 97.6 (15 Aug 2022 06:15), Max: 97.9 (14 Aug 2022 20:44)  HR: 80 (15 Aug 2022 06:15) (79 - 90)  BP: 116/68 (15 Aug 2022 06:15) (116/68 - 130/88)  BP(mean): 94 (14 Aug 2022 15:06) (94 - 94)  ABP: --  ABP(mean): --  RR: 20 (15 Aug 2022 06:15) (18 - 20)  SpO2: --            General:[  x ] NAD, Resting Comfortable,   [   ] other:                                HEENT: [  x ] NC/AT, EOMI, PERRL , Normal Conjunctivae,   [   ] other:  Cardio: [  x ] RRR, no murmer,   [   ] other:                              Pulm: [  x ] No Respiratory Distress,  Lungs CTAB,   [   ] other:                       Abdomen: [ x  ]ND/NT, Soft,   [   ] other:    : [ x  ] NO PRITCHARD CATHETER,   [  ] PRITCHARD CATHETER- no meatal tear, no discharge, [   ] other:                                            MSK: [ x  ] No joint swelling, Full PROM,   [    ] other:                                      Ext: [  x ]No C/C/E, No calf tenderness,   [   ]other:    Skin: [ x  ]intact,   [   ] other:                                                                   Neurological Examination:  Cognitive: [ x   ] AAO x 3,   [    ]  other:                                                                 Attention/ Concentration:  [ x   ] intact   [    ]  other:   Memory: [    ] intact,    [ x  ]  other:  mildly impaired  Mood/Affect: [  x  ] wnl,    [    ]  other:                                                                             Communication: [ x  ]Fluent, no dysarthria, following commands:  [   ] other:  CN II - XII:  [  x  ] intact, VFF  [    ] other: mild right facial droop                                                                                         Motor:   RIGHT UE: [   ] WNL,  [  x ] other: 0/5 grossly in all muscle groups  LEFT    UE: [  x ] WNL  RIGHT LE: [ x  ] WNL,  [   ] other: Hip flexion 3/5 with drift to bed, knee flexion 4/5, plantarflexion 5/5, dorsiflexion 5/5, toe extension 5/5  LEFT    LE: [  x ] WNL,  Hip flexion 4/5, knee flexion 5/5, plantarflexion 5/5, dorsiflexion 5/5, toe extension 5/5     Tone: [    ] wnl,   [ x   ]  other: mild increased tone in right triceps  DTRs: [ x  ]symmetric, 1+ [   ] other:   Coordination:   [  x  ] intact as testable,   [    ]   Sensory: [    ] Intact to light touch [ x   ] other:  decreased both distal LEs  Proprioception: impaired on testing of right great toe      MEDICATIONS  (STANDING):  amLODIPine   Tablet 5 milliGRAM(s) Oral at bedtime  aspirin enteric coated 81 milliGRAM(s) Oral daily  atorvastatin 80 milliGRAM(s) Oral at bedtime  citalopram 5 milliGRAM(s) Oral daily  dextrose 5%. 1000 milliLiter(s) (100 mL/Hr) IV Continuous <Continuous>  dextrose 50% Injectable 25 Gram(s) IV Push once  dextrose 50% Injectable 12.5 Gram(s) IV Push once  dextrose 50% Injectable 25 Gram(s) IV Push once  enoxaparin Injectable 40 milliGRAM(s) SubCutaneous every 24 hours  famotidine    Tablet 40 milliGRAM(s) Oral at bedtime  glucagon  Injectable 1 milliGRAM(s) IntraMuscular once  hydrochlorothiazide 25 milliGRAM(s) Oral daily  hydrocortisone 1% Cream 1 Application(s) Topical two times a day  insulin glargine Injectable (LANTUS) 30 Unit(s) SubCutaneous every morning  insulin lispro (ADMELOG) corrective regimen sliding scale   SubCutaneous three times a day before meals  insulin lispro Injectable (ADMELOG) 8 Unit(s) SubCutaneous three times a day before meals  lisinopril 40 milliGRAM(s) Oral daily  metoprolol tartrate 50 milliGRAM(s) Oral two times a day  polyethylene glycol 3350 17 Gram(s) Oral two times a day  senna 2 Tablet(s) Oral at bedtime  ticagrelor 90 milliGRAM(s) Oral every 12 hours    MEDICATIONS  (PRN):  bisacodyl Suppository 10 milliGRAM(s) Rectal daily PRN Constipation  dextrose Oral Gel 15 Gram(s) Oral once PRN Blood Glucose LESS THAN 70 milliGRAM(s)/deciliter  lactulose Syrup 20 Gram(s) Oral two times a day PRN Constipation  melatonin 5 milliGRAM(s) Oral at bedtime PRN Insomnia        RECENT LABS/IMAGING                          15.6   11.66 )-----------( 307      ( 12 Aug 2022 07:57 )             44.6     08-12    136  |  100  |  20  ----------------------------<  159<H>  4.2   |  23  |  1.1    Ca    9.6      12 Aug 2022 07:57  Mg     1.9     08-12    TPro  7.5  /  Alb  3.8  /  TBili  1.2  /  DBili  x   /  AST  37  /  ALT  32  /  AlkPhos  180<H>  08-12        POCT Blood Glucose.: 201 mg/dL (08-12-22 @ 11:25)  POCT Blood Glucose.: 143 mg/dL (08-12-22 @ 07:13)  POCT Blood Glucose.: 183 mg/dL (08-11-22 @ 21:32)  POCT Blood Glucose.: 85 mg/dL (08-11-22 @ 16:31)  POCT Blood Glucose.: 170 mg/dL (08-11-22 @ 11:16)  POCT Blood Glucose.: 129 mg/dL (08-11-22 @ 07:32)  POCT Blood Glucose.: 114 mg/dL (08-10-22 @ 21:53)  POCT Blood Glucose.: 132 mg/dL (08-10-22 @ 16:31)  POCT Blood Glucose.: 233 mg/dL (08-10-22 @ 11:20)  POCT Blood Glucose.: 174 mg/dL (08-10-22 @ 07:53)  POCT Blood Glucose.: 137 mg/dL (08-09-22 @ 21:08)  POCT Blood Glucose.: 137 mg/dL (08-09-22 @ 16:51)       Patient is a 77y old  Male who presents with a chief complaint of rehab s/p left ischemic stroke with aphasia and right hemiparesis (11 Aug 2022 11:30)      HPI:  Patient is a 78yo M with PMH of HTN, DM, HLD, CAD (s/p stents), GERD who presented to the ED on 8/3/2022 for right hand weakness. Patient noticed he started having difficulty with his speech, then developed weakness in his right hand and some visual field deficits. In the ED, patient received tPA. CT head at the time was negative, EDSON showed EF of 55-60%.  MRI head showed scattered left-sided acute cortical infarcts within the MCA and PCA territories, with trace petechial hemorrhage involving high left frontal region. There is a chronic left occipital lobe infarct. MRA of the neck showed moderate stenosis of the proximal right internal carotid artery. On 8/9, a right upper quadrant US was done to rule out cholecystitis, revealing an indeterminate 1.2 cm right renal lesion and moderate-severe hepatic steatosis. During the hospital stay, the visual field deficits improved but patient continued to have no motor function in the right hand and impaired RLE strength and mobility and ADL independence and a persistent aphasia.    On evaluation by PM&R, patient endorses no movement in his right hand and notes some difficulty with memory but otherwise has no other complaints. Patient is right handed. He lives by himself in a house with [address] and one flight of stairs inside (5+7 steps). Patient was previously independent in ADLs and ambulation, occasionally using a cane outdoors for balance. Currently patient requires mod assist for bed mobility, max assist with transfers, mod assist with ambulation 25 feet with RW and max assistance for dressing. He was evaluated by Physiatry on the medical service and was found to be a good acute rehab candidate.  (11 Aug 2022 11:30)      I examined the patient and reviewed the chart. There have been no significant changes since my history and physical except where documented below.    TODAY'S SUBJECTIVE & REVIEW OF SYMPTOMS:  Seen this AM. VSS.  No acute events. No complaints.     Constitutional    [ x  ] WNL           [   ] poor appetite   [   ] insomnia   [   ] tired   Cardio:                [  x ] WNL           [   ] CP   [   ] VELASCO   [   ] palpitations               Resp:                   [  x ] WNL           [   ] SOB   [   ] cough   [   ] wheezing   GI:                        [ x  ] WNL           [   ] constipation   [   ] diarrhea   [   ] abdominal pain   [   ] nausea   [   ] emesis                                :                      [ x  ] WNL           [   ] PRITCHARD  [   ] dysuria   [   ] difficulty voiding             Endo:                   [ x  ] WNL          [   ] polyuria   [   ] temperature intolerance                 Skin:                     [ x  ] WNL          [   ] pain   [   ] wound   [   ] rash   MSK:                    [   x] WNL          [   ] muscle pain   [   ] joint pain/ stiffness   [   ] muscle tenderness   [   ] swelling   Neuro:                 [   ] WNL          [   ] HA   [   ] change in vision   [   ] tremor   [ x  ] weakness- Right side   [   ]dysphagia   [x  ] word finding difficulties,   [ x ] numbness/ neuropathy both feet    Cognitive:           [ x  ] WNL           [   ]confusion   - daughter note some memory loss since stroke   Psych:                  [  x ] WNL           [   ] hallucinations   [   ]agitation   [   ] delusion   [   ]depression      PHYSICAL EXAM    ICU Vital Signs Last 24 Hrs  T(C): 36.4 (15 Aug 2022 06:15), Max: 36.6 (14 Aug 2022 20:44)  T(F): 97.6 (15 Aug 2022 06:15), Max: 97.9 (14 Aug 2022 20:44)  HR: 80 (15 Aug 2022 06:15) (79 - 90)  BP: 116/68 (15 Aug 2022 06:15) (116/68 - 130/88)  BP(mean): 94 (14 Aug 2022 15:06) (94 - 94)  ABP: --  ABP(mean): --  RR: 20 (15 Aug 2022 06:15) (18 - 20)  SpO2: --            General:[  x ] NAD, Resting Comfortable,   [   ] other:                                HEENT: [  x ] NC/AT, EOMI, PERRL , Normal Conjunctivae,   [   ] other:  Cardio: [  x ] RRR, no murmer,   [   ] other:                              Pulm: [  x ] No Respiratory Distress,  Lungs CTAB,   [   ] other:                       Abdomen: [ x  ]ND/NT, Soft,   [   ] other:    : [ x  ] NO PRITCHARD CATHETER,   [  ] PRITCHARD CATHETER- no meatal tear, no discharge, [   ] other:                                            MSK: [ x  ] No joint swelling, Full PROM,   [    ] other:                                      Ext: [  x ]No C/C/E, No calf tenderness,   [   ]other:    Skin: [ x  ]intact,   [   ] other:                                                                   Neurological Examination:  Cognitive: [ x   ] AAO x 3,   [    ]  other:                                                                 Attention/ Concentration:  [ x   ] intact   [    ]  other:   Memory: [    ] intact,    [ x  ]  other:  mildly impaired  Mood/Affect: [  x  ] wnl,    [    ]  other:                                                                             Communication: [ x  ]Fluent, no dysarthria, following commands:  [   ] other:  CN II - XII:  [  x  ] intact, VFF  [    ] other: mild right facial droop                                                                                         Motor:   RIGHT UE: [   ] WNL,  [  x ] other: 0/5 grossly in all muscle groups  LEFT    UE: [  x ] WNL  RIGHT LE: [ x  ] WNL,  [   ] other: Hip flexion 3/5 with drift to bed, knee flexion 4/5, plantarflexion 5/5, dorsiflexion 5/5, toe extension 5/5  LEFT    LE: [  x ] WNL,  Hip flexion 4/5, knee flexion 5/5, plantarflexion 5/5, dorsiflexion 5/5, toe extension 5/5     Tone: [    ] wnl,   [ x   ]  other: mild increased tone in right triceps  DTRs: [ x  ]symmetric, 1+ [   ] other:   Coordination:   [  x  ] intact as testable,   [    ]   Sensory: [    ] Intact to light touch [ x   ] other:  decreased both distal LEs  Proprioception: impaired on testing of right great toe      CLOF:   Bed mobility- PA   Transfers- mod A   Gait: 25 x2 PA 3 point cane     MEDICATIONS  (STANDING):  amLODIPine   Tablet 5 milliGRAM(s) Oral at bedtime  aspirin enteric coated 81 milliGRAM(s) Oral daily  atorvastatin 80 milliGRAM(s) Oral at bedtime  citalopram 5 milliGRAM(s) Oral daily  dextrose 5%. 1000 milliLiter(s) (100 mL/Hr) IV Continuous <Continuous>  dextrose 50% Injectable 25 Gram(s) IV Push once  dextrose 50% Injectable 12.5 Gram(s) IV Push once  dextrose 50% Injectable 25 Gram(s) IV Push once  enoxaparin Injectable 40 milliGRAM(s) SubCutaneous every 24 hours  famotidine    Tablet 40 milliGRAM(s) Oral at bedtime  glucagon  Injectable 1 milliGRAM(s) IntraMuscular once  hydrochlorothiazide 25 milliGRAM(s) Oral daily  hydrocortisone 1% Cream 1 Application(s) Topical two times a day  insulin glargine Injectable (LANTUS) 30 Unit(s) SubCutaneous every morning  insulin lispro (ADMELOG) corrective regimen sliding scale   SubCutaneous three times a day before meals  insulin lispro Injectable (ADMELOG) 8 Unit(s) SubCutaneous three times a day before meals  lisinopril 40 milliGRAM(s) Oral daily  metoprolol tartrate 50 milliGRAM(s) Oral two times a day  polyethylene glycol 3350 17 Gram(s) Oral two times a day  senna 2 Tablet(s) Oral at bedtime  ticagrelor 90 milliGRAM(s) Oral every 12 hours    MEDICATIONS  (PRN):  bisacodyl Suppository 10 milliGRAM(s) Rectal daily PRN Constipation  dextrose Oral Gel 15 Gram(s) Oral once PRN Blood Glucose LESS THAN 70 milliGRAM(s)/deciliter  lactulose Syrup 20 Gram(s) Oral two times a day PRN Constipation  melatonin 5 milliGRAM(s) Oral at bedtime PRN Insomnia        RECENT LABS/IMAGING                          15.6   11.66 )-----------( 307      ( 12 Aug 2022 07:57 )             44.6     08-12    136  |  100  |  20  ----------------------------<  159<H>  4.2   |  23  |  1.1    Ca    9.6      12 Aug 2022 07:57  Mg     1.9     08-12    TPro  7.5  /  Alb  3.8  /  TBili  1.2  /  DBili  x   /  AST  37  /  ALT  32  /  AlkPhos  180<H>  08-12        POCT Blood Glucose.: 201 mg/dL (08-12-22 @ 11:25)  POCT Blood Glucose.: 143 mg/dL (08-12-22 @ 07:13)  POCT Blood Glucose.: 183 mg/dL (08-11-22 @ 21:32)  POCT Blood Glucose.: 85 mg/dL (08-11-22 @ 16:31)  POCT Blood Glucose.: 170 mg/dL (08-11-22 @ 11:16)  POCT Blood Glucose.: 129 mg/dL (08-11-22 @ 07:32)  POCT Blood Glucose.: 114 mg/dL (08-10-22 @ 21:53)  POCT Blood Glucose.: 132 mg/dL (08-10-22 @ 16:31)  POCT Blood Glucose.: 233 mg/dL (08-10-22 @ 11:20)  POCT Blood Glucose.: 174 mg/dL (08-10-22 @ 07:53)  POCT Blood Glucose.: 137 mg/dL (08-09-22 @ 21:08)  POCT Blood Glucose.: 137 mg/dL (08-09-22 @ 16:51)       Patient is a 77y old  Male who presents with a chief complaint of rehab s/p left ischemic stroke with aphasia and right hemiparesis (11 Aug 2022 11:30)      HPI:  Patient is a 76yo M with PMH of HTN, DM, HLD, CAD (s/p stents), GERD who presented to the ED on 8/3/2022 for right hand weakness. Patient noticed he started having difficulty with his speech, then developed weakness in his right hand and some visual field deficits. In the ED, patient received tPA. CT head at the time was negative, EDSON showed EF of 55-60%.  MRI head showed scattered left-sided acute cortical infarcts within the MCA and PCA territories, with trace petechial hemorrhage involving high left frontal region. There is a chronic left occipital lobe infarct. MRA of the neck showed moderate stenosis of the proximal right internal carotid artery. On 8/9, a right upper quadrant US was done to rule out cholecystitis, revealing an indeterminate 1.2 cm right renal lesion and moderate-severe hepatic steatosis. During the hospital stay, the visual field deficits improved but patient continued to have no motor function in the right hand and impaired RLE strength and mobility and ADL independence and a persistent aphasia.    On evaluation by PM&R, patient endorses no movement in his right hand and notes some difficulty with memory but otherwise has no other complaints. Patient is right handed. He lives by himself in a house with [address] and one flight of stairs inside (5+7 steps). Patient was previously independent in ADLs and ambulation, occasionally using a cane outdoors for balance. Currently patient requires mod assist for bed mobility, max assist with transfers, mod assist with ambulation 25 feet with RW and max assistance for dressing. He was evaluated by Physiatry on the medical service and was found to be a good acute rehab candidate.  (11 Aug 2022 11:30)    TODAY'S SUBJECTIVE & REVIEW OF SYMPTOMS:  Seen this AM. VSS.  No acute events. No complaints. Notes that he has some proximal RUE movement lying in  bed with gravity eliminated.    Constitutional    [ x  ] WNL           [   ] poor appetite   [   ] insomnia   [   ] tired   Cardio:                [  x ] WNL           [   ] CP   [   ] VELASCO   [   ] palpitations               Resp:                   [  x ] WNL           [   ] SOB   [   ] cough   [   ] wheezing   GI:                        [ x  ] WNL           [   ] constipation   [   ] diarrhea   [   ] abdominal pain   [   ] nausea   [   ] emesis                                :                      [ x  ] WNL           [   ] PRITCHARD  [   ] dysuria   [   ] difficulty voiding             Endo:                   [ x  ] WNL          [   ] polyuria   [   ] temperature intolerance                 Skin:                     [ x  ] WNL          [   ] pain   [   ] wound   [   ] rash   MSK:                    [   x] WNL          [   ] muscle pain   [   ] joint pain/ stiffness   [   ] muscle tenderness   [   ] swelling   Neuro:                 [   ] WNL          [   ] HA   [   ] change in vision   [   ] tremor   [ x  ] weakness- Right side   [   ]dysphagia   [x  ] word finding difficulties,   [ x ] numbness/ neuropathy both feet    Cognitive:           [ x  ] WNL           [   ]confusion   - daughter note some memory loss since stroke   Psych:                  [  x ] WNL           [   ] hallucinations   [   ]agitation   [   ] delusion   [   ]depression      PHYSICAL EXAM    ICU Vital Signs Last 24 Hrs  T(C): 36.4 (15 Aug 2022 06:15), Max: 36.6 (14 Aug 2022 20:44)  T(F): 97.6 (15 Aug 2022 06:15), Max: 97.9 (14 Aug 2022 20:44)  HR: 80 (15 Aug 2022 06:15) (79 - 90)  BP: 116/68 (15 Aug 2022 06:15) (116/68 - 130/88)  BP(mean): 94 (14 Aug 2022 15:06) (94 - 94)  ABP: --  ABP(mean): --  RR: 20 (15 Aug 2022 06:15) (18 - 20)  SpO2: --    General:[  x ] NAD, Resting Comfortable,   [   ] other:                                HEENT: [  x ] NC/AT, EOMI, PERRL , Normal Conjunctivae,   [   ] other:  Cardio: [  x ] RRR, no murmer,   [   ] other:                              Pulm: [  x ] No Respiratory Distress,  Lungs CTAB,   [   ] other:                       Abdomen: [ x  ]ND/NT, Soft,   [   ] other:    : [ x  ] NO PRITCHARD CATHETER,   [  ] PRITCHARD CATHETER- no meatal tear, no discharge, [   ] other:                                            MSK: [ x  ] No joint swelling, Full PROM,   [    ] other:                                      Ext: [  x ]No C/C/E, No calf tenderness,   [   ]other:    Skin: [ x  ]intact,   [   ] other:                                                                   Neurological Examination:  Cognitive: [ x   ] AAO x 3,   [    ]  other:                                                                 Attention/ Concentration:  [ x   ] intact   [    ]  other:   Memory: [    ] intact,    [ x  ]  other:  mildly impaired  Mood/Affect: [  x  ] wnl,    [    ]  other:                                                                             Communication: [ x  ]Fluent, no dysarthria, following commands:  [   ] other:  CN II - XII:  [  x  ] intact, VFF  [    ] other: mild right facial droop                                                                                         Motor:   RIGHT UE: [   ] WNL,  [  x ] other: 0/5 grossly in all muscle groups  LEFT    UE: [  x ] WNL  RIGHT LE: [ x  ] WNL,  [   ] other: Hip flexion 3/5 with drift to bed, knee flexion 4/5, plantarflexion 5/5, dorsiflexion 5/5, toe extension 5/5  LEFT    LE: [  x ] WNL,  Hip flexion 4/5, knee flexion 5/5, plantarflexion 5/5, dorsiflexion 5/5, toe extension 5/5     Tone: [    ] wnl,   [ x   ]  other: mild increased tone in right triceps  DTRs: [ x  ]symmetric, 1+ [   ] other:   Coordination:   [  x  ] intact as testable,   [    ]   Sensory: [    ] Intact to light touch [ x   ] other:  decreased both distal LEs  Proprioception: impaired on testing of right great toe      CLOF:   Bed mobility- PA   Transfers- mod A   Gait: 25 x2 PA 3 point cane     MEDICATIONS  (STANDING):  amLODIPine   Tablet 5 milliGRAM(s) Oral at bedtime  aspirin enteric coated 81 milliGRAM(s) Oral daily  atorvastatin 80 milliGRAM(s) Oral at bedtime  citalopram 5 milliGRAM(s) Oral daily  dextrose 5%. 1000 milliLiter(s) (100 mL/Hr) IV Continuous <Continuous>  dextrose 50% Injectable 25 Gram(s) IV Push once  dextrose 50% Injectable 12.5 Gram(s) IV Push once  dextrose 50% Injectable 25 Gram(s) IV Push once  enoxaparin Injectable 40 milliGRAM(s) SubCutaneous every 24 hours  famotidine    Tablet 40 milliGRAM(s) Oral at bedtime  glucagon  Injectable 1 milliGRAM(s) IntraMuscular once  hydrochlorothiazide 25 milliGRAM(s) Oral daily  hydrocortisone 1% Cream 1 Application(s) Topical two times a day  insulin glargine Injectable (LANTUS) 30 Unit(s) SubCutaneous every morning  insulin lispro (ADMELOG) corrective regimen sliding scale   SubCutaneous three times a day before meals  insulin lispro Injectable (ADMELOG) 8 Unit(s) SubCutaneous three times a day before meals  lisinopril 40 milliGRAM(s) Oral daily  metoprolol tartrate 50 milliGRAM(s) Oral two times a day  polyethylene glycol 3350 17 Gram(s) Oral two times a day  senna 2 Tablet(s) Oral at bedtime  ticagrelor 90 milliGRAM(s) Oral every 12 hours    MEDICATIONS  (PRN):  bisacodyl Suppository 10 milliGRAM(s) Rectal daily PRN Constipation  dextrose Oral Gel 15 Gram(s) Oral once PRN Blood Glucose LESS THAN 70 milliGRAM(s)/deciliter  lactulose Syrup 20 Gram(s) Oral two times a day PRN Constipation  melatonin 5 milliGRAM(s) Oral at bedtime PRN Insomnia        RECENT LABS/IMAGING    POCT Blood Glucose.: 201 mg/dL (08-15-22 @ 12:13)  POCT Blood Glucose.: 121 mg/dL (08-15-22 @ 07:41)  POCT Blood Glucose.: 111 mg/dL (08-14-22 @ 22:28)  POCT Blood Glucose.: 83 mg/dL (08-14-22 @ 16:54)  POCT Blood Glucose.: 210 mg/dL (08-14-22 @ 12:02)  POCT Blood Glucose.: 141 mg/dL (08-14-22 @ 07:54)  POCT Blood Glucose.: 110 mg/dL (08-13-22 @ 22:05)  POCT Blood Glucose.: 71 mg/dL (08-13-22 @ 15:52)  POCT Blood Glucose.: 203 mg/dL (08-13-22 @ 11:38)  POCT Blood Glucose.: 145 mg/dL (08-13-22 @ 07:40)  POCT Blood Glucose.: 140 mg/dL (08-12-22 @ 23:56)  POCT Blood Glucose.: 80 mg/dL (08-12-22 @ 22:21)                          15.6   11.66 )-----------( 307      ( 12 Aug 2022 07:57 )             44.6     08-12    136  |  100  |  20  ----------------------------<  159<H>  4.2   |  23  |  1.1    Ca    9.6      12 Aug 2022 07:57  Mg     1.9     08-12    TPro  7.5  /  Alb  3.8  /  TBili  1.2  /  DBili  x   /  AST  37  /  ALT  32  /  AlkPhos  180<H>  08-12

## 2022-08-15 NOTE — PROGRESS NOTE ADULT - ASSESSMENT
Patient is a 78yo M with PMH HTN, DM, CAD (s/p stents), GERD who presented for right sided weakness and aphasia. Patient was found to have a left acute cortical infarct within the MCA and PCA territories. Patient presented with visual field deficits that has since improved, but is still with impaired strength, balance, mobility and ADLs and language. Patient was previously independent in all ADLs and activities. Current level of function is mod assist for bed mobility, max assist with transfers, mod assist with ambulation 25 feet with RW. Patient is a good candidate for rehab due to significantly decreased level of function from prior.    #Left acute ischemic stroke with right hemiparesis (dominant) and aphasia  s/p tPA on 8/3  EDSON showed EF of 55-60%  8/6 MRI head: scattered left-sided acute cortical infarcts within the MCA and PCA territories, with trace petechial hemorrhage involving high left frontal region. There is a chronic left occipital lobe infarct.   MRA of the neck: moderate stenosis of the proximal right internal carotid artery  - start PT/OT/ST, Neuropsych eval  - c/w 5mg celexa  - c/w ASA 81mg  - c/w Brilinta 90mg BID  - c/w Lipitor 80mg daily  - monitor vitals    NIHSS =7, mRankin = 4    #Diabetic Peripheral Polyneuropathy  - No pain. Teach patient skin monitoring an sensory awareness    #HTN   - c/w Amlodipine 5mg qd  - c/w Lisinopril 40mg   - c/w Lopressor 50mg BID    #Diabetes 2  Ha1c 8.2  - c/w 30U Lantus  - c/w 10U Lispro    #Leukocytosis - resolved   8/12 WBC 11.7  -repeat CBC tomorrow  -trended down, afebrile     #CAD  -continue ASA 81, Brilinta, BB, statin    #Renal lesion  On 8/9, a right upper quadrant US was done to rule out cholecystitis, revealing an indeterminate 1.2 cm right renal lesion and moderate-severe hepatic steatosis.  -outpatient followup, non urgent MRI with renal mass protocol    -GI/Bowel Mgmt: senna, dulcolax, lactulose, miralax    - Diet: Dash Carb Consistent diet       Precautions / PROPHYLAXIS:      - Falls    - Ortho: Weight bearing status: WBAT      - DVT prophylaxis: Lovenox 40mg subQ   Patient is a 76yo M with PMH HTN, DM, CAD (s/p stents), GERD who presented for right sided weakness and aphasia. Patient was found to have a left acute cortical infarct within the MCA and PCA territories. Patient presented with visual field deficits that has since improved, but is still with impaired strength, balance, mobility and ADLs and language. Patient was previously independent in all ADLs and activities. Current level of function is mod assist for bed mobility, max assist with transfers, mod assist with ambulation 25 feet with RW. Patient is a good candidate for rehab due to significantly decreased level of function from prior.    #Left acute ischemic stroke with right hemiparesis (dominant) and aphasia  s/p tPA on 8/3  EDSON showed EF of 55-60%  8/6 MRI head: scattered left-sided acute cortical infarcts within the MCA and PCA territories, with trace petechial hemorrhage involving high left frontal region. There is a chronic left occipital lobe infarct.   MRA of the neck: moderate stenosis of the proximal right internal carotid artery  - start PT/OT/ST, Neuropsych eval  - c/w 5mg celexa  - c/w ASA 81mg  - c/w Brilinta 90mg BID  - c/w Lipitor 80mg daily  - monitor vitals    NIHSS =7, mRankin = 4    #Diabetic Peripheral Polyneuropathy  - No pain. Teach patient skin monitoring an sensory awareness    #HTN   - c/w Amlodipine 5mg qd  - c/w Lisinopril 40mg   - c/w Lopressor 50mg BID    #Diabetes 2  Ha1c 8.2  - c/w 30U Lantus  - c/w 8U Lispro    #CAD  -continue ASA 81, Brilinta, BB, statin    #Renal lesion  On 8/9, a right upper quadrant US was done to rule out cholecystitis, revealing an indeterminate 1.2 cm right renal lesion and moderate-severe hepatic steatosis.  -outpatient followup, non urgent MRI with renal mass protocol    -GI/Bowel Mgmt: senna, dulcolax, lactulose, miralax    - Diet: Dash Carb Consistent diet       Precautions / PROPHYLAXIS:      - Falls    - Ortho: Weight bearing status: WBAT      - DVT prophylaxis: Lovenox 40mg subQ

## 2022-08-16 LAB
ALBUMIN SERPL ELPH-MCNC: 3.7 G/DL — SIGNIFICANT CHANGE UP (ref 3.5–5.2)
ALP SERPL-CCNC: 179 U/L — HIGH (ref 30–115)
ALT FLD-CCNC: 52 U/L — HIGH (ref 0–41)
ANION GAP SERPL CALC-SCNC: 13 MMOL/L — SIGNIFICANT CHANGE UP (ref 7–14)
APPEARANCE UR: CLEAR — SIGNIFICANT CHANGE UP
AST SERPL-CCNC: 30 U/L — SIGNIFICANT CHANGE UP (ref 0–41)
BACTERIA # UR AUTO: ABNORMAL
BASOPHILS # BLD AUTO: 0.05 K/UL — SIGNIFICANT CHANGE UP (ref 0–0.2)
BASOPHILS NFR BLD AUTO: 0.4 % — SIGNIFICANT CHANGE UP (ref 0–1)
BILIRUB SERPL-MCNC: 1.2 MG/DL — SIGNIFICANT CHANGE UP (ref 0.2–1.2)
BILIRUB UR-MCNC: NEGATIVE — SIGNIFICANT CHANGE UP
BUN SERPL-MCNC: 27 MG/DL — HIGH (ref 10–20)
CALCIUM SERPL-MCNC: 9.7 MG/DL — SIGNIFICANT CHANGE UP (ref 8.5–10.1)
CHLORIDE SERPL-SCNC: 98 MMOL/L — SIGNIFICANT CHANGE UP (ref 98–110)
CO2 SERPL-SCNC: 24 MMOL/L — SIGNIFICANT CHANGE UP (ref 17–32)
COLOR SPEC: YELLOW — SIGNIFICANT CHANGE UP
CREAT SERPL-MCNC: 1.3 MG/DL — SIGNIFICANT CHANGE UP (ref 0.7–1.5)
DIFF PNL FLD: NEGATIVE — SIGNIFICANT CHANGE UP
EGFR: 57 ML/MIN/1.73M2 — LOW
EOSINOPHIL # BLD AUTO: 0.28 K/UL — SIGNIFICANT CHANGE UP (ref 0–0.7)
EOSINOPHIL NFR BLD AUTO: 2.2 % — SIGNIFICANT CHANGE UP (ref 0–8)
EPI CELLS # UR: 1 /HPF — SIGNIFICANT CHANGE UP (ref 0–5)
GLUCOSE BLDC GLUCOMTR-MCNC: 105 MG/DL — HIGH (ref 70–99)
GLUCOSE BLDC GLUCOMTR-MCNC: 120 MG/DL — HIGH (ref 70–99)
GLUCOSE BLDC GLUCOMTR-MCNC: 146 MG/DL — HIGH (ref 70–99)
GLUCOSE BLDC GLUCOMTR-MCNC: 222 MG/DL — HIGH (ref 70–99)
GLUCOSE SERPL-MCNC: 149 MG/DL — HIGH (ref 70–99)
GLUCOSE UR QL: ABNORMAL
HCT VFR BLD CALC: 43.2 % — SIGNIFICANT CHANGE UP (ref 42–52)
HGB BLD-MCNC: 15.3 G/DL — SIGNIFICANT CHANGE UP (ref 14–18)
HYALINE CASTS # UR AUTO: 1 /LPF — SIGNIFICANT CHANGE UP (ref 0–7)
IMM GRANULOCYTES NFR BLD AUTO: 0.4 % — HIGH (ref 0.1–0.3)
KETONES UR-MCNC: NEGATIVE — SIGNIFICANT CHANGE UP
LEUKOCYTE ESTERASE UR-ACNC: NEGATIVE — SIGNIFICANT CHANGE UP
LYMPHOCYTES # BLD AUTO: 1.59 K/UL — SIGNIFICANT CHANGE UP (ref 1.2–3.4)
LYMPHOCYTES # BLD AUTO: 12.2 % — LOW (ref 20.5–51.1)
MAGNESIUM SERPL-MCNC: 1.8 MG/DL — SIGNIFICANT CHANGE UP (ref 1.8–2.4)
MCHC RBC-ENTMCNC: 29.7 PG — SIGNIFICANT CHANGE UP (ref 27–31)
MCHC RBC-ENTMCNC: 35.4 G/DL — SIGNIFICANT CHANGE UP (ref 32–37)
MCV RBC AUTO: 83.7 FL — SIGNIFICANT CHANGE UP (ref 80–94)
MONOCYTES # BLD AUTO: 1.54 K/UL — HIGH (ref 0.1–0.6)
MONOCYTES NFR BLD AUTO: 11.8 % — HIGH (ref 1.7–9.3)
NEUTROPHILS # BLD AUTO: 9.51 K/UL — HIGH (ref 1.4–6.5)
NEUTROPHILS NFR BLD AUTO: 73 % — SIGNIFICANT CHANGE UP (ref 42.2–75.2)
NITRITE UR-MCNC: NEGATIVE — SIGNIFICANT CHANGE UP
NRBC # BLD: 0 /100 WBCS — SIGNIFICANT CHANGE UP (ref 0–0)
PH UR: 5.5 — SIGNIFICANT CHANGE UP (ref 5–8)
PLATELET # BLD AUTO: 353 K/UL — SIGNIFICANT CHANGE UP (ref 130–400)
POTASSIUM SERPL-MCNC: 3.9 MMOL/L — SIGNIFICANT CHANGE UP (ref 3.5–5)
POTASSIUM SERPL-SCNC: 3.9 MMOL/L — SIGNIFICANT CHANGE UP (ref 3.5–5)
PROT SERPL-MCNC: 7.5 G/DL — SIGNIFICANT CHANGE UP (ref 6–8)
PROT UR-MCNC: ABNORMAL
RBC # BLD: 5.16 M/UL — SIGNIFICANT CHANGE UP (ref 4.7–6.1)
RBC # FLD: 13.1 % — SIGNIFICANT CHANGE UP (ref 11.5–14.5)
RBC CASTS # UR COMP ASSIST: 2 /HPF — SIGNIFICANT CHANGE UP (ref 0–4)
SODIUM SERPL-SCNC: 135 MMOL/L — SIGNIFICANT CHANGE UP (ref 135–146)
SP GR SPEC: 1.02 — SIGNIFICANT CHANGE UP (ref 1.01–1.03)
UROBILINOGEN FLD QL: SIGNIFICANT CHANGE UP
WBC # BLD: 13.02 K/UL — HIGH (ref 4.8–10.8)
WBC # FLD AUTO: 13.02 K/UL — HIGH (ref 4.8–10.8)
WBC UR QL: 1 /HPF — SIGNIFICANT CHANGE UP (ref 0–5)

## 2022-08-16 PROCEDURE — 71045 X-RAY EXAM CHEST 1 VIEW: CPT | Mod: 26

## 2022-08-16 RX ORDER — SODIUM CHLORIDE 9 MG/ML
1000 INJECTION INTRAMUSCULAR; INTRAVENOUS; SUBCUTANEOUS
Refills: 0 | Status: DISCONTINUED | OUTPATIENT
Start: 2022-08-16 | End: 2022-08-28

## 2022-08-16 RX ADMIN — Medication 50 MILLIGRAM(S): at 18:26

## 2022-08-16 RX ADMIN — Medication 8 UNIT(S): at 18:37

## 2022-08-16 RX ADMIN — CITALOPRAM 5 MILLIGRAM(S): 10 TABLET, FILM COATED ORAL at 12:36

## 2022-08-16 RX ADMIN — Medication 81 MILLIGRAM(S): at 12:37

## 2022-08-16 RX ADMIN — Medication 2: at 12:34

## 2022-08-16 RX ADMIN — INSULIN GLARGINE 30 UNIT(S): 100 INJECTION, SOLUTION SUBCUTANEOUS at 08:42

## 2022-08-16 RX ADMIN — ATORVASTATIN CALCIUM 80 MILLIGRAM(S): 80 TABLET, FILM COATED ORAL at 21:20

## 2022-08-16 RX ADMIN — Medication 8 UNIT(S): at 12:34

## 2022-08-16 RX ADMIN — TICAGRELOR 90 MILLIGRAM(S): 90 TABLET ORAL at 18:26

## 2022-08-16 RX ADMIN — Medication 25 MILLIGRAM(S): at 05:52

## 2022-08-16 RX ADMIN — Medication 8 UNIT(S): at 08:17

## 2022-08-16 RX ADMIN — ENOXAPARIN SODIUM 40 MILLIGRAM(S): 100 INJECTION SUBCUTANEOUS at 21:19

## 2022-08-16 RX ADMIN — SODIUM CHLORIDE 75 MILLILITER(S): 9 INJECTION INTRAMUSCULAR; INTRAVENOUS; SUBCUTANEOUS at 18:25

## 2022-08-16 RX ADMIN — TICAGRELOR 90 MILLIGRAM(S): 90 TABLET ORAL at 05:52

## 2022-08-16 RX ADMIN — FAMOTIDINE 40 MILLIGRAM(S): 10 INJECTION INTRAVENOUS at 21:19

## 2022-08-16 NOTE — CHART NOTE - NSCHARTNOTEFT_GEN_A_CORE
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 Patient noticed he started having difficulty with his speech, then developed weakness in his right hand and some visual field deficits. In the ED, patient received tPA. CT head at the time was negative, EDSON showed EF of 55-60%.  MRI head showed scattered left-sided acute cortical infarcts within the MCA and PCA territories, with trace petechial hemorrhage involving high left frontal region. There is a chronic left occipital lobe infarct. MRA of the neck showed moderate stenosis of the proximal right internal carotid artery. On 8/9, a right upper quadrant US was done to rule out cholecystitis, revealing an indeterminate 1.2 cm right renal lesion and moderate-severe hepatic steatosis. During the hospital stay, the visual field deficits improved but patient continued to have no motor function in the right hand and impaired RLE strength and mobility and ADL independence and a persistent aphasia.    On evaluation by PM&R, patient endorses no movement in his right hand and notes some difficulty with memory but otherwise has no other complaints. Patient is right handed. He lives by himself in a house with [address] and one flight of stairs inside (5+7 steps). Patient was previously independent in ADLs and ambulation, occasionally using a cane outdoors for balance. Currently patient requires mod assist for bed mobility, max assist with transfers, mod assist with ambulation 25 feet with RW and max assistance for dressing. He was evaluated by Physiatry on the medical service and was found to be a good acute rehab candidate. He was admitted to Rehab medicine service on 8/11/22.    The patient wasn't able to complete therapy yesterday due to fatigue; he feels better today. However he c/o urinary frequency and cough, occasionally productive. Labs done today show new leukocytosis and UA showed moderate bacteria. BUN/Creat are also elevated, pvr was done after patient voided, was not in retention. He admits he hasn't been drinking enough liquids.    Allergies: No Known Allergies    MEDICATIONS  (STANDING):  amLODIPine   Tablet 5 milliGRAM(s) Oral at bedtime  aspirin enteric coated 81 milliGRAM(s) Oral daily  atorvastatin 80 milliGRAM(s) Oral at bedtime  citalopram 5 milliGRAM(s) Oral daily  dextrose 5%. 1000 milliLiter(s) (100 mL/Hr) IV Continuous <Continuous>  dextrose 50% Injectable 25 Gram(s) IV Push once  dextrose 50% Injectable 12.5 Gram(s) IV Push once  dextrose 50% Injectable 25 Gram(s) IV Push once  enoxaparin Injectable 40 milliGRAM(s) SubCutaneous every 24 hours  famotidine    Tablet 40 milliGRAM(s) Oral at bedtime  glucagon  Injectable 1 milliGRAM(s) IntraMuscular once  hydrochlorothiazide 25 milliGRAM(s) Oral daily  hydrocortisone 1% Cream 1 Application(s) Topical two times a day  insulin glargine Injectable (LANTUS) 30 Unit(s) SubCutaneous every morning  insulin lispro (ADMELOG) corrective regimen sliding scale   SubCutaneous three times a day before meals  insulin lispro Injectable (ADMELOG) 8 Unit(s) SubCutaneous three times a day before meals  lisinopril 40 milliGRAM(s) Oral daily  metoprolol tartrate 50 milliGRAM(s) Oral two times a day  polyethylene glycol 3350 17 Gram(s) Oral two times a day  senna 2 Tablet(s) Oral at bedtime  sodium chloride 0.9%. 1000 milliLiter(s) (75 mL/Hr) IV Continuous <Continuous>  ticagrelor 90 milliGRAM(s) Oral every 12 hours    MEDICATIONS  (PRN):  bisacodyl Suppository 10 milliGRAM(s) Rectal daily PRN Constipation  dextrose Oral Gel 15 Gram(s) Oral once PRN Blood Glucose LESS THAN 70 milliGRAM(s)/deciliter  lactulose Syrup 20 Gram(s) Oral two times a day PRN Constipation  melatonin 5 milliGRAM(s) Oral at bedtime PRN Insomnia    Vital Signs Last 24 Hrs  T(C): 36.8 (16 Aug 2022 13:37), Max: 37.3 (15 Aug 2022 22:39)  T(F): 98.2 (16 Aug 2022 13:37), Max: 99.1 (15 Aug 2022 22:39)  HR: 101 (16 Aug 2022 13:37) (87 - 102)  BP: 110/75 (16 Aug 2022 13:37) (110/75 - 131/77)  BP(mean): --  RR: 18 (16 Aug 2022 13:37) (17 - 18)  SpO2: --    PE the patient is alert, pleasant, oriented x 3, able to give good history, NAD  Lungs decreased bs at bases with slight bibasilar crackles  Heart RR apical HR = 96/reg, no murmur  Abdomen soft, bs+, non-tender  Extremities no CCE  CNS - alert, oriented x 3, speech is fluent, no movement RUE      LABS:             15.3   13.02 )-----------( 353      ( 16 Aug 2022 07:42 )  73% neutrophils             43.2     08-16    135  |  98  |  27<H>  ----------------------------<  149<H>  (B/CR 8/13 = 19/1.1/ GFR = 70)  3.9   |  24  |  1.3    GFR = 57  Ca    9.7      16 Aug 2022 07:42  Mg     1.8     08-16    TPro  7.5  /  Alb  3.7  /  TBili  1.2  /  DBili  x   /  AST  30  /  ALT  52<H>  /  AlkPhos  179<H>  08-16    **RVP including covid-19 PCR 8/16 PENDING**    < from: Xray Chest 1 View- PORTABLE-Routine (Xray Chest 1 View- PORTABLE-Routine .) (08.16.22 @ 12:01) >    Impression:    Bibasilar linear opacities, stable.  Low lung volumes.    < end of copied text >    < from: US Abdomen Upper Quadrant Right (08.09.22 @ 19:42) >    IMPRESSION:  1. Mild gallbladder wall thickening, although without visualized   gallstones or pericholecystic fluid, indeterminate. Acalculous   cholecystitis is difficult to exclude. If there is persistent clinical   concern, consider nuclear medicine HIDA scan.  2.  Indeterminate 1.7 cm right renal lesion. Recommend follow-up MRI   renal mass protocol for further evaluation on a nonurgent basis.  3.  Moderate to severe hepatic steatosis.    < end of copied text >    < from: MR Head No Cont (08.06.22 @ 08:49) >      IMPRESSION:  Scattered left-sided acute cortical infarcts within the MCA and PCA   territories. Suggestion of trace petechial hemorrhage involving the high  left frontal region    Mild chronic microvascular type changes as well as a chronic left   occipital lobe infarct..    < end of copied text >    Venous doppler b/l LE's NEGATIVE 8/9/22      ASSESSMENT/PLAN:  Patient is a 76yo M with PMH HTN, DM, CAD (s/p stents), GERD who presented for right sided weakness and aphasia. Patient was found to have a left acute cortical infarct within the MCA and PCA territories. Patient presented with visual field deficits that has since improved, but is still with impaired strength, balance, mobility and ADLs and language. Patient was previously independent in all ADLs and activities. Current level of function is mod assist for bed mobility, max assist with transfers, mod assist with ambulation 25 feet with RW. Patient is a good candidate for rehab due to significantly decreased level of function from prior.    #Left acute ischemic stroke with right hemiparesis (dominant) and aphasia  s/p tPA on 8/3  EDSON showed EF of 55-60%  8/6 MRI head: scattered left-sided acute cortical infarcts within the MCA and PCA territories, with trace petechial hemorrhage involving high left frontal region. There is a chronic left occipital lobe infarct.   MRA of the neck: moderate stenosis of the proximal right internal carotid artery  - start PT/OT/ST, Neuropsych eval  - c/w 5mg celexa  - c/w ASA 81mg  - c/w Brilinta 90mg BID x 30 days then start plavix  - c/w Lipitor 80mg daily  - monitor vitals    NIHSS =7, mRankin = 4    #Diabetic Peripheral Polyneuropathy  - No pain. Teach patient skin monitoring an sensory awareness    #HTN   - c/w Amlodipine 5mg qd  - c/w Lisinopril 40mg   - c/w Lopressor 50mg BID  - HCTZ 25mg po daily---will d/c for now due to azotemia/ poor po fluid intake    #Diabetes 2--patient is on insulin at home; has glucometer  Ha1c 8.2  - c/w 30U Lantus q hs  - c/w 8U Lispro sc q ac    #CAD  -continue ASA 81, Brilinta, BB, statin    #Renal lesion  On 8/9, a right upper quadrant US was done to rule out cholecystitis, revealing an indeterminate 1.2 cm right renal lesion and moderate-severe hepatic steatosis.  -outpatient followup, non urgent MRI with renal mass protocol    Mild persistent cough/ Increased WBC with left shift  - chest x-ray read pending - no consolidation seen (read as stable atelectasis)  - UA with moderate bacteria; will get urine culture since pt c/o frequency and has high WBC.  - Nasal Viral PCR sent  - check Doppler BLE  - repeat CBC in am    Azotemia  - give IVF overnight and recheck  - had negative PVR  - d/c HCTZ for now    -GI/Bowel Mgmt: senna, dulcolax, lactulose, miralax    - Diet: Dash Carb Consistent diet     Precautions / PROPHYLAXIS:      - Falls    - Ortho: Weight bearing status: WBAT      - DVT prophylaxis: Lovenox 40mg subQ

## 2022-08-16 NOTE — PROGRESS NOTE ADULT - ASSESSMENT
Patient is a 76yo M with PMH HTN, DM, CAD (s/p stents), GERD who presented for right sided weakness and aphasia. Patient was found to have a left acute cortical infarct within the MCA and PCA territories. Patient presented with visual field deficits that has since improved, but is still with impaired strength, balance, mobility and ADLs and language. Patient was previously independent in all ADLs and activities. Current level of function is mod assist for bed mobility, max assist with transfers, mod assist with ambulation 25 feet with RW. Patient is a good candidate for rehab due to significantly decreased level of function from prior.    #Left acute ischemic stroke with right hemiparesis (dominant) and aphasia  s/p tPA on 8/3  EDSON showed EF of 55-60%  8/6 MRI head: scattered left-sided acute cortical infarcts within the MCA and PCA territories, with trace petechial hemorrhage involving high left frontal region. There is a chronic left occipital lobe infarct.   MRA of the neck: moderate stenosis of the proximal right internal carotid artery  - start PT/OT/ST, Neuropsych eval  - c/w 5mg celexa  - c/w ASA 81mg  - c/w Brilinta 90mg BID  - c/w Lipitor 80mg daily  - monitor vitals    NIHSS =7, mRankin = 4    #Diabetic Peripheral Polyneuropathy  - No pain. Teach patient skin monitoring an sensory awareness    #HTN   - c/w Amlodipine 5mg qd  - c/w Lisinopril 40mg   - c/w Lopressor 50mg BID    #Diabetes 2  Ha1c 8.2  - c/w 30U Lantus  - c/w 8U Lispro    #CAD  -continue ASA 81, Brilinta, BB, statin    #Renal lesion  On 8/9, a right upper quadrant US was done to rule out cholecystitis, revealing an indeterminate 1.2 cm right renal lesion and moderate-severe hepatic steatosis.  -outpatient followup, non urgent MRI with renal mass protocol    -GI/Bowel Mgmt: senna, dulcolax, lactulose, miralax    - Diet: Dash Carb Consistent diet       Precautions / PROPHYLAXIS:      - Falls    - Ortho: Weight bearing status: WBAT      - DVT prophylaxis: Lovenox 40mg subQ   Patient is a 78yo M with PMH HTN, DM, CAD (s/p stents), GERD who presented for right sided weakness and aphasia. Patient was found to have a left acute cortical infarct within the MCA and PCA territories. Patient presented with visual field deficits that has since improved, but is still with impaired strength, balance, mobility and ADLs and language. Patient was previously independent in all ADLs and activities. Current level of function is mod assist for bed mobility, max assist with transfers, mod assist with ambulation 25 feet with RW. Patient is a good candidate for rehab due to significantly decreased level of function from prior.    #Left acute ischemic stroke with right hemiparesis (dominant) and aphasia  s/p tPA on 8/3  EDSON showed EF of 55-60%  8/6 MRI head: scattered left-sided acute cortical infarcts within the MCA and PCA territories, with trace petechial hemorrhage involving high left frontal region. There is a chronic left occipital lobe infarct.   MRA of the neck: moderate stenosis of the proximal right internal carotid artery  - start PT/OT/ST, Neuropsych eval  - c/w 5mg celexa  - c/w ASA 81mg  - c/w Brilinta 90mg BID  - c/w Lipitor 80mg daily  - monitor vitals    NIHSS =7, mRankin = 4    #Diabetic Peripheral Polyneuropathy  - No pain. Teach patient skin monitoring an sensory awareness    #HTN   - c/w Amlodipine 5mg qd  - c/w Lisinopril 40mg   - c/w Lopressor 50mg BID    #Diabetes 2  Ha1c 8.2  - c/w 30U Lantus  - c/w 8U Lispro    #CAD  -continue ASA 81, Brilinta, BB, statin    #Renal lesion  On 8/9, a right upper quadrant US was done to rule out cholecystitis, revealing an indeterminate 1.2 cm right renal lesion and moderate-severe hepatic steatosis.  -outpatient followup, non urgent MRI with renal mass protocol    Mild cough   - UA ordered   - CXR ordered  - PVR ordered     -GI/Bowel Mgmt: senna, dulcolax, lactulose, miralax    - Diet: Dash Carb Consistent diet       Precautions / PROPHYLAXIS:      - Falls    - Ortho: Weight bearing status: WBAT      - DVT prophylaxis: Lovenox 40mg subQ   Patient is a 78yo M with PMH HTN, DM, CAD (s/p stents), GERD who presented for right sided weakness and aphasia. Patient was found to have a left acute cortical infarct within the MCA and PCA territories. Patient presented with visual field deficits that has since improved, but is still with impaired strength, balance, mobility and ADLs and language. Patient was previously independent in all ADLs and activities. Current level of function is mod assist for bed mobility, max assist with transfers, mod assist with ambulation 25 feet with RW. Patient is a good candidate for rehab due to significantly decreased level of function from prior.    #Left acute ischemic stroke with right hemiparesis (dominant) and aphasia  s/p tPA on 8/3  EDSON showed EF of 55-60%  8/6 MRI head: scattered left-sided acute cortical infarcts within the MCA and PCA territories, with trace petechial hemorrhage involving high left frontal region. There is a chronic left occipital lobe infarct.   MRA of the neck: moderate stenosis of the proximal right internal carotid artery  - start PT/OT/ST, Neuropsych eval  - c/w 5mg celexa  - c/w ASA 81mg  - c/w Brilinta 90mg BID  - c/w Lipitor 80mg daily  - monitor vitals    NIHSS =7, mRankin = 4    #Diabetic Peripheral Polyneuropathy  - No pain. Teach patient skin monitoring an sensory awareness    #HTN   - c/w Amlodipine 5mg qd  - c/w Lisinopril 40mg   - c/w Lopressor 50mg BID    #Diabetes 2  Ha1c 8.2  - c/w 30U Lantus  - c/w 8U Lispro    #CAD  -continue ASA 81, Brilinta, BB, statin    #Renal lesion  On 8/9, a right upper quadrant US was done to rule out cholecystitis, revealing an indeterminate 1.2 cm right renal lesion and moderate-severe hepatic steatosis.  -outpatient followup, non urgent MRI with renal mass protocol    Mild persistent cough/ Increased WBC with left shift  - chest x-ray read pending - no consolidation seen  - UA negative.  - Nasal Viral PCR sent  - check Doppler BLE  - repeat CBC in am    Azotemia  - give IVF overnight and recheck  - had negative PVR    -GI/Bowel Mgmt: senna, dulcolax, lactulose, miralax    - Diet: Dash Carb Consistent diet       Precautions / PROPHYLAXIS:      - Falls    - Ortho: Weight bearing status: WBAT      - DVT prophylaxis: Lovenox 40mg subQ

## 2022-08-16 NOTE — PROGRESS NOTE ADULT - SUBJECTIVE AND OBJECTIVE BOX
Patient is a 77y old  Male who presents with a chief complaint of rehab s/p left ischemic stroke with aphasia and right hemiparesis (11 Aug 2022 11:30)      HPI:  Patient is a 76yo M with PMH of HTN, DM, HLD, CAD (s/p stents), GERD who presented to the ED on 8/3/2022 for right hand weakness. Patient noticed he started having difficulty with his speech, then developed weakness in his right hand and some visual field deficits. In the ED, patient received tPA. CT head at the time was negative, EDSON showed EF of 55-60%.  MRI head showed scattered left-sided acute cortical infarcts within the MCA and PCA territories, with trace petechial hemorrhage involving high left frontal region. There is a chronic left occipital lobe infarct. MRA of the neck showed moderate stenosis of the proximal right internal carotid artery. On 8/9, a right upper quadrant US was done to rule out cholecystitis, revealing an indeterminate 1.2 cm right renal lesion and moderate-severe hepatic steatosis. During the hospital stay, the visual field deficits improved but patient continued to have no motor function in the right hand and impaired RLE strength and mobility and ADL independence and a persistent aphasia.    On evaluation by PM&R, patient endorses no movement in his right hand and notes some difficulty with memory but otherwise has no other complaints. Patient is right handed. He lives by himself in a house with 5 [address] and one flight of stairs inside (5+7 steps). Patient was previously independent in ADLs and ambulation, occasionally using a cane outdoors for balance. Currently patient requires mod assist for bed mobility, max assist with transfers, mod assist with ambulation 25 feet with RW and max assistance for dressing. He was evaluated by Physiatry on the medical service and was found to be a good acute rehab candidate.  (11 Aug 2022 11:30)    TODAY'S SUBJECTIVE & REVIEW OF SYMPTOMS:  Seen this AM. VSS.  No acute events. No complaints. Notes that he has some proximal RUE movement lying in  bed with gravity eliminated.    Constitutional    [ x  ] WNL           [   ] poor appetite   [   ] insomnia   [   ] tired   Cardio:                [  x ] WNL           [   ] CP   [   ] VELASCO   [   ] palpitations               Resp:                   [  x ] WNL           [   ] SOB   [   ] cough   [   ] wheezing   GI:                        [ x  ] WNL           [   ] constipation   [   ] diarrhea   [   ] abdominal pain   [   ] nausea   [   ] emesis                                :                      [ x  ] WNL           [   ] PRITCHARD  [   ] dysuria   [   ] difficulty voiding             Endo:                   [ x  ] WNL          [   ] polyuria   [   ] temperature intolerance                 Skin:                     [ x  ] WNL          [   ] pain   [   ] wound   [   ] rash   MSK:                    [   x] WNL          [   ] muscle pain   [   ] joint pain/ stiffness   [   ] muscle tenderness   [   ] swelling   Neuro:                 [   ] WNL          [   ] HA   [   ] change in vision   [   ] tremor   [ x  ] weakness- Right side   [   ]dysphagia   [x  ] word finding difficulties,   [ x ] numbness/ neuropathy both feet    Cognitive:           [ x  ] WNL           [   ]confusion   - daughter note some memory loss since stroke   Psych:                  [  x ] WNL           [   ] hallucinations   [   ]agitation   [   ] delusion   [   ]depression      PHYSICAL EXAM    ICU Vital Signs Last 24 Hrs  T(C): 36.3 (16 Aug 2022 05:28), Max: 37.6 (15 Aug 2022 13:15)  T(F): 97.4 (16 Aug 2022 05:28), Max: 99.6 (15 Aug 2022 13:15)  HR: 87 (16 Aug 2022 05:28) (87 - 109)  BP: 115/67 (16 Aug 2022 05:28) (115/67 - 131/77)  BP(mean): --  ABP: --  ABP(mean): --  RR: 18 (16 Aug 2022 05:28) (17 - 18)  SpO2: --        General:[  x ] NAD, Resting Comfortable,   [   ] other:                                HEENT: [  x ] NC/AT, EOMI, PERRL , Normal Conjunctivae,   [   ] other:  Cardio: [  x ] RRR, no murmer,   [   ] other:                              Pulm: [  x ] No Respiratory Distress,  Lungs CTAB,   [   ] other:                       Abdomen: [ x  ]ND/NT, Soft,   [   ] other:    : [ x  ] NO PRITCHARD CATHETER,   [  ] PRITCHARD CATHETER- no meatal tear, no discharge, [   ] other:                                            MSK: [ x  ] No joint swelling, Full PROM,   [    ] other:                                      Ext: [  x ]No C/C/E, No calf tenderness,   [   ]other:    Skin: [ x  ]intact,   [   ] other:                                                                   Neurological Examination:  Cognitive: [ x   ] AAO x 3,   [    ]  other:                                                                 Attention/ Concentration:  [ x   ] intact   [    ]  other:   Memory: [    ] intact,    [ x  ]  other:  mildly impaired  Mood/Affect: [  x  ] wnl,    [    ]  other:                                                                             Communication: [ x  ]Fluent, no dysarthria, following commands:  [   ] other:  CN II - XII:  [  x  ] intact, VFF  [    ] other: mild right facial droop                                                                                         Motor:   RIGHT UE: [   ] WNL,  [  x ] other: 0/5 grossly in all muscle groups  LEFT    UE: [  x ] WNL  RIGHT LE: [ x  ] WNL,  [   ] other: Hip flexion 3/5 with drift to bed, knee flexion 4/5, plantarflexion 5/5, dorsiflexion 5/5, toe extension 5/5  LEFT    LE: [  x ] WNL,  Hip flexion 4/5, knee flexion 5/5, plantarflexion 5/5, dorsiflexion 5/5, toe extension 5/5     Tone: [    ] wnl,   [ x   ]  other: mild increased tone in right triceps  DTRs: [ x  ]symmetric, 1+ [   ] other:   Coordination:   [  x  ] intact as testable,   [    ]   Sensory: [    ] Intact to light touch [ x   ] other:  decreased both distal LEs  Proprioception: impaired on testing of right great toe      CLOF:   Bed mobility- PA   Transfers- mod A   Gait: 25 x2 PA 3 point cane       MEDICATIONS  (STANDING):  amLODIPine   Tablet 5 milliGRAM(s) Oral at bedtime  aspirin enteric coated 81 milliGRAM(s) Oral daily  atorvastatin 80 milliGRAM(s) Oral at bedtime  citalopram 5 milliGRAM(s) Oral daily  dextrose 5%. 1000 milliLiter(s) (100 mL/Hr) IV Continuous <Continuous>  dextrose 50% Injectable 25 Gram(s) IV Push once  dextrose 50% Injectable 12.5 Gram(s) IV Push once  dextrose 50% Injectable 25 Gram(s) IV Push once  enoxaparin Injectable 40 milliGRAM(s) SubCutaneous every 24 hours  famotidine    Tablet 40 milliGRAM(s) Oral at bedtime  glucagon  Injectable 1 milliGRAM(s) IntraMuscular once  hydrochlorothiazide 25 milliGRAM(s) Oral daily  hydrocortisone 1% Cream 1 Application(s) Topical two times a day  insulin glargine Injectable (LANTUS) 30 Unit(s) SubCutaneous every morning  insulin lispro (ADMELOG) corrective regimen sliding scale   SubCutaneous three times a day before meals  insulin lispro Injectable (ADMELOG) 8 Unit(s) SubCutaneous three times a day before meals  lisinopril 40 milliGRAM(s) Oral daily  metoprolol tartrate 50 milliGRAM(s) Oral two times a day  polyethylene glycol 3350 17 Gram(s) Oral two times a day  senna 2 Tablet(s) Oral at bedtime  ticagrelor 90 milliGRAM(s) Oral every 12 hours    MEDICATIONS  (PRN):  bisacodyl Suppository 10 milliGRAM(s) Rectal daily PRN Constipation  dextrose Oral Gel 15 Gram(s) Oral once PRN Blood Glucose LESS THAN 70 milliGRAM(s)/deciliter  lactulose Syrup 20 Gram(s) Oral two times a day PRN Constipation  melatonin 5 milliGRAM(s) Oral at bedtime PRN Insomnia        RECENT LABS/IMAGING    POCT Blood Glucose.: 201 mg/dL (08-15-22 @ 12:13)  POCT Blood Glucose.: 121 mg/dL (08-15-22 @ 07:41)  POCT Blood Glucose.: 111 mg/dL (08-14-22 @ 22:28)  POCT Blood Glucose.: 83 mg/dL (08-14-22 @ 16:54)  POCT Blood Glucose.: 210 mg/dL (08-14-22 @ 12:02)  POCT Blood Glucose.: 141 mg/dL (08-14-22 @ 07:54)  POCT Blood Glucose.: 110 mg/dL (08-13-22 @ 22:05)  POCT Blood Glucose.: 71 mg/dL (08-13-22 @ 15:52)  POCT Blood Glucose.: 203 mg/dL (08-13-22 @ 11:38)  POCT Blood Glucose.: 145 mg/dL (08-13-22 @ 07:40)  POCT Blood Glucose.: 140 mg/dL (08-12-22 @ 23:56)  POCT Blood Glucose.: 80 mg/dL (08-12-22 @ 22:21)                          15.6   11.66 )-----------( 307      ( 12 Aug 2022 07:57 )             44.6     08-12    136  |  100  |  20  ----------------------------<  159<H>  4.2   |  23  |  1.1    Ca    9.6      12 Aug 2022 07:57  Mg     1.9     08-12    TPro  7.5  /  Alb  3.8  /  TBili  1.2  /  DBili  x   /  AST  37  /  ALT  32  /  AlkPhos  180<H>  08-12   Patient is a 77y old  Male who presents with a chief complaint of rehab s/p left ischemic stroke with aphasia and right hemiparesis (11 Aug 2022 11:30)      HPI:  Patient is a 78yo M with PMH of HTN, DM, HLD, CAD (s/p stents), GERD who presented to the ED on 8/3/2022 for right hand weakness. Patient noticed he started having difficulty with his speech, then developed weakness in his right hand and some visual field deficits. In the ED, patient received tPA. CT head at the time was negative, EDSON showed EF of 55-60%.  MRI head showed scattered left-sided acute cortical infarcts within the MCA and PCA territories, with trace petechial hemorrhage involving high left frontal region. There is a chronic left occipital lobe infarct. MRA of the neck showed moderate stenosis of the proximal right internal carotid artery. On 8/9, a right upper quadrant US was done to rule out cholecystitis, revealing an indeterminate 1.2 cm right renal lesion and moderate-severe hepatic steatosis. During the hospital stay, the visual field deficits improved but patient continued to have no motor function in the right hand and impaired RLE strength and mobility and ADL independence and a persistent aphasia.    On evaluation by PM&R, patient endorses no movement in his right hand and notes some difficulty with memory but otherwise has no other complaints. Patient is right handed. He lives by himself in a house with [address] and one flight of stairs inside (5+7 steps). Patient was previously independent in ADLs and ambulation, occasionally using a cane outdoors for balance. Currently patient requires mod assist for bed mobility, max assist with transfers, mod assist with ambulation 25 feet with RW and max assistance for dressing. He was evaluated by Physiatry on the medical service and was found to be a good acute rehab candidate.  (11 Aug 2022 11:30)    TODAY'S SUBJECTIVE & REVIEW OF SYMPTOMS:  Seen this AM. VSS. Patient feels fatigued and cold, as per therapists, exhibiting signs of fatigue. PVR ordered, UA ordered, chest xray and covid PCR ordered as well.    Constitutional    [ x  ] WNL           [   ] poor appetite   [   ] insomnia   [   ] tired   Cardio:                [  x ] WNL           [   ] CP   [   ] VELASCO   [   ] palpitations               Resp:                   [  x ] WNL           [   ] SOB   [   ] cough   [   ] wheezing   GI:                        [ x  ] WNL           [   ] constipation   [   ] diarrhea   [   ] abdominal pain   [   ] nausea   [   ] emesis                                :                      [ x  ] WNL           [   ] PRITCHARD  [   ] dysuria   [   ] difficulty voiding             Endo:                   [ x  ] WNL          [   ] polyuria   [   ] temperature intolerance                 Skin:                     [ x  ] WNL          [   ] pain   [   ] wound   [   ] rash   MSK:                    [   x] WNL          [   ] muscle pain   [   ] joint pain/ stiffness   [   ] muscle tenderness   [   ] swelling   Neuro:                 [   ] WNL          [   ] HA   [   ] change in vision   [   ] tremor   [ x  ] weakness- Right side   [   ]dysphagia   [x  ] word finding difficulties,   [ x ] numbness/ neuropathy both feet    Cognitive:           [ x  ] WNL           [   ]confusion   - daughter note some memory loss since stroke   Psych:                  [  x ] WNL           [   ] hallucinations   [   ]agitation   [   ] delusion   [   ]depression      PHYSICAL EXAM    ICU Vital Signs Last 24 Hrs  T(C): 36.3 (16 Aug 2022 05:28), Max: 37.6 (15 Aug 2022 13:15)  T(F): 97.4 (16 Aug 2022 05:28), Max: 99.6 (15 Aug 2022 13:15)  HR: 87 (16 Aug 2022 05:28) (87 - 109)  BP: 115/67 (16 Aug 2022 05:28) (115/67 - 131/77)  BP(mean): --  ABP: --  ABP(mean): --  RR: 18 (16 Aug 2022 05:28) (17 - 18)  SpO2: --        General:[  x ] NAD, Resting Comfortable,   [   ] other:                                HEENT: [  x ] NC/AT, EOMI, PERRL , Normal Conjunctivae,   [   ] other:  Cardio: [  x ] RRR, no murmer,   [   ] other:                              Pulm: [  x ] No Respiratory Distress,  Lungs CTAB,   [   ] other:                       Abdomen: [ x  ]ND/NT, Soft,   [   ] other:    : [ x  ] NO PRITCHARD CATHETER,   [  ] PRITCHARD CATHETER- no meatal tear, no discharge, [   ] other:                                            MSK: [ x  ] No joint swelling, Full PROM,   [    ] other:                                      Ext: [  x ]No C/C/E, No calf tenderness,   [   ]other:    Skin: [ x  ]intact,   [   ] other:                                                                   Neurological Examination:  Cognitive: [ x   ] AAO x 3,   [    ]  other:                                                                 Attention/ Concentration:  [ x   ] intact   [    ]  other:   Memory: [    ] intact,    [ x  ]  other:  mildly impaired  Mood/Affect: [  x  ] wnl,    [    ]  other:                                                                             Communication: [ x  ]Fluent, no dysarthria, following commands:  [   ] other:  CN II - XII:  [  x  ] intact, VFF  [    ] other: mild right facial droop                                                                                         Motor:   RIGHT UE: [   ] WNL,  [  x ] other: 0/5 grossly in all muscle groups  LEFT    UE: [  x ] WNL  RIGHT LE: [ x  ] WNL,  [   ] other: Hip flexion 3/5 with drift to bed, knee flexion 4/5, plantarflexion 5/5, dorsiflexion 5/5, toe extension 5/5  LEFT    LE: [  x ] WNL,  Hip flexion 4/5, knee flexion 5/5, plantarflexion 5/5, dorsiflexion 5/5, toe extension 5/5     Tone: [    ] wnl,   [ x   ]  other: mild increased tone in right triceps  DTRs: [ x  ]symmetric, 1+ [   ] other:   Coordination:   [    ] intact as testable,   [ not assesed   ]       CLOF:   Bed mobility- PA   Transfers- mod A   Gait: 25 x2 PA 3 point cane       MEDICATIONS  (STANDING):  amLODIPine   Tablet 5 milliGRAM(s) Oral at bedtime  aspirin enteric coated 81 milliGRAM(s) Oral daily  atorvastatin 80 milliGRAM(s) Oral at bedtime  citalopram 5 milliGRAM(s) Oral daily  dextrose 5%. 1000 milliLiter(s) (100 mL/Hr) IV Continuous <Continuous>  dextrose 50% Injectable 25 Gram(s) IV Push once  dextrose 50% Injectable 12.5 Gram(s) IV Push once  dextrose 50% Injectable 25 Gram(s) IV Push once  enoxaparin Injectable 40 milliGRAM(s) SubCutaneous every 24 hours  famotidine    Tablet 40 milliGRAM(s) Oral at bedtime  glucagon  Injectable 1 milliGRAM(s) IntraMuscular once  hydrochlorothiazide 25 milliGRAM(s) Oral daily  hydrocortisone 1% Cream 1 Application(s) Topical two times a day  insulin glargine Injectable (LANTUS) 30 Unit(s) SubCutaneous every morning  insulin lispro (ADMELOG) corrective regimen sliding scale   SubCutaneous three times a day before meals  insulin lispro Injectable (ADMELOG) 8 Unit(s) SubCutaneous three times a day before meals  lisinopril 40 milliGRAM(s) Oral daily  metoprolol tartrate 50 milliGRAM(s) Oral two times a day  polyethylene glycol 3350 17 Gram(s) Oral two times a day  senna 2 Tablet(s) Oral at bedtime  ticagrelor 90 milliGRAM(s) Oral every 12 hours    MEDICATIONS  (PRN):  bisacodyl Suppository 10 milliGRAM(s) Rectal daily PRN Constipation  dextrose Oral Gel 15 Gram(s) Oral once PRN Blood Glucose LESS THAN 70 milliGRAM(s)/deciliter  lactulose Syrup 20 Gram(s) Oral two times a day PRN Constipation  melatonin 5 milliGRAM(s) Oral at bedtime PRN Insomnia        RECENT LABS/IMAGING    POCT Blood Glucose.: 201 mg/dL (08-15-22 @ 12:13)  POCT Blood Glucose.: 121 mg/dL (08-15-22 @ 07:41)  POCT Blood Glucose.: 111 mg/dL (08-14-22 @ 22:28)  POCT Blood Glucose.: 83 mg/dL (08-14-22 @ 16:54)  POCT Blood Glucose.: 210 mg/dL (08-14-22 @ 12:02)  POCT Blood Glucose.: 141 mg/dL (08-14-22 @ 07:54)  POCT Blood Glucose.: 110 mg/dL (08-13-22 @ 22:05)  POCT Blood Glucose.: 71 mg/dL (08-13-22 @ 15:52)  POCT Blood Glucose.: 203 mg/dL (08-13-22 @ 11:38)  POCT Blood Glucose.: 145 mg/dL (08-13-22 @ 07:40)  POCT Blood Glucose.: 140 mg/dL (08-12-22 @ 23:56)  POCT Blood Glucose.: 80 mg/dL (08-12-22 @ 22:21)                          15.6   11.66 )-----------( 307      ( 12 Aug 2022 07:57 )             44.6     08-12    136  |  100  |  20  ----------------------------<  159<H>  4.2   |  23  |  1.1    Ca    9.6      12 Aug 2022 07:57  Mg     1.9     08-12    TPro  7.5  /  Alb  3.8  /  TBili  1.2  /  DBili  x   /  AST  37  /  ALT  32  /  AlkPhos  180<H>  08-12   Patient is a 77y old  Male who presents with a chief complaint of rehab s/p left ischemic stroke with aphasia and right hemiparesis (11 Aug 2022 11:30)      HPI:  Patient is a 78yo M with PMH of HTN, DM, HLD, CAD (s/p stents), GERD who presented to the ED on 8/3/2022 for right hand weakness. Patient noticed he started having difficulty with his speech, then developed weakness in his right hand and some visual field deficits. In the ED, patient received tPA. CT head at the time was negative, EDSON showed EF of 55-60%.  MRI head showed scattered left-sided acute cortical infarcts within the MCA and PCA territories, with trace petechial hemorrhage involving high left frontal region. There is a chronic left occipital lobe infarct. MRA of the neck showed moderate stenosis of the proximal right internal carotid artery. On , a right upper quadrant US was done to rule out cholecystitis, revealing an indeterminate 1.2 cm right renal lesion and moderate-severe hepatic steatosis. During the hospital stay, the visual field deficits improved but patient continued to have no motor function in the right hand and impaired RLE strength and mobility and ADL independence and a persistent aphasia.    On evaluation by PM&R, patient endorses no movement in his right hand and notes some difficulty with memory but otherwise has no other complaints. Patient is right handed. He lives by himself in a house with [address] and one flight of stairs inside (5+7 steps). Patient was previously independent in ADLs and ambulation, occasionally using a cane outdoors for balance. Currently patient requires mod assist for bed mobility, max assist with transfers, mod assist with ambulation 25 feet with RW and max assistance for dressing. He was evaluated by Physiatry on the medical service and was found to be a good acute rehab candidate.  (11 Aug 2022 11:30)    TODAY'S SUBJECTIVE & REVIEW OF SYMPTOMS:  Seen this AM. VSS. Patient feels better today. Less fatigue. Still with chronic cough.  UA ordered, chest xray and covid PCR ordered as well.    Constitutional    [ x  ] WNL           [   ] poor appetite   [   ] insomnia   [   ] tired   Cardio:                [  x ] WNL           [   ] CP   [   ] VELASCO   [   ] palpitations               Resp:                   [  x ] WNL           [   ] SOB   [   ] cough   [   ] wheezing   GI:                        [ x  ] WNL           [   ] constipation   [   ] diarrhea   [   ] abdominal pain   [   ] nausea   [   ] emesis                                :                      [ x  ] WNL           [   ] PRITCHARD  [   ] dysuria   [   ] difficulty voiding             Endo:                   [ x  ] WNL          [   ] polyuria   [   ] temperature intolerance                 Skin:                     [ x  ] WNL          [   ] pain   [   ] wound   [   ] rash   MSK:                    [   x] WNL          [   ] muscle pain   [   ] joint pain/ stiffness   [   ] muscle tenderness   [   ] swelling   Neuro:                 [   ] WNL          [   ] HA   [   ] change in vision   [   ] tremor   [ x  ] weakness- Right side   [   ]dysphagia   [x  ] word finding difficulties,   [ x ] numbness/ neuropathy both feet    Cognitive:           [ x  ] WNL           [   ]confusion   - daughter note some memory loss since stroke   Psych:                  [  x ] WNL           [   ] hallucinations   [   ]agitation   [   ] delusion   [   ]depression      PHYSICAL EXAM    ICU Vital Signs Last 24 Hrs  T(C): 36.3 (16 Aug 2022 05:28), Max: 37.6 (15 Aug 2022 13:15)  T(F): 97.4 (16 Aug 2022 05:28), Max: 99.6 (15 Aug 2022 13:15)  HR: 87 (16 Aug 2022 05:28) (87 - 109)  BP: 115/67 (16 Aug 2022 05:28) (115/67 - 131/77)  BP(mean): --  ABP: --  ABP(mean): --  RR: 18 (16 Aug 2022 05:28) (17 - 18)  SpO2: --      General:[  x ] NAD, Resting Comfortable,   [   ] other:                                HEENT: [  x ] NC/AT, EOMI, PERRL , Normal Conjunctivae,   [   ] other:  Cardio: [  x ] RRR, no murmer,   [   ] other:                              Pulm: [  x ] No Respiratory Distress,  Lungs CTAB,   [   ] other:                       Abdomen: [ x  ]ND/NT, Soft,   [   ] other:    : [ x  ] NO PRITCHARD CATHETER,   [  ] PRITCHARD CATHETER- no meatal tear, no discharge, [   ] other:                                            MSK: [ x  ] No joint swelling, Full PROM,   [    ] other:                                      Ext: [  x ]No C/C/E, No calf tenderness,   [   ]other:    Skin: [ x  ]intact,   [   ] other:                                                                   Neurological Examination:  Cognitive: [ x   ] AAO x 3,   [    ]  other:                                                                 Attention/ Concentration:  [ x   ] intact   [    ]  other:   Memory: [    ] intact,    [ x  ]  other:  mildly impaired  Mood/Affect: [  x  ] wnl,    [    ]  other:                                                                             Communication: [ x  ]Fluent, no dysarthria, following commands:  [   ] other:  CN II - XII:  [  x  ] intact, VFF  [    ] other: mild right facial droop                                                                                         Motor:   RIGHT UE: [   ] WNL,  [  x ] other: 0/5 grossly in all muscle groups  LEFT    UE: [  x ] WNL  RIGHT LE: [ x  ] WNL,  [   ] other: Hip flexion 3/5 with drift to bed, knee flexion 4/5, plantarflexion 5/5, dorsiflexion 5/5, toe extension 5/5  LEFT    LE: [  x ] WNL,  Hip flexion 4/5, knee flexion 5/5, plantarflexion 5/5, dorsiflexion 5/5, toe extension 5/5     Tone: [    ] wnl,   [ x   ]  other: mild increased tone in right triceps  DTRs: [ x  ]symmetric, 1+ [   ] other:   Coordination:   [    ] intact as testable,   [ not assesed   ]       CLOF:   Bed mobility- PA   Transfers- mod A   Gait: 25 x2 PA 3 point cane       MEDICATIONS  (STANDING):  amLODIPine   Tablet 5 milliGRAM(s) Oral at bedtime  aspirin enteric coated 81 milliGRAM(s) Oral daily  atorvastatin 80 milliGRAM(s) Oral at bedtime  citalopram 5 milliGRAM(s) Oral daily  dextrose 5%. 1000 milliLiter(s) (100 mL/Hr) IV Continuous <Continuous>  dextrose 50% Injectable 25 Gram(s) IV Push once  dextrose 50% Injectable 12.5 Gram(s) IV Push once  dextrose 50% Injectable 25 Gram(s) IV Push once  enoxaparin Injectable 40 milliGRAM(s) SubCutaneous every 24 hours  famotidine    Tablet 40 milliGRAM(s) Oral at bedtime  glucagon  Injectable 1 milliGRAM(s) IntraMuscular once  hydrochlorothiazide 25 milliGRAM(s) Oral daily  hydrocortisone 1% Cream 1 Application(s) Topical two times a day  insulin glargine Injectable (LANTUS) 30 Unit(s) SubCutaneous every morning  insulin lispro (ADMELOG) corrective regimen sliding scale   SubCutaneous three times a day before meals  insulin lispro Injectable (ADMELOG) 8 Unit(s) SubCutaneous three times a day before meals  lisinopril 40 milliGRAM(s) Oral daily  metoprolol tartrate 50 milliGRAM(s) Oral two times a day  polyethylene glycol 3350 17 Gram(s) Oral two times a day  senna 2 Tablet(s) Oral at bedtime  ticagrelor 90 milliGRAM(s) Oral every 12 hours    MEDICATIONS  (PRN):  bisacodyl Suppository 10 milliGRAM(s) Rectal daily PRN Constipation  dextrose Oral Gel 15 Gram(s) Oral once PRN Blood Glucose LESS THAN 70 milliGRAM(s)/deciliter  lactulose Syrup 20 Gram(s) Oral two times a day PRN Constipation  melatonin 5 milliGRAM(s) Oral at bedtime PRN Insomnia    RECENT LABS/IMAGING                          15.3   13.02 )-----------( 353      ( 16 Aug 2022 07:42 )             43.2         135  |  98  |  27<H>  ----------------------------<  149<H>  3.9   |  24  |  1.3    Ca    9.7      16 Aug 2022 07:42  Mg     1.8         TPro  7.5  /  Alb  3.7  /  TBili  1.2  /  DBili  x   /  AST  30  /  ALT  52<H>  /  AlkPhos  179<H>        Urinalysis Basic - ( 16 Aug 2022 15:20 )    Color: Yellow / Appearance: Clear / S.019 / pH: x  Gluc: x / Ketone: Negative  / Bili: Negative / Urobili: <2 mg/dL   Blood: x / Protein: 30 mg/dL / Nitrite: Negative   Leuk Esterase: Negative / RBC: 2 /HPF / WBC 1 /HPF   Sq Epi: x / Non Sq Epi: 1 /HPF / Bacteria: Moderate      POCT Blood Glucose.: 222 mg/dL (22 @ 11:47)  POCT Blood Glucose.: 146 mg/dL (22 @ 07:18)  POCT Blood Glucose.: 178 mg/dL (08-15-22 @ 21:47)  POCT Blood Glucose.: 156 mg/dL (08-15-22 @ 16:13)  POCT Blood Glucose.: 201 mg/dL (08-15-22 @ 12:13)  POCT Blood Glucose.: 121 mg/dL (08-15-22 @ 07:41)  POCT Blood Glucose.: 111 mg/dL (22 @ 22:28)  POCT Blood Glucose.: 83 mg/dL (22 @ 16:54)  POCT Blood Glucose.: 210 mg/dL (22 @ 12:02)  POCT Blood Glucose.: 141 mg/dL (22 @ 07:54)  POCT Blood Glucose.: 110 mg/dL (22 @ 22:05)  POCT Blood Glucose.: 71 mg/dL (22 @ 15:52)

## 2022-08-17 DIAGNOSIS — R47.81 SLURRED SPEECH: ICD-10-CM

## 2022-08-17 DIAGNOSIS — R29.810 FACIAL WEAKNESS: ICD-10-CM

## 2022-08-17 DIAGNOSIS — K21.9 GASTRO-ESOPHAGEAL REFLUX DISEASE WITHOUT ESOPHAGITIS: ICD-10-CM

## 2022-08-17 DIAGNOSIS — Z79.84 LONG TERM (CURRENT) USE OF ORAL HYPOGLYCEMIC DRUGS: ICD-10-CM

## 2022-08-17 DIAGNOSIS — Z95.5 PRESENCE OF CORONARY ANGIOPLASTY IMPLANT AND GRAFT: ICD-10-CM

## 2022-08-17 DIAGNOSIS — K59.00 CONSTIPATION, UNSPECIFIED: ICD-10-CM

## 2022-08-17 DIAGNOSIS — I25.10 ATHEROSCLEROTIC HEART DISEASE OF NATIVE CORONARY ARTERY WITHOUT ANGINA PECTORIS: ICD-10-CM

## 2022-08-17 DIAGNOSIS — R29.702 NIHSS SCORE 2: ICD-10-CM

## 2022-08-17 DIAGNOSIS — G81.91 HEMIPLEGIA, UNSPECIFIED AFFECTING RIGHT DOMINANT SIDE: ICD-10-CM

## 2022-08-17 DIAGNOSIS — I63.9 CEREBRAL INFARCTION, UNSPECIFIED: ICD-10-CM

## 2022-08-17 DIAGNOSIS — D72.829 ELEVATED WHITE BLOOD CELL COUNT, UNSPECIFIED: ICD-10-CM

## 2022-08-17 DIAGNOSIS — Z79.02 LONG TERM (CURRENT) USE OF ANTITHROMBOTICS/ANTIPLATELETS: ICD-10-CM

## 2022-08-17 DIAGNOSIS — E78.5 HYPERLIPIDEMIA, UNSPECIFIED: ICD-10-CM

## 2022-08-17 DIAGNOSIS — Z79.82 LONG TERM (CURRENT) USE OF ASPIRIN: ICD-10-CM

## 2022-08-17 DIAGNOSIS — N17.9 ACUTE KIDNEY FAILURE, UNSPECIFIED: ICD-10-CM

## 2022-08-17 DIAGNOSIS — R74.8 ABNORMAL LEVELS OF OTHER SERUM ENZYMES: ICD-10-CM

## 2022-08-17 DIAGNOSIS — I63.412 CEREBRAL INFARCTION DUE TO EMBOLISM OF LEFT MIDDLE CEREBRAL ARTERY: ICD-10-CM

## 2022-08-17 DIAGNOSIS — I10 ESSENTIAL (PRIMARY) HYPERTENSION: ICD-10-CM

## 2022-08-17 DIAGNOSIS — I16.1 HYPERTENSIVE EMERGENCY: ICD-10-CM

## 2022-08-17 DIAGNOSIS — E11.65 TYPE 2 DIABETES MELLITUS WITH HYPERGLYCEMIA: ICD-10-CM

## 2022-08-17 LAB
ALBUMIN SERPL ELPH-MCNC: 4 G/DL — SIGNIFICANT CHANGE UP (ref 3.5–5.2)
ALP SERPL-CCNC: 185 U/L — HIGH (ref 30–115)
ALT FLD-CCNC: 43 U/L — HIGH (ref 0–41)
ANION GAP SERPL CALC-SCNC: 18 MMOL/L — HIGH (ref 7–14)
AST SERPL-CCNC: 29 U/L — SIGNIFICANT CHANGE UP (ref 0–41)
BASOPHILS # BLD AUTO: 0.04 K/UL — SIGNIFICANT CHANGE UP (ref 0–0.2)
BASOPHILS NFR BLD AUTO: 0.3 % — SIGNIFICANT CHANGE UP (ref 0–1)
BILIRUB SERPL-MCNC: 1.2 MG/DL — SIGNIFICANT CHANGE UP (ref 0.2–1.2)
BUN SERPL-MCNC: 25 MG/DL — HIGH (ref 10–20)
CALCIUM SERPL-MCNC: 9.7 MG/DL — SIGNIFICANT CHANGE UP (ref 8.5–10.1)
CHLORIDE SERPL-SCNC: 92 MMOL/L — LOW (ref 98–110)
CO2 SERPL-SCNC: 20 MMOL/L — SIGNIFICANT CHANGE UP (ref 17–32)
CREAT SERPL-MCNC: 1.3 MG/DL — SIGNIFICANT CHANGE UP (ref 0.7–1.5)
CRP SERPL-MCNC: 85.4 MG/L — HIGH
D DIMER BLD IA.RAPID-MCNC: 323 NG/ML DDU — HIGH (ref 0–230)
EGFR: 57 ML/MIN/1.73M2 — LOW
EOSINOPHIL # BLD AUTO: 0.22 K/UL — SIGNIFICANT CHANGE UP (ref 0–0.7)
EOSINOPHIL NFR BLD AUTO: 1.5 % — SIGNIFICANT CHANGE UP (ref 0–8)
GLUCOSE BLDC GLUCOMTR-MCNC: 116 MG/DL — HIGH (ref 70–99)
GLUCOSE BLDC GLUCOMTR-MCNC: 141 MG/DL — HIGH (ref 70–99)
GLUCOSE BLDC GLUCOMTR-MCNC: 190 MG/DL — HIGH (ref 70–99)
GLUCOSE BLDC GLUCOMTR-MCNC: 91 MG/DL — SIGNIFICANT CHANGE UP (ref 70–99)
GLUCOSE SERPL-MCNC: 171 MG/DL — HIGH (ref 70–99)
HCT VFR BLD CALC: 44.8 % — SIGNIFICANT CHANGE UP (ref 42–52)
HGB BLD-MCNC: 15.5 G/DL — SIGNIFICANT CHANGE UP (ref 14–18)
IMM GRANULOCYTES NFR BLD AUTO: 0.4 % — HIGH (ref 0.1–0.3)
LYMPHOCYTES # BLD AUTO: 1.24 K/UL — SIGNIFICANT CHANGE UP (ref 1.2–3.4)
LYMPHOCYTES # BLD AUTO: 8.3 % — LOW (ref 20.5–51.1)
MCHC RBC-ENTMCNC: 28.8 PG — SIGNIFICANT CHANGE UP (ref 27–31)
MCHC RBC-ENTMCNC: 34.6 G/DL — SIGNIFICANT CHANGE UP (ref 32–37)
MCV RBC AUTO: 83.1 FL — SIGNIFICANT CHANGE UP (ref 80–94)
MONOCYTES # BLD AUTO: 1.7 K/UL — HIGH (ref 0.1–0.6)
MONOCYTES NFR BLD AUTO: 11.4 % — HIGH (ref 1.7–9.3)
NEUTROPHILS # BLD AUTO: 11.7 K/UL — HIGH (ref 1.4–6.5)
NEUTROPHILS NFR BLD AUTO: 78.1 % — HIGH (ref 42.2–75.2)
NRBC # BLD: 0 /100 WBCS — SIGNIFICANT CHANGE UP (ref 0–0)
PLATELET # BLD AUTO: 403 K/UL — HIGH (ref 130–400)
POTASSIUM SERPL-MCNC: 4.5 MMOL/L — SIGNIFICANT CHANGE UP (ref 3.5–5)
POTASSIUM SERPL-SCNC: 4.5 MMOL/L — SIGNIFICANT CHANGE UP (ref 3.5–5)
PROT SERPL-MCNC: 7.7 G/DL — SIGNIFICANT CHANGE UP (ref 6–8)
RAPID RVP RESULT: DETECTED
RBC # BLD: 5.39 M/UL — SIGNIFICANT CHANGE UP (ref 4.7–6.1)
RBC # FLD: 13.1 % — SIGNIFICANT CHANGE UP (ref 11.5–14.5)
SARS-COV-2 RNA SPEC QL NAA+PROBE: DETECTED
SODIUM SERPL-SCNC: 130 MMOL/L — LOW (ref 135–146)
WBC # BLD: 14.96 K/UL — HIGH (ref 4.8–10.8)
WBC # FLD AUTO: 14.96 K/UL — HIGH (ref 4.8–10.8)

## 2022-08-17 PROCEDURE — 93970 EXTREMITY STUDY: CPT | Mod: 26

## 2022-08-17 RX ORDER — REMDESIVIR 5 MG/ML
200 INJECTION INTRAVENOUS EVERY 24 HOURS
Refills: 0 | Status: COMPLETED | OUTPATIENT
Start: 2022-08-17 | End: 2022-08-17

## 2022-08-17 RX ORDER — ACETAMINOPHEN 500 MG
650 TABLET ORAL EVERY 6 HOURS
Refills: 0 | Status: DISCONTINUED | OUTPATIENT
Start: 2022-08-17 | End: 2022-08-30

## 2022-08-17 RX ORDER — ACETAMINOPHEN 500 MG
650 TABLET ORAL ONCE
Refills: 0 | Status: COMPLETED | OUTPATIENT
Start: 2022-08-17 | End: 2022-08-17

## 2022-08-17 RX ORDER — REMDESIVIR 5 MG/ML
100 INJECTION INTRAVENOUS EVERY 24 HOURS
Refills: 0 | Status: DISCONTINUED | OUTPATIENT
Start: 2022-08-18 | End: 2022-08-18

## 2022-08-17 RX ORDER — GUAIFENESIN/DEXTROMETHORPHAN 600MG-30MG
10 TABLET, EXTENDED RELEASE 12 HR ORAL EVERY 4 HOURS
Refills: 0 | Status: DISCONTINUED | OUTPATIENT
Start: 2022-08-19 | End: 2022-08-30

## 2022-08-17 RX ORDER — DIPHENHYDRAMINE HCL 50 MG
25 CAPSULE ORAL EVERY 6 HOURS
Refills: 0 | Status: DISCONTINUED | OUTPATIENT
Start: 2022-08-17 | End: 2022-08-30

## 2022-08-17 RX ORDER — REMDESIVIR 5 MG/ML
INJECTION INTRAVENOUS
Refills: 0 | Status: DISCONTINUED | OUTPATIENT
Start: 2022-08-17 | End: 2022-08-18

## 2022-08-17 RX ORDER — GUAIFENESIN/DEXTROMETHORPHAN 600MG-30MG
10 TABLET, EXTENDED RELEASE 12 HR ORAL EVERY 6 HOURS
Refills: 0 | Status: COMPLETED | OUTPATIENT
Start: 2022-08-17 | End: 2022-08-18

## 2022-08-17 RX ADMIN — INSULIN GLARGINE 30 UNIT(S): 100 INJECTION, SOLUTION SUBCUTANEOUS at 08:03

## 2022-08-17 RX ADMIN — Medication 81 MILLIGRAM(S): at 12:14

## 2022-08-17 RX ADMIN — ATORVASTATIN CALCIUM 80 MILLIGRAM(S): 80 TABLET, FILM COATED ORAL at 21:18

## 2022-08-17 RX ADMIN — TICAGRELOR 90 MILLIGRAM(S): 90 TABLET ORAL at 18:29

## 2022-08-17 RX ADMIN — Medication 650 MILLIGRAM(S): at 13:00

## 2022-08-17 RX ADMIN — REMDESIVIR 200 MILLIGRAM(S): 5 INJECTION INTRAVENOUS at 20:16

## 2022-08-17 RX ADMIN — CITALOPRAM 5 MILLIGRAM(S): 10 TABLET, FILM COATED ORAL at 12:13

## 2022-08-17 RX ADMIN — Medication 50 MILLIGRAM(S): at 18:29

## 2022-08-17 RX ADMIN — Medication 8 UNIT(S): at 12:12

## 2022-08-17 RX ADMIN — Medication 8 UNIT(S): at 17:28

## 2022-08-17 RX ADMIN — Medication 650 MILLIGRAM(S): at 12:47

## 2022-08-17 RX ADMIN — Medication 8 UNIT(S): at 08:02

## 2022-08-17 RX ADMIN — Medication 5 MILLIGRAM(S): at 21:18

## 2022-08-17 RX ADMIN — Medication 1: at 12:11

## 2022-08-17 RX ADMIN — POLYETHYLENE GLYCOL 3350 17 GRAM(S): 17 POWDER, FOR SOLUTION ORAL at 18:30

## 2022-08-17 RX ADMIN — AMLODIPINE BESYLATE 5 MILLIGRAM(S): 2.5 TABLET ORAL at 21:19

## 2022-08-17 RX ADMIN — SENNA PLUS 2 TABLET(S): 8.6 TABLET ORAL at 21:22

## 2022-08-17 RX ADMIN — FAMOTIDINE 40 MILLIGRAM(S): 10 INJECTION INTRAVENOUS at 21:19

## 2022-08-17 RX ADMIN — Medication 10 MILLILITER(S): at 20:23

## 2022-08-17 RX ADMIN — ENOXAPARIN SODIUM 40 MILLIGRAM(S): 100 INJECTION SUBCUTANEOUS at 21:19

## 2022-08-17 RX ADMIN — TICAGRELOR 90 MILLIGRAM(S): 90 TABLET ORAL at 06:04

## 2022-08-17 NOTE — PROGRESS NOTE ADULT - ASSESSMENT
Pain: Yes Location: Headache Rating: 10/10 Intervention: Notified RN     Pain rating after Intervention: N/A     Orientation: WFL    Arousal Level: Lethargic     Behavior: Cooperative     Affect Range: WFL      Needed: No     Attention: Easily distracted due to pain/drowsiness     Insight into illness/deficits: Good     Treatment Session Focused on Coping/Cognition     Patient was seen for neuropsychological testing was completed and the following measures were given: Luria Apraxia Exam, Neuropsychological Assessment Battery (NAB)-Naming, WAIS-IV Digit Span.     Luria Apraxia Exam:     Buccofacial  6/6 WNL     Upper Limb  16/16 WNL     Mr. Proctor’s spontaneous speech was grammatical, but slow with occasional pauses before words. Following these pauses, he was able to find the necessary words to express himself each time. This may be consistent with his daughter’s report that he has had word-finding difficulties over the past year which have worsened since his admission to the hospital. NAB confrontation naming was not completed due to the patient’s pain and fatigue; however, he showed 100% accuracy with the limited administered items.     Mr. Proctor did not show buccofacial apraxia and did not show limb apraxia when using his left hand. However, his motor responses on limb praxis tasks were slow. He remained unable to move his right hand.      Mr. Proctor’s verbal working memory was normal relative to same-aged peers. However, his performance was slightly below what would be expected given Mr. 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      Given that Mr. Proctor’s expressive speech and motor responses were both slow, he shows a general weakness in processing speed. His word-finding difficulties and slow speech could be related to this general processing speed weakness rather than due to a primary language deficit, especially since his speech is grammatical yet slow. We will further evaluate Mr. Proctor’s expressive language (including confrontation naming) as well as his speed of processing and executive functioning in order to further clarify the nature of these deficits.     Goals: Monitor for cognitive changes and further assess processing speed and expressive language, as well as visuospatial functioning given multiple PCA infarcts.     Plan: Reassess 1-2 times per week

## 2022-08-17 NOTE — PROGRESS NOTE ADULT - SUBJECTIVE AND OBJECTIVE BOX
Patient is a 77y old  Male who presents with a chief complaint of rehab s/p left ischemic stroke with aphasia and right hemiparesis (11 Aug 2022 11:30)      HPI:  Patient is a 78yo M with PMH of HTN, DM, HLD, CAD (s/p stents), GERD who presented to the ED on 8/3/2022 for right hand weakness. Patient noticed he started having difficulty with his speech, then developed weakness in his right hand and some visual field deficits. In the ED, patient received tPA. CT head at the time was negative, EDSON showed EF of 55-60%.  MRI head showed scattered left-sided acute cortical infarcts within the MCA and PCA territories, with trace petechial hemorrhage involving high left frontal region. There is a chronic left occipital lobe infarct. MRA of the neck showed moderate stenosis of the proximal right internal carotid artery. On , a right upper quadrant US was done to rule out cholecystitis, revealing an indeterminate 1.2 cm right renal lesion and moderate-severe hepatic steatosis. During the hospital stay, the visual field deficits improved but patient continued to have no motor function in the right hand and impaired RLE strength and mobility and ADL independence and a persistent aphasia.    On evaluation by PM&R, patient endorses no movement in his right hand and notes some difficulty with memory but otherwise has no other complaints. Patient is right handed. He lives by himself in a house with 5 [address] and one flight of stairs inside (5+7 steps). Patient was previously independent in ADLs and ambulation, occasionally using a cane outdoors for balance. Currently patient requires mod assist for bed mobility, max assist with transfers, mod assist with ambulation 25 feet with RW and max assistance for dressing. He was evaluated by Physiatry on the medical service and was found to be a good acute rehab candidate.  (11 Aug 2022 11:30)    TODAY'S SUBJECTIVE & REVIEW OF SYMPTOMS:  Patient tested positive for Covid. Will place on isolation for 10 days. Infectious control contacted.     Constitutional    [ x  ] WNL           [   ] poor appetite   [   ] insomnia   [   ] tired   Cardio:                [  x ] WNL           [   ] CP   [   ] VELASCO   [   ] palpitations               Resp:                   [  x ] WNL           [   ] SOB   [   ] cough   [   ] wheezing   GI:                        [ x  ] WNL           [   ] constipation   [   ] diarrhea   [   ] abdominal pain   [   ] nausea   [   ] emesis                                :                      [ x  ] WNL           [   ] PRITCHARD  [   ] dysuria   [   ] difficulty voiding             Endo:                   [ x  ] WNL          [   ] polyuria   [   ] temperature intolerance                 Skin:                     [ x  ] WNL          [   ] pain   [   ] wound   [   ] rash   MSK:                    [   x] WNL          [   ] muscle pain   [   ] joint pain/ stiffness   [   ] muscle tenderness   [   ] swelling   Neuro:                 [   ] WNL          [   ] HA   [   ] change in vision   [   ] tremor   [ x  ] weakness- Right side   [   ]dysphagia   [x  ] word finding difficulties,   [ x ] numbness/ neuropathy both feet    Cognitive:           [ x  ] WNL           [   ]confusion   - daughter note some memory loss since stroke   Psych:                  [  x ] WNL           [   ] hallucinations   [   ]agitation   [   ] delusion   [   ]depression      PHYSICAL EXAM    ICU Vital Signs Last 24 Hrs  T(C): 36.3 (17 Aug 2022 05:54), Max: 37 (16 Aug 2022 20:51)  T(F): 97.4 (17 Aug 2022 05:54), Max: 98.6 (16 Aug 2022 20:51)  HR: 84 (17 Aug 2022 05:54) (84 - 104)  BP: 115/71 (17 Aug 2022 05:54) (103/60 - 115/71)  BP(mean): --  ABP: --  ABP(mean): --  RR: 17 (17 Aug 2022 05:54) (17 - 18)  SpO2: --          General:[  x ] NAD, Resting Comfortable,   [   ] other:                                HEENT: [  x ] NC/AT, EOMI, PERRL , Normal Conjunctivae,   [   ] other:  Cardio: [  x ] RRR, no murmer,   [   ] other:                              Pulm: [  x ] No Respiratory Distress,  Lungs CTAB,   [   ] other:                       Abdomen: [ x  ]ND/NT, Soft,   [   ] other:    : [ x  ] NO PRITCHARD CATHETER,   [  ] PRITCHARD CATHETER- no meatal tear, no discharge, [   ] other:                                            MSK: [ x  ] No joint swelling, Full PROM,   [    ] other:                                      Ext: [  x ]No C/C/E, No calf tenderness,   [   ]other:    Skin: [ x  ]intact,   [   ] other:                                                                   Neurological Examination:  Cognitive: [ x   ] AAO x 3,   [    ]  other:                                                                 Attention/ Concentration:  [ x   ] intact   [    ]  other:   Memory: [    ] intact,    [ x  ]  other:  mildly impaired  Mood/Affect: [  x  ] wnl,    [    ]  other:                                                                             Communication: [ x  ]Fluent, no dysarthria, following commands:  [   ] other:  CN II - XII:  [  x  ] intact, VFF  [    ] other: mild right facial droop                                                                                         Motor:   RIGHT UE: [   ] WNL,  [  x ] other: 0/5 grossly in all muscle groups  LEFT    UE: [  x ] WNL  RIGHT LE: [ x  ] WNL,  [   ] other: Hip flexion 3/5 with drift to bed, knee flexion 4/5, plantarflexion 5/5, dorsiflexion 5/5, toe extension 5/5  LEFT    LE: [  x ] WNL,  Hip flexion 4/5, knee flexion 5/5, plantarflexion 5/5, dorsiflexion 5/5, toe extension 5/5     Tone: [    ] wnl,   [ x   ]  other: mild increased tone in right triceps  DTRs: [ x  ]symmetric, 1+ [   ] other:   Coordination:   [    ] intact as testable,   [ not assesed   ]       CLOF:   Bed mobility- PA   Transfers- mod A   Gait: 25 x2 PA 3 point cane       MEDICATIONS  (STANDING):  amLODIPine   Tablet 5 milliGRAM(s) Oral at bedtime  aspirin enteric coated 81 milliGRAM(s) Oral daily  atorvastatin 80 milliGRAM(s) Oral at bedtime  citalopram 5 milliGRAM(s) Oral daily  dextrose 5%. 1000 milliLiter(s) (100 mL/Hr) IV Continuous <Continuous>  dextrose 50% Injectable 25 Gram(s) IV Push once  dextrose 50% Injectable 12.5 Gram(s) IV Push once  dextrose 50% Injectable 25 Gram(s) IV Push once  enoxaparin Injectable 40 milliGRAM(s) SubCutaneous every 24 hours  famotidine    Tablet 40 milliGRAM(s) Oral at bedtime  glucagon  Injectable 1 milliGRAM(s) IntraMuscular once  hydrocortisone 1% Cream 1 Application(s) Topical two times a day  insulin glargine Injectable (LANTUS) 30 Unit(s) SubCutaneous every morning  insulin lispro (ADMELOG) corrective regimen sliding scale   SubCutaneous three times a day before meals  insulin lispro Injectable (ADMELOG) 8 Unit(s) SubCutaneous three times a day before meals  lisinopril 40 milliGRAM(s) Oral daily  metoprolol tartrate 50 milliGRAM(s) Oral two times a day  polyethylene glycol 3350 17 Gram(s) Oral two times a day  senna 2 Tablet(s) Oral at bedtime  sodium chloride 0.9%. 1000 milliLiter(s) (75 mL/Hr) IV Continuous <Continuous>  ticagrelor 90 milliGRAM(s) Oral every 12 hours    MEDICATIONS  (PRN):  bisacodyl Suppository 10 milliGRAM(s) Rectal daily PRN Constipation  dextrose Oral Gel 15 Gram(s) Oral once PRN Blood Glucose LESS THAN 70 milliGRAM(s)/deciliter  lactulose Syrup 20 Gram(s) Oral two times a day PRN Constipation  melatonin 5 milliGRAM(s) Oral at bedtime PRN Insomnia        RECENT LABS/IMAGING                          15.3   13.02 )-----------( 353      ( 16 Aug 2022 07:42 )             43.2         135  |  98  |  27<H>  ----------------------------<  149<H>  3.9   |  24  |  1.3    Ca    9.7      16 Aug 2022 07:42  Mg     1.8         TPro  7.5  /  Alb  3.7  /  TBili  1.2  /  DBili  x   /  AST  30  /  ALT  52<H>  /  AlkPhos  179<H>        Urinalysis Basic - ( 16 Aug 2022 15:20 )    Color: Yellow / Appearance: Clear / S.019 / pH: x  Gluc: x / Ketone: Negative  / Bili: Negative / Urobili: <2 mg/dL   Blood: x / Protein: 30 mg/dL / Nitrite: Negative   Leuk Esterase: Negative / RBC: 2 /HPF / WBC 1 /HPF   Sq Epi: x / Non Sq Epi: 1 /HPF / Bacteria: Moderate      POCT Blood Glucose.: 116 mg/dL (22 @ 07:05)  POCT Blood Glucose.: 105 mg/dL (22 @ 20:49)  POCT Blood Glucose.: 120 mg/dL (22 @ 16:28)  POCT Blood Glucose.: 222 mg/dL (22 @ 11:47)  POCT Blood Glucose.: 146 mg/dL (22 @ 07:18)  POCT Blood Glucose.: 178 mg/dL (08-15-22 @ 21:47)  POCT Blood Glucose.: 156 mg/dL (08-15-22 @ 16:13)  POCT Blood Glucose.: 201 mg/dL (08-15-22 @ 12:13)  POCT Blood Glucose.: 121 mg/dL (08-15-22 @ 07:41)  POCT Blood Glucose.: 111 mg/dL (22 @ 22:28)  POCT Blood Glucose.: 83 mg/dL (22 @ 16:54)  POCT Blood Glucose.: 210 mg/dL (22 @ 12:02)                          15.3   13.02 )-----------( 353      ( 16 Aug 2022 07:42 )             43.2         135  |  98  |  27<H>  ----------------------------<  149<H>  3.9   |  24  |  1.3    Ca    9.7      16 Aug 2022 07:42  Mg     1.8         TPro  7.5  /  Alb  3.7  /  TBili  1.2  /  DBili  x   /  AST  30  /  ALT  52<H>  /  AlkPhos  179<H>        Urinalysis Basic - ( 16 Aug 2022 15:20 )    Color: Yellow / Appearance: Clear / S.019 / pH: x  Gluc: x / Ketone: Negative  / Bili: Negative / Urobili: <2 mg/dL   Blood: x / Protein: 30 mg/dL / Nitrite: Negative   Leuk Esterase: Negative / RBC: 2 /HPF / WBC 1 /HPF   Sq Epi: x / Non Sq Epi: 1 /HPF / Bacteria: Moderate      POCT Blood Glucose.: 222 mg/dL (22 @ 11:47)  POCT Blood Glucose.: 146 mg/dL (22 @ 07:18)  POCT Blood Glucose.: 178 mg/dL (08-15-22 @ 21:47)  POCT Blood Glucose.: 156 mg/dL (08-15-22 @ 16:13)  POCT Blood Glucose.: 201 mg/dL (08-15-22 @ 12:13)  POCT Blood Glucose.: 121 mg/dL (08-15-22 @ 07:41)  POCT Blood Glucose.: 111 mg/dL (22 @ 22:28)  POCT Blood Glucose.: 83 mg/dL (22 @ 16:54)  POCT Blood Glucose.: 210 mg/dL (22 @ 12:02)  POCT Blood Glucose.: 141 mg/dL (22 @ 07:54)  POCT Blood Glucose.: 110 mg/dL (22 @ 22:05)  POCT Blood Glucose.: 71 mg/dL (22 @ 15:52)     Patient is a 77y old  Male who presents with a chief complaint of rehab s/p left ischemic stroke with aphasia and right hemiparesis (11 Aug 2022 11:30)      HPI:  Patient is a 78yo M with PMH of HTN, DM, HLD, CAD (s/p stents), GERD who presented to the ED on 8/3/2022 for right hand weakness. Patient noticed he started having difficulty with his speech, then developed weakness in his right hand and some visual field deficits. In the ED, patient received tPA. CT head at the time was negative, EDSON showed EF of 55-60%.  MRI head showed scattered left-sided acute cortical infarcts within the MCA and PCA territories, with trace petechial hemorrhage involving high left frontal region. There is a chronic left occipital lobe infarct. MRA of the neck showed moderate stenosis of the proximal right internal carotid artery. On , a right upper quadrant US was done to rule out cholecystitis, revealing an indeterminate 1.2 cm right renal lesion and moderate-severe hepatic steatosis. During the hospital stay, the visual field deficits improved but patient continued to have no motor function in the right hand and impaired RLE strength and mobility and ADL independence and a persistent aphasia.    On evaluation by PM&R, patient endorses no movement in his right hand and notes some difficulty with memory but otherwise has no other complaints. Patient is right handed. He lives by himself in a house with [address] and one flight of stairs inside (5+7 steps). Patient was previously independent in ADLs and ambulation, occasionally using a cane outdoors for balance. Currently patient requires mod assist for bed mobility, max assist with transfers, mod assist with ambulation 25 feet with RW and max assistance for dressing. He was evaluated by Physiatry on the medical service and was found to be a good acute rehab candidate.  (11 Aug 2022 11:30)    TODAY'S SUBJECTIVE & REVIEW OF SYMPTOMS:  Patient tested positive for Covid. Will place on isolation for 10 days. Infectious control contacted.     Constitutional    [ x  ] WNL           [   ] poor appetite   [   ] insomnia   [   ] tired   Cardio:                [  x ] WNL           [   ] CP   [   ] VELASCO   [   ] palpitations               Resp:                   [   ] WNL           [   ] SOB   [  x ] cough   [   ] wheezing   GI:                        [ x  ] WNL           [   ] constipation   [   ] diarrhea   [   ] abdominal pain   [   ] nausea   [   ] emesis                                :                      [ x  ] WNL           [   ] PRITCHARD  [   ] dysuria   [   ] difficulty voiding             Endo:                   [ x  ] WNL          [   ] polyuria   [   ] temperature intolerance                 Skin:                     [ x  ] WNL          [   ] pain   [   ] wound   [   ] rash   MSK:                    [   x] WNL          [   ] muscle pain   [   ] joint pain/ stiffness   [   ] muscle tenderness   [   ] swelling   Neuro:                 [   ] WNL          [   ] HA   [   ] change in vision   [   ] tremor   [ x  ] weakness- Right side   [   ]dysphagia   [x  ] word finding difficulties,   [ x ] numbness/ neuropathy both feet    Cognitive:           [ x  ] WNL           [   ]confusion   - daughter note some memory loss since stroke   Psych:                  [  x ] WNL           [   ] hallucinations   [   ]agitation   [   ] delusion   [   ]depression      PHYSICAL EXAM    ICU Vital Signs Last 24 Hrs  T(C): 36.3 (17 Aug 2022 05:54), Max: 37 (16 Aug 2022 20:51)  T(F): 97.4 (17 Aug 2022 05:54), Max: 98.6 (16 Aug 2022 20:51)  HR: 84 (17 Aug 2022 05:54) (84 - 104)  BP: 115/71 (17 Aug 2022 05:54) (103/60 - 115/71)  BP(mean): --  ABP: --  ABP(mean): --  RR: 17 (17 Aug 2022 05:54) (17 - 18)  SpO2: --          General:[  x ] NAD, Resting Comfortable,   [   ] other:                                HEENT: [  x ] NC/AT, EOMI, PERRL , Normal Conjunctivae,   [   ] other:  Cardio: [  x ] RRR, no murmer,   [   ] other:                              Pulm: [  x ] No Respiratory Distress,  Lungs CTAB,   [   ] other:                       Abdomen: [ x  ]ND/NT, Soft,   [   ] other:    : [ x  ] NO PRITCHARD CATHETER,   [  ] PRITCHARD CATHETER- no meatal tear, no discharge, [   ] other:                                            MSK: [ x  ] No joint swelling, Full PROM,   [    ] other:                                      Ext: [  x ]No C/C/E, No calf tenderness,   [   ]other:    Skin: [ x  ]intact,   [   ] other:                                                                   Neurological Examination:  Cognitive: [ x   ] AAO x 3,   [    ]  other:                                                                 Attention/ Concentration:  [ x   ] intact   [    ]  other:   Memory: [    ] intact,    [ x  ]  other:  mildly impaired  Mood/Affect: [  x  ] wnl,    [    ]  other:                                                                             Communication: [ x  ]Fluent, no dysarthria, following commands:  [   ] other:  CN II - XII:  [  x  ] intact, VFF  [    ] other: mild right facial droop                                                                                         Motor:   RIGHT UE: [   ] WNL,  [  x ] other: 0/5 grossly in all muscle groups  LEFT    UE: [  x ] WNL  RIGHT LE: [ x  ] WNL,  [   ] other: Hip flexion 3/5 with drift to bed, knee flexion 4/5, plantarflexion 5/5, dorsiflexion 5/5, toe extension 5/5  LEFT    LE: [  x ] WNL,  Hip flexion 4/5, knee flexion 5/5, plantarflexion 5/5, dorsiflexion 5/5, toe extension 5/5     Tone: [    ] wnl,   [ x   ]  other: mild increased tone in right triceps  DTRs: [ x  ]symmetric, 1+ [   ] other:   Coordination:   [    ] intact as testable,   [ not assesed   ]       CLOF:   Bed mobility- PA   Transfers- mod A   Gait: 25 x2 PA 3 point cane       MEDICATIONS  (STANDING):  acetaminophen     Tablet .. 650 milliGRAM(s) Oral once  amLODIPine   Tablet 5 milliGRAM(s) Oral at bedtime  aspirin enteric coated 81 milliGRAM(s) Oral daily  atorvastatin 80 milliGRAM(s) Oral at bedtime  citalopram 5 milliGRAM(s) Oral daily  dextrose 5%. 1000 milliLiter(s) (100 mL/Hr) IV Continuous <Continuous>  dextrose 50% Injectable 25 Gram(s) IV Push once  dextrose 50% Injectable 12.5 Gram(s) IV Push once  dextrose 50% Injectable 25 Gram(s) IV Push once  enoxaparin Injectable 40 milliGRAM(s) SubCutaneous every 24 hours  famotidine    Tablet 40 milliGRAM(s) Oral at bedtime  glucagon  Injectable 1 milliGRAM(s) IntraMuscular once  hydrocortisone 1% Cream 1 Application(s) Topical two times a day  insulin glargine Injectable (LANTUS) 30 Unit(s) SubCutaneous every morning  insulin lispro (ADMELOG) corrective regimen sliding scale   SubCutaneous three times a day before meals  insulin lispro Injectable (ADMELOG) 8 Unit(s) SubCutaneous three times a day before meals  lisinopril 40 milliGRAM(s) Oral daily  metoprolol tartrate 50 milliGRAM(s) Oral two times a day  polyethylene glycol 3350 17 Gram(s) Oral two times a day  senna 2 Tablet(s) Oral at bedtime  sodium chloride 0.9%. 1000 milliLiter(s) (75 mL/Hr) IV Continuous <Continuous>  ticagrelor 90 milliGRAM(s) Oral every 12 hours    MEDICATIONS  (PRN):  bisacodyl Suppository 10 milliGRAM(s) Rectal daily PRN Constipation  dextrose Oral Gel 15 Gram(s) Oral once PRN Blood Glucose LESS THAN 70 milliGRAM(s)/deciliter  lactulose Syrup 20 Gram(s) Oral two times a day PRN Constipation  melatonin 5 milliGRAM(s) Oral at bedtime PRN Insomnia          RECENT LABS/IMAGING                          15.3   13.02 )-----------( 353      ( 16 Aug 2022 07:42 )             43.2         135  |  98  |  27<H>  ----------------------------<  149<H>  3.9   |  24  |  1.3    Ca    9.7      16 Aug 2022 07:42  Mg     1.8         TPro  7.5  /  Alb  3.7  /  TBili  1.2  /  DBili  x   /  AST  30  /  ALT  52<H>  /  AlkPhos  179<H>        Urinalysis Basic - ( 16 Aug 2022 15:20 )    Color: Yellow / Appearance: Clear / S.019 / pH: x  Gluc: x / Ketone: Negative  / Bili: Negative / Urobili: <2 mg/dL   Blood: x / Protein: 30 mg/dL / Nitrite: Negative   Leuk Esterase: Negative / RBC: 2 /HPF / WBC 1 /HPF   Sq Epi: x / Non Sq Epi: 1 /HPF / Bacteria: Moderate      POCT Blood Glucose.: 116 mg/dL (22 @ 07:05)  POCT Blood Glucose.: 105 mg/dL (22 @ 20:49)  POCT Blood Glucose.: 120 mg/dL (22 @ 16:28)  POCT Blood Glucose.: 222 mg/dL (22 @ 11:47)  POCT Blood Glucose.: 146 mg/dL (22 @ 07:18)  POCT Blood Glucose.: 178 mg/dL (08-15-22 @ 21:47)  POCT Blood Glucose.: 156 mg/dL (08-15-22 @ 16:13)  POCT Blood Glucose.: 201 mg/dL (08-15-22 @ 12:13)  POCT Blood Glucose.: 121 mg/dL (08-15-22 @ 07:41)  POCT Blood Glucose.: 111 mg/dL (22 @ 22:28)  POCT Blood Glucose.: 83 mg/dL (22 @ 16:54)  POCT Blood Glucose.: 210 mg/dL (22 @ 12:02)                          15.3   13.02 )-----------( 353      ( 16 Aug 2022 07:42 )             43.2         135  |  98  |  27<H>  ----------------------------<  149<H>  3.9   |  24  |  1.3    Ca    9.7      16 Aug 2022 07:42  Mg     1.8         TPro  7.5  /  Alb  3.7  /  TBili  1.2  /  DBili  x   /  AST  30  /  ALT  52<H>  /  AlkPhos  179<H>        Urinalysis Basic - ( 16 Aug 2022 15:20 )    Color: Yellow / Appearance: Clear / S.019 / pH: x  Gluc: x / Ketone: Negative  / Bili: Negative / Urobili: <2 mg/dL   Blood: x / Protein: 30 mg/dL / Nitrite: Negative   Leuk Esterase: Negative / RBC: 2 /HPF / WBC 1 /HPF   Sq Epi: x / Non Sq Epi: 1 /HPF / Bacteria: Moderate      POCT Blood Glucose.: 222 mg/dL (22 @ 11:47)  POCT Blood Glucose.: 146 mg/dL (22 @ 07:18)  POCT Blood Glucose.: 178 mg/dL (08-15-22 @ 21:47)  POCT Blood Glucose.: 156 mg/dL (08-15-22 @ 16:13)  POCT Blood Glucose.: 201 mg/dL (08-15-22 @ 12:13)  POCT Blood Glucose.: 121 mg/dL (08-15-22 @ 07:41)  POCT Blood Glucose.: 111 mg/dL (22 @ 22:28)  POCT Blood Glucose.: 83 mg/dL (22 @ 16:54)  POCT Blood Glucose.: 210 mg/dL (22 @ 12:02)  POCT Blood Glucose.: 141 mg/dL (22 @ 07:54)  POCT Blood Glucose.: 110 mg/dL (22 @ 22:05)  POCT Blood Glucose.: 71 mg/dL (22 @ 15:52)     Patient is a 77y old  Male who presents with a chief complaint of rehab s/p left ischemic stroke with aphasia and right hemiparesis (11 Aug 2022 11:30)      HPI:  Patient is a 76yo M with PMH of HTN, DM, HLD, CAD (s/p stents), GERD who presented to the ED on 8/3/2022 for right hand weakness. Patient noticed he started having difficulty with his speech, then developed weakness in his right hand and some visual field deficits. In the ED, patient received tPA. CT head at the time was negative, EDSON showed EF of 55-60%.  MRI head showed scattered left-sided acute cortical infarcts within the MCA and PCA territories, with trace petechial hemorrhage involving high left frontal region. There is a chronic left occipital lobe infarct. MRA of the neck showed moderate stenosis of the proximal right internal carotid artery. On , a right upper quadrant US was done to rule out cholecystitis, revealing an indeterminate 1.2 cm right renal lesion and moderate-severe hepatic steatosis. During the hospital stay, the visual field deficits improved but patient continued to have no motor function in the right hand and impaired RLE strength and mobility and ADL independence and a persistent aphasia.    On evaluation by PM&R, patient endorses no movement in his right hand and notes some difficulty with memory but otherwise has no other complaints. Patient is right handed. He lives by himself in a house with 5 [address] and one flight of stairs inside (5+7 steps). Patient was previously independent in ADLs and ambulation, occasionally using a cane outdoors for balance. Currently patient requires mod assist for bed mobility, max assist with transfers, mod assist with ambulation 25 feet with RW and max assistance for dressing. He was evaluated by Physiatry on the medical service and was found to be a good acute rehab candidate.  (11 Aug 2022 11:30)    TODAY'S SUBJECTIVE & REVIEW OF SYMPTOMS:  Patient tested positive for Covid. Will place on isolation for 10 days. Infectious control contacted.     Constitutional    [ x  ] WNL           [   ] poor appetite   [   ] insomnia   [   ] tired   Cardio:                [  x ] WNL           [   ] CP   [   ] VELASCO   [   ] palpitations               Resp:                   [   ] WNL           [   ] SOB   [  x ] cough   [   ] wheezing   GI:                        [ x  ] WNL           [   ] constipation   [   ] diarrhea   [   ] abdominal pain   [   ] nausea   [   ] emesis                                :                      [ x  ] WNL           [   ] PRITCHARD  [   ] dysuria   [   ] difficulty voiding             Endo:                   [ x  ] WNL          [   ] polyuria   [   ] temperature intolerance                 Skin:                     [ x  ] WNL          [   ] pain   [   ] wound   [   ] rash   MSK:                    [   x] WNL          [   ] muscle pain   [   ] joint pain/ stiffness   [   ] muscle tenderness   [   ] swelling   Neuro:                 [   ] WNL          [   ] HA   [   ] change in vision   [   ] tremor   [ x  ] weakness- Right side   [   ]dysphagia   [x  ] word finding difficulties,   [ x ] numbness/ neuropathy both feet    Cognitive:           [ x  ] WNL           [   ]confusion   - daughter note some memory loss since stroke   Psych:                  [  x ] WNL           [   ] hallucinations   [   ]agitation   [   ] delusion   [   ]depression      PHYSICAL EXAM    ICU Vital Signs Last 24 Hrs  T(C): 36.3 (17 Aug 2022 05:54), Max: 37 (16 Aug 2022 20:51)  T(F): 97.4 (17 Aug 2022 05:54), Max: 98.6 (16 Aug 2022 20:51)  HR: 84 (17 Aug 2022 05:54) (84 - 104)  BP: 115/71 (17 Aug 2022 05:54) (103/60 - 115/71)  BP(mean): --  ABP: --  ABP(mean): --  RR: 17 (17 Aug 2022 05:54) (17 - 18)  SpO2: --    General:[  x ] NAD, Resting Comfortable,   [   ] other:                                HEENT: [  x ] NC/AT, EOMI, PERRL , Normal Conjunctivae,   [   ] other:  Cardio: [  x ] RRR, no murmer,   [   ] other:                              Pulm: [  x ] No Respiratory Distress,  Lungs CTAB,   [   ] other:                       Abdomen: [ x  ] ND/NT, Soft,   [   ] other:    : [ x  ] NO PRITCHARD CATHETER,   [  ] PRITCHARD CATHETER- no meatal tear, no discharge, [   ] other:                                            MSK: [ x  ] No joint swelling, Full PROM,   [    ] other:                                      Ext: [  x ]No C/C/E, No calf tenderness,   [   ]other:    Skin: [ x  ]intact,   [   ] other:                                                                   Neurological Examination:  Cognitive: [ x   ] AAO x 3,   [    ]  other:                                                                 Attention/ Concentration:  [ x   ] intact   [    ]  other:   Memory: [    ] intact,    [ x  ]  other:  mildly impaired  Mood/Affect: [  x  ] wnl,    [    ]  other:                                                                             Communication: [ x  ]Fluent, no dysarthria, following commands:  [   ] other:  CN II - XII:  [  x  ] intact, VFF  [    ] other: mild right facial droop                                                                                         Motor:   RIGHT UE: [   ] WNL,  [  x ] other: 0/5 grossly in all muscle groups  LEFT    UE: [  x ] WNL  RIGHT LE: [ x  ] WNL,  [   ] other: Hip flexion 3/5 with drift to bed, knee flexion 4/5, plantarflexion 5/5, dorsiflexion 5/5, toe extension 5/5  LEFT    LE: [  x ] WNL,  Hip flexion 4/5, knee flexion 5/5, plantarflexion 5/5, dorsiflexion 5/5, toe extension 5/5     Tone: [    ] wnl,   [ x   ]  other: mild increased tone in right triceps  DTRs: [ x  ]symmetric, 1+ [   ] other:   Coordination:   [    ] intact as testable,   [ not assesed   ]       CLOF:   Bed mobility- PA   Transfers- mod A   Gait: 25 x2 PA 3 point cane       MEDICATIONS  (STANDING):  acetaminophen     Tablet .. 650 milliGRAM(s) Oral once  amLODIPine   Tablet 5 milliGRAM(s) Oral at bedtime  aspirin enteric coated 81 milliGRAM(s) Oral daily  atorvastatin 80 milliGRAM(s) Oral at bedtime  citalopram 5 milliGRAM(s) Oral daily  dextrose 5%. 1000 milliLiter(s) (100 mL/Hr) IV Continuous <Continuous>  dextrose 50% Injectable 25 Gram(s) IV Push once  dextrose 50% Injectable 12.5 Gram(s) IV Push once  dextrose 50% Injectable 25 Gram(s) IV Push once  enoxaparin Injectable 40 milliGRAM(s) SubCutaneous every 24 hours  famotidine    Tablet 40 milliGRAM(s) Oral at bedtime  glucagon  Injectable 1 milliGRAM(s) IntraMuscular once  hydrocortisone 1% Cream 1 Application(s) Topical two times a day  insulin glargine Injectable (LANTUS) 30 Unit(s) SubCutaneous every morning  insulin lispro (ADMELOG) corrective regimen sliding scale   SubCutaneous three times a day before meals  insulin lispro Injectable (ADMELOG) 8 Unit(s) SubCutaneous three times a day before meals  lisinopril 40 milliGRAM(s) Oral daily  metoprolol tartrate 50 milliGRAM(s) Oral two times a day  polyethylene glycol 3350 17 Gram(s) Oral two times a day  senna 2 Tablet(s) Oral at bedtime  sodium chloride 0.9%. 1000 milliLiter(s) (75 mL/Hr) IV Continuous <Continuous>  ticagrelor 90 milliGRAM(s) Oral every 12 hours    MEDICATIONS  (PRN):  bisacodyl Suppository 10 milliGRAM(s) Rectal daily PRN Constipation  dextrose Oral Gel 15 Gram(s) Oral once PRN Blood Glucose LESS THAN 70 milliGRAM(s)/deciliter  lactulose Syrup 20 Gram(s) Oral two times a day PRN Constipation  melatonin 5 milliGRAM(s) Oral at bedtime PRN Insomnia          RECENT LABS/IMAGING                          15.5   14.96 )-----------( 403      ( 17 Aug 2022 13:28 )             44.8     08-17    130<L>  |  92<L>  |  25<H>  ----------------------------<  171<H>  4.5   |  20  |  1.3    Ca    9.7      17 Aug 2022 13:28  Mg     1.8     08-    TPro  7.7  /  Alb  4.0  /  TBili  1.2  /  DBili  x   /  AST  29  /  ALT  43<H>  /  AlkPhos  185<H>  -  C-Reactive Protein, Serum (22 @ 13:28)    C-Reactive Protein, Serum: 85.4 mg/L  D-Dimer Assay, Quantitative (22 @ 13:28)    D-Dimer Assay, Quantitative: 323: Manufacturers recommended Cut off for VTE is 230 ng/ml D-DU ng/mL DDU      Urinalysis Basic - ( 16 Aug 2022 15:20 )    Color: Yellow / Appearance: Clear / S.019 / pH: x  Gluc: x / Ketone: Negative  / Bili: Negative / Urobili: <2 mg/dL   Blood: x / Protein: 30 mg/dL / Nitrite: Negative   Leuk Esterase: Negative / RBC: 2 /HPF / WBC 1 /HPF   Sq Epi: x / Non Sq Epi: 1 /HPF / Bacteria: Moderate      POCT Blood Glucose.: 190 mg/dL (22 @ 11:41)  POCT Blood Glucose.: 116 mg/dL (22 @ 07:05)  POCT Blood Glucose.: 105 mg/dL (22 @ 20:49)  POCT Blood Glucose.: 120 mg/dL (22 @ 16:28)  POCT Blood Glucose.: 222 mg/dL (22 @ 11:47)  POCT Blood Glucose.: 146 mg/dL (22 @ 07:18)  POCT Blood Glucose.: 178 mg/dL (08-15-22 @ 21:47)  POCT Blood Glucose.: 156 mg/dL (08-15-22 @ 16:13)  POCT Blood Glucose.: 201 mg/dL (08-15-22 @ 12:13)  POCT Blood Glucose.: 121 mg/dL (08-15-22 @ 07:41)  POCT Blood Glucose.: 111 mg/dL (22 @ 22:28)  POCT Blood Glucose.: 83 mg/dL (22 @ 16:54)

## 2022-08-17 NOTE — PROGRESS NOTE ADULT - ASSESSMENT
Patient is a 76yo M with PMH HTN, DM, CAD (s/p stents), GERD who presented for right sided weakness and aphasia. Patient was found to have a left acute cortical infarct within the MCA and PCA territories. Patient presented with visual field deficits that has since improved, but is still with impaired strength, balance, mobility and ADLs and language. Patient was previously independent in all ADLs and activities. Current level of function is mod assist for bed mobility, max assist with transfers, mod assist with ambulation 25 feet with RW. Patient is a good candidate for rehab due to significantly decreased level of function from prior.    #Left acute ischemic stroke with right hemiparesis (dominant) and aphasia  s/p tPA on 8/3  EDSON showed EF of 55-60%  8/6 MRI head: scattered left-sided acute cortical infarcts within the MCA and PCA territories, with trace petechial hemorrhage involving high left frontal region. There is a chronic left occipital lobe infarct.   MRA of the neck: moderate stenosis of the proximal right internal carotid artery  - start PT/OT/ST, Neuropsych eval  - c/w 5mg celexa  - c/w ASA 81mg  - c/w Brilinta 90mg BID  - c/w Lipitor 80mg daily  - monitor vitals    NIHSS =7, mRankin = 4    #Diabetic Peripheral Polyneuropathy  - No pain. Teach patient skin monitoring an sensory awareness    #Covid positive   - PCR positive from 08/16/22   - Placed on droplet and contact precautions     #HTN   - c/w Amlodipine 5mg qd  - c/w Lisinopril 40mg   - c/w Lopressor 50mg BID    #Diabetes 2  Ha1c 8.2  - c/w 30U Lantus  - c/w 8U Lispro    #CAD  -continue ASA 81, Brilinta, BB, statin    #Renal lesion  On 8/9, a right upper quadrant US was done to rule out cholecystitis, revealing an indeterminate 1.2 cm right renal lesion and moderate-severe hepatic steatosis.  -outpatient followup, non urgent MRI with renal mass protocol    Mild persistent cough/ Increased WBC with left shift  - chest x-ray read pending - no consolidation seen  - PCR positive  - doppler pending   - repeat CBC pending   - UA showed bacteriuria     Azotemia  - give IVF overnight and recheck  - had negative PVR    -GI/Bowel Mgmt: senna, dulcolax, lactulose, miralax    - Diet: Dash Carb Consistent diet       Precautions / PROPHYLAXIS:      - Falls    - Ortho: Weight bearing status: WBAT      - DVT prophylaxis: Lovenox 40mg subQ   Patient is a 78yo M with PMH HTN, DM, CAD (s/p stents), GERD who presented for right sided weakness and aphasia. Patient was found to have a left acute cortical infarct within the MCA and PCA territories. Patient presented with visual field deficits that has since improved, but is still with impaired strength, balance, mobility and ADLs and language. Patient was previously independent in all ADLs and activities. Current level of function is mod assist for bed mobility, max assist with transfers, mod assist with ambulation 25 feet with RW. Patient is a good candidate for rehab due to significantly decreased level of function from prior.    #Left acute ischemic stroke with right hemiparesis (dominant) and aphasia  s/p tPA on 8/3  EDSON showed EF of 55-60%  8/6 MRI head: scattered left-sided acute cortical infarcts within the MCA and PCA territories, with trace petechial hemorrhage involving high left frontal region. There is a chronic left occipital lobe infarct.   MRA of the neck: moderate stenosis of the proximal right internal carotid artery  - start PT/OT/ST, Neuropsych eval  - c/w 5mg celexa  - c/w ASA 81mg  - c/w Brilinta 90mg BID  - c/w Lipitor 80mg daily    NIHSS =7, mRankin = 4    #Diabetic Peripheral Polyneuropathy  - No pain. Teach patient skin monitoring an sensory awareness    #Covid positive   - PCR positive from 08/16/22   - Placed on droplet and contact precautions     #HTN   - c/w Amlodipine 5mg qd  - c/w Lisinopril 40mg   - c/w Lopressor 50mg BID    #Diabetes 2  Ha1c 8.2  - c/w 30U Lantus  - c/w 8U Lispro    #CAD  -continue ASA 81, Brilinta, BB, statin    #Renal lesion  On 8/9, a right upper quadrant US was done to rule out cholecystitis, revealing an indeterminate 1.2 cm right renal lesion and moderate-severe hepatic steatosis.  -outpatient followup, non urgent MRI with renal mass protocol    Mild persistent cough/ Increased WBC with left shift  - chest x-ray read pending - no consolidation seen  - PCR positive  - doppler pending   - repeat CBC pending   - UA showed bacteriuria     Azotemia  - give IVF overnight and recheck  - had negative PVR    -GI/Bowel Mgmt: senna, dulcolax, lactulose, miralax    - Diet: Dash Carb Consistent diet       Precautions / PROPHYLAXIS:      - Falls    - Ortho: Weight bearing status: WBAT      - DVT prophylaxis: Lovenox 40mg subQ   Patient is a 78yo M with PMH HTN, DM, CAD (s/p stents), GERD who presented for right sided weakness and aphasia. Patient was found to have a left acute cortical infarct within the MCA and PCA territories. Patient presented with visual field deficits that has since improved, but is still with impaired strength, balance, mobility and ADLs and language. Patient was previously independent in all ADLs and activities. Current level of function is mod assist for bed mobility, max assist with transfers, mod assist with ambulation 25 feet with RW. Patient is a good candidate for rehab due to significantly decreased level of function from prior.    #Left acute ischemic stroke with right hemiparesis (dominant) and aphasia  s/p tPA on 8/3  EDSON showed EF of 55-60%  8/6 MRI head: scattered left-sided acute cortical infarcts within the MCA and PCA territories, with trace petechial hemorrhage involving high left frontal region. There is a chronic left occipital lobe infarct.   MRA of the neck: moderate stenosis of the proximal right internal carotid artery  - start PT/OT/ST, Neuropsych eval  - c/w 5mg celexa  - c/w ASA 81mg  - c/w Brilinta 90mg BID  - c/w Lipitor 80mg daily    NIHSS =7, mRankin = 4    #Diabetic Peripheral Polyneuropathy  - No pain. Teach patient skin monitoring an sensory awareness    #Covid positive   - PCR positive from 08/16/22   - Placed on droplet and contact precautions   - F/u CRP, Pro malaika, D dimer, Ferritin     #HTN   - c/w Amlodipine 5mg qd  - c/w Lisinopril 40mg   - c/w Lopressor 50mg BID    #Diabetes 2  Ha1c 8.2  - c/w 30U Lantus  - c/w 8U Lispro    #CAD  -continue ASA 81, Brilinta, BB, statin    #Renal lesion  On 8/9, a right upper quadrant US was done to rule out cholecystitis, revealing an indeterminate 1.2 cm right renal lesion and moderate-severe hepatic steatosis.  -outpatient followup, non urgent MRI with renal mass protocol    Mild persistent cough/ Increased WBC with left shift  - chest x-ray read pending - no consolidation seen  - PCR positive  - doppler pending   - repeat CBC pending   - UA showed bacteriuria, but no wbcs     Azotemia  - give IVF overnight and recheck  - had negative PVR    -GI/Bowel Mgmt: senna, dulcolax, lactulose, miralax    - Diet: Dash Carb Consistent diet       Precautions / PROPHYLAXIS:      - Falls    - Ortho: Weight bearing status: WBAT      - DVT prophylaxis: Lovenox 40mg subQ   Patient is a 78yo M with PMH HTN, DM, CAD (s/p stents), GERD who presented for right sided weakness and aphasia. Patient was found to have a left acute cortical infarct within the MCA and PCA territories. Patient presented with visual field deficits that has since improved, but is still with impaired strength, balance, mobility and ADLs and language. Patient was previously independent in all ADLs and activities. Current level of function is mod assist for bed mobility, max assist with transfers, mod assist with ambulation 25 feet with RW. Patient is a good candidate for rehab due to significantly decreased level of function from prior.    #Left acute ischemic stroke with right hemiparesis (dominant) and aphasia  s/p tPA on 8/3  EDSON showed EF of 55-60%  8/6 MRI head: scattered left-sided acute cortical infarcts within the MCA and PCA territories, with trace petechial hemorrhage involving high left frontal region. There is a chronic left occipital lobe infarct.   MRA of the neck: moderate stenosis of the proximal right internal carotid artery  - start PT/OT/ST, Neuropsych eval  - c/w 5mg celexa  - c/w ASA 81mg  - c/w Brilinta 90mg BID  - c/w Lipitor 80mg daily    NIHSS =7, mRankin = 4    #Diabetic Peripheral Polyneuropathy  - No pain. Teach patient skin monitoring an sensory awareness    #Covid positive   - PCR positive from 08/16/22   - Placed on Airborne Precautions  - CRP - 85, D-Dimer 323  - Ferritin and Procal pending. IDconsult pending    #HTN   - c/w Amlodipine 5mg qd  - c/w Lisinopril 40mg   - c/w Lopressor 50mg BID    #Diabetes 2  Ha1c 8.2  - c/w 30U Lantus  - c/w 8U Lispro    #CAD  -continue ASA 81, Brilinta, BB, statin    #Renal lesion  On 8/9, a right upper quadrant US was done to rule out cholecystitis, revealing an indeterminate 1.2 cm right renal lesion and moderate-severe hepatic steatosis.  -outpatient followup, non urgent MRI with renal mass protocol    Mild persistent cough/ Increased WBC with left shift  - chest x-ray read pending - no consolidation seen  - PCR positive  - doppler pending   - repeat CBC pending   - UA showed bacteriuria, but no wbcs     Azotemia  - give IVF overnight and recheck improved  - had negative PVR    -GI/Bowel Mgmt: senna, dulcolax, lactulose, miralax    - Diet: Dash Carb Consistent diet       Precautions / PROPHYLAXIS:      - Falls    - Ortho: Weight bearing status: WBAT      - DVT prophylaxis: Lovenox 40mg subQ

## 2022-08-17 NOTE — CHART NOTE - NSCHARTNOTEFT_GEN_A_CORE
Patients covid swab came back posiitve , he had   Mild persistent cough/ Increased WBC with left shift yesterday , that prompted  to send Covid swab ,   - chest x-ray showed emery lenial opacities/  stable atelectasis)  - UA with moderate bacteria; will get urine culture since pt c/o frequency and has high WBC.          Teamed dr Domingo regarding his positive covid status , pt s/p 3 covid vaccination , ID doctor suggested  Remdesivir  total 3 days and Id team  will  evaluate in am . Will give   Remdesivir 200 mg ivpb  tonight and 100 mg day 2 and 3 , will get labs in am , and follow liver functiond daily for few days .     Vital Signs Last 24 Hrs  T(C): 37.3 (17 Aug 2022 13:44), Max: 37.3 (17 Aug 2022 13:44)  T(F): 99.1 (17 Aug 2022 13:44), Max: 99.1 (17 Aug 2022 13:44)  HR: 102 (17 Aug 2022 13:44) (84 - 104)  BP: 143/89 (17 Aug 2022 13:44) (103/60 - 143/89)    RR: 18 (17 Aug 2022 13:45) (17 - 18)  SpO2: 97% (17 Aug 2022 13:45) (97% - 97%)    Parameters below as of 17 Aug 2022 13:45  Patient On (Oxygen Delivery Method): room air                           15.5   14.96 )-----------( 403      ( 17 Aug 2022 13:28 )             44.8       08-17    130<L>  |  92<L>  |  25<H>  ----------------------------<  171<H>  4.5   |  20  |  1.3    Ca    9.7      17 Aug 2022 13:28  Mg     1.8     08-16    TPro  7.7  /  Alb  4.0  /  TBili  1.2  /  DBili  x   /  AST  29  /  ALT  43<H>  /  AlkPhos  185<H>  08-17           Urinalysis Basic - ( 16 Aug 2022 15:20 )    Color: Yellow / Appearance: Clear / S.019 / pH: x  Gluc: x / Ketone: Negative  / Bili: Negative / Urobili: <2 mg/dL   Blood: x / Protein: 30 mg/dL / Nitrite: Negative   Leuk Esterase: Negative / RBC: 2 /HPF / WBC 1 /HPF   Sq Epi: x / Non Sq Epi: 1 /HPF / Bacteria: Moderate      POCT Blood Glucose.: 91 mg/dL (17 Aug 2022 16:35) Patients covid swab came back posiitve , he had   Mild persistent cough/ Increased WBC with left shift yesterday , that prompted  to send Covid swab ,   - chest x-ray showed emery lenial opacities/  stable atelectasis)  - UA with moderate bacteria; will get urine culture since pt c/o frequency and has high WBC.          Teamed dr Domingo regarding his positive covid status , pt s/p 3 covid vaccination , Dr Domingo advised to give  Remdesivir  total 3 days and Id team  will  evaluate in am .  Will give   Remdesivir 200 mg ivpb  tonight and 100 mg day 2 and 3 , will get labs in am , and follow liver function  daily for few days .   He is on lovenox 40 mg  daily and  Brilinta  90 mg q12h .       Vital Signs Last 24 Hrs  T(C): 37.3 (17 Aug 2022 13:44), Max: 37.3 (17 Aug 2022 13:44)  T(F): 99.1 (17 Aug 2022 13:44), Max: 99.1 (17 Aug 2022 13:44)  HR: 102 (17 Aug 2022 13:44) (84 - 104)  BP: 143/89 (17 Aug 2022 13:44) (103/60 - 143/89)    RR: 18 (17 Aug 2022 13:45) (17 - 18)  SpO2: 97% (17 Aug 2022 13:45) (97% - 97%)    Parameters below as of 17 Aug 2022 13:45  Patient On (Oxygen Delivery Method): room air                  MEDICATIONS  (STANDING):  amLODIPine   Tablet 5 milliGRAM(s) Oral at bedtime  aspirin enteric coated 81 milliGRAM(s) Oral daily  atorvastatin 80 milliGRAM(s) Oral at bedtime  citalopram 5 milliGRAM(s) Oral daily  dextrose 5%. 1000 milliLiter(s) (100 mL/Hr) IV Continuous <Continuous>  dextrose 50% Injectable 25 Gram(s) IV Push once  dextrose 50% Injectable 12.5 Gram(s) IV Push once  dextrose 50% Injectable 25 Gram(s) IV Push once  enoxaparin Injectable 40 milliGRAM(s) SubCutaneous every 24 hours  famotidine    Tablet 40 milliGRAM(s) Oral at bedtime  glucagon  Injectable 1 milliGRAM(s) IntraMuscular once  hydrocortisone 1% Cream 1 Application(s) Topical two times a day  insulin glargine Injectable (LANTUS) 30 Unit(s) SubCutaneous every morning  insulin lispro (ADMELOG) corrective regimen sliding scale   SubCutaneous three times a day before meals  insulin lispro Injectable (ADMELOG) 8 Unit(s) SubCutaneous three times a day before meals  lisinopril 40 milliGRAM(s) Oral daily  metoprolol tartrate 50 milliGRAM(s) Oral two times a day  polyethylene glycol 3350 17 Gram(s) Oral two times a day  remdesivir  IVPB   IV Intermittent   remdesivir  IVPB 200 milliGRAM(s) IV Intermittent every 24 hours  senna 2 Tablet(s) Oral at bedtime  sodium chloride 0.9%. 1000 milliLiter(s) (75 mL/Hr) IV Continuous <Continuous>  ticagrelor 90 milliGRAM(s) Oral every 12 hours    MEDICATIONS  (PRN):  acetaminophen     Tablet .. 650 milliGRAM(s) Oral every 6 hours PRN Temp greater or equal to 38C (100.4F), Mild Pain (1 - 3)  bisacodyl Suppository 10 milliGRAM(s) Rectal daily PRN Constipation  dextrose Oral Gel 15 Gram(s) Oral once PRN Blood Glucose LESS THAN 70 milliGRAM(s)/deciliter  diphenhydrAMINE 25 milliGRAM(s) Oral every 6 hours PRN Rash and/or Itching  lactulose Syrup 20 Gram(s) Oral two times a day PRN Constipation  melatonin 5 milliGRAM(s) Oral at bedtime PRN Insomnia                   15.5   14.96 )-----------( 403      ( 17 Aug 2022 13:28 )             44.8       17    130<L>  |  92<L>  |  25<H>  ----------------------------<  171<H>  4.5   |  20  |  1.3    Ca    9.7      17 Aug 2022 13:28  Mg     1.8     -    TPro  7.7  /  Alb  4.0  /  TBili  1.2  /  DBili  x   /  AST  29  /  ALT  43<H>  /  AlkPhos  185<H>             Urinalysis Basic - ( 16 Aug 2022 15:20 )    Color: Yellow / Appearance: Clear / S.019 / pH: x  Gluc: x / Ketone: Negative  / Bili: Negative / Urobili: <2 mg/dL   Blood: x / Protein: 30 mg/dL / Nitrite: Negative   Leuk Esterase: Negative / RBC: 2 /HPF / WBC 1 /HPF   Sq Epi: x / Non Sq Epi: 1 /HPF / Bacteria: Moderate      POCT Blood Glucose.: 91 mg/dL (17 Aug 2022 16:35) Patients covid swab came back posiitve , he had   Mild persistent cough/ Increased WBC with left shift yesterday , that prompted  to send Covid swab ,   - chest x-ray showed bibasilar  leniar opacities/   - UA with moderate bacteria; will get urine culture since pt c/o frequency and has high WBC.          Teamed dr Domingo regarding his positive covid status , pt s/p 3 covid vaccination , Dr Domingo advised to give  Remdesivir  total 3 days and Id team  will  evaluate in am .  Will give   Remdesivir 200 mg ivpb  tonight and 100 mg day 2 and 3 , will get labs in am , and follow liver function  daily for few days .   He is on lovenox 40 mg  daily and  Brilinta  90 mg q12h .       Vital Signs Last 24 Hrs  T(C): 37.3 (17 Aug 2022 13:44), Max: 37.3 (17 Aug 2022 13:44)  T(F): 99.1 (17 Aug 2022 13:44), Max: 99.1 (17 Aug 2022 13:44)  HR: 102 (17 Aug 2022 13:44) (84 - 104)  BP: 143/89 (17 Aug 2022 13:44) (103/60 - 143/89)    RR: 18 (17 Aug 2022 13:45) (17 - 18)  SpO2: 97% (17 Aug 2022 13:45) (97% - 97%)    Parameters below as of 17 Aug 2022 13:45  Patient On (Oxygen Delivery Method): room air                  MEDICATIONS  (STANDING):  amLODIPine   Tablet 5 milliGRAM(s) Oral at bedtime  aspirin enteric coated 81 milliGRAM(s) Oral daily  atorvastatin 80 milliGRAM(s) Oral at bedtime  citalopram 5 milliGRAM(s) Oral daily  dextrose 5%. 1000 milliLiter(s) (100 mL/Hr) IV Continuous <Continuous>  dextrose 50% Injectable 25 Gram(s) IV Push once  dextrose 50% Injectable 12.5 Gram(s) IV Push once  dextrose 50% Injectable 25 Gram(s) IV Push once  enoxaparin Injectable 40 milliGRAM(s) SubCutaneous every 24 hours  famotidine    Tablet 40 milliGRAM(s) Oral at bedtime  glucagon  Injectable 1 milliGRAM(s) IntraMuscular once  hydrocortisone 1% Cream 1 Application(s) Topical two times a day  insulin glargine Injectable (LANTUS) 30 Unit(s) SubCutaneous every morning  insulin lispro (ADMELOG) corrective regimen sliding scale   SubCutaneous three times a day before meals  insulin lispro Injectable (ADMELOG) 8 Unit(s) SubCutaneous three times a day before meals  lisinopril 40 milliGRAM(s) Oral daily  metoprolol tartrate 50 milliGRAM(s) Oral two times a day  polyethylene glycol 3350 17 Gram(s) Oral two times a day  remdesivir  IVPB   IV Intermittent   remdesivir  IVPB 200 milliGRAM(s) IV Intermittent every 24 hours  senna 2 Tablet(s) Oral at bedtime  sodium chloride 0.9%. 1000 milliLiter(s) (75 mL/Hr) IV Continuous <Continuous>  ticagrelor 90 milliGRAM(s) Oral every 12 hours    MEDICATIONS  (PRN):  acetaminophen     Tablet .. 650 milliGRAM(s) Oral every 6 hours PRN Temp greater or equal to 38C (100.4F), Mild Pain (1 - 3)  bisacodyl Suppository 10 milliGRAM(s) Rectal daily PRN Constipation  dextrose Oral Gel 15 Gram(s) Oral once PRN Blood Glucose LESS THAN 70 milliGRAM(s)/deciliter  diphenhydrAMINE 25 milliGRAM(s) Oral every 6 hours PRN Rash and/or Itching  lactulose Syrup 20 Gram(s) Oral two times a day PRN Constipation  melatonin 5 milliGRAM(s) Oral at bedtime PRN Insomnia                   15.5   14.96 )-----------( 403      ( 17 Aug 2022 13:28 )             44.8           130<L>  |  92<L>  |  25<H>  ----------------------------<  171<H>  4.5   |  20  |  1.3    Ca    9.7      17 Aug 2022 13:28  Mg     1.8     -    TPro  7.7  /  Alb  4.0  /  TBili  1.2  /  DBili  x   /  AST  29  /  ALT  43<H>  /  AlkPhos  185<H>             Urinalysis Basic - ( 16 Aug 2022 15:20 )    Color: Yellow / Appearance: Clear / S.019 / pH: x  Gluc: x / Ketone: Negative  / Bili: Negative / Urobili: <2 mg/dL   Blood: x / Protein: 30 mg/dL / Nitrite: Negative   Leuk Esterase: Negative / RBC: 2 /HPF / WBC 1 /HPF   Sq Epi: x / Non Sq Epi: 1 /HPF / Bacteria: Moderate      POCT Blood Glucose.: 91 mg/dL (17 Aug 2022 16:35)

## 2022-08-18 LAB
ALBUMIN SERPL ELPH-MCNC: 3.5 G/DL — SIGNIFICANT CHANGE UP (ref 3.5–5.2)
ALP SERPL-CCNC: 160 U/L — HIGH (ref 30–115)
ALT FLD-CCNC: 35 U/L — SIGNIFICANT CHANGE UP (ref 0–41)
ANION GAP SERPL CALC-SCNC: 12 MMOL/L — SIGNIFICANT CHANGE UP (ref 7–14)
AST SERPL-CCNC: 24 U/L — SIGNIFICANT CHANGE UP (ref 0–41)
BASOPHILS # BLD AUTO: 0.05 K/UL — SIGNIFICANT CHANGE UP (ref 0–0.2)
BASOPHILS NFR BLD AUTO: 0.5 % — SIGNIFICANT CHANGE UP (ref 0–1)
BILIRUB DIRECT SERPL-MCNC: 0.2 MG/DL — SIGNIFICANT CHANGE UP (ref 0–0.3)
BILIRUB INDIRECT FLD-MCNC: 0.7 MG/DL — SIGNIFICANT CHANGE UP (ref 0.2–1.2)
BILIRUB SERPL-MCNC: 0.9 MG/DL — SIGNIFICANT CHANGE UP (ref 0.2–1.2)
BUN SERPL-MCNC: 23 MG/DL — HIGH (ref 10–20)
CALCIUM SERPL-MCNC: 9.5 MG/DL — SIGNIFICANT CHANGE UP (ref 8.5–10.1)
CHLORIDE SERPL-SCNC: 98 MMOL/L — SIGNIFICANT CHANGE UP (ref 98–110)
CO2 SERPL-SCNC: 26 MMOL/L — SIGNIFICANT CHANGE UP (ref 17–32)
CREAT SERPL-MCNC: 1.2 MG/DL — SIGNIFICANT CHANGE UP (ref 0.7–1.5)
CULTURE RESULTS: SIGNIFICANT CHANGE UP
EGFR: 63 ML/MIN/1.73M2 — SIGNIFICANT CHANGE UP
EOSINOPHIL # BLD AUTO: 0.32 K/UL — SIGNIFICANT CHANGE UP (ref 0–0.7)
EOSINOPHIL NFR BLD AUTO: 3.4 % — SIGNIFICANT CHANGE UP (ref 0–8)
FERRITIN SERPL-MCNC: 585 NG/ML — HIGH (ref 30–400)
GLUCOSE BLDC GLUCOMTR-MCNC: 114 MG/DL — HIGH (ref 70–99)
GLUCOSE BLDC GLUCOMTR-MCNC: 164 MG/DL — HIGH (ref 70–99)
GLUCOSE BLDC GLUCOMTR-MCNC: 287 MG/DL — HIGH (ref 70–99)
GLUCOSE BLDC GLUCOMTR-MCNC: 65 MG/DL — LOW (ref 70–99)
GLUCOSE SERPL-MCNC: 133 MG/DL — HIGH (ref 70–99)
HCT VFR BLD CALC: 42.1 % — SIGNIFICANT CHANGE UP (ref 42–52)
HGB BLD-MCNC: 14.7 G/DL — SIGNIFICANT CHANGE UP (ref 14–18)
IMM GRANULOCYTES NFR BLD AUTO: 0.4 % — HIGH (ref 0.1–0.3)
INR BLD: 1.07 RATIO — SIGNIFICANT CHANGE UP (ref 0.65–1.3)
LYMPHOCYTES # BLD AUTO: 1.48 K/UL — SIGNIFICANT CHANGE UP (ref 1.2–3.4)
LYMPHOCYTES # BLD AUTO: 15.9 % — LOW (ref 20.5–51.1)
MCHC RBC-ENTMCNC: 29.2 PG — SIGNIFICANT CHANGE UP (ref 27–31)
MCHC RBC-ENTMCNC: 34.9 G/DL — SIGNIFICANT CHANGE UP (ref 32–37)
MCV RBC AUTO: 83.5 FL — SIGNIFICANT CHANGE UP (ref 80–94)
MONOCYTES # BLD AUTO: 1.1 K/UL — HIGH (ref 0.1–0.6)
MONOCYTES NFR BLD AUTO: 11.8 % — HIGH (ref 1.7–9.3)
NEUTROPHILS # BLD AUTO: 6.32 K/UL — SIGNIFICANT CHANGE UP (ref 1.4–6.5)
NEUTROPHILS NFR BLD AUTO: 68 % — SIGNIFICANT CHANGE UP (ref 42.2–75.2)
NRBC # BLD: 0 /100 WBCS — SIGNIFICANT CHANGE UP (ref 0–0)
PLATELET # BLD AUTO: 312 K/UL — SIGNIFICANT CHANGE UP (ref 130–400)
POTASSIUM SERPL-MCNC: 4.4 MMOL/L — SIGNIFICANT CHANGE UP (ref 3.5–5)
POTASSIUM SERPL-SCNC: 4.4 MMOL/L — SIGNIFICANT CHANGE UP (ref 3.5–5)
PROCALCITONIN SERPL-MCNC: 0.1 NG/ML — SIGNIFICANT CHANGE UP (ref 0.02–0.1)
PROT SERPL-MCNC: 6.9 G/DL — SIGNIFICANT CHANGE UP (ref 6–8)
PROTHROM AB SERPL-ACNC: 12.3 SEC — SIGNIFICANT CHANGE UP (ref 9.95–12.87)
RBC # BLD: 5.04 M/UL — SIGNIFICANT CHANGE UP (ref 4.7–6.1)
RBC # FLD: 13.1 % — SIGNIFICANT CHANGE UP (ref 11.5–14.5)
SODIUM SERPL-SCNC: 136 MMOL/L — SIGNIFICANT CHANGE UP (ref 135–146)
SPECIMEN SOURCE: SIGNIFICANT CHANGE UP
WBC # BLD: 9.31 K/UL — SIGNIFICANT CHANGE UP (ref 4.8–10.8)
WBC # FLD AUTO: 9.31 K/UL — SIGNIFICANT CHANGE UP (ref 4.8–10.8)

## 2022-08-18 RX ORDER — REMDESIVIR 5 MG/ML
100 INJECTION INTRAVENOUS EVERY 24 HOURS
Refills: 0 | Status: COMPLETED | OUTPATIENT
Start: 2022-08-18 | End: 2022-08-19

## 2022-08-18 RX ADMIN — Medication 8 UNIT(S): at 08:13

## 2022-08-18 RX ADMIN — Medication 10 MILLILITER(S): at 17:03

## 2022-08-18 RX ADMIN — ENOXAPARIN SODIUM 40 MILLIGRAM(S): 100 INJECTION SUBCUTANEOUS at 21:03

## 2022-08-18 RX ADMIN — Medication 8 UNIT(S): at 16:44

## 2022-08-18 RX ADMIN — POLYETHYLENE GLYCOL 3350 17 GRAM(S): 17 POWDER, FOR SOLUTION ORAL at 06:22

## 2022-08-18 RX ADMIN — LISINOPRIL 40 MILLIGRAM(S): 2.5 TABLET ORAL at 06:21

## 2022-08-18 RX ADMIN — INSULIN GLARGINE 30 UNIT(S): 100 INJECTION, SOLUTION SUBCUTANEOUS at 08:14

## 2022-08-18 RX ADMIN — TICAGRELOR 90 MILLIGRAM(S): 90 TABLET ORAL at 06:21

## 2022-08-18 RX ADMIN — Medication 10 MILLILITER(S): at 11:30

## 2022-08-18 RX ADMIN — Medication 3: at 11:27

## 2022-08-18 RX ADMIN — Medication 8 UNIT(S): at 11:27

## 2022-08-18 RX ADMIN — Medication 81 MILLIGRAM(S): at 11:30

## 2022-08-18 RX ADMIN — Medication 10 MILLILITER(S): at 06:21

## 2022-08-18 RX ADMIN — SENNA PLUS 2 TABLET(S): 8.6 TABLET ORAL at 21:02

## 2022-08-18 RX ADMIN — TICAGRELOR 90 MILLIGRAM(S): 90 TABLET ORAL at 17:03

## 2022-08-18 RX ADMIN — ATORVASTATIN CALCIUM 80 MILLIGRAM(S): 80 TABLET, FILM COATED ORAL at 21:02

## 2022-08-18 RX ADMIN — CITALOPRAM 5 MILLIGRAM(S): 10 TABLET, FILM COATED ORAL at 11:30

## 2022-08-18 RX ADMIN — Medication 1: at 08:13

## 2022-08-18 RX ADMIN — REMDESIVIR 500 MILLIGRAM(S): 5 INJECTION INTRAVENOUS at 21:01

## 2022-08-18 RX ADMIN — FAMOTIDINE 40 MILLIGRAM(S): 10 INJECTION INTRAVENOUS at 21:04

## 2022-08-18 RX ADMIN — Medication 50 MILLIGRAM(S): at 06:21

## 2022-08-18 RX ADMIN — AMLODIPINE BESYLATE 5 MILLIGRAM(S): 2.5 TABLET ORAL at 21:02

## 2022-08-18 NOTE — PROGRESS NOTE ADULT - ASSESSMENT
Patient is a 78yo M with PMH HTN, DM, CAD (s/p stents), GERD who presented for right sided weakness and aphasia. Patient was found to have a left acute cortical infarct within the MCA and PCA territories. Patient presented with visual field deficits that has since improved, but is still with impaired strength, balance, mobility and ADLs and language. Patient was previously independent in all ADLs and activities. Current level of function is mod assist for bed mobility, max assist with transfers, mod assist with ambulation 25 feet with RW. Patient is a good candidate for rehab due to significantly decreased level of function from prior.    #Left acute ischemic stroke with right hemiparesis (dominant) and aphasia  s/p tPA on 8/3  EDSON showed EF of 55-60%  8/6 MRI head: scattered left-sided acute cortical infarcts within the MCA and PCA territories, with trace petechial hemorrhage involving high left frontal region. There is a chronic left occipital lobe infarct.   MRA of the neck: moderate stenosis of the proximal right internal carotid artery  - start PT/OT/ST, Neuropsych eval  - c/w 5mg celexa  - c/w ASA 81mg  - c/w Brilinta 90mg BID  - c/w Lipitor 80mg daily    NIHSS =7, mRankin = 4    #Diabetic Peripheral Polyneuropathy  - No pain. Teach patient skin monitoring an sensory awareness    #Covid positive   - PCR positive from 08/16/22   - Placed on Airborne Precautions  - CRP - 85, D-Dimer 323  - Ferritin and Procal pending. IDconsult pending    #HTN   - c/w Amlodipine 5mg qd  - c/w Lisinopril 40mg   - c/w Lopressor 50mg BID    #Diabetes 2  Ha1c 8.2  - c/w 30U Lantus  - c/w 8U Lispro    #CAD  -continue ASA 81, Brilinta, BB, statin    #Renal lesion  On 8/9, a right upper quadrant US was done to rule out cholecystitis, revealing an indeterminate 1.2 cm right renal lesion and moderate-severe hepatic steatosis.  -outpatient followup, non urgent MRI with renal mass protocol    Mild persistent cough/ Increased WBC with left shift  - chest x-ray read pending - no consolidation seen  - PCR positive  - doppler pending   - repeat CBC pending   - UA showed bacteriuria, but no wbcs     Azotemia  - give IVF overnight and recheck improved  - had negative PVR    -GI/Bowel Mgmt: senna, dulcolax, lactulose, miralax    - Diet: Dash Carb Consistent diet       Precautions / PROPHYLAXIS:      - Falls    - Ortho: Weight bearing status: WBAT      - DVT prophylaxis: Lovenox 40mg subQ   Patient is a 78yo M with PMH HTN, DM, CAD (s/p stents), GERD who presented for right sided weakness and aphasia. Patient was found to have a left acute cortical infarct within the MCA and PCA territories. Patient presented with visual field deficits that has since improved, but is still with impaired strength, balance, mobility and ADLs and language. Patient was previously independent in all ADLs and activities. Current level of function is mod assist for bed mobility, max assist with transfers, mod assist with ambulation 25 feet with RW. Patient is a good candidate for rehab due to significantly decreased level of function from prior.    #Left acute ischemic stroke with right hemiparesis (dominant) and aphasia  s/p tPA on 8/3  EDSON showed EF of 55-60%  8/6 MRI head: scattered left-sided acute cortical infarcts within the MCA and PCA territories, with trace petechial hemorrhage involving high left frontal region. There is a chronic left occipital lobe infarct.   MRA of the neck: moderate stenosis of the proximal right internal carotid artery  - start PT/OT/ST, Neuropsych eval  - c/w 5mg celexa  - c/w ASA 81mg  - c/w Brilinta 90mg BID  - c/w Lipitor 80mg daily    NIHSS =7, mRankin = 4    #Diabetic Peripheral Polyneuropathy  - No pain. Teach patient skin monitoring an sensory awareness    #Covid positive   - PCR positive from 08/16/22   - Placed on Airborne Precautions  - CRP - 85, D-Dimer 323  - Ferritin and Procal pending. IDconsult pending    #HTN   - c/w Amlodipine 5mg qd  - c/w Lisinopril 40mg   - c/w Lopressor 50mg BID    #Diabetes 2  Ha1c 8.2  - c/w 30U Lantus  - c/w 8U Lispro    #CAD  -continue ASA 81, Brilinta, BB, statin    #Renal lesion  On 8/9, a right upper quadrant US was done to rule out cholecystitis, revealing an indeterminate 1.2 cm right renal lesion and moderate-severe hepatic steatosis.  -outpatient followup, non urgent MRI with renal mass protocol    #Mild persistent cough/ Increased WBC with left shift  - chest x-ray with mild bilateral atelectasis  - PCR positive  - doppler: < from: [facility] Lower Ext Vein Scan, [facility] (08.17.22 @ 17:53) > No evidence of deep venous thrombosis in either lower extremity.  - Resolved on 8/18 CBC  - UA showed bacteriuria, but no wbcs     #Azotemia  - give IVF overnight and recheck improved  - had negative PVR    -GI/Bowel Mgmt: senna, dulcolax, lactulose, miralax    - Diet: Dash Carb Consistent diet       Precautions / PROPHYLAXIS:      - Falls    - Ortho: Weight bearing status: WBAT      - DVT prophylaxis: Lovenox 40mg subQ

## 2022-08-18 NOTE — CONSULT NOTE ADULT - ASSESSMENT
ASSESSMENT  77yMale with a PMH of HTN, DM, HLD, CAD (s/p stents), GERD, CVA s/p TPA recently diagnosed with COVID-19. Patient is fully vaccinated. Last negative COVID pcr on 8/7. Patient was exposed by room mate and subsequently isolated following positive PCR on 8/16. He received a dose of Remdesivir the following day. Patient remains afebrile, denies chest pain,  SOB and is saturating at 94% on room air. His only subjective complain is occasional non-productive cough.     Patient's disease severity does not require further Remdesivir treatment.        RECOMMENDATIONS  #COVID-19 Infection  - d/c Remdesivir  - Continue to monitor O2 saturation levels     ASSESSMENT  77yMale with a PMH of HTN, DM, HLD, CAD (s/p stents), GERD, CVA s/p TPA recently diagnosed with COVID-19. Patient is fully vaccinated. Last negative COVID pcr on 8/7. Patient was exposed by room mate and subsequently isolated following positive PCR on 8/16. He received a dose of Remdesivir the following day. Patient remains afebrile, denies chest pain,  SOB and is saturating at 94% on room air. His only subjective complain is occasional non-productive cough.   S/p vaccination an one booster    COVID with a positive test and asymptomatic   Pt is in the early viral replicative phase based on the timeline/onset of symptoms.   On RA  CXR no GGO  Patient's disease severity does not require further Remdesivir treatment.        RECOMMENDATIONS  - d/c Remdesivir  - Continue to monitor O2 saturation levels  Please do not hesitate to recall ID if any questions arise either through [employer] or through [employer]

## 2022-08-18 NOTE — PROGRESS NOTE ADULT - SUBJECTIVE AND OBJECTIVE BOX
Patient is a 77y old  Male who presents with a chief complaint of rehab s/p left ischemic stroke with aphasia and right hemiparesis (11 Aug 2022 11:30)      HPI:  Patient is a 78yo M with PMH of HTN, DM, HLD, CAD (s/p stents), GERD who presented to the ED on 8/3/2022 for right hand weakness. Patient noticed he started having difficulty with his speech, then developed weakness in his right hand and some visual field deficits. In the ED, patient received tPA. CT head at the time was negative, EDSON showed EF of 55-60%.  MRI head showed scattered left-sided acute cortical infarcts within the MCA and PCA territories, with trace petechial hemorrhage involving high left frontal region. There is a chronic left occipital lobe infarct. MRA of the neck showed moderate stenosis of the proximal right internal carotid artery. On , a right upper quadrant US was done to rule out cholecystitis, revealing an indeterminate 1.2 cm right renal lesion and moderate-severe hepatic steatosis. During the hospital stay, the visual field deficits improved but patient continued to have no motor function in the right hand and impaired RLE strength and mobility and ADL independence and a persistent aphasia.    On evaluation by PM&R, patient endorses no movement in his right hand and notes some difficulty with memory but otherwise has no other complaints. Patient is right handed. He lives by himself in a house with 5 [address] and one flight of stairs inside (5+7 steps). Patient was previously independent in ADLs and ambulation, occasionally using a cane outdoors for balance. Currently patient requires mod assist for bed mobility, max assist with transfers, mod assist with ambulation 25 feet with RW and max assistance for dressing. He was evaluated by Physiatry on the medical service and was found to be a good acute rehab candidate.  (11 Aug 2022 11:30)    TODAY'S SUBJECTIVE & REVIEW OF SYMPTOMS:  Seen this AM. No overnight events. VSS    Constitutional    [ x  ] WNL           [   ] poor appetite   [   ] insomnia   [   ] tired   Cardio:                [  x ] WNL           [   ] CP   [   ] VELASCO   [   ] palpitations               Resp:                   [   ] WNL           [   ] SOB   [  x ] cough   [   ] wheezing   GI:                        [ x  ] WNL           [   ] constipation   [   ] diarrhea   [   ] abdominal pain   [   ] nausea   [   ] emesis                                :                      [ x  ] WNL           [   ] PRITCHARD  [   ] dysuria   [   ] difficulty voiding             Endo:                   [ x  ] WNL          [   ] polyuria   [   ] temperature intolerance                 Skin:                     [ x  ] WNL          [   ] pain   [   ] wound   [   ] rash   MSK:                    [   x] WNL          [   ] muscle pain   [   ] joint pain/ stiffness   [   ] muscle tenderness   [   ] swelling   Neuro:                 [   ] WNL          [   ] HA   [   ] change in vision   [   ] tremor   [ x  ] weakness- Right side   [   ]dysphagia   [x  ] word finding difficulties,   [ x ] numbness/ neuropathy both feet    Cognitive:           [ x  ] WNL           [   ]confusion   - daughter note some memory loss since stroke   Psych:                  [  x ] WNL           [   ] hallucinations   [   ]agitation   [   ] delusion   [   ]depression      PHYSICAL EXAM    ICU Vital Signs Last 24 Hrs  T(C): 36.1 (18 Aug 2022 04:25), Max: 37.3 (17 Aug 2022 13:44)  T(F): 97 (18 Aug 2022 04:25), Max: 99.1 (17 Aug 2022 13:44)  HR: 76 (18 Aug 2022 04:25) (76 - 961)  BP: 123/78 (18 Aug 2022 04:25) (116/60 - 143/89)  BP(mean): 94 (18 Aug 2022 04:25) (90 - 97)  ABP: --  ABP(mean): --  RR: 18 (18 Aug 2022 04:25) (18 - 18)  SpO2: 97% (17 Aug 2022 13:45) (97% - 97%)    O2 Parameters below as of 17 Aug 2022 13:45  Patient On (Oxygen Delivery Method): room air            General:[  x ] NAD, Resting Comfortable,   [   ] other:                                HEENT: [  x ] NC/AT, EOMI, PERRL , Normal Conjunctivae,   [   ] other:  Cardio: [  x ] RRR, no murmer,   [   ] other:                              Pulm: [  x ] No Respiratory Distress,  Lungs CTAB,   [   ] other:                       Abdomen: [ x  ] ND/NT, Soft,   [   ] other:    : [ x  ] NO PRITCHARD CATHETER,   [  ] PRITCHARD CATHETER- no meatal tear, no discharge, [   ] other:                                            MSK: [ x  ] No joint swelling, Full PROM,   [    ] other:                                      Ext: [  x ]No C/C/E, No calf tenderness,   [   ]other:    Skin: [ x  ]intact,   [   ] other:                                                                   Neurological Examination:  Cognitive: [ x   ] AAO x 3,   [    ]  other:                                                                 Attention/ Concentration:  [ x   ] intact   [    ]  other:   Memory: [    ] intact,    [ x  ]  other:  mildly impaired  Mood/Affect: [  x  ] wnl,    [    ]  other:                                                                             Communication: [ x  ]Fluent, no dysarthria, following commands:  [   ] other:  CN II - XII:  [  x  ] intact, VFF  [    ] other: mild right facial droop                                                                                         Motor:   RIGHT UE: [   ] WNL,  [  x ] other: 0/5 grossly in all muscle groups  LEFT    UE: [  x ] WNL  RIGHT LE: [ x  ] WNL,  [   ] other: Hip flexion 3/5 with drift to bed, knee flexion 4/5, plantarflexion 5/5, dorsiflexion 5/5, toe extension 5/5  LEFT    LE: [  x ] WNL,  Hip flexion 4/5, knee flexion 5/5, plantarflexion 5/5, dorsiflexion 5/5, toe extension 5/5     Tone: [    ] wnl,   [ x   ]  other: mild increased tone in right triceps  DTRs: [ x  ]symmetric, 1+ [   ] other:   Coordination:   [    ] intact as testable,   [ not assesed   ]       CLOF:   Bed mobility- PA   Transfers- mod A   Gait: 25 x2 PA 3 point cane       MEDICATIONS  (STANDING):  amLODIPine   Tablet 5 milliGRAM(s) Oral at bedtime  aspirin enteric coated 81 milliGRAM(s) Oral daily  atorvastatin 80 milliGRAM(s) Oral at bedtime  citalopram 5 milliGRAM(s) Oral daily  dextrose 5%. 1000 milliLiter(s) (100 mL/Hr) IV Continuous <Continuous>  dextrose 50% Injectable 25 Gram(s) IV Push once  dextrose 50% Injectable 12.5 Gram(s) IV Push once  dextrose 50% Injectable 25 Gram(s) IV Push once  enoxaparin Injectable 40 milliGRAM(s) SubCutaneous every 24 hours  famotidine    Tablet 40 milliGRAM(s) Oral at bedtime  glucagon  Injectable 1 milliGRAM(s) IntraMuscular once  guaifenesin/dextromethorphan Oral Liquid 10 milliLiter(s) Oral every 6 hours  hydrocortisone 1% Cream 1 Application(s) Topical two times a day  insulin glargine Injectable (LANTUS) 30 Unit(s) SubCutaneous every morning  insulin lispro (ADMELOG) corrective regimen sliding scale   SubCutaneous three times a day before meals  insulin lispro Injectable (ADMELOG) 8 Unit(s) SubCutaneous three times a day before meals  lisinopril 40 milliGRAM(s) Oral daily  metoprolol tartrate 50 milliGRAM(s) Oral two times a day  polyethylene glycol 3350 17 Gram(s) Oral two times a day  remdesivir  IVPB   IV Intermittent   remdesivir  IVPB 100 milliGRAM(s) IV Intermittent every 24 hours  senna 2 Tablet(s) Oral at bedtime  sodium chloride 0.9%. 1000 milliLiter(s) (75 mL/Hr) IV Continuous <Continuous>  ticagrelor 90 milliGRAM(s) Oral every 12 hours    MEDICATIONS  (PRN):  acetaminophen     Tablet .. 650 milliGRAM(s) Oral every 6 hours PRN Temp greater or equal to 38C (100.4F), Mild Pain (1 - 3)  bisacodyl Suppository 10 milliGRAM(s) Rectal daily PRN Constipation  dextrose Oral Gel 15 Gram(s) Oral once PRN Blood Glucose LESS THAN 70 milliGRAM(s)/deciliter  diphenhydrAMINE 25 milliGRAM(s) Oral every 6 hours PRN Rash and/or Itching  lactulose Syrup 20 Gram(s) Oral two times a day PRN Constipation  melatonin 5 milliGRAM(s) Oral at bedtime PRN Insomnia            RECENT LABS/IMAGING                          15.5   14.96 )-----------( 403      ( 17 Aug 2022 13:28 )             44.8         130<L>  |  92<L>  |  25<H>  ----------------------------<  171<H>  4.5   |  20  |  1.3    Ca    9.7      17 Aug 2022 13:28  Mg     1.8     -    TPro  7.7  /  Alb  4.0  /  TBili  1.2  /  DBili  x   /  AST  29  /  ALT  43<H>  /  AlkPhos  185<H>    C-Reactive Protein, Serum (22 @ 13:28)    C-Reactive Protein, Serum: 85.4 mg/L  D-Dimer Assay, Quantitative (22 @ 13:28)    D-Dimer Assay, Quantitative: 323: Manufacturers recommended Cut off for VTE is 230 ng/ml D-DU ng/mL DDU      Urinalysis Basic - ( 16 Aug 2022 15:20 )    Color: Yellow / Appearance: Clear / S.019 / pH: x  Gluc: x / Ketone: Negative  / Bili: Negative / Urobili: <2 mg/dL   Blood: x / Protein: 30 mg/dL / Nitrite: Negative   Leuk Esterase: Negative / RBC: 2 /HPF / WBC 1 /HPF   Sq Epi: x / Non Sq Epi: 1 /HPF / Bacteria: Moderate      POCT Blood Glucose.: 190 mg/dL (22 @ 11:41)  POCT Blood Glucose.: 116 mg/dL (22 @ 07:05)  POCT Blood Glucose.: 105 mg/dL (22 @ 20:49)  POCT Blood Glucose.: 120 mg/dL (22 @ 16:28)  POCT Blood Glucose.: 222 mg/dL (22 @ 11:47)  POCT Blood Glucose.: 146 mg/dL (22 @ 07:18)  POCT Blood Glucose.: 178 mg/dL (08-15-22 @ 21:47)  POCT Blood Glucose.: 156 mg/dL (08-15-22 @ 16:13)  POCT Blood Glucose.: 201 mg/dL (08-15-22 @ 12:13)  POCT Blood Glucose.: 121 mg/dL (08-15-22 @ 07:41)  POCT Blood Glucose.: 111 mg/dL (22 @ 22:28)  POCT Blood Glucose.: 83 mg/dL (22 @ 16:54)   Patient is a 77y old  Male who presents with a chief complaint of rehab s/p left ischemic stroke with aphasia and right hemiparesis (11 Aug 2022 11:30)      HPI:  Patient is a 78yo M with PMH of HTN, DM, HLD, CAD (s/p stents), GERD who presented to the ED on 8/3/2022 for right hand weakness. Patient noticed he started having difficulty with his speech, then developed weakness in his right hand and some visual field deficits. In the ED, patient received tPA. CT head at the time was negative, EDSON showed EF of 55-60%.  MRI head showed scattered left-sided acute cortical infarcts within the MCA and PCA territories, with trace petechial hemorrhage involving high left frontal region. There is a chronic left occipital lobe infarct. MRA of the neck showed moderate stenosis of the proximal right internal carotid artery. On , a right upper quadrant US was done to rule out cholecystitis, revealing an indeterminate 1.2 cm right renal lesion and moderate-severe hepatic steatosis. During the hospital stay, the visual field deficits improved but patient continued to have no motor function in the right hand and impaired RLE strength and mobility and ADL independence and a persistent aphasia.    On evaluation by PM&R, patient endorses no movement in his right hand and notes some difficulty with memory but otherwise has no other complaints. Patient is right handed. He lives by himself in a house with [address] and one flight of stairs inside (5+7 steps). Patient was previously independent in ADLs and ambulation, occasionally using a cane outdoors for balance. Currently patient requires mod assist for bed mobility, max assist with transfers, mod assist with ambulation 25 feet with RW and max assistance for dressing. He was evaluated by Physiatry on the medical service and was found to be a good acute rehab candidate.  (11 Aug 2022 11:30)    TODAY'S SUBJECTIVE & REVIEW OF SYMPTOMS:  Seen this AM. No overnight events. VSS. ID following, pt on remdesivir. Patient's function improving, now able to extend RUE w/ gravity eliminated.     Constitutional    [ x  ] WNL           [   ] poor appetite   [   ] insomnia   [   ] tired   Cardio:                [  x ] WNL           [   ] CP   [   ] VELASCO   [   ] palpitations               Resp:                   [   ] WNL           [   ] SOB   [  x ] cough   [   ] wheezing   GI:                        [ x  ] WNL           [   ] constipation   [   ] diarrhea   [   ] abdominal pain   [   ] nausea   [   ] emesis                                :                      [ x  ] WNL           [   ] PRITCHARD  [   ] dysuria   [   ] difficulty voiding             Endo:                   [ x  ] WNL          [   ] polyuria   [   ] temperature intolerance                 Skin:                     [ x  ] WNL          [   ] pain   [   ] wound   [   ] rash   MSK:                    [   x] WNL          [   ] muscle pain   [   ] joint pain/ stiffness   [   ] muscle tenderness   [   ] swelling   Neuro:                 [   ] WNL          [   ] HA   [   ] change in vision   [   ] tremor   [ x  ] weakness- Right side   [   ]dysphagia   [x  ] word finding difficulties,   [ x ] numbness/ neuropathy both feet    Cognitive:           [ x  ] WNL           [   ]confusion   - daughter note some memory loss since stroke   Psych:                  [  x ] WNL           [   ] hallucinations   [   ]agitation   [   ] delusion   [   ]depression      PHYSICAL EXAM    ICU Vital Signs Last 24 Hrs  T(C): 36.1 (18 Aug 2022 04:25), Max: 37.3 (17 Aug 2022 13:44)  T(F): 97 (18 Aug 2022 04:25), Max: 99.1 (17 Aug 2022 13:44)  HR: 76 (18 Aug 2022 04:25) (76 - 961)  BP: 123/78 (18 Aug 2022 04:25) (116/60 - 143/89)  BP(mean): 94 (18 Aug 2022 04:25) (90 - 97)  ABP: --  ABP(mean): --  RR: 18 (18 Aug 2022 04:25) (18 - 18)  SpO2: 97% (17 Aug 2022 13:45) (97% - 97%)    O2 Parameters below as of 17 Aug 2022 13:45  Patient On (Oxygen Delivery Method): room air            General:[  x ] NAD, Resting Comfortable,   [   ] other:                                HEENT: [  x ] NC/AT, EOMI, PERRL , Normal Conjunctivae,   [   ] other:  Cardio: [  x ] RRR, no murmer,   [   ] other:                              Pulm: [  x ] No Respiratory Distress,  Lungs CTAB,   [   ] other:                       Abdomen: [ x  ] ND/NT, Soft,   [   ] other:    : [ x  ] NO PRITCHARD CATHETER,   [  ] PRITCHARD CATHETER- no meatal tear, no discharge, [   ] other:                                            MSK: [   ] No joint swelling, Full PROM,   [  x  ] other:                                       Ext: [  x ]No C/C/E, No calf tenderness,   [   ]other:    Skin: [ x  ]intact,   [   ] other:                                                                   Neurological Examination:  Cognitive: [ x   ] AAO x 3,   [    ]  other:                                                                 Attention/ Concentration:  [ x   ] intact   [    ]  other:   Memory: [    ] intact,    [ x  ]  other:  mildly impaired  Mood/Affect: [  x  ] wnl,    [    ]  other:                                                                             Communication: [ x  ]Fluent, no dysarthria, following commands:  [   ] other:  CN II - XII:  [  x  ] intact, VFF  [    ] other: mild right facial droop                                                                                         Motor:   RIGHT UE: [   ] WNL,  [  x ] other: 2/5 extension, 0/5 flexion, abduction   LEFT    UE: [  x ] WNL  RIGHT LE: [ x  ] WNL,  [   ] other: Hip flexion 3/5 with drift to bed, knee flexion 4/5, plantarflexion 5/5, dorsiflexion 5/5, toe extension 5/5  LEFT    LE: [  x ] WNL,  Hip flexion 4/5, knee flexion 5/5, plantarflexion 5/5, dorsiflexion 5/5, toe extension 5/5     Tone: [    ] wnl,   [ x   ]  other: mild increased tone in right triceps  DTRs: [ x  ]symmetric, 1+ [   ] other:   Coordination:   [    ] intact as testable,   [ not assesed   ]       CLOF:   Bed mobility- PA   Transfers- mod A   Gait: 25 x2 PA 3 point cane       MEDICATIONS  (STANDING):  amLODIPine   Tablet 5 milliGRAM(s) Oral at bedtime  aspirin enteric coated 81 milliGRAM(s) Oral daily  atorvastatin 80 milliGRAM(s) Oral at bedtime  citalopram 5 milliGRAM(s) Oral daily  dextrose 5%. 1000 milliLiter(s) (100 mL/Hr) IV Continuous <Continuous>  dextrose 50% Injectable 25 Gram(s) IV Push once  dextrose 50% Injectable 12.5 Gram(s) IV Push once  dextrose 50% Injectable 25 Gram(s) IV Push once  enoxaparin Injectable 40 milliGRAM(s) SubCutaneous every 24 hours  famotidine    Tablet 40 milliGRAM(s) Oral at bedtime  glucagon  Injectable 1 milliGRAM(s) IntraMuscular once  guaifenesin/dextromethorphan Oral Liquid 10 milliLiter(s) Oral every 6 hours  hydrocortisone 1% Cream 1 Application(s) Topical two times a day  insulin glargine Injectable (LANTUS) 30 Unit(s) SubCutaneous every morning  insulin lispro (ADMELOG) corrective regimen sliding scale   SubCutaneous three times a day before meals  insulin lispro Injectable (ADMELOG) 8 Unit(s) SubCutaneous three times a day before meals  lisinopril 40 milliGRAM(s) Oral daily  metoprolol tartrate 50 milliGRAM(s) Oral two times a day  polyethylene glycol 3350 17 Gram(s) Oral two times a day  remdesivir  IVPB   IV Intermittent   remdesivir  IVPB 100 milliGRAM(s) IV Intermittent every 24 hours  senna 2 Tablet(s) Oral at bedtime  sodium chloride 0.9%. 1000 milliLiter(s) (75 mL/Hr) IV Continuous <Continuous>  ticagrelor 90 milliGRAM(s) Oral every 12 hours    MEDICATIONS  (PRN):  acetaminophen     Tablet .. 650 milliGRAM(s) Oral every 6 hours PRN Temp greater or equal to 38C (100.4F), Mild Pain (1 - 3)  bisacodyl Suppository 10 milliGRAM(s) Rectal daily PRN Constipation  dextrose Oral Gel 15 Gram(s) Oral once PRN Blood Glucose LESS THAN 70 milliGRAM(s)/deciliter  diphenhydrAMINE 25 milliGRAM(s) Oral every 6 hours PRN Rash and/or Itching  lactulose Syrup 20 Gram(s) Oral two times a day PRN Constipation  melatonin 5 milliGRAM(s) Oral at bedtime PRN Insomnia            RECENT LABS/IMAGING                          14.7   9.31  )-----------( 312      ( 18 Aug 2022 07:04 )             42.1     08-18    136  |  98  |  23<H>  ----------------------------<  133<H>  4.4   |  26  |  1.2    Ca    9.5      18 Aug 2022 07:04    TPro  6.9  /  Alb  3.5  /  TBili  0.9  /  DBili  0.2  /  AST  24  /  ALT  35  /  AlkPhos  160<H>  08-18    PT/INR - ( 18 Aug 2022 07:04 )   PT: 12.30 sec;   INR: 1.07 ratio           Urinalysis Basic - ( 16 Aug 2022 15:20 )    Color: Yellow / Appearance: Clear / S.019 / pH: x  Gluc: x / Ketone: Negative  / Bili: Negative / Urobili: <2 mg/dL   Blood: x / Protein: 30 mg/dL / Nitrite: Negative   Leuk Esterase: Negative / RBC: 2 /HPF / WBC 1 /HPF   Sq Epi: x / Non Sq Epi: 1 /HPF / Bacteria: Moderate      POCT Blood Glucose.: 164 mg/dL (22 @ 08:01)  POCT Blood Glucose.: 141 mg/dL (22 @ 22:03)  POCT Blood Glucose.: 91 mg/dL (22 @ 16:35)  POCT Blood Glucose.: 190 mg/dL (22 @ 11:41)  POCT Blood Glucose.: 116 mg/dL (22 @ 07:05)  POCT Blood Glucose.: 105 mg/dL (22 @ 20:49)  POCT Blood Glucose.: 120 mg/dL (22 @ 16:28)  POCT Blood Glucose.: 222 mg/dL (22 @ 11:47)  POCT Blood Glucose.: 146 mg/dL (22 @ 07:18)  POCT Blood Glucose.: 178 mg/dL (08-15-22 @ 21:47)  POCT Blood Glucose.: 156 mg/dL (08-15-22 @ 16:13)  POCT Blood Glucose.: 201 mg/dL (08-15-22 @ 12:13)                            15.5   14.96 )-----------( 403      ( 17 Aug 2022 13:28 )             44.8         130<L>  |  92<L>  |  25<H>  ----------------------------<  171<H>  4.5   |  20  |  1.3    Ca    9.7      17 Aug 2022 13:28  Mg     1.8         TPro  7.7  /  Alb  4.0  /  TBili  1.2  /  DBili  x   /  AST  29  /  ALT  43<H>  /  AlkPhos  185<H>    C-Reactive Protein, Serum (22 @ 13:28)    C-Reactive Protein, Serum: 85.4 mg/L  D-Dimer Assay, Quantitative (22 @ 13:28)    D-Dimer Assay, Quantitative: 323: Manufacturers recommended Cut off for VTE is 230 ng/ml D-DU ng/mL DDU      Urinalysis Basic - ( 16 Aug 2022 15:20 )    Color: Yellow / Appearance: Clear / S.019 / pH: x  Gluc: x / Ketone: Negative  / Bili: Negative / Urobili: <2 mg/dL   Blood: x / Protein: 30 mg/dL / Nitrite: Negative   Leuk Esterase: Negative / RBC: 2 /HPF / WBC 1 /HPF   Sq Epi: x / Non Sq Epi: 1 /HPF / Bacteria: Moderate      POCT Blood Glucose.: 190 mg/dL (22 @ 11:41)  POCT Blood Glucose.: 116 mg/dL (22 @ 07:05)  POCT Blood Glucose.: 105 mg/dL (22 @ 20:49)  POCT Blood Glucose.: 120 mg/dL (22 @ 16:28)  POCT Blood Glucose.: 222 mg/dL (22 @ 11:47)  POCT Blood Glucose.: 146 mg/dL (22 @ 07:18)  POCT Blood Glucose.: 178 mg/dL (08-15-22 @ 21:47)  POCT Blood Glucose.: 156 mg/dL (08-15-22 @ 16:13)  POCT Blood Glucose.: 201 mg/dL (08-15-22 @ 12:13)  POCT Blood Glucose.: 121 mg/dL (08-15-22 @ 07:41)  POCT Blood Glucose.: 111 mg/dL (22 @ 22:28)  POCT Blood Glucose.: 83 mg/dL (22 @ 16:54)   Patient is a 77y old  Male who presents with a chief complaint of rehab s/p left ischemic stroke with aphasia and right hemiparesis (11 Aug 2022 11:30)      HPI:  Patient is a 78yo M with PMH of HTN, DM, HLD, CAD (s/p stents), GERD who presented to the ED on 8/3/2022 for right hand weakness. Patient noticed he started having difficulty with his speech, then developed weakness in his right hand and some visual field deficits. In the ED, patient received tPA. CT head at the time was negative, EDSON showed EF of 55-60%.  MRI head showed scattered left-sided acute cortical infarcts within the MCA and PCA territories, with trace petechial hemorrhage involving high left frontal region. There is a chronic left occipital lobe infarct. MRA of the neck showed moderate stenosis of the proximal right internal carotid artery. On , a right upper quadrant US was done to rule out cholecystitis, revealing an indeterminate 1.2 cm right renal lesion and moderate-severe hepatic steatosis. During the hospital stay, the visual field deficits improved but patient continued to have no motor function in the right hand and impaired RLE strength and mobility and ADL independence and a persistent aphasia.    On evaluation by PM&R, patient endorses no movement in his right hand and notes some difficulty with memory but otherwise has no other complaints. Patient is right handed. He lives by himself in a house with 5 [address] and one flight of stairs inside (5+7 steps). Patient was previously independent in ADLs and ambulation, occasionally using a cane outdoors for balance. Currently patient requires mod assist for bed mobility, max assist with transfers, mod assist with ambulation 25 feet with RW and max assistance for dressing. He was evaluated by Physiatry on the medical service and was found to be a good acute rehab candidate.  (11 Aug 2022 11:30)    TODAY'S SUBJECTIVE & REVIEW OF SYMPTOMS:  Seen this AM. No overnight events. VSS. ID following, pt on remdesivir. Patient's function improving, now able to extend RUE w/ gravity eliminated.     Constitutional    [ x  ] WNL           [   ] poor appetite   [   ] insomnia   [   ] tired   Cardio:                [  x ] WNL           [   ] CP   [   ] VELASCO   [   ] palpitations               Resp:                   [   ] WNL           [   ] SOB   [  x ] cough improving   [   ] wheezing   GI:                        [ x  ] WNL           [   ] constipation   [   ] diarrhea   [   ] abdominal pain   [   ] nausea   [   ] emesis                                :                      [ x  ] WNL           [   ] PRITCHARD  [   ] dysuria   [   ] difficulty voiding             Endo:                   [ x  ] WNL          [   ] polyuria   [   ] temperature intolerance                 Skin:                     [ x  ] WNL          [   ] pain   [   ] wound   [   ] rash   MSK:                    [   x] WNL          [   ] muscle pain   [   ] joint pain/ stiffness   [   ] muscle tenderness   [   ] swelling   Neuro:                 [   ] WNL          [   ] HA   [   ] change in vision   [   ] tremor   [ x  ] weakness- Right side   [   ]dysphagia   [x  ] word finding difficulties,   [ x ] numbness/ neuropathy both feet    Cognitive:           [ x  ] WNL           [   ]confusion   - daughter note some memory loss since stroke   Psych:                  [  x ] WNL           [   ] hallucinations   [   ]agitation   [   ] delusion   [   ]depression      PHYSICAL EXAM    ICU Vital Signs Last 24 Hrs  T(C): 36.1 (18 Aug 2022 04:25), Max: 37.3 (17 Aug 2022 13:44)  T(F): 97 (18 Aug 2022 04:25), Max: 99.1 (17 Aug 2022 13:44)  HR: 76 (18 Aug 2022 04:25) (76 - 961)  BP: 123/78 (18 Aug 2022 04:25) (116/60 - 143/89)  BP(mean): 94 (18 Aug 2022 04:25) (90 - 97)  ABP: --  ABP(mean): --  RR: 18 (18 Aug 2022 04:25) (18 - 18)  SpO2: 97% (17 Aug 2022 13:45) (97% - 97%)    O2 Parameters below as of 17 Aug 2022 13:45  Patient On (Oxygen Delivery Method): room air      General:[  x ] NAD, Resting Comfortable,   [   ] other:                                HEENT: [  x ] NC/AT, EOMI, PERRL , Normal Conjunctivae,   [   ] other:  Cardio: [  x ] RRR, no murmer,   [   ] other:                              Pulm: [  x ] No Respiratory Distress,  Lungs CTAB,   [   ] other:                       Abdomen: [ x  ] ND/NT, Soft,   [   ] other:    : [ x  ] NO PRITCHARD CATHETER,   [  ] PRITCHARD CATHETER- no meatal tear, no discharge, [   ] other:                                            MSK: [ x  ] No joint swelling, Full PROM,   [    ] other:                                       Ext: [  x ]No C/C/E, No calf tenderness,   [   ]other:    Skin: [ x  ]intact,   [   ] other:                                                                   Neurological Examination:  Cognitive: [ x   ] AAO x 3,   [    ]  other:                                                                 Attention/ Concentration:  [ x   ] intact   [    ]  other:   Memory: [    ] intact,    [ x  ]  other:  mildly impaired  Mood/Affect: [  x  ] wnl,    [    ]  other:                                                                             Communication: [ x  ]Fluent, no dysarthria, following commands:  [   ] other:  CN II - XII:  [  x  ] intact, VFF  [    ] other: mild right facial droop                                                                                         Motor:   RIGHT UE: [   ] WNL,  [  x ] other: 2/5 shoulder extension, o/w 0/5  LEFT    UE: [  x ] WNL  RIGHT LE: [ x  ] WNL,  [   ] other: Hip flexion 3/5 with drift to bed, knee flexion 4/5, plantarflexion 5/5, dorsiflexion 5/5, toe extension 5/5  LEFT    LE: [  x ] WNL,  Hip flexion 4/5, knee flexion 5/5, plantarflexion 5/5, dorsiflexion 5/5, toe extension 5/5     Tone: [    ] wnl,   [ x   ]  other: mild increased tone in right triceps  DTRs: [ x  ]symmetric, 1+ [   ] other:   Coordination:   [    ] intact as testable,   [ not assesed   ]       CLOF:   Bed mobility- PA   Transfers- mod A   Gait: 25 x2 PA 3 point cane       MEDICATIONS  (STANDING):  amLODIPine   Tablet 5 milliGRAM(s) Oral at bedtime  aspirin enteric coated 81 milliGRAM(s) Oral daily  atorvastatin 80 milliGRAM(s) Oral at bedtime  citalopram 5 milliGRAM(s) Oral daily  dextrose 5%. 1000 milliLiter(s) (100 mL/Hr) IV Continuous <Continuous>  dextrose 50% Injectable 25 Gram(s) IV Push once  dextrose 50% Injectable 12.5 Gram(s) IV Push once  dextrose 50% Injectable 25 Gram(s) IV Push once  enoxaparin Injectable 40 milliGRAM(s) SubCutaneous every 24 hours  famotidine    Tablet 40 milliGRAM(s) Oral at bedtime  glucagon  Injectable 1 milliGRAM(s) IntraMuscular once  guaifenesin/dextromethorphan Oral Liquid 10 milliLiter(s) Oral every 6 hours  hydrocortisone 1% Cream 1 Application(s) Topical two times a day  insulin glargine Injectable (LANTUS) 30 Unit(s) SubCutaneous every morning  insulin lispro (ADMELOG) corrective regimen sliding scale   SubCutaneous three times a day before meals  insulin lispro Injectable (ADMELOG) 8 Unit(s) SubCutaneous three times a day before meals  lisinopril 40 milliGRAM(s) Oral daily  metoprolol tartrate 50 milliGRAM(s) Oral two times a day  polyethylene glycol 3350 17 Gram(s) Oral two times a day  remdesivir  IVPB   IV Intermittent   remdesivir  IVPB 100 milliGRAM(s) IV Intermittent every 24 hours  senna 2 Tablet(s) Oral at bedtime  sodium chloride 0.9%. 1000 milliLiter(s) (75 mL/Hr) IV Continuous <Continuous>  ticagrelor 90 milliGRAM(s) Oral every 12 hours    MEDICATIONS  (PRN):  acetaminophen     Tablet .. 650 milliGRAM(s) Oral every 6 hours PRN Temp greater or equal to 38C (100.4F), Mild Pain (1 - 3)  bisacodyl Suppository 10 milliGRAM(s) Rectal daily PRN Constipation  dextrose Oral Gel 15 Gram(s) Oral once PRN Blood Glucose LESS THAN 70 milliGRAM(s)/deciliter  diphenhydrAMINE 25 milliGRAM(s) Oral every 6 hours PRN Rash and/or Itching  lactulose Syrup 20 Gram(s) Oral two times a day PRN Constipation  melatonin 5 milliGRAM(s) Oral at bedtime PRN Insomnia      RECENT LABS/IMAGING                          14.7   9.31  )-----------( 312      ( 18 Aug 2022 07:04 )             42.1     08-    136  |  98  |  23<H>  ----------------------------<  133<H>  4.4   |  26  |  1.2    Ca    9.5      18 Aug 2022 07:04    TPro  6.9  /  Alb  3.5  /  TBili  0.9  /  DBili  0.2  /  AST  24  /  ALT  35  /  AlkPhos  160<H>  08-18    PT/INR - ( 18 Aug 2022 07:04 )   PT: 12.30 sec;   INR: 1.07 ratio           Urinalysis Basic - ( 16 Aug 2022 15:20 )    Color: Yellow / Appearance: Clear / S.019 / pH: x  Gluc: x / Ketone: Negative  / Bili: Negative / Urobili: <2 mg/dL   Blood: x / Protein: 30 mg/dL / Nitrite: Negative   Leuk Esterase: Negative / RBC: 2 /HPF / WBC 1 /HPF   Sq Epi: x / Non Sq Epi: 1 /HPF / Bacteria: Moderate      POCT Blood Glucose.: 164 mg/dL (22 @ 08:01)  POCT Blood Glucose.: 141 mg/dL (22 @ 22:03)  POCT Blood Glucose.: 91 mg/dL (22 @ 16:35)  POCT Blood Glucose.: 190 mg/dL (22 @ 11:41)  POCT Blood Glucose.: 116 mg/dL (22 @ 07:05)  POCT Blood Glucose.: 105 mg/dL (22 @ 20:49)  POCT Blood Glucose.: 120 mg/dL (22 @ 16:28)  POCT Blood Glucose.: 222 mg/dL (22 @ 11:47)  POCT Blood Glucose.: 146 mg/dL (22 @ 07:18)  POCT Blood Glucose.: 178 mg/dL (08-15-22 @ 21:47)  POCT Blood Glucose.: 156 mg/dL (08-15-22 @ 16:13)  POCT Blood Glucose.: 201 mg/dL (08-15-22 @ 12:13)                            15.5   14.96 )-----------( 403      ( 17 Aug 2022 13:28 )             44.8         130<L>  |  92<L>  |  25<H>  ----------------------------<  171<H>  4.5   |  20  |  1.3    Ca    9.7      17 Aug 2022 13:28  Mg     1.8         TPro  7.7  /  Alb  4.0  /  TBili  1.2  /  DBili  x   /  AST  29  /  ALT  43<H>  /  AlkPhos  185<H>    C-Reactive Protein, Serum (22 @ 13:28)    C-Reactive Protein, Serum: 85.4 mg/L  D-Dimer Assay, Quantitative (22 @ 13:28)    D-Dimer Assay, Quantitative: 323: Manufacturers recommended Cut off for VTE is 230 ng/ml D-DU ng/mL DDU      Urinalysis Basic - ( 16 Aug 2022 15:20 )    Color: Yellow / Appearance: Clear / S.019 / pH: x  Gluc: x / Ketone: Negative  / Bili: Negative / Urobili: <2 mg/dL   Blood: x / Protein: 30 mg/dL / Nitrite: Negative   Leuk Esterase: Negative / RBC: 2 /HPF / WBC 1 /HPF   Sq Epi: x / Non Sq Epi: 1 /HPF / Bacteria: Moderate      POCT Blood Glucose.: 190 mg/dL (22 @ 11:41)  POCT Blood Glucose.: 116 mg/dL (22 @ 07:05)  POCT Blood Glucose.: 105 mg/dL (22 @ 20:49)  POCT Blood Glucose.: 120 mg/dL (22 @ 16:28)  POCT Blood Glucose.: 222 mg/dL (22 @ 11:47)  POCT Blood Glucose.: 146 mg/dL (22 @ 07:18)  POCT Blood Glucose.: 178 mg/dL (08-15-22 @ 21:47)  POCT Blood Glucose.: 156 mg/dL (08-15-22 @ 16:13)  POCT Blood Glucose.: 201 mg/dL (08-15-22 @ 12:13)  POCT Blood Glucose.: 121 mg/dL (08-15-22 @ 07:41)  POCT Blood Glucose.: 111 mg/dL (22 @ 22:28)  POCT Blood Glucose.: 83 mg/dL (22 @ 16:54)

## 2022-08-18 NOTE — CONSULT NOTE ADULT - SUBJECTIVE AND OBJECTIVE BOX
MARIA M CROFT  77y, Male  Allergy: No Known Allergies    CHIEF COMPLAINT: rehab s/p left ischemic stroke with aphasia and right hemiparesis (18 Aug 2022 08:23)    HPI:  HPI:  Patient is a 76yo M with PMH of HTN, DM, HLD, CAD (s/p stents), GERD who presented to the ED on 8/3/2022 for right hand weakness. Patient noticed he started having difficulty with his speech, then developed weakness in his right hand and some visual field deficits. In the ED, patient received tPA. CT head at the time was negative, EDSON showed EF of 55-60%.  MRI head showed scattered left-sided acute cortical infarcts within the MCA and PCA territories, with trace petechial hemorrhage involving high left frontal region. There is a chronic left occipital lobe infarct. MRA of the neck showed moderate stenosis of the proximal right internal carotid artery. On , a right upper quadrant US was done to rule out cholecystitis, revealing an indeterminate 1.2 cm right renal lesion and moderate-severe hepatic steatosis. During the hospital stay, the visual field deficits improved but patient continued to have no motor function in the right hand and impaired RLE strength and mobility and ADL independence and a persistent aphasia.    On evaluation by PM&R, patient endorses no movement in his right hand and notes some difficulty with memory but otherwise has no other complaints. Patient is right handed. He lives by himself in a house with 5 [address] and one flight of stairs inside (5+7 steps). Patient was previously independent in ADLs and ambulation, occasionally using a cane outdoors for balance. Currently patient requires mod assist for bed mobility, max assist with transfers, mod assist with ambulation 25 feet with RW and max assistance for dressing. He was evaluated by Physiatry on the medical service and was found to be a good acute rehab candidate.  (11 Aug 2022 11:30)      Infectious Diseases History:  Old Micro Data/Cultures:     FAMILY HISTORY:  Maternal history of diabetes mellitus (Mother)    Family history of heart attack (Mother)      PAST MEDICAL & SURGICAL HISTORY:  Hypertension      Diabetes      Hyperlipidemia      GERD (gastroesophageal reflux disease)      Coronary artery disease      Diabetic polyneuropathy      No significant past surgical history          SOCIAL HISTORY  Social History:  Tobacco: Former smoker over 25 years ago  Alcohol: social drinker (1 glass/month)  Drugs: denies  Social history - see above HPI (11 Aug 2022 11:30)      Recent Travel:  Other Exposures:     ROS  General: Denies rigors, nightsweats  HEENT: Denies headache, rhinorrhea, sore throat, eye pain  CV: Denies CP, palpitations  PULM: Denies wheezing, hemoptysis  GI: Denies hematemesis, hematochezia, melena  : Denies discharge, hematuria  MSK: Denies arthralgias, myalgias  SKIN: Denies rash, lesions  NEURO: Denies paresthesias, weakness  PSYCH: Denies depression, anxiety    VITALS:  T(F): 97, Max: 99.1 (22 @ 13:44)  HR: 73  BP: 133/77  RR: 18Vital Signs Last 24 Hrs  T(C): 36.1 (18 Aug 2022 04:25), Max: 37.3 (17 Aug 2022 13:44)  T(F): 97 (18 Aug 2022 04:25), Max: 99.1 (17 Aug 2022 13:44)  HR: 73 (18 Aug 2022 08:31) (73 - 961)  BP: 133/77 (18 Aug 2022 08:31) (116/60 - 143/89)  BP(mean): 94 (18 Aug 2022 04:25) (90 - 97)  RR: 18 (18 Aug 2022 08:31) (18 - 18)  SpO2: 97% (18 Aug 2022 08:31) (97% - 97%)    Parameters below as of 18 Aug 2022 08:31  Patient On (Oxygen Delivery Method): room air        PHYSICAL EXAM:  Gen: NAD, resting in bed  HEENT: Normocephalic, atraumatic  Neck: supple, no lymphadenopathy  CV: Regular rate & regular rhythm  Lungs: CTAB  Abdomen: Soft, BS present  Ext: Warm, well perfused  Neuro: non focal, awake  Skin: no rash, no lesions  Lines: no phlebitis    TESTS & MEASUREMENTS:                        14.7   9.31  )-----------( 312      ( 18 Aug 2022 07:04 )             42.1         136  |  98  |  23<H>  ----------------------------<  133<H>  4.4   |  26  |  1.2    Ca    9.5      18 Aug 2022 07:04    TPro  6.9  /  Alb  3.5  /  TBili  0.9  /  DBili  0.2  /  AST  24  /  ALT  35  /  AlkPhos  160<H>        LIVER FUNCTIONS - ( 18 Aug 2022 07:04 )  Alb: 3.5 g/dL / Pro: 6.9 g/dL / ALK PHOS: 160 U/L / ALT: 35 U/L / AST: 24 U/L / GGT: x           Urinalysis Basic - ( 16 Aug 2022 15:20 )    Color: Yellow / Appearance: Clear / S.019 / pH: x  Gluc: x / Ketone: Negative  / Bili: Negative / Urobili: <2 mg/dL   Blood: x / Protein: 30 mg/dL / Nitrite: Negative   Leuk Esterase: Negative / RBC: 2 /HPF / WBC 1 /HPF   Sq Epi: x / Non Sq Epi: 1 /HPF / Bacteria: Moderate        Culture - Urine (collected 22 @ 00:00)  Source: Clean Catch Clean Catch (Midstream)  Final Report (22 @ 08:28):    <10,000 CFU/mL Normal Urogenital Blanca            INFECTIOUS DISEASES TESTING      RADIOLOGY & ADDITIONAL TESTS:  I have personally reviewed the last available Chest xray  CXR      CT      CARDIOLOGY TESTING  12 Lead ECG:   Ventricular Rate 71 BPM    Atrial Rate 71 BPM    P-R Interval 142 ms    QRS Duration 100 ms    Q-T Interval 428 ms    QTC Calculation(Bazett) 465 ms    P Axis 54 degrees    R Axis -23 degrees    T Axis -3 degrees    Diagnosis Line Normal sinus rhythm  Inferior infarct , age undetermined  Anterolateral infarct , age undetermined  Abnormal ECG    Confirmed by COLLINS EUBANKS MD ([id number]) on 2022 6:25:55 AM (22 @ 21:22)  12 Lead ECG:   Ventricular Rate 101 BPM    Atrial Rate 101 BPM    P-R Interval 130 ms    QRS Duration 96 ms    Q-T Interval 358 ms    QTC Calculation(Bazett) 464 ms    P Axis 46 degrees    R Axis -57 degrees    T Axis 27 degrees    Diagnosis Line Sinus tachycardia  Possible Left atrial enlargement  Left axis deviation  Incomplete right bundle branch block  Inferior infarct , age undetermined  Anterolateral infarct , age undetermined  Abnormal ECG    Confirmed by Oneal Sosa ([id number]) on 2022 10:34:13 PM (22 @ 14:10)      All available historical records have been reviewed    MEDICATIONS  amLODIPine   Tablet 5  aspirin enteric coated 81  atorvastatin 80  citalopram 5  dextrose 5%. 1000  dextrose 50% Injectable 25  dextrose 50% Injectable 12.5  dextrose 50% Injectable 25  enoxaparin Injectable 40  famotidine    Tablet 40  glucagon  Injectable 1  guaifenesin/dextromethorphan Oral Liquid 10  hydrocortisone 1% Cream 1  insulin glargine Injectable (LANTUS) 30  insulin lispro (ADMELOG) corrective regimen sliding scale   insulin lispro Injectable (ADMELOG) 8  lisinopril 40  metoprolol tartrate 50  polyethylene glycol 3350 17  remdesivir  IVPB   remdesivir  IVPB 100  senna 2  sodium chloride 0.9%. 1000  ticagrelor 90      ANTIBIOTICS:  remdesivir  IVPB   IV Intermittent   remdesivir  IVPB 100 milliGRAM(s) IV Intermittent every 24 hours      All available historical data has been reviewed    ASSESSMENT  77yMale with a PMH of....... (fill in)    IMPRESSION  # (?) Sepsis on admission (2 or more of the following T<96.8F, T>101F, Pulse>90, Resp Rate>20, WBC>12, wbc<4, Bands>10%), PLUS (+) lactic acidosis, OR metabolic encephalopathy, OR DONOVAN, OR bacteremia . Otherwise just SIRS on admission, sepsis ruled out  #  #    RECOMMENDATIONS  - f/u pending cultures  - ***    This is a pended note. All final recommendations to follow pending discussion with ID Attending    MARIA M CROFT  77y, Male  Allergy: No Known Allergies    CHIEF COMPLAINT: rehab s/p left ischemic stroke with aphasia and right hemiparesis (18 Aug 2022 08:23)    HPI:  HPI:  Patient is a 78yo M with PMH of HTN, DM, HLD, CAD (s/p stents), GERD who presented to the ED on 8/3/2022 for right hand weakness. Patient noticed he started having difficulty with his speech, then developed weakness in his right hand and some visual field deficits. In the ED, patient received tPA. CT head at the time was negative, EDSON showed EF of 55-60%.  MRI head showed scattered left-sided acute cortical infarcts within the MCA and PCA territories, with trace petechial hemorrhage involving high left frontal region. There is a chronic left occipital lobe infarct. MRA of the neck showed moderate stenosis of the proximal right internal carotid artery. On , a right upper quadrant US was done to rule out cholecystitis, revealing an indeterminate 1.2 cm right renal lesion and moderate-severe hepatic steatosis. During the hospital stay, the visual field deficits improved but patient continued to have no motor function in the right hand and impaired RLE strength and mobility and ADL independence and a persistent aphasia.    On evaluation by PM&R, patient endorses no movement in his right hand and notes some difficulty with memory but otherwise has no other complaints. Patient is right handed. He lives by himself in a house with [address] and one flight of stairs inside (5+7 steps). Patient was previously independent in ADLs and ambulation, occasionally using a cane outdoors for balance. Currently patient requires mod assist for bed mobility, max assist with transfers, mod assist with ambulation 25 feet with RW and max assistance for dressing. He was evaluated by Physiatry on the medical service and was found to be a good acute rehab candidate.  (11 Aug 2022 11:30)      Infectious Diseases History:  Old Micro Data/Cultures:     FAMILY HISTORY:  Maternal history of diabetes mellitus (Mother)    Family history of heart attack (Mother)      PAST MEDICAL & SURGICAL HISTORY:  Hypertension      Diabetes      Hyperlipidemia      GERD (gastroesophageal reflux disease)      Coronary artery disease      Diabetic polyneuropathy      No significant past surgical history          SOCIAL HISTORY  Social History:  Tobacco: Former smoker over 25 years ago  Alcohol: social drinker (1 glass/month)  Drugs: denies  Social history - see above HPI (11 Aug 2022 11:30)      Recent Travel:  Other Exposures:     ROS  General: Denies rigors, nightsweats  HEENT: Denies headache, rhinorrhea, sore throat, eye pain  CV: Denies CP, palpitations  PULM: Denies wheezing, hemoptysis  GI: Denies hematemesis, hematochezia, melena  : Denies discharge, hematuria  MSK: Denies arthralgias, myalgias  SKIN: Denies rash, lesions  NEURO: Denies paresthesias, weakness  PSYCH: Denies depression, anxiety    VITALS:  T(F): 97, Max: 99.1 (22 @ 13:44)  HR: 73  BP: 133/77  RR: 18Vital Signs Last 24 Hrs  T(C): 36.1 (18 Aug 2022 04:25), Max: 37.3 (17 Aug 2022 13:44)  T(F): 97 (18 Aug 2022 04:25), Max: 99.1 (17 Aug 2022 13:44)  HR: 73 (18 Aug 2022 08:31) (73 - 961)  BP: 133/77 (18 Aug 2022 08:31) (116/60 - 143/89)  BP(mean): 94 (18 Aug 2022 04:25) (90 - 97)  RR: 18 (18 Aug 2022 08:31) (18 - 18)  SpO2: 97% (18 Aug 2022 08:31) (97% - 97%)    Parameters below as of 18 Aug 2022 08:31  Patient On (Oxygen Delivery Method): room air        PHYSICAL EXAM:  Gen: NAD, resting in bed  HEENT: Normocephalic, atraumatic  Neck: supple, no lymphadenopathy  CV: Regular rate & regular rhythm  Lungs: CTAB  Abdomen: Soft, BS present  Ext: Warm, well perfused  Neuro: non focal, awake  Skin: no rash, no lesions  Lines: no phlebitis    TESTS & MEASUREMENTS:                        14.7   9.31  )-----------( 312      ( 18 Aug 2022 07:04 )             42.1         136  |  98  |  23<H>  ----------------------------<  133<H>  4.4   |  26  |  1.2    Ca    9.5      18 Aug 2022 07:04    TPro  6.9  /  Alb  3.5  /  TBili  0.9  /  DBili  0.2  /  AST  24  /  ALT  35  /  AlkPhos  160<H>        LIVER FUNCTIONS - ( 18 Aug 2022 07:04 )  Alb: 3.5 g/dL / Pro: 6.9 g/dL / ALK PHOS: 160 U/L / ALT: 35 U/L / AST: 24 U/L / GGT: x           Urinalysis Basic - ( 16 Aug 2022 15:20 )    Color: Yellow / Appearance: Clear / S.019 / pH: x  Gluc: x / Ketone: Negative  / Bili: Negative / Urobili: <2 mg/dL   Blood: x / Protein: 30 mg/dL / Nitrite: Negative   Leuk Esterase: Negative / RBC: 2 /HPF / WBC 1 /HPF   Sq Epi: x / Non Sq Epi: 1 /HPF / Bacteria: Moderate        Culture - Urine (collected 22 @ 00:00)  Source: Clean Catch Clean Catch (Midstream)  Final Report (22 @ 08:28):    <10,000 CFU/mL Normal Urogenital Blanca            INFECTIOUS DISEASES TESTING      RADIOLOGY & ADDITIONAL TESTS:  I have personally reviewed the last available Chest xray  CXR      CT      CARDIOLOGY TESTING  12 Lead ECG:   Ventricular Rate 71 BPM    Atrial Rate 71 BPM    P-R Interval 142 ms    QRS Duration 100 ms    Q-T Interval 428 ms    QTC Calculation(Bazett) 465 ms    P Axis 54 degrees    R Axis -23 degrees    T Axis -3 degrees    Diagnosis Line Normal sinus rhythm  Inferior infarct , age undetermined  Anterolateral infarct , age undetermined  Abnormal ECG    Confirmed by COLLINS EUBANKS MD ([id number]) on 2022 6:25:55 AM (22 @ 21:22)  12 Lead ECG:   Ventricular Rate 101 BPM    Atrial Rate 101 BPM    P-R Interval 130 ms    QRS Duration 96 ms    Q-T Interval 358 ms    QTC Calculation(Bazett) 464 ms    P Axis 46 degrees    R Axis -57 degrees    T Axis 27 degrees    Diagnosis Line Sinus tachycardia  Possible Left atrial enlargement  Left axis deviation  Incomplete right bundle branch block  Inferior infarct , age undetermined  Anterolateral infarct , age undetermined  Abnormal ECG    Confirmed by Oneal Sosa ([id number]) on 2022 10:34:13 PM (22 @ 14:10)      All available historical records have been reviewed    MEDICATIONS  amLODIPine   Tablet 5  aspirin enteric coated 81  atorvastatin 80  citalopram 5  dextrose 5%. 1000  dextrose 50% Injectable 25  dextrose 50% Injectable 12.5  dextrose 50% Injectable 25  enoxaparin Injectable 40  famotidine    Tablet 40  glucagon  Injectable 1  guaifenesin/dextromethorphan Oral Liquid 10  hydrocortisone 1% Cream 1  insulin glargine Injectable (LANTUS) 30  insulin lispro (ADMELOG) corrective regimen sliding scale   insulin lispro Injectable (ADMELOG) 8  lisinopril 40  metoprolol tartrate 50  polyethylene glycol 3350 17  remdesivir  IVPB   remdesivir  IVPB 100  senna 2  sodium chloride 0.9%. 1000  ticagrelor 90        All available historical data has been reviewed    ASSESSMENT  77yMale with a PMH of....... (fill in)    IMPRESSION  # (?) Sepsis on admission (2 or more of the following T<96.8F, T>101F, Pulse>90, Resp Rate>20, WBC>12, wbc<4, Bands>10%), PLUS (+) lactic acidosis, OR metabolic encephalopathy, OR DONOVAN, OR bacteremia . Otherwise just SIRS on admission, sepsis ruled out  #  #    RECOMMENDATIONS  - f/u pending cultures  - ***    This is a pended note. All final recommendations to follow pending discussion with ID Attending    MARIA M CROFT  77y, Male  Allergy: No Known Allergies    CHIEF COMPLAINT: rehab s/p left ischemic stroke with aphasia and right hemiparesis (18 Aug 2022 08:23)    HPI:  HPI:  Patient is a 78yo M with PMH of HTN, DM, HLD, CAD (s/p stents), GERD who presented to the ED on 8/3/2022 for right hand weakness. Patient noticed he started having difficulty with his speech, then developed weakness in his right hand and some visual field deficits. In the ED, patient received tPA. CT head at the time was negative, EDSON showed EF of 55-60%.  MRI head showed scattered left-sided acute cortical infarcts within the MCA and PCA territories, with trace petechial hemorrhage involving high left frontal region. There is a chronic left occipital lobe infarct. MRA of the neck showed moderate stenosis of the proximal right internal carotid artery. On , a right upper quadrant US was done to rule out cholecystitis, revealing an indeterminate 1.2 cm right renal lesion and moderate-severe hepatic steatosis. During the hospital stay, the visual field deficits improved but patient continued to have no motor function in the right hand and impaired RLE strength and mobility and ADL independence and a persistent aphasia.    On evaluation by PM&R, patient endorses no movement in his right hand and notes some difficulty with memory but otherwise has no other complaints. Patient is right handed. He lives by himself in a house with [address] and one flight of stairs inside (5+7 steps). Patient was previously independent in ADLs and ambulation, occasionally using a cane outdoors for balance. Currently patient requires mod assist for bed mobility, max assist with transfers, mod assist with ambulation 25 feet with RW and max assistance for dressing. He was evaluated by Physiatry on the medical service and was found to be a good acute rehab candidate.  (11 Aug 2022 11:30)      Infectious Diseases History:  Old Micro Data/Cultures:     FAMILY HISTORY:  Maternal history of diabetes mellitus (Mother)    Family history of heart attack (Mother)      PAST MEDICAL & SURGICAL HISTORY:  Hypertension      Diabetes      Hyperlipidemia      GERD (gastroesophageal reflux disease)      Coronary artery disease      Diabetic polyneuropathy      No significant past surgical history          SOCIAL HISTORY  Social History:  Tobacco: Former smoker over 25 years ago  Alcohol: social drinker (1 glass/month)  Drugs: denies  Social history - see above HPI (11 Aug 2022 11:30)      Recent Travel:  Other Exposures:     ROS  General: Denies rigors, nightsweats  HEENT: Denies headache, rhinorrhea, sore throat, eye pain  CV: Denies CP, palpitations  PULM: Denies wheezing, hemoptysis  GI: Denies hematemesis, hematochezia, melena  : Denies discharge, hematuria  MSK: Denies arthralgias, myalgias  SKIN: Denies rash, lesions  NEURO: Denies paresthesias, weakness  PSYCH: Denies depression, anxiety    VITALS:  T(F): 97, Max: 99.1 (22 @ 13:44)  HR: 73  BP: 133/77  RR: 18Vital Signs Last 24 Hrs  T(C): 36.1 (18 Aug 2022 04:25), Max: 37.3 (17 Aug 2022 13:44)  T(F): 97 (18 Aug 2022 04:25), Max: 99.1 (17 Aug 2022 13:44)  HR: 73 (18 Aug 2022 08:31) (73 - 961)  BP: 133/77 (18 Aug 2022 08:31) (116/60 - 143/89)  BP(mean): 94 (18 Aug 2022 04:25) (90 - 97)  RR: 18 (18 Aug 2022 08:31) (18 - 18)  SpO2: 97% (18 Aug 2022 08:31) (97% - 97%)    Parameters below as of 18 Aug 2022 08:31  Patient On (Oxygen Delivery Method): room air        PHYSICAL EXAM:  Gen: NAD, resting in bed  HEENT: Normocephalic, atraumatic  Neck: supple, no lymphadenopathy  CV: Regular rate & regular rhythm  Lungs: CTAB  Abdomen: Soft, BS present  Ext: Warm, well perfused  Neuro: non focal, awake  Skin: no rash, no lesions  Lines: no phlebitis    TESTS & MEASUREMENTS:                        14.7   9.31  )-----------( 312      ( 18 Aug 2022 07:04 )             42.1         136  |  98  |  23<H>  ----------------------------<  133<H>  4.4   |  26  |  1.2    Ca    9.5      18 Aug 2022 07:04    TPro  6.9  /  Alb  3.5  /  TBili  0.9  /  DBili  0.2  /  AST  24  /  ALT  35  /  AlkPhos  160<H>        LIVER FUNCTIONS - ( 18 Aug 2022 07:04 )  Alb: 3.5 g/dL / Pro: 6.9 g/dL / ALK PHOS: 160 U/L / ALT: 35 U/L / AST: 24 U/L / GGT: x           Urinalysis Basic - ( 16 Aug 2022 15:20 )    Color: Yellow / Appearance: Clear / S.019 / pH: x  Gluc: x / Ketone: Negative  / Bili: Negative / Urobili: <2 mg/dL   Blood: x / Protein: 30 mg/dL / Nitrite: Negative   Leuk Esterase: Negative / RBC: 2 /HPF / WBC 1 /HPF   Sq Epi: x / Non Sq Epi: 1 /HPF / Bacteria: Moderate        Culture - Urine (collected 22 @ 00:00)  Source: Clean Catch Clean Catch (Midstream)  Final Report (22 @ 08:28):    <10,000 CFU/mL Normal Urogenital Blanca            INFECTIOUS DISEASES TESTING      RADIOLOGY & ADDITIONAL TESTS:  I have personally reviewed the last available Chest xray  CXR      CT      CARDIOLOGY TESTING  12 Lead ECG:   Ventricular Rate 71 BPM    Atrial Rate 71 BPM    P-R Interval 142 ms    QRS Duration 100 ms    Q-T Interval 428 ms    QTC Calculation(Bazett) 465 ms    P Axis 54 degrees    R Axis -23 degrees    T Axis -3 degrees    Diagnosis Line Normal sinus rhythm  Inferior infarct , age undetermined  Anterolateral infarct , age undetermined  Abnormal ECG    Confirmed by COLLINS EUBANKS MD ([id number]) on 2022 6:25:55 AM (22 @ 21:22)  12 Lead ECG:   Ventricular Rate 101 BPM    Atrial Rate 101 BPM    P-R Interval 130 ms    QRS Duration 96 ms    Q-T Interval 358 ms    QTC Calculation(Bazett) 464 ms    P Axis 46 degrees    R Axis -57 degrees    T Axis 27 degrees    Diagnosis Line Sinus tachycardia  Possible Left atrial enlargement  Left axis deviation  Incomplete right bundle branch block  Inferior infarct , age undetermined  Anterolateral infarct , age undetermined  Abnormal ECG    Confirmed by Oneal Sosa ([id number]) on 2022 10:34:13 PM (22 @ 14:10)      All available historical records have been reviewed    MEDICATIONS  amLODIPine   Tablet 5  aspirin enteric coated 81  atorvastatin 80  citalopram 5  dextrose 5%. 1000  dextrose 50% Injectable 25  dextrose 50% Injectable 12.5  dextrose 50% Injectable 25  enoxaparin Injectable 40  famotidine    Tablet 40  glucagon  Injectable 1  guaifenesin/dextromethorphan Oral Liquid 10  hydrocortisone 1% Cream 1  insulin glargine Injectable (LANTUS) 30  insulin lispro (ADMELOG) corrective regimen sliding scale   insulin lispro Injectable (ADMELOG) 8  lisinopril 40  metoprolol tartrate 50  polyethylene glycol 3350 17  remdesivir  IVPB   remdesivir  IVPB 100  senna 2  sodium chloride 0.9%. 1000  ticagrelor 90        All available historical data has been reviewed    ASSESSMENT  77yMale with a PMH of....... (fill in)

## 2022-08-18 NOTE — CHART NOTE - NSCHARTNOTEFT_GEN_A_CORE
Registered Dietitian Follow-Up     Patient Profile Reviewed                           Yes [x]   No []     Nutrition History Previously Obtained        Yes [x]  No []       Pertinent Subjective Information:   Per RN, pt has good appetite; consuming 100% of meals provided in-house. No nausea or vomiting reported.     Pertinent Medical Information:  78yo M with PMH HTN, DM, CAD (s/p stents), GERD who presented for right sided weakness and aphasia  -- rehab s/p left ischemic stroke with aphasia and right hemiparesis    #Left acute ischemic stroke with right hemiparesis (dominant) and aphasia s/p tPA on 8/3  #Diabetic Peripheral Polyneuropathy  #Covid positive   #HTN   #DM II  #CAD    Diet order:  Diet, DASH/TLC:   Sodium & Cholesterol Restricted  Consistent Carbohydrate {Evening Snack}  Easy to Chew (EASYTOCHEW)  Free Water Flush Instructions:  needs consecutive swallow of solids and liquids . (08-17-22 @ 20:50) [Active]    Anthropometrics:  - Ht: 182.9 cm  - Wt: 100..8 KG  - BMI: 30.1  - IBW: 80.9 KG     Pertinent Lab Data:  08/18 @ 07:04 - BUN 23; ; Alk Phos 160    CAPILLARY BLOOD GLUCOSE:  POCT Blood Glucose.: 164 mg/dL (18 Aug 2022 08:01)  POCT Blood Glucose.: 141 mg/dL (17 Aug 2022 22:03)  POCT Blood Glucose.: 91 mg/dL (17 Aug 2022 16:35)  POCT Blood Glucose.: 190 mg/dL (17 Aug 2022 11:41)    Pertinent Meds:  MEDICATIONS  (STANDING):  amLODIPine   Tablet 5 milliGRAM(s) Oral at bedtime  aspirin enteric coated 81 milliGRAM(s) Oral daily  atorvastatin 80 milliGRAM(s) Oral at bedtime  citalopram 5 milliGRAM(s) Oral daily  dextrose 5%. 1000 milliLiter(s) (100 mL/Hr) IV Continuous <Continuous>  dextrose 50% Injectable 25 Gram(s) IV Push once  dextrose 50% Injectable 12.5 Gram(s) IV Push once  dextrose 50% Injectable 25 Gram(s) IV Push once  enoxaparin Injectable 40 milliGRAM(s) SubCutaneous every 24 hours  famotidine    Tablet 40 milliGRAM(s) Oral at bedtime  glucagon  Injectable 1 milliGRAM(s) IntraMuscular once  guaifenesin/dextromethorphan Oral Liquid 10 milliLiter(s) Oral every 6 hours  hydrocortisone 1% Cream 1 Application(s) Topical two times a day  insulin glargine Injectable (LANTUS) 30 Unit(s) SubCutaneous every morning  insulin lispro (ADMELOG) corrective regimen sliding scale   SubCutaneous three times a day before meals  insulin lispro Injectable (ADMELOG) 8 Unit(s) SubCutaneous three times a day before meals  lisinopril 40 milliGRAM(s) Oral daily  metoprolol tartrate 50 milliGRAM(s) Oral two times a day  polyethylene glycol 3350 17 Gram(s) Oral two times a day  remdesivir  IVPB   IV Intermittent   remdesivir  IVPB 100 milliGRAM(s) IV Intermittent every 24 hours  senna 2 Tablet(s) Oral at bedtime  sodium chloride 0.9%. 1000 milliLiter(s) (75 mL/Hr) IV Continuous <Continuous>  ticagrelor 90 milliGRAM(s) Oral every 12 hours    MEDICATIONS  (PRN):  acetaminophen     Tablet .. 650 milliGRAM(s) Oral every 6 hours PRN Temp greater or equal to 38C (100.4F), Mild Pain (1 - 3)  bisacodyl Suppository 10 milliGRAM(s) Rectal daily PRN Constipation  dextrose Oral Gel 15 Gram(s) Oral once PRN Blood Glucose LESS THAN 70 milliGRAM(s)/deciliter  diphenhydrAMINE 25 milliGRAM(s) Oral every 6 hours PRN Rash and/or Itching  lactulose Syrup 20 Gram(s) Oral two times a day PRN Constipation  melatonin 5 milliGRAM(s) Oral at bedtime PRN Insomnia     Physical Findings:  - Appearance: no edema per flowsheet  - GI function: nondistended; soft/nontender; last BM on 8/17 - 1x per RN  - Tubes: n/a   - Oral/Mouth cavity: tolerating easy to chew diet texture  - Skin: intact; no pressure injuries  - Cognitive: AAOx4     Nutrition Requirements  Weight Used: Using .8 KG     Estimated Energy Needs    Continue []  Adjust []  ENERGY: 4238-1350 kcal/day (MSJ 1781 * 1-1.1 SF)     Estimated Protein Needs    Continue []  Adjust []  PROTEIN: 101-111 g/day (1-1.1 g/kg)     Estimated Fluid Needs        Continue []  Adjust []  FLUID: 2520 mL/day (25 mL/kg) or per LIP     Nutrient Intake: Consuming 100% of meals; meeting >75% of estimated energy needs.     [] Previous Nutrition Diagnosis:            [] Ongoing          [] Resolved    [] No active nutrition diagnosis identified at this time     Nutrition Intervention: meals, snacks, coordination of care     Goal/Expected Outcome: continue to meet >75% of estimated energy needs     Indicator/Monitoring: diet order, PO intake, weights, labs, NFPF, body composition, BM     Recommendations:  1. Continue with current diet order  2. Encourage PO intake and assist during meals as needed    Pt is at low nutrition risk; will f/u in 7-10 days or c/s PRN.    RD remains available: [phone]

## 2022-08-19 LAB
ALBUMIN SERPL ELPH-MCNC: 3.4 G/DL — LOW (ref 3.5–5.2)
ALP SERPL-CCNC: 151 U/L — HIGH (ref 30–115)
ALT FLD-CCNC: 32 U/L — SIGNIFICANT CHANGE UP (ref 0–41)
ANION GAP SERPL CALC-SCNC: 14 MMOL/L — SIGNIFICANT CHANGE UP (ref 7–14)
AST SERPL-CCNC: 28 U/L — SIGNIFICANT CHANGE UP (ref 0–41)
BASOPHILS # BLD AUTO: 0.04 K/UL — SIGNIFICANT CHANGE UP (ref 0–0.2)
BASOPHILS NFR BLD AUTO: 0.4 % — SIGNIFICANT CHANGE UP (ref 0–1)
BILIRUB DIRECT SERPL-MCNC: <0.2 MG/DL — SIGNIFICANT CHANGE UP (ref 0–0.3)
BILIRUB INDIRECT FLD-MCNC: >0.4 MG/DL — SIGNIFICANT CHANGE UP (ref 0.2–1.2)
BILIRUB SERPL-MCNC: 0.6 MG/DL — SIGNIFICANT CHANGE UP (ref 0.2–1.2)
BUN SERPL-MCNC: 26 MG/DL — HIGH (ref 10–20)
CALCIUM SERPL-MCNC: 9.4 MG/DL — SIGNIFICANT CHANGE UP (ref 8.5–10.1)
CHLORIDE SERPL-SCNC: 101 MMOL/L — SIGNIFICANT CHANGE UP (ref 98–110)
CO2 SERPL-SCNC: 22 MMOL/L — SIGNIFICANT CHANGE UP (ref 17–32)
CREAT SERPL-MCNC: 1.2 MG/DL — SIGNIFICANT CHANGE UP (ref 0.7–1.5)
EGFR: 63 ML/MIN/1.73M2 — SIGNIFICANT CHANGE UP
EOSINOPHIL # BLD AUTO: 0.4 K/UL — SIGNIFICANT CHANGE UP (ref 0–0.7)
EOSINOPHIL NFR BLD AUTO: 3.9 % — SIGNIFICANT CHANGE UP (ref 0–8)
GLUCOSE BLDC GLUCOMTR-MCNC: 105 MG/DL — HIGH (ref 70–99)
GLUCOSE BLDC GLUCOMTR-MCNC: 114 MG/DL — HIGH (ref 70–99)
GLUCOSE BLDC GLUCOMTR-MCNC: 117 MG/DL — HIGH (ref 70–99)
GLUCOSE BLDC GLUCOMTR-MCNC: 142 MG/DL — HIGH (ref 70–99)
GLUCOSE BLDC GLUCOMTR-MCNC: 153 MG/DL — HIGH (ref 70–99)
GLUCOSE BLDC GLUCOMTR-MCNC: 178 MG/DL — HIGH (ref 70–99)
GLUCOSE BLDC GLUCOMTR-MCNC: 260 MG/DL — HIGH (ref 70–99)
GLUCOSE BLDC GLUCOMTR-MCNC: 268 MG/DL — HIGH (ref 70–99)
GLUCOSE BLDC GLUCOMTR-MCNC: 56 MG/DL — LOW (ref 70–99)
GLUCOSE BLDC GLUCOMTR-MCNC: 63 MG/DL — LOW (ref 70–99)
GLUCOSE SERPL-MCNC: 108 MG/DL — HIGH (ref 70–99)
HCT VFR BLD CALC: 43.8 % — SIGNIFICANT CHANGE UP (ref 42–52)
HGB BLD-MCNC: 15 G/DL — SIGNIFICANT CHANGE UP (ref 14–18)
IMM GRANULOCYTES NFR BLD AUTO: 0.5 % — HIGH (ref 0.1–0.3)
INR BLD: 1.1 RATIO — SIGNIFICANT CHANGE UP (ref 0.65–1.3)
LYMPHOCYTES # BLD AUTO: 1.7 K/UL — SIGNIFICANT CHANGE UP (ref 1.2–3.4)
LYMPHOCYTES # BLD AUTO: 16.4 % — LOW (ref 20.5–51.1)
MCHC RBC-ENTMCNC: 28.6 PG — SIGNIFICANT CHANGE UP (ref 27–31)
MCHC RBC-ENTMCNC: 34.2 G/DL — SIGNIFICANT CHANGE UP (ref 32–37)
MCV RBC AUTO: 83.6 FL — SIGNIFICANT CHANGE UP (ref 80–94)
MONOCYTES # BLD AUTO: 1.13 K/UL — HIGH (ref 0.1–0.6)
MONOCYTES NFR BLD AUTO: 10.9 % — HIGH (ref 1.7–9.3)
NEUTROPHILS # BLD AUTO: 7.04 K/UL — HIGH (ref 1.4–6.5)
NEUTROPHILS NFR BLD AUTO: 67.9 % — SIGNIFICANT CHANGE UP (ref 42.2–75.2)
NRBC # BLD: 0 /100 WBCS — SIGNIFICANT CHANGE UP (ref 0–0)
PLATELET # BLD AUTO: 318 K/UL — SIGNIFICANT CHANGE UP (ref 130–400)
POTASSIUM SERPL-MCNC: 4.3 MMOL/L — SIGNIFICANT CHANGE UP (ref 3.5–5)
POTASSIUM SERPL-SCNC: 4.3 MMOL/L — SIGNIFICANT CHANGE UP (ref 3.5–5)
PROT SERPL-MCNC: 7 G/DL — SIGNIFICANT CHANGE UP (ref 6–8)
PROTHROM AB SERPL-ACNC: 12.6 SEC — SIGNIFICANT CHANGE UP (ref 9.95–12.87)
RBC # BLD: 5.24 M/UL — SIGNIFICANT CHANGE UP (ref 4.7–6.1)
RBC # FLD: 13 % — SIGNIFICANT CHANGE UP (ref 11.5–14.5)
SODIUM SERPL-SCNC: 137 MMOL/L — SIGNIFICANT CHANGE UP (ref 135–146)
WBC # BLD: 10.36 K/UL — SIGNIFICANT CHANGE UP (ref 4.8–10.8)
WBC # FLD AUTO: 10.36 K/UL — SIGNIFICANT CHANGE UP (ref 4.8–10.8)

## 2022-08-19 RX ORDER — LISINOPRIL 2.5 MG/1
40 TABLET ORAL AT BEDTIME
Refills: 0 | Status: DISCONTINUED | OUTPATIENT
Start: 2022-08-19 | End: 2022-08-30

## 2022-08-19 RX ORDER — METOPROLOL TARTRATE 50 MG
50 TABLET ORAL ONCE
Refills: 0 | Status: COMPLETED | OUTPATIENT
Start: 2022-08-19 | End: 2022-08-19

## 2022-08-19 RX ORDER — ENOXAPARIN SODIUM 100 MG/ML
30 INJECTION SUBCUTANEOUS EVERY 12 HOURS
Refills: 0 | Status: DISCONTINUED | OUTPATIENT
Start: 2022-08-19 | End: 2022-08-19

## 2022-08-19 RX ORDER — ENOXAPARIN SODIUM 100 MG/ML
30 INJECTION SUBCUTANEOUS EVERY 12 HOURS
Refills: 0 | Status: DISCONTINUED | OUTPATIENT
Start: 2022-08-19 | End: 2022-08-30

## 2022-08-19 RX ORDER — METOPROLOL TARTRATE 50 MG
50 TABLET ORAL
Refills: 0 | Status: DISCONTINUED | OUTPATIENT
Start: 2022-08-19 | End: 2022-08-30

## 2022-08-19 RX ADMIN — TICAGRELOR 90 MILLIGRAM(S): 90 TABLET ORAL at 17:56

## 2022-08-19 RX ADMIN — POLYETHYLENE GLYCOL 3350 17 GRAM(S): 17 POWDER, FOR SOLUTION ORAL at 06:40

## 2022-08-19 RX ADMIN — Medication 3: at 11:47

## 2022-08-19 RX ADMIN — LISINOPRIL 40 MILLIGRAM(S): 2.5 TABLET ORAL at 21:40

## 2022-08-19 RX ADMIN — Medication 81 MILLIGRAM(S): at 11:47

## 2022-08-19 RX ADMIN — Medication 8 UNIT(S): at 08:20

## 2022-08-19 RX ADMIN — REMDESIVIR 500 MILLIGRAM(S): 5 INJECTION INTRAVENOUS at 21:41

## 2022-08-19 RX ADMIN — ENOXAPARIN SODIUM 30 MILLIGRAM(S): 100 INJECTION SUBCUTANEOUS at 23:31

## 2022-08-19 RX ADMIN — Medication 8 UNIT(S): at 11:48

## 2022-08-19 RX ADMIN — Medication 50 MILLIGRAM(S): at 23:31

## 2022-08-19 RX ADMIN — CITALOPRAM 5 MILLIGRAM(S): 10 TABLET, FILM COATED ORAL at 11:47

## 2022-08-19 RX ADMIN — ENOXAPARIN SODIUM 30 MILLIGRAM(S): 100 INJECTION SUBCUTANEOUS at 12:57

## 2022-08-19 RX ADMIN — ATORVASTATIN CALCIUM 80 MILLIGRAM(S): 80 TABLET, FILM COATED ORAL at 21:40

## 2022-08-19 RX ADMIN — FAMOTIDINE 40 MILLIGRAM(S): 10 INJECTION INTRAVENOUS at 21:40

## 2022-08-19 RX ADMIN — INSULIN GLARGINE 30 UNIT(S): 100 INJECTION, SOLUTION SUBCUTANEOUS at 08:25

## 2022-08-19 RX ADMIN — TICAGRELOR 90 MILLIGRAM(S): 90 TABLET ORAL at 06:36

## 2022-08-19 RX ADMIN — Medication 50 MILLIGRAM(S): at 12:57

## 2022-08-19 NOTE — CHART NOTE - NSCHARTNOTEFT_GEN_A_CORE
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 Patient noticed he started having difficulty with his speech, then developed weakness in his right hand and some visual field deficits. In the ED, patient received tPA. CT head at the time was negative, EDSON showed EF of 55-60%.  MRI head showed scattered left-sided acute cortical infarcts within the MCA and PCA territories, with trace petechial hemorrhage involving high left frontal region. There is a chronic left occipital lobe infarct. MRA of the neck showed moderate stenosis of the proximal right internal carotid artery.    The patient's HR this AM  = 112; metoprolol 50mg was held last night and this AM due to SBP < 130; lowered parameter to hold if SBP is less than 120 (per Neuro keep  to 160) and also changed  lisinopril 40mg to q hs instead of q AM (it was also held this AM) with same holding parameter. HCTZ was d/c'd on 8/16 due to azotemia and poor po fluid intake, and d/c'd amlodipine today.    The patient tested + for covid on 8/16-- treated with 3 doses of RDV (200mg IV on 8/17 and 100mg IV 8/18 and 8/19). He was symptomatic at first but is feeling much better.  He was vaccinated x 3. He will remain on airborne/contact isolation for 10 days.    Vital Signs Last 24 Hrs  T(C): 36.6 (19 Aug 2022 11:50), Max: 36.7 (18 Aug 2022 16:58)  T(F): 97.9 (19 Aug 2022 11:50), Max: 98 (18 Aug 2022 16:58)  HR: 112 (19 Aug 2022 12:11) (73 - 114)  BP: 118/74 (19 Aug 2022 11:50) (118/74 - 138/73)  BP(mean): 99 (18 Aug 2022 22:06) (99 - 99)  RR: 18 (19 Aug 2022 11:50) (18 - 18)  SpO2: 97% (18 Aug 2022 16:58) (97% - 97%)    Parameters below as of 18 Aug 2022 16:58  Patient On (Oxygen Delivery Method): room air      LABS:              15.0   10.36 )-----------( 318      ( 19 Aug 2022 06:00 )             43.8     08-19    137  |  101  |  26<H>  ----------------------------<  108<H>  4.3   |  22  |  1.2    Ca    9.4      19 Aug 2022 06:00    TPro  7.0  /  Alb  3.4<L>  /  TBili  0.6  /  DBili  <0.2  /  AST  28  /  ALT  32  /  AlkPhos  151<H>  08-19    PT/INR - ( 19 Aug 2022 06:00 )   PT: 12.60 sec;   INR: 1.10 ratio      Urine culture was negative.    Will continue to monitor closely -- labs, VS, FS  Discussed with Dr. Cam.

## 2022-08-19 NOTE — PROGRESS NOTE ADULT - SUBJECTIVE AND OBJECTIVE BOX
Patient is a 77y old  Male who presents with a chief complaint of rehab s/p left ischemic stroke with aphasia and right hemiparesis (11 Aug 2022 11:30)      HPI:  Patient is a 76yo M with PMH of HTN, DM, HLD, CAD (s/p stents), GERD who presented to the ED on 8/3/2022 for right hand weakness. Patient noticed he started having difficulty with his speech, then developed weakness in his right hand and some visual field deficits. In the ED, patient received tPA. CT head at the time was negative, EDSON showed EF of 55-60%.  MRI head showed scattered left-sided acute cortical infarcts within the MCA and PCA territories, with trace petechial hemorrhage involving high left frontal region. There is a chronic left occipital lobe infarct. MRA of the neck showed moderate stenosis of the proximal right internal carotid artery. On 8/9, a right upper quadrant US was done to rule out cholecystitis, revealing an indeterminate 1.2 cm right renal lesion and moderate-severe hepatic steatosis. During the hospital stay, the visual field deficits improved but patient continued to have no motor function in the right hand and impaired RLE strength and mobility and ADL independence and a persistent aphasia.    On evaluation by PM&R, patient endorses no movement in his right hand and notes some difficulty with memory but otherwise has no other complaints. Patient is right handed. He lives by himself in a house with 5 [address] and one flight of stairs inside (5+7 steps). Patient was previously independent in ADLs and ambulation, occasionally using a cane outdoors for balance. Currently patient requires mod assist for bed mobility, max assist with transfers, mod assist with ambulation 25 feet with RW and max assistance for dressing. He was evaluated by Physiatry on the medical service and was found to be a good acute rehab candidate.  (11 Aug 2022 11:30)    TODAY'S SUBJECTIVE & REVIEW OF SYMPTOMS:  Patient was seen and assessed at bedside. No overnight events. Per ID, discontinue Remdesivir due to low severity. Patient denies any complaints at this time. Tolerating PT/OT. Tolerating oral diet. Voiding and passing stool spontaneously. Vital signs are stable.      Constitutional    [ x  ] WNL           [   ] poor appetite   [   ] insomnia   [   ] tired   Cardio:                [  x ] WNL           [   ] CP   [   ] VELASCO   [   ] palpitations               Resp:                   [   ] WNL           [   ] SOB   [  x ] cough improving   [   ] wheezing   GI:                        [ x  ] WNL           [   ] constipation   [   ] diarrhea   [   ] abdominal pain   [   ] nausea   [   ] emesis                                :                      [ x  ] WNL           [   ] PRITCHARD  [   ] dysuria   [   ] difficulty voiding             Endo:                   [ x  ] WNL          [   ] polyuria   [   ] temperature intolerance                 Skin:                     [ x  ] WNL          [   ] pain   [   ] wound   [   ] rash   MSK:                    [   x] WNL          [   ] muscle pain   [   ] joint pain/ stiffness   [   ] muscle tenderness   [   ] swelling   Neuro:                 [   ] WNL          [   ] HA   [   ] change in vision   [   ] tremor   [ x  ] weakness- Right side   [   ]dysphagia   [x  ] word finding difficulties,   [ x ] numbness/ neuropathy both feet    Cognitive:           [ x  ] WNL           [   ]confusion   - daughter note some memory loss since stroke   Psych:                  [  x ] WNL           [   ] hallucinations   [   ]agitation   [   ] delusion   [   ]depression      PHYSICAL EXAM    ICU Vital Signs Last 24 Hrs  T(C): 36.4 (19 Aug 2022 09:07), Max: 36.7 (18 Aug 2022 16:58)  T(F): 97.6 (19 Aug 2022 09:07), Max: 98 (18 Aug 2022 16:58)  HR: 114 (19 Aug 2022 09:07) (73 - 114)  BP: 122/67 (19 Aug 2022 09:07) (120/72 - 138/73)  BP(mean): 99 (18 Aug 2022 22:06) (99 - 99)  ABP: --  ABP(mean): --  RR: 18 (19 Aug 2022 06:05) (18 - 18)  SpO2: 97% (18 Aug 2022 16:58) (97% - 97%)    O2 Parameters below as of 18 Aug 2022 16:58  Patient On (Oxygen Delivery Method): room air      General:[  x ] NAD, Resting Comfortable,   [   ] other:                                HEENT: [  x ] NC/AT, EOMI, PERRL , Normal Conjunctivae,   [   ] other:  Cardio: [  x ] RRR, no murmer,   [   ] other:                              Pulm: [  x ] No Respiratory Distress,  Lungs CTAB,   [   ] other:                       Abdomen: [ x  ] ND/NT, Soft,   [   ] other:    : [ x  ] NO PRITCHARD CATHETER,   [  ] PRITCHARD CATHETER- no meatal tear, no discharge, [   ] other:                                            MSK: [ x  ] No joint swelling, Full PROM,   [    ] other:                                       Ext: [  x ]No C/C/E, No calf tenderness,   [   ]other:    Skin: [ x  ]intact,   [   ] other:                                                                   Neurological Examination:  Cognitive: [ x   ] AAO x 3,   [    ]  other:                                                                 Attention/ Concentration:  [ x   ] intact   [    ]  other:   Memory: [    ] intact,    [ x  ]  other:  mildly impaired  Mood/Affect: [  x  ] wnl,    [    ]  other:                                                                             Communication: [ x  ]Fluent, no dysarthria, following commands:  [   ] other:  CN II - XII:  [  x  ] intact, VFF  [    ] other: mild right facial droop                                                                                         Motor:   RIGHT UE: [   ] WNL,  [  x ] other: 2/5 shoulder extension, o/w 0/5  LEFT    UE: [  x ] WNL  RIGHT LE: [ x  ] WNL,  [   ] other: Hip flexion 3/5 with drift to bed, knee flexion 4/5, plantarflexion 5/5, dorsiflexion 5/5, toe extension 5/5  LEFT    LE: [  x ] WNL,  Hip flexion 4/5, knee flexion 5/5, plantarflexion 5/5, dorsiflexion 5/5, toe extension 5/5     Tone: [    ] wnl,   [ x   ]  other: mild increased tone in right triceps  DTRs: [ x  ]symmetric, 1+ [   ] other:   Coordination:   [    ] intact as testable,   [ not assesed   ]       CLOF:   Bed mobility- PA   Transfers- mod A   Gait: 25 x2 PA 3 point cane       MEDICATIONS  (STANDING):  amLODIPine   Tablet 5 milliGRAM(s) Oral at bedtime  aspirin enteric coated 81 milliGRAM(s) Oral daily  atorvastatin 80 milliGRAM(s) Oral at bedtime  citalopram 5 milliGRAM(s) Oral daily  dextrose 5%. 1000 milliLiter(s) (100 mL/Hr) IV Continuous <Continuous>  dextrose 50% Injectable 25 Gram(s) IV Push once  dextrose 50% Injectable 12.5 Gram(s) IV Push once  dextrose 50% Injectable 25 Gram(s) IV Push once  enoxaparin Injectable 40 milliGRAM(s) SubCutaneous every 24 hours  famotidine    Tablet 40 milliGRAM(s) Oral at bedtime  glucagon  Injectable 1 milliGRAM(s) IntraMuscular once  hydrocortisone 1% Cream 1 Application(s) Topical two times a day  insulin glargine Injectable (LANTUS) 30 Unit(s) SubCutaneous every morning  insulin lispro (ADMELOG) corrective regimen sliding scale   SubCutaneous three times a day before meals  insulin lispro Injectable (ADMELOG) 8 Unit(s) SubCutaneous three times a day before meals  lisinopril 40 milliGRAM(s) Oral daily  metoprolol tartrate 50 milliGRAM(s) Oral two times a day  polyethylene glycol 3350 17 Gram(s) Oral two times a day  remdesivir  IVPB 100 milliGRAM(s) IV Intermittent every 24 hours  senna 2 Tablet(s) Oral at bedtime  sodium chloride 0.9%. 1000 milliLiter(s) (75 mL/Hr) IV Continuous <Continuous>  ticagrelor 90 milliGRAM(s) Oral every 12 hours    MEDICATIONS  (PRN):  acetaminophen     Tablet .. 650 milliGRAM(s) Oral every 6 hours PRN Temp greater or equal to 38C (100.4F), Mild Pain (1 - 3)  bisacodyl Suppository 10 milliGRAM(s) Rectal daily PRN Constipation  dextrose Oral Gel 15 Gram(s) Oral once PRN Blood Glucose LESS THAN 70 milliGRAM(s)/deciliter  diphenhydrAMINE 25 milliGRAM(s) Oral every 6 hours PRN Rash and/or Itching  guaifenesin/dextromethorphan Oral Liquid 10 milliLiter(s) Oral every 4 hours PRN Cough  lactulose Syrup 20 Gram(s) Oral two times a day PRN Constipation  melatonin 5 milliGRAM(s) Oral at bedtime PRN Insomnia        RECENT LABS/IMAGING                        15.0   10.36 )-----------( 318      ( 19 Aug 2022 06:00 )             43.8     08-19    137  |  101  |  26<H>  ----------------------------<  108<H>  4.3   |  22  |  1.2    Ca    9.4      19 Aug 2022 06:00    TPro  7.0  /  Alb  3.4<L>  /  TBili  0.6  /  DBili  <0.2  /  AST  28  /  ALT  32  /  AlkPhos  151<H>  08-19    PT/INR - ( 19 Aug 2022 06:00 )   PT: 12.60 sec;   INR: 1.10 ratio             POCT Blood Glucose.: 268 mg/dL (08-19-22 @ 09:03)  POCT Blood Glucose.: 114 mg/dL (08-19-22 @ 07:15)  POCT Blood Glucose.: 117 mg/dL (08-19-22 @ 00:10)  POCT Blood Glucose.: 65 mg/dL (08-18-22 @ 22:48)  POCT Blood Glucose.: 114 mg/dL (08-18-22 @ 16:27)  POCT Blood Glucose.: 287 mg/dL (08-18-22 @ 11:12)  POCT Blood Glucose.: 164 mg/dL (08-18-22 @ 08:01)  POCT Blood Glucose.: 141 mg/dL (08-17-22 @ 22:03)  POCT Blood Glucose.: 91 mg/dL (08-17-22 @ 16:35)  POCT Blood Glucose.: 190 mg/dL (08-17-22 @ 11:41)  POCT Blood Glucose.: 116 mg/dL (08-17-22 @ 07:05)  POCT Blood Glucose.: 105 mg/dL (08-16-22 @ 20:49)   Patient is a 77y old  Male who presents with a chief complaint of rehab s/p left ischemic stroke with aphasia and right hemiparesis (11 Aug 2022 11:30)      HPI:  Patient is a 76yo M with PMH of HTN, DM, HLD, CAD (s/p stents), GERD who presented to the ED on 8/3/2022 for right hand weakness. Patient noticed he started having difficulty with his speech, then developed weakness in his right hand and some visual field deficits. In the ED, patient received tPA. CT head at the time was negative, EDSON showed EF of 55-60%.  MRI head showed scattered left-sided acute cortical infarcts within the MCA and PCA territories, with trace petechial hemorrhage involving high left frontal region. There is a chronic left occipital lobe infarct. MRA of the neck showed moderate stenosis of the proximal right internal carotid artery. On 8/9, a right upper quadrant US was done to rule out cholecystitis, revealing an indeterminate 1.2 cm right renal lesion and moderate-severe hepatic steatosis. During the hospital stay, the visual field deficits improved but patient continued to have no motor function in the right hand and impaired RLE strength and mobility and ADL independence and a persistent aphasia.    On evaluation by PM&R, patient endorses no movement in his right hand and notes some difficulty with memory but otherwise has no other complaints. Patient is right handed. He lives by himself in a house with 5 [address] and one flight of stairs inside (5+7 steps). Patient was previously independent in ADLs and ambulation, occasionally using a cane outdoors for balance. Currently patient requires mod assist for bed mobility, max assist with transfers, mod assist with ambulation 25 feet with RW and max assistance for dressing. He was evaluated by Physiatry on the medical service and was found to be a good acute rehab candidate.  (11 Aug 2022 11:30)    TODAY'S SUBJECTIVE & REVIEW OF SYMPTOMS:  Patient was seen and assessed at bedside. No overnight events. Patient denies any complaints at this time. Tolerating PT/OT. Tolerating oral diet. Voiding and passing stool spontaneously. Vital signs are stable.      Constitutional    [ x  ] WNL           [   ] poor appetite   [   ] insomnia   [   ] tired   Cardio:                [  x ] WNL           [   ] CP   [   ] VELASCO   [   ] palpitations               Resp:                   [   ] WNL           [   ] SOB   [  x ] cough improving   [   ] wheezing   GI:                        [ x  ] WNL           [   ] constipation   [   ] diarrhea   [   ] abdominal pain   [   ] nausea   [   ] emesis                                :                      [ x  ] WNL           [   ] PRITCHARD  [   ] dysuria   [   ] difficulty voiding             Endo:                   [ x  ] WNL          [   ] polyuria   [   ] temperature intolerance                 Skin:                     [ x  ] WNL          [   ] pain   [   ] wound   [   ] rash   MSK:                    [   x] WNL          [   ] muscle pain   [   ] joint pain/ stiffness   [   ] muscle tenderness   [   ] swelling   Neuro:                 [   ] WNL          [   ] HA   [   ] change in vision   [   ] tremor   [ x  ] weakness- Right side   [   ]dysphagia   [x  ] word finding difficulties,   [ x ] numbness/ neuropathy both feet    Cognitive:           [ x  ] WNL           [   ]confusion   - daughter note some memory loss since stroke   Psych:                  [  x ] WNL           [   ] hallucinations   [   ]agitation   [   ] delusion   [   ]depression      PHYSICAL EXAM    ICU Vital Signs Last 24 Hrs  T(C): 36.4 (19 Aug 2022 09:07), Max: 36.7 (18 Aug 2022 16:58)  T(F): 97.6 (19 Aug 2022 09:07), Max: 98 (18 Aug 2022 16:58)  HR: 114 (19 Aug 2022 09:07) (73 - 114)  BP: 122/67 (19 Aug 2022 09:07) (120/72 - 138/73)  BP(mean): 99 (18 Aug 2022 22:06) (99 - 99)  ABP: --  ABP(mean): --  RR: 18 (19 Aug 2022 06:05) (18 - 18)  SpO2: 97% (18 Aug 2022 16:58) (97% - 97%)    O2 Parameters below as of 18 Aug 2022 16:58  Patient On (Oxygen Delivery Method): room air      General:[  x ] NAD, Resting Comfortable,   [   ] other:                                HEENT: [  x ] NC/AT, EOMI, PERRL , Normal Conjunctivae,   [   ] other:  Cardio: [  x ] RRR, no murmer,   [   ] other:                              Pulm: [  x ] No Respiratory Distress,  Lungs CTAB,   [   ] other:                       Abdomen: [ x  ] ND/NT, Soft,   [   ] other:    : [ x  ] NO PRITCHARD CATHETER,   [  ] PRITCHARD CATHETER- no meatal tear, no discharge, [   ] other:                                            MSK: [ x  ] No joint swelling, Full PROM,   [    ] other:                                       Ext: [  x ]No C/C/E, No calf tenderness,   [   ]other:    Skin: [ x  ]intact,   [   ] other:                                                                   Neurological Examination:  Cognitive: [ x   ] AAO x 3,   [    ]  other:                                                                 Attention/ Concentration:  [ x   ] intact   [    ]  other:   Memory: [    ] intact,    [ x  ]  other:  mildly impaired  Mood/Affect: [  x  ] wnl,    [    ]  other:                                                                             Communication: [ x  ]Fluent, no dysarthria, following commands:  [   ] other:  CN II - XII:  [  x  ] intact, VFF  [    ] other: mild right facial droop                                                                                         Motor:   RIGHT UE: [   ] WNL,  [  x ] other: 2/5 shoulder extension, o/w 0/5  LEFT    UE: [  x ] WNL  RIGHT LE: [ x  ] WNL,  [   ] other: Hip flexion 3/5 with drift to bed, knee flexion 4/5, plantarflexion 5/5, dorsiflexion 5/5, toe extension 5/5  LEFT    LE: [  x ] WNL,  Hip flexion 4/5, knee flexion 5/5, plantarflexion 5/5, dorsiflexion 5/5, toe extension 5/5     Tone: [    ] wnl,   [ x   ]  other: mild increased tone in right triceps  DTRs: [ x  ]symmetric, 1+ [   ] other:   Coordination:   [    ] intact as testable,   [ not assesed   ]       CLOF:   Bed mobility- PA   Transfers- mod A   Gait: 25 x2 PA 3 point cane       MEDICATIONS  (STANDING):  aspirin enteric coated 81 milliGRAM(s) Oral daily  atorvastatin 80 milliGRAM(s) Oral at bedtime  citalopram 5 milliGRAM(s) Oral daily  dextrose 5%. 1000 milliLiter(s) (100 mL/Hr) IV Continuous <Continuous>  dextrose 50% Injectable 25 Gram(s) IV Push once  dextrose 50% Injectable 12.5 Gram(s) IV Push once  dextrose 50% Injectable 25 Gram(s) IV Push once  enoxaparin Injectable 30 milliGRAM(s) SubCutaneous every 12 hours  famotidine    Tablet 40 milliGRAM(s) Oral at bedtime  glucagon  Injectable 1 milliGRAM(s) IntraMuscular once  hydrocortisone 1% Cream 1 Application(s) Topical two times a day  insulin glargine Injectable (LANTUS) 30 Unit(s) SubCutaneous every morning  insulin lispro (ADMELOG) corrective regimen sliding scale   SubCutaneous three times a day before meals  insulin lispro Injectable (ADMELOG) 8 Unit(s) SubCutaneous three times a day before meals  lisinopril 40 milliGRAM(s) Oral at bedtime  metoprolol tartrate 50 milliGRAM(s) Oral two times a day  polyethylene glycol 3350 17 Gram(s) Oral two times a day  remdesivir  IVPB 100 milliGRAM(s) IV Intermittent every 24 hours  senna 2 Tablet(s) Oral at bedtime  sodium chloride 0.9%. 1000 milliLiter(s) (75 mL/Hr) IV Continuous <Continuous>  ticagrelor 90 milliGRAM(s) Oral every 12 hours    MEDICATIONS  (PRN):  acetaminophen     Tablet .. 650 milliGRAM(s) Oral every 6 hours PRN Temp greater or equal to 38C (100.4F), Mild Pain (1 - 3)  bisacodyl Suppository 10 milliGRAM(s) Rectal daily PRN Constipation  dextrose Oral Gel 15 Gram(s) Oral once PRN Blood Glucose LESS THAN 70 milliGRAM(s)/deciliter  diphenhydrAMINE 25 milliGRAM(s) Oral every 6 hours PRN Rash and/or Itching  guaifenesin/dextromethorphan Oral Liquid 10 milliLiter(s) Oral every 4 hours PRN Cough  lactulose Syrup 20 Gram(s) Oral two times a day PRN Constipation  melatonin 5 milliGRAM(s) Oral at bedtime PRN Insomnia        RECENT LABS/IMAGING                        15.0   10.36 )-----------( 318      ( 19 Aug 2022 06:00 )             43.8     08-19    137  |  101  |  26<H>  ----------------------------<  108<H>  4.3   |  22  |  1.2    Ca    9.4      19 Aug 2022 06:00    TPro  7.0  /  Alb  3.4<L>  /  TBili  0.6  /  DBili  <0.2  /  AST  28  /  ALT  32  /  AlkPhos  151<H>  08-19    PT/INR - ( 19 Aug 2022 06:00 )   PT: 12.60 sec;   INR: 1.10 ratio             POCT Blood Glucose.: 268 mg/dL (08-19-22 @ 09:03)  POCT Blood Glucose.: 114 mg/dL (08-19-22 @ 07:15)  POCT Blood Glucose.: 117 mg/dL (08-19-22 @ 00:10)  POCT Blood Glucose.: 65 mg/dL (08-18-22 @ 22:48)  POCT Blood Glucose.: 114 mg/dL (08-18-22 @ 16:27)  POCT Blood Glucose.: 287 mg/dL (08-18-22 @ 11:12)  POCT Blood Glucose.: 164 mg/dL (08-18-22 @ 08:01)  POCT Blood Glucose.: 141 mg/dL (08-17-22 @ 22:03)  POCT Blood Glucose.: 91 mg/dL (08-17-22 @ 16:35)  POCT Blood Glucose.: 190 mg/dL (08-17-22 @ 11:41)  POCT Blood Glucose.: 116 mg/dL (08-17-22 @ 07:05)  POCT Blood Glucose.: 105 mg/dL (08-16-22 @ 20:49)

## 2022-08-19 NOTE — PROGRESS NOTE ADULT - ASSESSMENT
Patient is a 78yo M with PMH HTN, DM, CAD (s/p stents), GERD who presented for right sided weakness and aphasia. Patient was found to have a left acute cortical infarct within the MCA and PCA territories. Patient presented with visual field deficits that has since improved, but is still with impaired strength, balance, mobility and ADLs and language. Patient was previously independent in all ADLs and activities. Current level of function is mod assist for bed mobility, max assist with transfers, mod assist with ambulation 25 feet with RW. Patient is a good candidate for rehab due to significantly decreased level of function from prior.    #Left acute ischemic stroke with right hemiparesis (dominant) and aphasia  s/p tPA on 8/3  EDSON showed EF of 55-60%  8/6 MRI head: scattered left-sided acute cortical infarcts within the MCA and PCA territories, with trace petechial hemorrhage involving high left frontal region. There is a chronic left occipital lobe infarct.   MRA of the neck: moderate stenosis of the proximal right internal carotid artery  - start PT/OT/ST, Neuropsych eval  - c/w 5mg celexa  - c/w ASA 81mg  - c/w Brilinta 90mg BID  - c/w Lipitor 80mg daily    NIHSS =7, mRankin = 4    #Diabetic Peripheral Polyneuropathy  - No pain. Teach patient skin monitoring an sensory awareness    #Covid positive   - PCR positive from 08/16/22   - Placed on Airborne Precautions  - CRP - 85, D-Dimer 323  - Ferritin and Procal pending  - per ID, received Remdesivir 1x, d/c due to low severity of illness    #HTN   - c/w Amlodipine 5mg qd  - c/w Lisinopril 40mg   - c/w Lopressor 50mg BID    #Diabetes 2  Ha1c 8.2  - c/w 30U Lantus  - c/w 8U Lispro    #CAD  -continue ASA 81, Brilinta, BB, statin    #Renal lesion  On 8/9, a right upper quadrant US was done to rule out cholecystitis, revealing an indeterminate 1.2 cm right renal lesion and moderate-severe hepatic steatosis.  -outpatient followup, non urgent MRI with renal mass protocol    #Mild persistent cough/ Increased WBC with left shift  - chest x-ray with mild bilateral atelectasis  - PCR positive  - doppler: < from: [facility] Lower Ext Vein Scan, Bilat (08.17.22 @ 17:53) > No evidence of deep venous thrombosis in either lower extremity.  - Resolved on 8/18 CBC  - UA showed bacteriuria, but no wbcs     #Azotemia  - give IVF overnight and recheck improved  - had negative PVR    -GI/Bowel Mgmt: senna, dulcolax, lactulose, miralax    - Diet: Dash Carb Consistent diet       Precautions / PROPHYLAXIS:      - Falls    - Ortho: Weight bearing status: WBAT      - DVT prophylaxis: Lovenox 40mg subQ   Patient is a 78yo M with PMH HTN, DM, CAD (s/p stents), GERD who presented for right sided weakness and aphasia. Patient was found to have a left acute cortical infarct within the MCA and PCA territories. Patient presented with visual field deficits that has since improved, but is still with impaired strength, balance, mobility and ADLs and language. Patient was previously independent in all ADLs and activities. Current level of function is mod assist for bed mobility, max assist with transfers, mod assist with ambulation 25 feet with RW. Patient is a good candidate for rehab due to significantly decreased level of function from prior.    #Left acute ischemic stroke with right hemiparesis (dominant) and aphasia  s/p tPA on 8/3  EDSON showed EF of 55-60%  8/6 MRI head: scattered left-sided acute cortical infarcts within the MCA and PCA territories, with trace petechial hemorrhage involving high left frontal region. There is a chronic left occipital lobe infarct.   MRA of the neck: moderate stenosis of the proximal right internal carotid artery  - start PT/OT/ST, Neuropsych eval  - c/w 5mg celexa  - c/w ASA 81mg  - c/w Brilinta 90mg BID  - c/w Lipitor 80mg daily    NIHSS =7, mRankin = 4    #Diabetic Peripheral Polyneuropathy  - No pain. Teach patient skin monitoring an sensory awareness    #Covid positive   - PCR positive from 08/16/22   - Placed on Airborne Precautions  - CRP - 85, D-Dimer 323  - Ferritin 585- mild increase  - Started Remdesivir, Lovenox increased to 30 mg bid  - cough improving    #HTN - BP was low  - stopped HCTZ  - stopped Amlodipine 5mg qd  - c/w Lisinopril 40mg   - c/w Lopressor 50mg BID    #Diabetes 2  Ha1c 8.2  - c/w 30U Lantus  - c/w 8U Lispro    #CAD  -continue ASA 81, Brilinta, BB, statin    #Renal lesion  On 8/9, a right upper quadrant US was done to rule out cholecystitis, revealing an indeterminate 1.2 cm right renal lesion and moderate-severe hepatic steatosis.  -outpatient followup, non urgent MRI with renal mass protocol    #Mild persistent cough/ Increased WBC with left shift  - chest x-ray with mild bilateral atelectasis  - PCR positive  - doppler: < from: [facility] Lower Ext Vein Scan, Bilat (08.17.22 @ 17:53) > No evidence of deep venous thrombosis in either lower extremity.  - Resolved on 8/18 CBC  - UA showed bacteriuria, but no wbcs     #Azotemia  - give IVF with improvement  - had negative PVR    -GI/Bowel Mgmt: senna, dulcolax, lactulose, miralax    - Diet: Dash Carb Consistent diet       Precautions / PROPHYLAXIS:      - Falls    - Ortho: Weight bearing status: WBAT      - DVT prophylaxis: Lovenox 30 mg BID

## 2022-08-20 LAB
ALBUMIN SERPL ELPH-MCNC: 3.6 G/DL — SIGNIFICANT CHANGE UP (ref 3.5–5.2)
ALP SERPL-CCNC: 161 U/L — HIGH (ref 30–115)
ALT FLD-CCNC: 29 U/L — SIGNIFICANT CHANGE UP (ref 0–41)
ANION GAP SERPL CALC-SCNC: 18 MMOL/L — HIGH (ref 7–14)
AST SERPL-CCNC: 24 U/L — SIGNIFICANT CHANGE UP (ref 0–41)
BASOPHILS # BLD AUTO: 0.06 K/UL — SIGNIFICANT CHANGE UP (ref 0–0.2)
BASOPHILS NFR BLD AUTO: 0.6 % — SIGNIFICANT CHANGE UP (ref 0–1)
BILIRUB DIRECT SERPL-MCNC: 0.2 MG/DL — SIGNIFICANT CHANGE UP (ref 0–0.3)
BILIRUB INDIRECT FLD-MCNC: 0.4 MG/DL — SIGNIFICANT CHANGE UP (ref 0.2–1.2)
BILIRUB SERPL-MCNC: 0.6 MG/DL — SIGNIFICANT CHANGE UP (ref 0.2–1.2)
BUN SERPL-MCNC: 24 MG/DL — HIGH (ref 10–20)
CALCIUM SERPL-MCNC: 9.1 MG/DL — SIGNIFICANT CHANGE UP (ref 8.5–10.1)
CHLORIDE SERPL-SCNC: 102 MMOL/L — SIGNIFICANT CHANGE UP (ref 98–110)
CO2 SERPL-SCNC: 20 MMOL/L — SIGNIFICANT CHANGE UP (ref 17–32)
CREAT SERPL-MCNC: 1.3 MG/DL — SIGNIFICANT CHANGE UP (ref 0.7–1.5)
EGFR: 57 ML/MIN/1.73M2 — LOW
EOSINOPHIL # BLD AUTO: 0.32 K/UL — SIGNIFICANT CHANGE UP (ref 0–0.7)
EOSINOPHIL NFR BLD AUTO: 3 % — SIGNIFICANT CHANGE UP (ref 0–8)
GLUCOSE BLDC GLUCOMTR-MCNC: 104 MG/DL — HIGH (ref 70–99)
GLUCOSE BLDC GLUCOMTR-MCNC: 227 MG/DL — HIGH (ref 70–99)
GLUCOSE BLDC GLUCOMTR-MCNC: 231 MG/DL — HIGH (ref 70–99)
GLUCOSE BLDC GLUCOMTR-MCNC: 89 MG/DL — SIGNIFICANT CHANGE UP (ref 70–99)
GLUCOSE SERPL-MCNC: 87 MG/DL — SIGNIFICANT CHANGE UP (ref 70–99)
HCT VFR BLD CALC: 43.5 % — SIGNIFICANT CHANGE UP (ref 42–52)
HGB BLD-MCNC: 14.7 G/DL — SIGNIFICANT CHANGE UP (ref 14–18)
IMM GRANULOCYTES NFR BLD AUTO: 0.4 % — HIGH (ref 0.1–0.3)
INR BLD: 1.12 RATIO — SIGNIFICANT CHANGE UP (ref 0.65–1.3)
LYMPHOCYTES # BLD AUTO: 1.57 K/UL — SIGNIFICANT CHANGE UP (ref 1.2–3.4)
LYMPHOCYTES # BLD AUTO: 14.5 % — LOW (ref 20.5–51.1)
MCHC RBC-ENTMCNC: 28.4 PG — SIGNIFICANT CHANGE UP (ref 27–31)
MCHC RBC-ENTMCNC: 33.8 G/DL — SIGNIFICANT CHANGE UP (ref 32–37)
MCV RBC AUTO: 84.1 FL — SIGNIFICANT CHANGE UP (ref 80–94)
MONOCYTES # BLD AUTO: 1.01 K/UL — HIGH (ref 0.1–0.6)
MONOCYTES NFR BLD AUTO: 9.3 % — SIGNIFICANT CHANGE UP (ref 1.7–9.3)
NEUTROPHILS # BLD AUTO: 7.81 K/UL — HIGH (ref 1.4–6.5)
NEUTROPHILS NFR BLD AUTO: 72.2 % — SIGNIFICANT CHANGE UP (ref 42.2–75.2)
NRBC # BLD: 0 /100 WBCS — SIGNIFICANT CHANGE UP (ref 0–0)
PLATELET # BLD AUTO: 376 K/UL — SIGNIFICANT CHANGE UP (ref 130–400)
POTASSIUM SERPL-MCNC: 4.6 MMOL/L — SIGNIFICANT CHANGE UP (ref 3.5–5)
POTASSIUM SERPL-SCNC: 4.6 MMOL/L — SIGNIFICANT CHANGE UP (ref 3.5–5)
PROT SERPL-MCNC: 7.1 G/DL — SIGNIFICANT CHANGE UP (ref 6–8)
PROTHROM AB SERPL-ACNC: 12.9 SEC — HIGH (ref 9.95–12.87)
RBC # BLD: 5.17 M/UL — SIGNIFICANT CHANGE UP (ref 4.7–6.1)
RBC # FLD: 13 % — SIGNIFICANT CHANGE UP (ref 11.5–14.5)
SODIUM SERPL-SCNC: 140 MMOL/L — SIGNIFICANT CHANGE UP (ref 135–146)
WBC # BLD: 10.81 K/UL — HIGH (ref 4.8–10.8)
WBC # FLD AUTO: 10.81 K/UL — HIGH (ref 4.8–10.8)

## 2022-08-20 RX ADMIN — Medication 81 MILLIGRAM(S): at 13:27

## 2022-08-20 RX ADMIN — Medication 50 MILLIGRAM(S): at 18:39

## 2022-08-20 RX ADMIN — Medication 8 UNIT(S): at 08:49

## 2022-08-20 RX ADMIN — ENOXAPARIN SODIUM 30 MILLIGRAM(S): 100 INJECTION SUBCUTANEOUS at 18:01

## 2022-08-20 RX ADMIN — Medication 50 MILLIGRAM(S): at 05:56

## 2022-08-20 RX ADMIN — Medication 8 UNIT(S): at 11:55

## 2022-08-20 RX ADMIN — POLYETHYLENE GLYCOL 3350 17 GRAM(S): 17 POWDER, FOR SOLUTION ORAL at 18:02

## 2022-08-20 RX ADMIN — FAMOTIDINE 40 MILLIGRAM(S): 10 INJECTION INTRAVENOUS at 21:38

## 2022-08-20 RX ADMIN — TICAGRELOR 90 MILLIGRAM(S): 90 TABLET ORAL at 05:56

## 2022-08-20 RX ADMIN — TICAGRELOR 90 MILLIGRAM(S): 90 TABLET ORAL at 18:02

## 2022-08-20 RX ADMIN — Medication 1 APPLICATION(S): at 17:48

## 2022-08-20 RX ADMIN — INSULIN GLARGINE 30 UNIT(S): 100 INJECTION, SOLUTION SUBCUTANEOUS at 08:48

## 2022-08-20 RX ADMIN — CITALOPRAM 5 MILLIGRAM(S): 10 TABLET, FILM COATED ORAL at 13:33

## 2022-08-20 RX ADMIN — ATORVASTATIN CALCIUM 80 MILLIGRAM(S): 80 TABLET, FILM COATED ORAL at 21:38

## 2022-08-20 RX ADMIN — ENOXAPARIN SODIUM 30 MILLIGRAM(S): 100 INJECTION SUBCUTANEOUS at 06:19

## 2022-08-20 NOTE — PROGRESS NOTE ADULT - SUBJECTIVE AND OBJECTIVE BOX
T(C): 36.4 (08-20-22 @ 05:53), Max: 37 (08-19-22 @ 21:34)  HR: 73 (08-20-22 @ 05:53) (73 - 112)  BP: 143/85 (08-20-22 @ 05:53) (112/70 - 143/85)  RR: 20 (08-20-22 @ 05:53) (18 - 20)  SpO2: 98% (08-20-22 @ 05:53) (98% - 99%)      Patient was stable overnight and expresses no new complaints     PE:    Alert   LUNGS- clear  COR- RRR  ABD- SOFT, NT  EXTR- w/o edema  NEURO- stable    08-19    137  |  101  |  26<H>  ----------------------------<  108<H>  4.3   |  22  |  1.2    Ca    9.4      19 Aug 2022 06:00    TPro  7.0  /  Alb  3.4<L>  /  TBili  0.6  /  DBili  <0.2  /  AST  28  /  ALT  32  /  AlkPhos  151<H>  08-19                            15.0   10.36 )-----------( 318      ( 19 Aug 2022 06:00 )             43.8

## 2022-08-21 LAB
GLUCOSE BLDC GLUCOMTR-MCNC: 100 MG/DL — HIGH (ref 70–99)
GLUCOSE BLDC GLUCOMTR-MCNC: 117 MG/DL — HIGH (ref 70–99)
GLUCOSE BLDC GLUCOMTR-MCNC: 122 MG/DL — HIGH (ref 70–99)
GLUCOSE BLDC GLUCOMTR-MCNC: 260 MG/DL — HIGH (ref 70–99)

## 2022-08-21 RX ORDER — LANOLIN ALCOHOL/MO/W.PET/CERES
10 CREAM (GRAM) TOPICAL AT BEDTIME
Refills: 0 | Status: DISCONTINUED | OUTPATIENT
Start: 2022-08-21 | End: 2022-08-30

## 2022-08-21 RX ADMIN — ATORVASTATIN CALCIUM 80 MILLIGRAM(S): 80 TABLET, FILM COATED ORAL at 21:22

## 2022-08-21 RX ADMIN — Medication 50 MILLIGRAM(S): at 06:21

## 2022-08-21 RX ADMIN — ENOXAPARIN SODIUM 30 MILLIGRAM(S): 100 INJECTION SUBCUTANEOUS at 17:39

## 2022-08-21 RX ADMIN — TICAGRELOR 90 MILLIGRAM(S): 90 TABLET ORAL at 06:21

## 2022-08-21 RX ADMIN — LISINOPRIL 40 MILLIGRAM(S): 2.5 TABLET ORAL at 21:22

## 2022-08-21 RX ADMIN — Medication 8 UNIT(S): at 18:34

## 2022-08-21 RX ADMIN — Medication 8 UNIT(S): at 08:10

## 2022-08-21 RX ADMIN — ENOXAPARIN SODIUM 30 MILLIGRAM(S): 100 INJECTION SUBCUTANEOUS at 06:21

## 2022-08-21 RX ADMIN — Medication 50 MILLIGRAM(S): at 17:39

## 2022-08-21 RX ADMIN — Medication 10 MILLIGRAM(S): at 21:21

## 2022-08-21 RX ADMIN — Medication 81 MILLIGRAM(S): at 13:09

## 2022-08-21 RX ADMIN — TICAGRELOR 90 MILLIGRAM(S): 90 TABLET ORAL at 17:39

## 2022-08-21 RX ADMIN — FAMOTIDINE 40 MILLIGRAM(S): 10 INJECTION INTRAVENOUS at 21:22

## 2022-08-21 RX ADMIN — CITALOPRAM 5 MILLIGRAM(S): 10 TABLET, FILM COATED ORAL at 13:08

## 2022-08-21 RX ADMIN — Medication 8 UNIT(S): at 11:50

## 2022-08-21 RX ADMIN — INSULIN GLARGINE 30 UNIT(S): 100 INJECTION, SOLUTION SUBCUTANEOUS at 08:10

## 2022-08-21 NOTE — PROGRESS NOTE ADULT - SUBJECTIVE AND OBJECTIVE BOX
T(C): 36.2 (08-21-22 @ 06:19), Max: 36.9 (08-20-22 @ 15:57)  HR: 76 (08-21-22 @ 06:19) (72 - 83)  BP: 161/88 (08-21-22 @ 06:19) (113/64 - 161/88)  RR: 18 (08-21-22 @ 06:19) (18 - 18)  SpO2: 98% (08-20-22 @ 15:57) (98% - 98%)      Patient was stable overnight and expresses no new complaints     PE:ptn seen at  bed side  oob  to wc  ptn  is covid 19 +    Alert   LUNGS- clear  COR- RRR  ABD- SOFT, NT  EXTR- w/o edema  NEURO- stable    08-20    140  |  102  |  24<H>  ----------------------------<  87  4.6   |  20  |  1.3    Ca    9.1      20 Aug 2022 07:48    TPro  7.1  /  Alb  3.6  /  TBili  0.6  /  DBili  0.2  /  AST  24  /  ALT  29  /  AlkPhos  161<H>  08-20                            14.7   10.81 )-----------( 376      ( 20 Aug 2022 07:48 )             43.5       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 and  [facility]

## 2022-08-22 LAB
GLUCOSE BLDC GLUCOMTR-MCNC: 138 MG/DL — HIGH (ref 70–99)
GLUCOSE BLDC GLUCOMTR-MCNC: 139 MG/DL — HIGH (ref 70–99)
GLUCOSE BLDC GLUCOMTR-MCNC: 248 MG/DL — HIGH (ref 70–99)
GLUCOSE BLDC GLUCOMTR-MCNC: 93 MG/DL — SIGNIFICANT CHANGE UP (ref 70–99)

## 2022-08-22 RX ADMIN — Medication 8 UNIT(S): at 08:39

## 2022-08-22 RX ADMIN — INSULIN GLARGINE 30 UNIT(S): 100 INJECTION, SOLUTION SUBCUTANEOUS at 08:40

## 2022-08-22 RX ADMIN — ATORVASTATIN CALCIUM 80 MILLIGRAM(S): 80 TABLET, FILM COATED ORAL at 21:39

## 2022-08-22 RX ADMIN — Medication 10 MILLIGRAM(S): at 21:39

## 2022-08-22 RX ADMIN — Medication 81 MILLIGRAM(S): at 12:03

## 2022-08-22 RX ADMIN — Medication 50 MILLIGRAM(S): at 06:18

## 2022-08-22 RX ADMIN — CITALOPRAM 5 MILLIGRAM(S): 10 TABLET, FILM COATED ORAL at 12:03

## 2022-08-22 RX ADMIN — ENOXAPARIN SODIUM 30 MILLIGRAM(S): 100 INJECTION SUBCUTANEOUS at 06:18

## 2022-08-22 RX ADMIN — FAMOTIDINE 40 MILLIGRAM(S): 10 INJECTION INTRAVENOUS at 21:42

## 2022-08-22 RX ADMIN — Medication 8 UNIT(S): at 16:52

## 2022-08-22 RX ADMIN — TICAGRELOR 90 MILLIGRAM(S): 90 TABLET ORAL at 06:18

## 2022-08-22 RX ADMIN — TICAGRELOR 90 MILLIGRAM(S): 90 TABLET ORAL at 17:36

## 2022-08-22 RX ADMIN — SENNA PLUS 2 TABLET(S): 8.6 TABLET ORAL at 21:40

## 2022-08-22 RX ADMIN — Medication 8 UNIT(S): at 12:03

## 2022-08-22 RX ADMIN — LISINOPRIL 40 MILLIGRAM(S): 2.5 TABLET ORAL at 21:41

## 2022-08-22 RX ADMIN — Medication 50 MILLIGRAM(S): at 17:37

## 2022-08-22 RX ADMIN — ENOXAPARIN SODIUM 30 MILLIGRAM(S): 100 INJECTION SUBCUTANEOUS at 17:37

## 2022-08-22 NOTE — PROGRESS NOTE ADULT - SUBJECTIVE AND OBJECTIVE BOX
Patient is a 77y old  Male who presents with a chief complaint of rehab s/p left ischemic stroke with aphasia and right hemiparesis (11 Aug 2022 11:30)      HPI:  Patient is a 76yo M with PMH of HTN, DM, HLD, CAD (s/p stents), GERD who presented to the ED on 8/3/2022 for right hand weakness. Patient noticed he started having difficulty with his speech, then developed weakness in his right hand and some visual field deficits. In the ED, patient received tPA. CT head at the time was negative, EDSON showed EF of 55-60%.  MRI head showed scattered left-sided acute cortical infarcts within the MCA and PCA territories, with trace petechial hemorrhage involving high left frontal region. There is a chronic left occipital lobe infarct. MRA of the neck showed moderate stenosis of the proximal right internal carotid artery. On 8/9, a right upper quadrant US was done to rule out cholecystitis, revealing an indeterminate 1.2 cm right renal lesion and moderate-severe hepatic steatosis. During the hospital stay, the visual field deficits improved but patient continued to have no motor function in the right hand and impaired RLE strength and mobility and ADL independence and a persistent aphasia.    On evaluation by PM&R, patient endorses no movement in his right hand and notes some difficulty with memory but otherwise has no other complaints. Patient is right handed. He lives by himself in a house with 5 [address] and one flight of stairs inside (5+7 steps). Patient was previously independent in ADLs and ambulation, occasionally using a cane outdoors for balance. Currently patient requires mod assist for bed mobility, max assist with transfers, mod assist with ambulation 25 feet with RW and max assistance for dressing. He was evaluated by Physiatry on the medical service and was found to be a good acute rehab candidate.  (11 Aug 2022 11:30)    TODAY'S SUBJECTIVE & REVIEW OF SYMPTOMS:  Patient was seen and assessed at bedside. No overnight events. Patient denies any complaints at this time. Tolerating PT/OT. Tolerating oral diet. Voiding and passing stool spontaneously. Vital signs are stable.      Constitutional    [ x  ] WNL           [   ] poor appetite   [   ] insomnia   [   ] tired   Cardio:                [  x ] WNL           [   ] CP   [   ] VELASCO   [   ] palpitations               Resp:                   [   ] WNL           [   ] SOB   [  x ] cough improving   [   ] wheezing   GI:                        [ x  ] WNL           [   ] constipation   [   ] diarrhea   [   ] abdominal pain   [   ] nausea   [   ] emesis                                :                      [ x  ] WNL           [   ] PRITCHARD  [   ] dysuria   [   ] difficulty voiding             Endo:                   [ x  ] WNL          [   ] polyuria   [   ] temperature intolerance                 Skin:                     [ x  ] WNL          [   ] pain   [   ] wound   [   ] rash   MSK:                    [   x] WNL          [   ] muscle pain   [   ] joint pain/ stiffness   [   ] muscle tenderness   [   ] swelling   Neuro:                 [   ] WNL          [   ] HA   [   ] change in vision   [   ] tremor   [ x  ] weakness- Right side   [   ]dysphagia   [x  ] word finding difficulties,   [ x ] numbness/ neuropathy both feet    Cognitive:           [ x  ] WNL           [   ]confusion   - daughter note some memory loss since stroke   Psych:                  [  x ] WNL           [   ] hallucinations   [   ]agitation   [   ] delusion   [   ]depression      PHYSICAL EXAM    ICU Vital Signs Last 24 Hrs  T(C): 36.4 (19 Aug 2022 09:07), Max: 36.7 (18 Aug 2022 16:58)  T(F): 97.6 (19 Aug 2022 09:07), Max: 98 (18 Aug 2022 16:58)  HR: 114 (19 Aug 2022 09:07) (73 - 114)  BP: 122/67 (19 Aug 2022 09:07) (120/72 - 138/73)  BP(mean): 99 (18 Aug 2022 22:06) (99 - 99)  ABP: --  ABP(mean): --  RR: 18 (19 Aug 2022 06:05) (18 - 18)  SpO2: 97% (18 Aug 2022 16:58) (97% - 97%)    O2 Parameters below as of 18 Aug 2022 16:58  Patient On (Oxygen Delivery Method): room air      General:[  x ] NAD, Resting Comfortable,   [   ] other:                                HEENT: [  x ] NC/AT, EOMI, PERRL , Normal Conjunctivae,   [   ] other:  Cardio: [  x ] RRR, no murmer,   [   ] other:                              Pulm: [  x ] No Respiratory Distress,  Lungs CTAB,   [   ] other:                       Abdomen: [ x  ] ND/NT, Soft,   [   ] other:    : [ x  ] NO PRITCHARD CATHETER,   [  ] PRITCHARD CATHETER- no meatal tear, no discharge, [   ] other:                                            MSK: [ x  ] No joint swelling, Full PROM,   [    ] other:                                       Ext: [  x ]No C/C/E, No calf tenderness,   [   ]other:    Skin: [ x  ]intact,   [   ] other:                                                                   Neurological Examination:  Cognitive: [ x   ] AAO x 3,   [    ]  other:                                                                 Attention/ Concentration:  [ x   ] intact   [    ]  other:   Memory: [    ] intact,    [ x  ]  other:  mildly impaired  Mood/Affect: [  x  ] wnl,    [    ]  other:                                                                             Communication: [ x  ]Fluent, no dysarthria, following commands:  [   ] other:  CN II - XII:  [  x  ] intact, VFF  [    ] other: mild right facial droop                                                                                         Motor:   RIGHT UE: [   ] WNL,  [  x ] other: 2/5 shoulder extension, o/w 0/5  LEFT    UE: [  x ] WNL  RIGHT LE: [ x  ] WNL,  [   ] other: Hip flexion 3/5 with drift to bed, knee flexion 4/5, plantarflexion 5/5, dorsiflexion 5/5, toe extension 5/5  LEFT    LE: [  x ] WNL,  Hip flexion 4/5, knee flexion 5/5, plantarflexion 5/5, dorsiflexion 5/5, toe extension 5/5     Tone: [    ] wnl,   [ x   ]  other: mild increased tone in right triceps  DTRs: [ x  ]symmetric, 1+ [   ] other:   Coordination:   [    ] intact as testable,   [ not assesed   ]       CLOF:   Bed mobility- PA   Transfers- mod A   Gait: 25 x2 PA 3 point cane       MEDICATIONS  (STANDING):  aspirin enteric coated 81 milliGRAM(s) Oral daily  atorvastatin 80 milliGRAM(s) Oral at bedtime  citalopram 5 milliGRAM(s) Oral daily  dextrose 5%. 1000 milliLiter(s) (100 mL/Hr) IV Continuous <Continuous>  dextrose 50% Injectable 25 Gram(s) IV Push once  dextrose 50% Injectable 12.5 Gram(s) IV Push once  dextrose 50% Injectable 25 Gram(s) IV Push once  enoxaparin Injectable 30 milliGRAM(s) SubCutaneous every 12 hours  famotidine    Tablet 40 milliGRAM(s) Oral at bedtime  glucagon  Injectable 1 milliGRAM(s) IntraMuscular once  hydrocortisone 1% Cream 1 Application(s) Topical two times a day  insulin glargine Injectable (LANTUS) 30 Unit(s) SubCutaneous every morning  insulin lispro (ADMELOG) corrective regimen sliding scale   SubCutaneous three times a day before meals  insulin lispro Injectable (ADMELOG) 8 Unit(s) SubCutaneous three times a day before meals  lisinopril 40 milliGRAM(s) Oral at bedtime  metoprolol tartrate 50 milliGRAM(s) Oral two times a day  polyethylene glycol 3350 17 Gram(s) Oral two times a day  remdesivir  IVPB 100 milliGRAM(s) IV Intermittent every 24 hours  senna 2 Tablet(s) Oral at bedtime  sodium chloride 0.9%. 1000 milliLiter(s) (75 mL/Hr) IV Continuous <Continuous>  ticagrelor 90 milliGRAM(s) Oral every 12 hours    MEDICATIONS  (PRN):  acetaminophen     Tablet .. 650 milliGRAM(s) Oral every 6 hours PRN Temp greater or equal to 38C (100.4F), Mild Pain (1 - 3)  bisacodyl Suppository 10 milliGRAM(s) Rectal daily PRN Constipation  dextrose Oral Gel 15 Gram(s) Oral once PRN Blood Glucose LESS THAN 70 milliGRAM(s)/deciliter  diphenhydrAMINE 25 milliGRAM(s) Oral every 6 hours PRN Rash and/or Itching  guaifenesin/dextromethorphan Oral Liquid 10 milliLiter(s) Oral every 4 hours PRN Cough  lactulose Syrup 20 Gram(s) Oral two times a day PRN Constipation  melatonin 5 milliGRAM(s) Oral at bedtime PRN Insomnia        RECENT LABS/IMAGING    POCT Blood Glucose.: 138 mg/dL (08-22-22 @ 08:15)  POCT Blood Glucose.: 117 mg/dL (08-21-22 @ 21:14)  POCT Blood Glucose.: 100 mg/dL (08-21-22 @ 16:26)  POCT Blood Glucose.: 260 mg/dL (08-21-22 @ 11:19)  POCT Blood Glucose.: 122 mg/dL (08-21-22 @ 08:06)  POCT Blood Glucose.: 227 mg/dL (08-20-22 @ 21:54)  POCT Blood Glucose.: 89 mg/dL (08-20-22 @ 16:30)  POCT Blood Glucose.: 231 mg/dL (08-20-22 @ 11:35)  POCT Blood Glucose.: 104 mg/dL (08-20-22 @ 07:37)  POCT Blood Glucose.: 153 mg/dL (08-19-22 @ 20:55)  POCT Blood Glucose.: 142 mg/dL (08-19-22 @ 17:26)  POCT Blood Glucose.: 105 mg/dL (08-19-22 @ 15:54)   Patient is a 77y old  Male who presents with a chief complaint of rehab s/p left ischemic stroke with aphasia and right hemiparesis (11 Aug 2022 11:30)      HPI:  Patient is a 78yo M with PMH of HTN, DM, HLD, CAD (s/p stents), GERD who presented to the ED on 8/3/2022 for right hand weakness. Patient noticed he started having difficulty with his speech, then developed weakness in his right hand and some visual field deficits. In the ED, patient received tPA. CT head at the time was negative, EDSON showed EF of 55-60%.  MRI head showed scattered left-sided acute cortical infarcts within the MCA and PCA territories, with trace petechial hemorrhage involving high left frontal region. There is a chronic left occipital lobe infarct. MRA of the neck showed moderate stenosis of the proximal right internal carotid artery. On 8/9, a right upper quadrant US was done to rule out cholecystitis, revealing an indeterminate 1.2 cm right renal lesion and moderate-severe hepatic steatosis. During the hospital stay, the visual field deficits improved but patient continued to have no motor function in the right hand and impaired RLE strength and mobility and ADL independence and a persistent aphasia.    On evaluation by PM&R, patient endorses no movement in his right hand and notes some difficulty with memory but otherwise has no other complaints. Patient is right handed. He lives by himself in a house with [address] and one flight of stairs inside (5+7 steps). Patient was previously independent in ADLs and ambulation, occasionally using a cane outdoors for balance. Currently patient requires mod assist for bed mobility, max assist with transfers, mod assist with ambulation 25 feet with RW and max assistance for dressing. He was evaluated by Physiatry on the medical service and was found to be a good acute rehab candidate.  (11 Aug 2022 11:30)    TODAY'S SUBJECTIVE & REVIEW OF SYMPTOMS:  Patient was seen and assessed at bedside. No overnight events. Patient denies any complaints at this time. Tolerating PT/OT. Tolerating oral diet. Voiding and passing stool spontaneously. Vital signs are stable.      Constitutional    [ x  ] WNL           [   ] poor appetite   [   ] insomnia   [   ] tired   Cardio:                [  x ] WNL           [   ] CP   [   ] VELASCO   [   ] palpitations               Resp:                   [   ] WNL           [   ] SOB   [  x ] cough improving   [   ] wheezing   GI:                        [ x  ] WNL           [   ] constipation   [   ] diarrhea   [   ] abdominal pain   [   ] nausea   [   ] emesis                                :                      [ x  ] WNL           [   ] PRITCHARD  [   ] dysuria   [   ] difficulty voiding             Endo:                   [ x  ] WNL          [   ] polyuria   [   ] temperature intolerance                 Skin:                     [ x  ] WNL          [   ] pain   [   ] wound   [   ] rash   MSK:                    [   x] WNL          [   ] muscle pain   [   ] joint pain/ stiffness   [   ] muscle tenderness   [   ] swelling   Neuro:                 [   ] WNL          [   ] HA   [   ] change in vision   [   ] tremor   [ x  ] weakness- Right side   [   ]dysphagia   [x  ] word finding difficulties,   [ x ] numbness/ neuropathy both feet    Cognitive:           [ x  ] WNL           [   ]confusion   - daughter note some memory loss since stroke   Psych:                  [  x ] WNL           [   ] hallucinations   [   ]agitation   [   ] delusion   [   ]depression      PHYSICAL EXAM    ICU Vital Signs Last 24 Hrs  T(C): 36.4 (19 Aug 2022 09:07), Max: 36.7 (18 Aug 2022 16:58)  T(F): 97.6 (19 Aug 2022 09:07), Max: 98 (18 Aug 2022 16:58)  HR: 114 (19 Aug 2022 09:07) (73 - 114)  BP: 122/67 (19 Aug 2022 09:07) (120/72 - 138/73)  BP(mean): 99 (18 Aug 2022 22:06) (99 - 99)  ABP: --  ABP(mean): --  RR: 18 (19 Aug 2022 06:05) (18 - 18)  SpO2: 97% (18 Aug 2022 16:58) (97% - 97%)    O2 Parameters below as of 18 Aug 2022 16:58  Patient On (Oxygen Delivery Method): room air      General:[  x ] NAD, Resting Comfortable,   [   ] other:                                HEENT: [  x ] NC/AT, EOMI, PERRL , Normal Conjunctivae,   [   ] other:  Cardio: [  x ] RRR, no murmer,   [   ] other:                              Pulm: [  x ] No Respiratory Distress,  Lungs CTAB,   [   ] other:                       Abdomen: [ x  ] ND/NT, Soft,   [   ] other:    : [ x  ] NO PRITCHARD CATHETER,   [  ] PRITCHARD CATHETER- no meatal tear, no discharge, [   ] other:                                            MSK: [ x  ] No joint swelling, Full PROM,   [    ] other:                                       Ext: [  x ]No C/C/E, No calf tenderness,   [   ]other:    Skin: [ x  ]intact,   [   ] other:                                                                   Neurological Examination:  Cognitive: [ x   ] AAO x 3,   [    ]  other:                                                                 Attention/ Concentration:  [ x   ] intact   [    ]  other:   Memory: [    ] intact,    [ x  ]  other:  mildly impaired  Mood/Affect: [  x  ] wnl,    [    ]  other:                                                                             Communication: [ x  ]Fluent, no dysarthria, following commands:  [   ] other:  CN II - XII:  [  x  ] intact, VFF  [    ] other: mild right facial droop                                                                                         Motor:   RIGHT UE: [   ] WNL,  [  x ] other: 2/5 shoulder extension, 1+/5 elbow flexion, 3/5 elbow extension, 2/5 finger flexion, 0/5 finger extension  LEFT    UE: [  x ] WNL  RIGHT LE: [ x  ] WNL,  [   ] other: Hip flexion 3/5 with drift to bed, knee flexion 4/5, plantarflexion 5/5, dorsiflexion 5/5, toe extension 5/5  LEFT    LE: [  x ] WNL,  Hip flexion 4/5, knee flexion 5/5, plantarflexion 5/5, dorsiflexion 5/5, toe extension 5/5     Tone: [    ] wnl,   [ x   ]  other: mild increased tone in right triceps  DTRs: [ x  ]symmetric, 1+ [   ] other:   Coordination:   [    ] intact as testable,   [ not assesed   ]       CLOF:   Bed mobility- PA   Transfers- mod A   Gait: 25 x2 PA 3 point cane       MEDICATIONS  (STANDING):  aspirin enteric coated 81 milliGRAM(s) Oral daily  atorvastatin 80 milliGRAM(s) Oral at bedtime  citalopram 5 milliGRAM(s) Oral daily  dextrose 5%. 1000 milliLiter(s) (100 mL/Hr) IV Continuous <Continuous>  dextrose 50% Injectable 25 Gram(s) IV Push once  dextrose 50% Injectable 12.5 Gram(s) IV Push once  dextrose 50% Injectable 25 Gram(s) IV Push once  enoxaparin Injectable 30 milliGRAM(s) SubCutaneous every 12 hours  famotidine    Tablet 40 milliGRAM(s) Oral at bedtime  glucagon  Injectable 1 milliGRAM(s) IntraMuscular once  hydrocortisone 1% Cream 1 Application(s) Topical two times a day  insulin glargine Injectable (LANTUS) 30 Unit(s) SubCutaneous every morning  insulin lispro (ADMELOG) corrective regimen sliding scale   SubCutaneous three times a day before meals  insulin lispro Injectable (ADMELOG) 8 Unit(s) SubCutaneous three times a day before meals  lisinopril 40 milliGRAM(s) Oral at bedtime  metoprolol tartrate 50 milliGRAM(s) Oral two times a day  polyethylene glycol 3350 17 Gram(s) Oral two times a day  remdesivir  IVPB 100 milliGRAM(s) IV Intermittent every 24 hours  senna 2 Tablet(s) Oral at bedtime  sodium chloride 0.9%. 1000 milliLiter(s) (75 mL/Hr) IV Continuous <Continuous>  ticagrelor 90 milliGRAM(s) Oral every 12 hours    MEDICATIONS  (PRN):  acetaminophen     Tablet .. 650 milliGRAM(s) Oral every 6 hours PRN Temp greater or equal to 38C (100.4F), Mild Pain (1 - 3)  bisacodyl Suppository 10 milliGRAM(s) Rectal daily PRN Constipation  dextrose Oral Gel 15 Gram(s) Oral once PRN Blood Glucose LESS THAN 70 milliGRAM(s)/deciliter  diphenhydrAMINE 25 milliGRAM(s) Oral every 6 hours PRN Rash and/or Itching  guaifenesin/dextromethorphan Oral Liquid 10 milliLiter(s) Oral every 4 hours PRN Cough  lactulose Syrup 20 Gram(s) Oral two times a day PRN Constipation  melatonin 5 milliGRAM(s) Oral at bedtime PRN Insomnia        RECENT LABS/IMAGING      POCT Blood Glucose.: 138 mg/dL (08-22-22 @ 08:15)  POCT Blood Glucose.: 117 mg/dL (08-21-22 @ 21:14)  POCT Blood Glucose.: 100 mg/dL (08-21-22 @ 16:26)  POCT Blood Glucose.: 260 mg/dL (08-21-22 @ 11:19)  POCT Blood Glucose.: 122 mg/dL (08-21-22 @ 08:06)  POCT Blood Glucose.: 227 mg/dL (08-20-22 @ 21:54)  POCT Blood Glucose.: 89 mg/dL (08-20-22 @ 16:30)  POCT Blood Glucose.: 231 mg/dL (08-20-22 @ 11:35)  POCT Blood Glucose.: 104 mg/dL (08-20-22 @ 07:37)  POCT Blood Glucose.: 153 mg/dL (08-19-22 @ 20:55)  POCT Blood Glucose.: 142 mg/dL (08-19-22 @ 17:26)  POCT Blood Glucose.: 105 mg/dL (08-19-22 @ 15:54)   Patient is a 77y old  Male who presents with a chief complaint of rehab s/p left ischemic stroke with aphasia and right hemiparesis (11 Aug 2022 11:30)      HPI:  Patient is a 78yo M with PMH of HTN, DM, HLD, CAD (s/p stents), GERD who presented to the ED on 8/3/2022 for right hand weakness. Patient noticed he started having difficulty with his speech, then developed weakness in his right hand and some visual field deficits. In the ED, patient received tPA. CT head at the time was negative, EDSON showed EF of 55-60%.  MRI head showed scattered left-sided acute cortical infarcts within the MCA and PCA territories, with trace petechial hemorrhage involving high left frontal region. There is a chronic left occipital lobe infarct. MRA of the neck showed moderate stenosis of the proximal right internal carotid artery. On 8/9, a right upper quadrant US was done to rule out cholecystitis, revealing an indeterminate 1.2 cm right renal lesion and moderate-severe hepatic steatosis. During the hospital stay, the visual field deficits improved but patient continued to have no motor function in the right hand and impaired RLE strength and mobility and ADL independence and a persistent aphasia.    On evaluation by PM&R, patient endorses no movement in his right hand and notes some difficulty with memory but otherwise has no other complaints. Patient is right handed. He lives by himself in a house with [address] and one flight of stairs inside (5+7 steps). Patient was previously independent in ADLs and ambulation, occasionally using a cane outdoors for balance. Currently patient requires mod assist for bed mobility, max assist with transfers, mod assist with ambulation 25 feet with RW and max assistance for dressing. He was evaluated by Physiatry on the medical service and was found to be a good acute rehab candidate.  (11 Aug 2022 11:30)    TODAY'S SUBJECTIVE & REVIEW OF SYMPTOMS:  Patient was seen and assessed at bedside. No overnight events. Patient denies any complaints at this time. Tolerating PT/OT. Tolerating oral diet. Voiding and passing stool spontaneously. Vital signs are stable.      Constitutional    [ x  ] WNL           [   ] poor appetite   [   ] insomnia   [   ] tired   Cardio:                [  x ] WNL           [   ] CP   [   ] VELASCO   [   ] palpitations               Resp:                   [   ] WNL           [   ] SOB   [  x ] cough improving   [   ] wheezing   GI:                        [ x  ] WNL           [   ] constipation   [   ] diarrhea   [   ] abdominal pain   [   ] nausea   [   ] emesis                                :                      [ x  ] WNL           [   ] PRITCHARD  [   ] dysuria   [   ] difficulty voiding             Endo:                   [ x  ] WNL          [   ] polyuria   [   ] temperature intolerance                 Skin:                     [ x  ] WNL          [   ] pain   [   ] wound   [   ] rash   MSK:                    [   x] WNL          [   ] muscle pain   [   ] joint pain/ stiffness   [   ] muscle tenderness   [   ] swelling   Neuro:                 [   ] WNL          [   ] HA   [   ] change in vision   [   ] tremor   [ x  ] weakness- Right side   [   ]dysphagia   [x  ] word finding difficulties- improving,   [ x ] numbness/ neuropathy both feet    Cognitive:           [ x  ] WNL           [   ]confusion   - daughter note some memory loss since stroke   Psych:                  [  x ] WNL           [   ] hallucinations   [   ]agitation   [   ] delusion   [   ]depression      PHYSICAL EXAM    Vital Signs Last 24 Hrs  T(C): 35.7 (22 Aug 2022 05:33), Max: 36.4 (21 Aug 2022 20:46)  T(F): 96.2 (22 Aug 2022 05:33), Max: 97.5 (21 Aug 2022 20:46)  HR: 75 (22 Aug 2022 05:33) (72 - 75)  BP: 135/83 (22 Aug 2022 05:33) (135/83 - 145/70)  BP(mean): --  RR: 18 (22 Aug 2022 05:33) (18 - 18)  SpO2: --    General:[  x ] NAD, Resting Comfortable,   [   ] other:                                HEENT: [  x ] NC/AT, EOMI, PERRL , Normal Conjunctivae,   [   ] other:  Cardio: [  x ] RRR, no murmer,   [   ] other:                              Pulm: [  x ] No Respiratory Distress,  Lungs CTAB,   [   ] other:                       Abdomen: [ x  ] ND/NT, Soft,   [   ] other:    : [ x  ] NO PRITCHARD CATHETER,   [  ] PRITCHARD CATHETER- no meatal tear, no discharge, [   ] other:                                            MSK: [ x  ] No joint swelling, Full PROM,   [    ] other:                                       Ext: [  x ]No C/C/E, No calf tenderness,   [   ]other:    Skin: [ x  ]intact,   [   ] other:                                                                   Neurological Examination:  Cognitive: [ x   ] AAO x 3,   [    ]  other:                                                                 Attention/ Concentration:  [ x   ] intact   [    ]  other:   Memory: [    ] intact,    [ x  ]  other:  mildly impaired  Mood/Affect: [  x  ] wnl,    [    ]  other:                                                                             Communication: [ x  ]Fluent, no dysarthria, following commands:  [   ] other:  CN II - XII:  [  x  ] intact, VFF  [    ] other: mild right facial droop                                                                                         Motor:   RIGHT UE: [   ] WNL,  [  x ] other: 2/5 shoulder extension, 2-/5 elbow flexion, 2-/5 elbow extension, 2/5 finger flexion, 0/5 finger extension  LEFT    UE: [  x ] WNL  RIGHT LE: [ x  ] WNL,  [   ] other: Hip flexion 3+/5 with drift to bed, knee flexion 4/5, plantarflexion 5/5, dorsiflexion 5/5, toe extension 5/5  LEFT    LE: [  x ] WNL,  Hip flexion 4/5, knee flexion 5/5, plantarflexion 5/5, dorsiflexion 5/5, toe extension 5/5     Tone: [    ] wnl,   [ x   ]  other: mild increased tone in right triceps  DTRs: [ x  ]symmetric, 1+ [   ] other:   Coordination:   [    ] intact as testable,   [ not assesed   ]       CLOF:   Bed mobility- PA   Transfers- mod A   Gait: 25 x2 PA 3 point cane       MEDICATIONS  (STANDING):  aspirin enteric coated 81 milliGRAM(s) Oral daily  atorvastatin 80 milliGRAM(s) Oral at bedtime  citalopram 5 milliGRAM(s) Oral daily  dextrose 5%. 1000 milliLiter(s) (100 mL/Hr) IV Continuous <Continuous>  dextrose 50% Injectable 25 Gram(s) IV Push once  dextrose 50% Injectable 12.5 Gram(s) IV Push once  dextrose 50% Injectable 25 Gram(s) IV Push once  enoxaparin Injectable 30 milliGRAM(s) SubCutaneous every 12 hours  famotidine    Tablet 40 milliGRAM(s) Oral at bedtime  glucagon  Injectable 1 milliGRAM(s) IntraMuscular once  hydrocortisone 1% Cream 1 Application(s) Topical two times a day  insulin glargine Injectable (LANTUS) 30 Unit(s) SubCutaneous every morning  insulin lispro (ADMELOG) corrective regimen sliding scale   SubCutaneous three times a day before meals  insulin lispro Injectable (ADMELOG) 8 Unit(s) SubCutaneous three times a day before meals  lisinopril 40 milliGRAM(s) Oral at bedtime  metoprolol tartrate 50 milliGRAM(s) Oral two times a day  polyethylene glycol 3350 17 Gram(s) Oral two times a day  remdesivir  IVPB 100 milliGRAM(s) IV Intermittent every 24 hours  senna 2 Tablet(s) Oral at bedtime  sodium chloride 0.9%. 1000 milliLiter(s) (75 mL/Hr) IV Continuous <Continuous>  ticagrelor 90 milliGRAM(s) Oral every 12 hours    MEDICATIONS  (PRN):  acetaminophen     Tablet .. 650 milliGRAM(s) Oral every 6 hours PRN Temp greater or equal to 38C (100.4F), Mild Pain (1 - 3)  bisacodyl Suppository 10 milliGRAM(s) Rectal daily PRN Constipation  dextrose Oral Gel 15 Gram(s) Oral once PRN Blood Glucose LESS THAN 70 milliGRAM(s)/deciliter  diphenhydrAMINE 25 milliGRAM(s) Oral every 6 hours PRN Rash and/or Itching  guaifenesin/dextromethorphan Oral Liquid 10 milliLiter(s) Oral every 4 hours PRN Cough  lactulose Syrup 20 Gram(s) Oral two times a day PRN Constipation  melatonin 5 milliGRAM(s) Oral at bedtime PRN Insomnia        RECENT LABS/IMAGING      POCT Blood Glucose.: 138 mg/dL (08-22-22 @ 08:15)  POCT Blood Glucose.: 117 mg/dL (08-21-22 @ 21:14)  POCT Blood Glucose.: 100 mg/dL (08-21-22 @ 16:26)  POCT Blood Glucose.: 260 mg/dL (08-21-22 @ 11:19)  POCT Blood Glucose.: 122 mg/dL (08-21-22 @ 08:06)  POCT Blood Glucose.: 227 mg/dL (08-20-22 @ 21:54)  POCT Blood Glucose.: 89 mg/dL (08-20-22 @ 16:30)  POCT Blood Glucose.: 231 mg/dL (08-20-22 @ 11:35)  POCT Blood Glucose.: 104 mg/dL (08-20-22 @ 07:37)  POCT Blood Glucose.: 153 mg/dL (08-19-22 @ 20:55)  POCT Blood Glucose.: 142 mg/dL (08-19-22 @ 17:26)  POCT Blood Glucose.: 105 mg/dL (08-19-22 @ 15:54)

## 2022-08-22 NOTE — PROGRESS NOTE ADULT - ASSESSMENT
Patient is a 78yo M with PMH HTN, DM, CAD (s/p stents), GERD who presented for right sided weakness and aphasia. Patient was found to have a left acute cortical infarct within the MCA and PCA territories. Patient presented with visual field deficits that has since improved, but is still with impaired strength, balance, mobility and ADLs and language. Patient was previously independent in all ADLs and activities. Current level of function is mod assist for bed mobility, max assist with transfers, mod assist with ambulation 25 feet with RW. Patient is a good candidate for rehab due to significantly decreased level of function from prior.    #Left acute ischemic stroke with right hemiparesis (dominant) and aphasia  s/p tPA on 8/3  EDSON showed EF of 55-60%  8/6 MRI head: scattered left-sided acute cortical infarcts within the MCA and PCA territories, with trace petechial hemorrhage involving high left frontal region. There is a chronic left occipital lobe infarct.   MRA of the neck: moderate stenosis of the proximal right internal carotid artery  - start PT/OT/ST, Neuropsych eval  - c/w 5mg celexa  - c/w ASA 81mg  - c/w Brilinta 90mg BID  - c/w Lipitor 80mg daily    NIHSS =7, mRankin = 4    #Diabetic Peripheral Polyneuropathy  - No pain. Teach patient skin monitoring an sensory awareness    #Covid positive   - PCR positive from 08/16/22   - Placed on Airborne Precautions  - CRP - 85, D-Dimer 323  - Ferritin 585- mild increase  - Completed Remdesivir  - Lovenox increased to 30 mg bid  - cough improving    #HTN - BP was low  - stopped HCTZ  - stopped Amlodipine 5mg qd  - c/w Lisinopril 40mg   - c/w Lopressor 50mg BID    #Diabetes 2  Ha1c 8.2  - c/w 30U Lantus  - c/w 8U Lispro    #CAD  -continue ASA 81, Brilinta, BB, statin    #Renal lesion  On 8/9, a right upper quadrant US was done to rule out cholecystitis, revealing an indeterminate 1.2 cm right renal lesion and moderate-severe hepatic steatosis.  -outpatient followup, non urgent MRI with renal mass protocol    #Mild persistent cough/ Increased WBC with left shift  - chest x-ray with mild bilateral atelectasis  - PCR positive  - doppler: < from: [facility] Lower Ext Vein Scan, Bilat (08.17.22 @ 17:53) > No evidence of deep venous thrombosis in either lower extremity.  - Resolved on 8/18 CBC  - UA showed bacteriuria, but no wbcs     #Azotemia  - give IVF with improvement  - had negative PVR    -GI/Bowel Mgmt: senna, dulcolax, lactulose, miralax    - Diet: Dash Carb Consistent diet    - Melatonin 10mg qhs for difficulty sleeping       Precautions / PROPHYLAXIS:      - Falls    - Ortho: Weight bearing status: WBAT      - DVT prophylaxis: Lovenox 30 mg BID   Patient is a 78yo M with PMH HTN, DM, CAD (s/p stents), GERD who presented for right sided weakness and aphasia. Patient was found to have a left acute cortical infarct within the MCA and PCA territories. Patient presented with visual field deficits that has since improved, but is still with impaired strength, balance, mobility and ADLs and language. Patient was previously independent in all ADLs and activities. Current level of function is mod assist for bed mobility, max assist with transfers, mod assist with ambulation 25 feet with RW. Patient is a good candidate for rehab due to significantly decreased level of function from prior.    #Left acute ischemic stroke with right hemiparesis (dominant) and aphasia  s/p tPA on 8/3  [facility]SON showed EF of 55-60%  8/6 MRI head: scattered left-sided acute cortical infarcts within the MCA and PCA territories, with trace petechial hemorrhage involving high left frontal region. There is a chronic left occipital lobe infarct.   MRA of the neck: moderate stenosis of the proximal right internal carotid artery  - start PT/OT/ST, Neuropsych eval  - c/w 5mg celexa  - c/w ASA 81mg  - c/w Brilinta 90mg BID  - c/w Lipitor 80mg daily  - Making significant gains in MP and mobility    #Diabetic Peripheral Polyneuropathy  - No pain. Teach patient skin monitoring an sensory awareness    #Covid positive   - PCR positive from 08/16/22   - Placed on Airborne Precautions  - CRP - 85, D-Dimer 323  - Ferritin 585- mild increase  - Completed Remdesivir  - Lovenox increased to 30 mg bid  - cough improving    #HTN - BP was low -   - stopped HCTZ  - stopped Amlodipine 5mg qd  - c/w Lisinopril 40mg   - c/w Lopressor 50mg BID  - now controlled    #Diabetes 2  Ha1c 8.2  - c/w 30U Lantus  - c/w 8U Lispro    #CAD  -continue ASA 81, Brilinta, BB, statin    #Renal lesion  On 8/9, a right upper quadrant US was done to rule out cholecystitis, revealing an indeterminate 1.2 cm right renal lesion and moderate-severe hepatic steatosis.  -outpatient followup, non urgent MRI with renal mass protocol    #Mild persistent cough/ Increased WBC with left shift  - chest x-ray with mild bilateral atelectasis  - PCR positive  - doppler: < from: [facility] Lower Ext Vein Scan, Bilat (08.17.22 @ 17:53) > No evidence of deep venous thrombosis in either lower extremity.  - Resolved on 8/18 CBC  - UA showed bacteriuria, but no wbcs     #Azotemia  - give IVF with improvement  - had negative PVR    -GI/Bowel Mgmt: senna, dulcolax, lactulose, miralax    - Diet: Dash Carb Consistent diet    - Melatonin 10mg qhs for difficulty sleeping       Precautions / PROPHYLAXIS:      - Falls    - Ortho: Weight bearing status: WBAT      - DVT prophylaxis: Lovenox 30 mg BID

## 2022-08-23 LAB
ALBUMIN SERPL ELPH-MCNC: 3.2 G/DL — LOW (ref 3.5–5.2)
ALP SERPL-CCNC: 145 U/L — HIGH (ref 30–115)
ALT FLD-CCNC: 36 U/L — SIGNIFICANT CHANGE UP (ref 0–41)
ANION GAP SERPL CALC-SCNC: 10 MMOL/L — SIGNIFICANT CHANGE UP (ref 7–14)
AST SERPL-CCNC: 30 U/L — SIGNIFICANT CHANGE UP (ref 0–41)
BILIRUB SERPL-MCNC: 0.7 MG/DL — SIGNIFICANT CHANGE UP (ref 0.2–1.2)
BUN SERPL-MCNC: 22 MG/DL — HIGH (ref 10–20)
CALCIUM SERPL-MCNC: 9 MG/DL — SIGNIFICANT CHANGE UP (ref 8.5–10.1)
CHLORIDE SERPL-SCNC: 105 MMOL/L — SIGNIFICANT CHANGE UP (ref 98–110)
CO2 SERPL-SCNC: 25 MMOL/L — SIGNIFICANT CHANGE UP (ref 17–32)
CREAT SERPL-MCNC: 1.1 MG/DL — SIGNIFICANT CHANGE UP (ref 0.7–1.5)
EGFR: 70 ML/MIN/1.73M2 — SIGNIFICANT CHANGE UP
GLUCOSE BLDC GLUCOMTR-MCNC: 141 MG/DL — HIGH (ref 70–99)
GLUCOSE BLDC GLUCOMTR-MCNC: 163 MG/DL — HIGH (ref 70–99)
GLUCOSE BLDC GLUCOMTR-MCNC: 245 MG/DL — HIGH (ref 70–99)
GLUCOSE SERPL-MCNC: 169 MG/DL — HIGH (ref 70–99)
HCT VFR BLD CALC: 40.3 % — LOW (ref 42–52)
HGB BLD-MCNC: 13.8 G/DL — LOW (ref 14–18)
MAGNESIUM SERPL-MCNC: 1.7 MG/DL — LOW (ref 1.8–2.4)
MCHC RBC-ENTMCNC: 29 PG — SIGNIFICANT CHANGE UP (ref 27–31)
MCHC RBC-ENTMCNC: 34.2 G/DL — SIGNIFICANT CHANGE UP (ref 32–37)
MCV RBC AUTO: 84.7 FL — SIGNIFICANT CHANGE UP (ref 80–94)
NRBC # BLD: 0 /100 WBCS — SIGNIFICANT CHANGE UP (ref 0–0)
PLATELET # BLD AUTO: 310 K/UL — SIGNIFICANT CHANGE UP (ref 130–400)
POTASSIUM SERPL-MCNC: 4.4 MMOL/L — SIGNIFICANT CHANGE UP (ref 3.5–5)
POTASSIUM SERPL-SCNC: 4.4 MMOL/L — SIGNIFICANT CHANGE UP (ref 3.5–5)
PROT SERPL-MCNC: 6.5 G/DL — SIGNIFICANT CHANGE UP (ref 6–8)
RBC # BLD: 4.76 M/UL — SIGNIFICANT CHANGE UP (ref 4.7–6.1)
RBC # FLD: 13 % — SIGNIFICANT CHANGE UP (ref 11.5–14.5)
SODIUM SERPL-SCNC: 140 MMOL/L — SIGNIFICANT CHANGE UP (ref 135–146)
WBC # BLD: 9.75 K/UL — SIGNIFICANT CHANGE UP (ref 4.8–10.8)
WBC # FLD AUTO: 9.75 K/UL — SIGNIFICANT CHANGE UP (ref 4.8–10.8)

## 2022-08-23 RX ORDER — MAGNESIUM OXIDE 400 MG ORAL TABLET 241.3 MG
400 TABLET ORAL
Refills: 0 | Status: DISCONTINUED | OUTPATIENT
Start: 2022-08-23 | End: 2022-08-30

## 2022-08-23 RX ADMIN — CITALOPRAM 5 MILLIGRAM(S): 10 TABLET, FILM COATED ORAL at 11:31

## 2022-08-23 RX ADMIN — ATORVASTATIN CALCIUM 80 MILLIGRAM(S): 80 TABLET, FILM COATED ORAL at 21:19

## 2022-08-23 RX ADMIN — ENOXAPARIN SODIUM 30 MILLIGRAM(S): 100 INJECTION SUBCUTANEOUS at 17:43

## 2022-08-23 RX ADMIN — TICAGRELOR 90 MILLIGRAM(S): 90 TABLET ORAL at 17:41

## 2022-08-23 RX ADMIN — Medication 8 UNIT(S): at 07:42

## 2022-08-23 RX ADMIN — ENOXAPARIN SODIUM 30 MILLIGRAM(S): 100 INJECTION SUBCUTANEOUS at 06:23

## 2022-08-23 RX ADMIN — FAMOTIDINE 40 MILLIGRAM(S): 10 INJECTION INTRAVENOUS at 21:20

## 2022-08-23 RX ADMIN — Medication 50 MILLIGRAM(S): at 17:42

## 2022-08-23 RX ADMIN — MAGNESIUM OXIDE 400 MG ORAL TABLET 400 MILLIGRAM(S): 241.3 TABLET ORAL at 17:41

## 2022-08-23 RX ADMIN — INSULIN GLARGINE 30 UNIT(S): 100 INJECTION, SOLUTION SUBCUTANEOUS at 07:41

## 2022-08-23 RX ADMIN — Medication 8 UNIT(S): at 17:00

## 2022-08-23 RX ADMIN — Medication 81 MILLIGRAM(S): at 11:31

## 2022-08-23 RX ADMIN — TICAGRELOR 90 MILLIGRAM(S): 90 TABLET ORAL at 06:23

## 2022-08-23 RX ADMIN — Medication 50 MILLIGRAM(S): at 06:23

## 2022-08-23 RX ADMIN — Medication 10 MILLIGRAM(S): at 21:25

## 2022-08-23 RX ADMIN — LISINOPRIL 40 MILLIGRAM(S): 2.5 TABLET ORAL at 21:25

## 2022-08-23 RX ADMIN — Medication 8 UNIT(S): at 11:32

## 2022-08-23 RX ADMIN — MAGNESIUM OXIDE 400 MG ORAL TABLET 400 MILLIGRAM(S): 241.3 TABLET ORAL at 07:45

## 2022-08-23 NOTE — PROGRESS NOTE ADULT - SUBJECTIVE AND OBJECTIVE BOX
Patient is a 77y old  Male who presents with a chief complaint of rehab s/p left ischemic stroke with aphasia and right hemiparesis (11 Aug 2022 11:30)      HPI:  Patient is a 78yo M with PMH of HTN, DM, HLD, CAD (s/p stents), GERD who presented to the ED on 8/3/2022 for right hand weakness. Patient noticed he started having difficulty with his speech, then developed weakness in his right hand and some visual field deficits. In the ED, patient received tPA. CT head at the time was negative, EDSON showed EF of 55-60%.  MRI head showed scattered left-sided acute cortical infarcts within the MCA and PCA territories, with trace petechial hemorrhage involving high left frontal region. There is a chronic left occipital lobe infarct. MRA of the neck showed moderate stenosis of the proximal right internal carotid artery. On 8/9, a right upper quadrant US was done to rule out cholecystitis, revealing an indeterminate 1.2 cm right renal lesion and moderate-severe hepatic steatosis. During the hospital stay, the visual field deficits improved but patient continued to have no motor function in the right hand and impaired RLE strength and mobility and ADL independence and a persistent aphasia.    On evaluation by PM&R, patient endorses no movement in his right hand and notes some difficulty with memory but otherwise has no other complaints. Patient is right handed. He lives by himself in a house with 5 [address] and one flight of stairs inside (5+7 steps). Patient was previously independent in ADLs and ambulation, occasionally using a cane outdoors for balance. Currently patient requires mod assist for bed mobility, max assist with transfers, mod assist with ambulation 25 feet with RW and max assistance for dressing. He was evaluated by Physiatry on the medical service and was found to be a good acute rehab candidate.  (11 Aug 2022 11:30)    TODAY'S SUBJECTIVE & REVIEW OF SYMPTOMS:  Patient was seen and assessed at bedside. No overnight events. Patient denies any complaints at this time. Tolerating PT/OT. Tolerating oral diet. Voiding and passing stool spontaneously. Vital signs are stable. Denies any cough, difficulty breathing, shortness of breath.      Constitutional    [ x  ] WNL           [   ] poor appetite   [   ] insomnia   [   ] tired   Cardio:                [  x ] WNL           [   ] CP   [   ] VELASCO   [   ] palpitations               Resp:                   [   ] WNL           [   ] SOB   [  x ] cough improving   [   ] wheezing   GI:                        [ x  ] WNL           [   ] constipation   [   ] diarrhea   [   ] abdominal pain   [   ] nausea   [   ] emesis                                :                      [ x  ] WNL           [   ] PRITCHARD  [   ] dysuria   [   ] difficulty voiding             Endo:                   [ x  ] WNL          [   ] polyuria   [   ] temperature intolerance                 Skin:                     [ x  ] WNL          [   ] pain   [   ] wound   [   ] rash   MSK:                    [   x] WNL          [   ] muscle pain   [   ] joint pain/ stiffness   [   ] muscle tenderness   [   ] swelling   Neuro:                 [   ] WNL          [   ] HA   [   ] change in vision   [   ] tremor   [ x  ] weakness- Right side   [   ]dysphagia   [x  ] word finding difficulties- improving,   [ x ] numbness/ neuropathy both feet    Cognitive:           [ x  ] WNL           [   ]confusion   - daughter note some memory loss since stroke   Psych:                  [  x ] WNL           [   ] hallucinations   [   ]agitation   [   ] delusion   [   ]depression      PHYSICAL EXAM    ICU Vital Signs Last 24 Hrs  T(C): 36.7 (23 Aug 2022 05:05), Max: 36.7 (23 Aug 2022 05:05)  T(F): 98 (23 Aug 2022 05:05), Max: 98 (23 Aug 2022 05:05)  HR: 61 (23 Aug 2022 05:05) (61 - 75)  BP: 143/85 (23 Aug 2022 05:05) (137/80 - 143/85)  BP(mean): --  ABP: --  ABP(mean): --  RR: 18 (23 Aug 2022 05:05) (18 - 18)  SpO2: --      General:[  x ] NAD, Resting Comfortable,   [   ] other:                                HEENT: [  x ] NC/AT, EOMI, PERRL , Normal Conjunctivae,   [   ] other:  Cardio: [  x ] RRR, no murmer,   [   ] other:                              Pulm: [  x ] No Respiratory Distress,  Lungs CTAB,   [   ] other:                       Abdomen: [ x  ] ND/NT, Soft,   [   ] other:    : [ x  ] NO PRITCHARD CATHETER,   [  ] PRITCHARD CATHETER- no meatal tear, no discharge, [   ] other:                                            MSK: [ x  ] No joint swelling, Full PROM,   [    ] other:                                       Ext: [  x ]No C/C/E, No calf tenderness,   [   ]other:    Skin: [ x  ]intact,   [   ] other:                                                                   Neurological Examination:  Cognitive: [ x   ] AAO x 3,   [    ]  other:                                                                 Attention/ Concentration:  [ x   ] intact   [    ]  other:   Memory: [    ] intact,    [ x  ]  other:  mildly impaired  Mood/Affect: [  x  ] wnl,    [    ]  other:                                                                             Communication: [ x  ]Fluent, no dysarthria, following commands:  [   ] other:  CN II - XII:  [  x  ] intact, VFF  [    ] other: mild right facial droop                                                                                         Motor:   RIGHT UE: [   ] WNL,  [  x ] other: 2/5 shoulder extension, 2-/5 elbow flexion, 2-/5 elbow extension, 2/5 finger flexion, 0/5 finger extension  LEFT    UE: [  x ] WNL  RIGHT LE: [ x  ] WNL,  [   ] other: Hip flexion 3+/5 with drift to bed, knee flexion 4/5, plantarflexion 5/5, dorsiflexion 5/5, toe extension 5/5  LEFT    LE: [  x ] WNL,  Hip flexion 4/5, knee flexion 5/5, plantarflexion 5/5, dorsiflexion 5/5, toe extension 5/5     Tone: [    ] wnl,   [ x   ]  other: mild increased tone in right triceps  DTRs: [ x  ]symmetric, 1+ [   ] other:   Coordination:   [    ] intact as testable,   [ not assesed   ]       CLOF:   Bed mobility- supervision  Transfers- touch assist  Gait: touch assist 60 feet with emmett-cane  Lower body dressing: supervision      MEDICATIONS  (STANDING):  aspirin enteric coated 81 milliGRAM(s) Oral daily  atorvastatin 80 milliGRAM(s) Oral at bedtime  citalopram 5 milliGRAM(s) Oral daily  dextrose 5%. 1000 milliLiter(s) (100 mL/Hr) IV Continuous <Continuous>  dextrose 50% Injectable 25 Gram(s) IV Push once  dextrose 50% Injectable 12.5 Gram(s) IV Push once  dextrose 50% Injectable 25 Gram(s) IV Push once  enoxaparin Injectable 30 milliGRAM(s) SubCutaneous every 12 hours  famotidine    Tablet 40 milliGRAM(s) Oral at bedtime  glucagon  Injectable 1 milliGRAM(s) IntraMuscular once  hydrocortisone 1% Cream 1 Application(s) Topical two times a day  insulin glargine Injectable (LANTUS) 30 Unit(s) SubCutaneous every morning  insulin lispro Injectable (ADMELOG) 8 Unit(s) SubCutaneous three times a day before meals  lisinopril 40 milliGRAM(s) Oral at bedtime  magnesium oxide 400 milliGRAM(s) Oral two times a day with meals  melatonin 10 milliGRAM(s) Oral at bedtime  metoprolol tartrate 50 milliGRAM(s) Oral two times a day  polyethylene glycol 3350 17 Gram(s) Oral two times a day  senna 2 Tablet(s) Oral at bedtime  sodium chloride 0.9%. 1000 milliLiter(s) (75 mL/Hr) IV Continuous <Continuous>  ticagrelor 90 milliGRAM(s) Oral every 12 hours    MEDICATIONS  (PRN):  acetaminophen     Tablet .. 650 milliGRAM(s) Oral every 6 hours PRN Temp greater or equal to 38C (100.4F), Mild Pain (1 - 3)  bisacodyl Suppository 10 milliGRAM(s) Rectal daily PRN Constipation  dextrose Oral Gel 15 Gram(s) Oral once PRN Blood Glucose LESS THAN 70 milliGRAM(s)/deciliter  diphenhydrAMINE 25 milliGRAM(s) Oral every 6 hours PRN Rash and/or Itching  guaifenesin/dextromethorphan Oral Liquid 10 milliLiter(s) Oral every 4 hours PRN Cough  lactulose Syrup 20 Gram(s) Oral two times a day PRN Constipation          RECENT LABS/IMAGING                        13.8   9.75  )-----------( 310      ( 23 Aug 2022 05:19 )             40.3     08-23    140  |  105  |  22<H>  ----------------------------<  169<H>  4.4   |  25  |  1.1    Ca    9.0      23 Aug 2022 05:19  Mg     1.7     08-23    TPro  6.5  /  Alb  3.2<L>  /  TBili  0.7  /  DBili  x   /  AST  30  /  ALT  36  /  AlkPhos  145<H>  08-23        POCT Blood Glucose.: 163 mg/dL (08-23-22 @ 07:27)  POCT Blood Glucose.: 139 mg/dL (08-22-22 @ 22:01)  POCT Blood Glucose.: 93 mg/dL (08-22-22 @ 16:30)  POCT Blood Glucose.: 248 mg/dL (08-22-22 @ 11:11)  POCT Blood Glucose.: 138 mg/dL (08-22-22 @ 08:15)  POCT Blood Glucose.: 117 mg/dL (08-21-22 @ 21:14)  POCT Blood Glucose.: 100 mg/dL (08-21-22 @ 16:26)  POCT Blood Glucose.: 260 mg/dL (08-21-22 @ 11:19)  POCT Blood Glucose.: 122 mg/dL (08-21-22 @ 08:06)  POCT Blood Glucose.: 227 mg/dL (08-20-22 @ 21:54)  POCT Blood Glucose.: 89 mg/dL (08-20-22 @ 16:30)  POCT Blood Glucose.: 231 mg/dL (08-20-22 @ 11:35)   Patient is a 77y old  Male who presents with a chief complaint of rehab s/p left ischemic stroke with aphasia and right hemiparesis (11 Aug 2022 11:30)      HPI:  Patient is a 78yo M with PMH of HTN, DM, HLD, CAD (s/p stents), GERD who presented to the ED on 8/3/2022 for right hand weakness. Patient noticed he started having difficulty with his speech, then developed weakness in his right hand and some visual field deficits. In the ED, patient received tPA. CT head at the time was negative, EDSON showed EF of 55-60%.  MRI head showed scattered left-sided acute cortical infarcts within the MCA and PCA territories, with trace petechial hemorrhage involving high left frontal region. There is a chronic left occipital lobe infarct. MRA of the neck showed moderate stenosis of the proximal right internal carotid artery. On 8/9, a right upper quadrant US was done to rule out cholecystitis, revealing an indeterminate 1.2 cm right renal lesion and moderate-severe hepatic steatosis. During the hospital stay, the visual field deficits improved but patient continued to have no motor function in the right hand and impaired RLE strength and mobility and ADL independence and a persistent aphasia.    On evaluation by PM&R, patient endorses no movement in his right hand and notes some difficulty with memory but otherwise has no other complaints. Patient is right handed. He lives by himself in a house with 5 [address] and one flight of stairs inside (5+7 steps). Patient was previously independent in ADLs and ambulation, occasionally using a cane outdoors for balance. Currently patient requires mod assist for bed mobility, max assist with transfers, mod assist with ambulation 25 feet with RW and max assistance for dressing. He was evaluated by Physiatry on the medical service and was found to be a good acute rehab candidate.  (11 Aug 2022 11:30)    TODAY'S SUBJECTIVE & REVIEW OF SYMPTOMS:  Patient was seen and assessed at bedside. No overnight events. Patient denies any complaints at this time. Tolerating PT/OT. Tolerating oral diet. Voiding and passing stool spontaneously. Vital signs are stable. Denies any cough, difficulty breathing, shortness of breath.      Constitutional    [ x  ] WNL           [   ] poor appetite   [   ] insomnia   [   ] tired   Cardio:                [  x ] WNL           [   ] CP   [   ] VELASCO   [   ] palpitations               Resp:                   [   ] WNL           [   ] SOB   [  x ] cough improving   [   ] wheezing   GI:                        [ x  ] WNL           [   ] constipation   [   ] diarrhea   [   ] abdominal pain   [   ] nausea   [   ] emesis                                :                      [ x  ] WNL           [   ] PRITCHARD  [   ] dysuria   [   ] difficulty voiding             Endo:                   [ x  ] WNL          [   ] polyuria   [   ] temperature intolerance                 Skin:                     [ x  ] WNL          [   ] pain   [   ] wound   [   ] rash   MSK:                    [   x] WNL          [   ] muscle pain   [   ] joint pain/ stiffness   [   ] muscle tenderness   [   ] swelling   Neuro:                 [   ] WNL          [   ] HA   [   ] change in vision   [   ] tremor   [ x  ] weakness- Right side   [   ]dysphagia   [x  ] word finding difficulties- improving,   [ x ] numbness/ neuropathy both feet    Cognitive:           [ x  ] WNL           [   ]confusion   - daughter note some memory loss since stroke   Psych:                  [  x ] WNL           [   ] hallucinations   [   ]agitation   [   ] delusion   [   ]depression      PHYSICAL EXAM    ICU Vital Signs Last 24 Hrs  T(C): 36.7 (23 Aug 2022 05:05), Max: 36.7 (23 Aug 2022 05:05)  T(F): 98 (23 Aug 2022 05:05), Max: 98 (23 Aug 2022 05:05)  HR: 61 (23 Aug 2022 05:05) (61 - 75)  BP: 143/85 (23 Aug 2022 05:05) (137/80 - 143/85)  BP(mean): --  ABP: --  ABP(mean): --  RR: 18 (23 Aug 2022 05:05) (18 - 18)  SpO2: --      General:[  x ] NAD, Resting Comfortable,   [   ] other:                                HEENT: [  x ] NC/AT, EOMI, PERRL , Normal Conjunctivae,   [   ] other:  Cardio: [  x ] RRR, no murmer,   [   ] other:                              Pulm: [  x ] No Respiratory Distress,  Lungs CTAB,   [   ] other:                       Abdomen: [ x  ] ND/NT, Soft,   [   ] other:    : [ x  ] NO PRITCHARD CATHETER,   [  ] PRITCHARD CATHETER- no meatal tear, no discharge, [   ] other:                                            MSK: [ x  ] No joint swelling, Full PROM,   [    ] other:                                       Ext: [  x ]No C/C/E, No calf tenderness,   [   ]other:    Skin: [ x  ]intact,   [   ] other:                                                                   Neurological Examination:  Cognitive: [ x   ] AAO x 3,   [    ]  other:                                                                 Attention/ Concentration:  [ x   ] intact   [    ]  other:   Memory: [    ] intact,    [ x  ]  other:  mildly impaired  Mood/Affect: [  x  ] wnl,    [    ]  other:                                                                             Communication: [ x  ]Fluent, no dysarthria, following commands:  [   ] other:  CN II - XII:  [  x  ] intact, VFF  [    ] other: mild right facial droop                                                                                         Motor:   RIGHT UE: [   ] WNL,  [  x ] other: 2/5 shoulder extension, 2+/5 elbow flexion, 2-/5 elbow extension, 2/5 finger flexion, 0/5 finger extension  LEFT    UE: [  x ] WNL  RIGHT LE: [ x  ] WNL,  [   ] other: Hip flexion 3+/5 with drift to bed, knee flexion 4/5, plantarflexion 5/5, dorsiflexion 5/5, toe extension 5/5  LEFT    LE: [  x ] WNL,  Hip flexion 4/5, knee flexion 5/5, plantarflexion 5/5, dorsiflexion 5/5, toe extension 5/5     Tone: [    ] wnl,   [ x   ]  other: mild increased tone in right triceps  DTRs: [ x  ]symmetric, 1+ [   ] other:   Coordination:   [    ] intact as testable,   [ not assesed   ]       CLOF:   Bed mobility- supervision  Transfers- touch assist  Gait: touch assist 60 feet with emmett-cane  Lower body dressing: supervision      MEDICATIONS  (STANDING):  aspirin enteric coated 81 milliGRAM(s) Oral daily  atorvastatin 80 milliGRAM(s) Oral at bedtime  citalopram 5 milliGRAM(s) Oral daily  dextrose 5%. 1000 milliLiter(s) (100 mL/Hr) IV Continuous <Continuous>  dextrose 50% Injectable 25 Gram(s) IV Push once  dextrose 50% Injectable 12.5 Gram(s) IV Push once  dextrose 50% Injectable 25 Gram(s) IV Push once  enoxaparin Injectable 30 milliGRAM(s) SubCutaneous every 12 hours  famotidine    Tablet 40 milliGRAM(s) Oral at bedtime  glucagon  Injectable 1 milliGRAM(s) IntraMuscular once  hydrocortisone 1% Cream 1 Application(s) Topical two times a day  insulin glargine Injectable (LANTUS) 30 Unit(s) SubCutaneous every morning  insulin lispro Injectable (ADMELOG) 8 Unit(s) SubCutaneous three times a day before meals  lisinopril 40 milliGRAM(s) Oral at bedtime  magnesium oxide 400 milliGRAM(s) Oral two times a day with meals  melatonin 10 milliGRAM(s) Oral at bedtime  metoprolol tartrate 50 milliGRAM(s) Oral two times a day  polyethylene glycol 3350 17 Gram(s) Oral two times a day  senna 2 Tablet(s) Oral at bedtime  sodium chloride 0.9%. 1000 milliLiter(s) (75 mL/Hr) IV Continuous <Continuous>  ticagrelor 90 milliGRAM(s) Oral every 12 hours    MEDICATIONS  (PRN):  acetaminophen     Tablet .. 650 milliGRAM(s) Oral every 6 hours PRN Temp greater or equal to 38C (100.4F), Mild Pain (1 - 3)  bisacodyl Suppository 10 milliGRAM(s) Rectal daily PRN Constipation  dextrose Oral Gel 15 Gram(s) Oral once PRN Blood Glucose LESS THAN 70 milliGRAM(s)/deciliter  diphenhydrAMINE 25 milliGRAM(s) Oral every 6 hours PRN Rash and/or Itching  guaifenesin/dextromethorphan Oral Liquid 10 milliLiter(s) Oral every 4 hours PRN Cough  lactulose Syrup 20 Gram(s) Oral two times a day PRN Constipation          RECENT LABS/IMAGING                        13.8   9.75  )-----------( 310      ( 23 Aug 2022 05:19 )             40.3     08-23    140  |  105  |  22<H>  ----------------------------<  169<H>  4.4   |  25  |  1.1    Ca    9.0      23 Aug 2022 05:19  Mg     1.7     08-23    TPro  6.5  /  Alb  3.2<L>  /  TBili  0.7  /  DBili  x   /  AST  30  /  ALT  36  /  AlkPhos  145<H>  08-23        POCT Blood Glucose.: 163 mg/dL (08-23-22 @ 07:27)  POCT Blood Glucose.: 139 mg/dL (08-22-22 @ 22:01)  POCT Blood Glucose.: 93 mg/dL (08-22-22 @ 16:30)  POCT Blood Glucose.: 248 mg/dL (08-22-22 @ 11:11)  POCT Blood Glucose.: 138 mg/dL (08-22-22 @ 08:15)  POCT Blood Glucose.: 117 mg/dL (08-21-22 @ 21:14)  POCT Blood Glucose.: 100 mg/dL (08-21-22 @ 16:26)  POCT Blood Glucose.: 260 mg/dL (08-21-22 @ 11:19)  POCT Blood Glucose.: 122 mg/dL (08-21-22 @ 08:06)  POCT Blood Glucose.: 227 mg/dL (08-20-22 @ 21:54)  POCT Blood Glucose.: 89 mg/dL (08-20-22 @ 16:30)  POCT Blood Glucose.: 231 mg/dL (08-20-22 @ 11:35)

## 2022-08-23 NOTE — PROGRESS NOTE ADULT - ASSESSMENT
Patient is a 76yo M with PMH HTN, DM, CAD (s/p stents), GERD who presented for right sided weakness and aphasia. Patient was found to have a left acute cortical infarct within the MCA and PCA territories. Patient presented with visual field deficits that has since improved, but is still with impaired strength, balance, mobility and ADLs and language. Patient was previously independent in all ADLs and activities. Current level of function is mod assist for bed mobility, max assist with transfers, mod assist with ambulation 25 feet with RW. Patient is a good candidate for rehab due to significantly decreased level of function from prior.    #Left acute ischemic stroke with right hemiparesis (dominant) and aphasia  s/p tPA on 8/3  EDSON showed EF of 55-60%  8/6 MRI head: scattered left-sided acute cortical infarcts within the MCA and PCA territories, with trace petechial hemorrhage involving high left frontal region. There is a chronic left occipital lobe infarct.   MRA of the neck: moderate stenosis of the proximal right internal carotid artery  - start PT/OT/ST, Neuropsych eval  - c/w 5mg celexa  - c/w ASA 81mg  - c/w Brilinta 90mg BID  - c/w Lipitor 80mg daily  - Making significant gains in MP and mobility    #Diabetic Peripheral Polyneuropathy  - No pain. Teach patient skin monitoring an sensory awareness    #Covid positive   - PCR positive from 08/16/22   - Placed on Airborne Precautions  - CRP - 85, D-Dimer 323  - Ferritin 585- mild increase  - Completed Remdesivir  - Lovenox increased to 30 mg bid  - cough improving    #HTN - BP was low -   - stopped HCTZ  - stopped Amlodipine 5mg qd  - c/w Lisinopril 40mg   - c/w Lopressor 50mg BID  - now controlled    #Diabetes 2  Ha1c 8.2  - c/w 30U Lantus  - c/w 8U Lispro    #CAD  -continue ASA 81, Brilinta, BB, statin    #Renal lesion  On 8/9, a right upper quadrant US was done to rule out cholecystitis, revealing an indeterminate 1.2 cm right renal lesion and moderate-severe hepatic steatosis.  -outpatient followup, non urgent MRI with renal mass protocol    #Mild persistent cough/ Increased WBC with left shift  - chest x-ray with mild bilateral atelectasis  - PCR positive  - doppler: < from: [facility] Lower Ext Vein Scan, Bilat (08.17.22 @ 17:53) > No evidence of deep venous thrombosis in either lower extremity.  - Resolved on 8/18 CBC  - UA showed bacteriuria, but no wbcs     #Azotemia  - give IVF with improvement  - had negative PVR    -GI/Bowel Mgmt: senna, dulcolax, lactulose, miralax    - Diet: Dash Carb Consistent diet    - Melatonin 10mg qhs for difficulty sleeping       Precautions / PROPHYLAXIS:      - Falls    - Ortho: Weight bearing status: WBAT      - DVT prophylaxis: Lovenox 30 mg BID   Patient is a 78yo M with PMH HTN, DM, CAD (s/p stents), GERD who presented for right sided weakness and aphasia. Patient was found to have a left acute cortical infarct within the MCA and PCA territories. Patient presented with visual field deficits that has since improved, but is still with impaired strength, balance, mobility and ADLs and language. Patient was previously independent in all ADLs and activities. Current level of function is mod assist for bed mobility, max assist with transfers, mod assist with ambulation 25 feet with RW. Patient is a good candidate for rehab due to significantly decreased level of function from prior.    #Left acute ischemic stroke with right hemiparesis (dominant) and aphasia  s/p tPA on 8/3  EDSON showed EF of 55-60%  8/6 MRI head: scattered left-sided acute cortical infarcts within the MCA and PCA territories, with trace petechial hemorrhage involving high left frontal region. There is a chronic left occipital lobe infarct.   MRA of the neck: moderate stenosis of the proximal right internal carotid artery  - start PT/OT/ST, Neuropsych eval  - c/w 5mg celexa  - c/w ASA 81mg  - c/w Brilinta 90mg BID  - c/w Lipitor 80mg daily  - Making significant gains in MP and mobility    #Diabetic Peripheral Polyneuropathy  - No pain. Teach patient skin monitoring an sensory awareness    #Covid positive   - PCR positive from 08/16/22   - Placed on Airborne Precautions  - CRP - 85, D-Dimer 323  - Ferritin 585- mild increase  - Completed Remdesivir  - Lovenox increased to 30 mg bid  - cough improving    #HTN - BP was low -   - stopped HCTZ  - stopped Amlodipine 5mg qd  - c/w Lisinopril 40mg   - c/w Lopressor 50mg BID  - now controlled    #Diabetes 2  Ha1c 8.2  - c/w 30U Lantus  - c/w 8U Lispro    #CAD  -continue ASA 81, Brilinta, BB, statin    #Renal lesion  On 8/9, a right upper quadrant US was done to rule out cholecystitis, revealing an indeterminate 1.2 cm right renal lesion and moderate-severe hepatic steatosis.  -outpatient followup, non urgent MRI with renal mass protocol    #Mild persistent cough/ Increased WBC with left shift  - chest x-ray with mild bilateral atelectasis  - PCR positive  - doppler: < from: [facility] Lower Ext Vein Scan, Bilat (08.17.22 @ 17:53) > No evidence of deep venous thrombosis in either lower extremity.  - Resolved on 8/18 CBC  - UA showed bacteriuria, but no wbcs     #Azotemia  - give IVF with improvement  - had negative PVR    -GI/Bowel Mgmt: senna, dulcolax, lactulose, miralax    - Diet: Dash Carb Consistent diet    - Melatonin 10mg qhs for difficulty sleeping       Precautions / PROPHYLAXIS:      - Falls    - Ortho: Weight bearing status: WBAT      - DVT prophylaxis: Lovenox 30 mg BID

## 2022-08-24 LAB
CULTURE RESULTS: SIGNIFICANT CHANGE UP
GLUCOSE BLDC GLUCOMTR-MCNC: 152 MG/DL — HIGH (ref 70–99)
GLUCOSE BLDC GLUCOMTR-MCNC: 152 MG/DL — HIGH (ref 70–99)
GLUCOSE BLDC GLUCOMTR-MCNC: 173 MG/DL — HIGH (ref 70–99)
GLUCOSE BLDC GLUCOMTR-MCNC: 71 MG/DL — SIGNIFICANT CHANGE UP (ref 70–99)
SPECIMEN SOURCE: SIGNIFICANT CHANGE UP

## 2022-08-24 RX ADMIN — LISINOPRIL 40 MILLIGRAM(S): 2.5 TABLET ORAL at 21:58

## 2022-08-24 RX ADMIN — TICAGRELOR 90 MILLIGRAM(S): 90 TABLET ORAL at 05:48

## 2022-08-24 RX ADMIN — Medication 1 APPLICATION(S): at 17:34

## 2022-08-24 RX ADMIN — Medication 10 MILLIGRAM(S): at 21:58

## 2022-08-24 RX ADMIN — ENOXAPARIN SODIUM 30 MILLIGRAM(S): 100 INJECTION SUBCUTANEOUS at 17:33

## 2022-08-24 RX ADMIN — Medication 50 MILLIGRAM(S): at 17:36

## 2022-08-24 RX ADMIN — ENOXAPARIN SODIUM 30 MILLIGRAM(S): 100 INJECTION SUBCUTANEOUS at 05:46

## 2022-08-24 RX ADMIN — TICAGRELOR 90 MILLIGRAM(S): 90 TABLET ORAL at 17:38

## 2022-08-24 RX ADMIN — ATORVASTATIN CALCIUM 80 MILLIGRAM(S): 80 TABLET, FILM COATED ORAL at 21:58

## 2022-08-24 RX ADMIN — Medication 81 MILLIGRAM(S): at 12:11

## 2022-08-24 RX ADMIN — Medication 8 UNIT(S): at 09:19

## 2022-08-24 RX ADMIN — FAMOTIDINE 40 MILLIGRAM(S): 10 INJECTION INTRAVENOUS at 21:59

## 2022-08-24 RX ADMIN — Medication 8 UNIT(S): at 12:13

## 2022-08-24 RX ADMIN — CITALOPRAM 5 MILLIGRAM(S): 10 TABLET, FILM COATED ORAL at 12:11

## 2022-08-24 RX ADMIN — MAGNESIUM OXIDE 400 MG ORAL TABLET 400 MILLIGRAM(S): 241.3 TABLET ORAL at 17:35

## 2022-08-24 RX ADMIN — INSULIN GLARGINE 30 UNIT(S): 100 INJECTION, SOLUTION SUBCUTANEOUS at 09:18

## 2022-08-24 RX ADMIN — MAGNESIUM OXIDE 400 MG ORAL TABLET 400 MILLIGRAM(S): 241.3 TABLET ORAL at 11:21

## 2022-08-24 RX ADMIN — SENNA PLUS 2 TABLET(S): 8.6 TABLET ORAL at 21:58

## 2022-08-24 RX ADMIN — Medication 50 MILLIGRAM(S): at 05:47

## 2022-08-24 NOTE — PROGRESS NOTE ADULT - ASSESSMENT
Patient is a 76yo M with PMH HTN, DM, CAD (s/p stents), GERD who presented for right sided weakness and aphasia. Patient was found to have a left acute cortical infarct within the MCA and PCA territories. Patient presented with visual field deficits that has since improved, but is still with impaired strength, balance, mobility and ADLs and language. Patient was previously independent in all ADLs and activities. Current level of function is mod assist for bed mobility, max assist with transfers, mod assist with ambulation 25 feet with RW. Patient is a good candidate for rehab due to significantly decreased level of function from prior.    #Left acute ischemic stroke with right hemiparesis (dominant) and aphasia  s/p tPA on 8/3  EDSON showed EF of 55-60%  8/6 MRI head: scattered left-sided acute cortical infarcts within the MCA and PCA territories, with trace petechial hemorrhage involving high left frontal region. There is a chronic left occipital lobe infarct.   MRA of the neck: moderate stenosis of the proximal right internal carotid artery  - start PT/OT/ST, Neuropsych eval  - c/w 5mg celexa  - c/w ASA 81mg  - c/w Brilinta 90mg BID  - c/w Lipitor 80mg daily  - Making significant gains in MP and mobility    #Diabetic Peripheral Polyneuropathy  - No pain. Teach patient skin monitoring an sensory awareness    #Covid positive   - PCR positive from 08/16/22   - Placed on Airborne Precautions  - CRP - 85, D-Dimer 323  - Ferritin 585- mild increase  - Completed Remdesivir  - Lovenox increased to 30 mg bid  - cough improving    #HTN - BP was low -   - stopped HCTZ  - stopped Amlodipine 5mg qd  - c/w Lisinopril 40mg   - c/w Lopressor 50mg BID  - now controlled    #Diabetes 2  Ha1c 8.2  - c/w 30U Lantus  - c/w 8U Lispro    #CAD  -continue ASA 81, Brilinta, BB, statin    #Renal lesion  On 8/9, a right upper quadrant US was done to rule out cholecystitis, revealing an indeterminate 1.2 cm right renal lesion and moderate-severe hepatic steatosis.  -outpatient followup, non urgent MRI with renal mass protocol    #Mild persistent cough/ Increased WBC with left shift  - chest x-ray with mild bilateral atelectasis  - PCR positive  - doppler: < from: [facility] Lower Ext Vein Scan, Bilat (08.17.22 @ 17:53) > No evidence of deep venous thrombosis in either lower extremity.  - Resolved on 8/18 CBC  - UA showed bacteriuria, but no wbcs     #Azotemia  - give IVF with improvement  - had negative PVR    -GI/Bowel Mgmt: senna, dulcolax, lactulose, miralax    - Diet: Dash Carb Consistent diet    - Melatonin 10mg qhs for difficulty sleeping       Precautions / PROPHYLAXIS:      - Falls    - Ortho: Weight bearing status: WBAT      - DVT prophylaxis: Lovenox 30 mg BID   Patient is a 78yo M with PMH HTN, DM, CAD (s/p stents), GERD who presented for right sided weakness and aphasia. Patient was found to have a left acute cortical infarct within the MCA and PCA territories. Patient presented with visual field deficits that has since improved, but is still with impaired strength, balance, mobility and ADLs and language. Patient was previously independent in all ADLs and activities. Current level of function is mod assist for bed mobility, max assist with transfers, mod assist with ambulation 25 feet with RW. Patient is a good candidate for rehab due to significantly decreased level of function from prior.    #Left acute ischemic stroke with right hemiparesis (dominant) and aphasia  s/p tPA on 8/3  EDSON showed EF of 55-60%  8/6 MRI head: scattered left-sided acute cortical infarcts within the MCA and PCA territories, with trace petechial hemorrhage involving high left frontal region. There is a chronic left occipital lobe infarct.   MRA of the neck: moderate stenosis of the proximal right internal carotid artery  - start PT/OT/ST, Neuropsych eval  - c/w 5mg celexa  - c/w ASA 81mg  - c/w Brilinta 90mg BID  - c/w Lipitor 80mg daily  - Making significant gains in MP and mobility    #Diabetic Peripheral Polyneuropathy  - No pain. Teach patient skin monitoring an sensory awareness    #Covid positive   - PCR positive from 08/16/22   - Placed on Airborne Precautions  - CRP - 85, D-Dimer 323  - Ferritin 585- mild increase  - Completed Remdesivir  - Lovenox increased to 30 mg bid  - cough improving    #HTN - BP was low -   - stopped HCTZ  - stopped Amlodipine 5mg qd  - c/w Lisinopril 40mg   - c/w Lopressor 50mg BID  - now controlled    #Diabetes 2. Fair control  Ha1c 8.2  - c/w 30U Lantus  - c/w 8U Lispro    #CAD  -continue ASA 81, Brilinta, BB, statin    #Renal lesion  On 8/9, a right upper quadrant US was done to rule out cholecystitis, revealing an indeterminate 1.2 cm right renal lesion and moderate-severe hepatic steatosis.  -outpatient followup, non urgent MRI with renal mass protocol    #Mild persistent cough/ Increased WBC with left shift  - chest x-ray with mild bilateral atelectasis  - PCR positive  - doppler: < from: [facility] Lower Ext Vein Scan, Bilat (08.17.22 @ 17:53) > No evidence of deep venous thrombosis in either lower extremity.  - Resolved on 8/18 CBC  - UA showed bacteriuria, but no wbcs     #Azotemia  - give IVF with improvement  - had negative PVR    -GI/Bowel Mgmt: senna, dulcolax, lactulose, miralax    - Diet: Dash Carb Consistent diet    - Melatonin 10mg qhs for difficulty sleeping       Precautions / PROPHYLAXIS:      - Falls    - Ortho: Weight bearing status: WBAT      - DVT prophylaxis: Lovenox 30 mg BID

## 2022-08-24 NOTE — PROGRESS NOTE ADULT - SUBJECTIVE AND OBJECTIVE BOX
Patient is a 77y old  Male who presents with a chief complaint of rehab s/p left ischemic stroke with aphasia and right hemiparesis (11 Aug 2022 11:30)      HPI:  Patient is a 78yo M with PMH of HTN, DM, HLD, CAD (s/p stents), GERD who presented to the ED on 8/3/2022 for right hand weakness. Patient noticed he started having difficulty with his speech, then developed weakness in his right hand and some visual field deficits. In the ED, patient received tPA. CT head at the time was negative, EDSON showed EF of 55-60%.  MRI head showed scattered left-sided acute cortical infarcts within the MCA and PCA territories, with trace petechial hemorrhage involving high left frontal region. There is a chronic left occipital lobe infarct. MRA of the neck showed moderate stenosis of the proximal right internal carotid artery. On 8/9, a right upper quadrant US was done to rule out cholecystitis, revealing an indeterminate 1.2 cm right renal lesion and moderate-severe hepatic steatosis. During the hospital stay, the visual field deficits improved but patient continued to have no motor function in the right hand and impaired RLE strength and mobility and ADL independence and a persistent aphasia.    On evaluation by PM&R, patient endorses no movement in his right hand and notes some difficulty with memory but otherwise has no other complaints. Patient is right handed. He lives by himself in a house with [address] and one flight of stairs inside (5+7 steps). Patient was previously independent in ADLs and ambulation, occasionally using a cane outdoors for balance. Currently patient requires mod assist for bed mobility, max assist with transfers, mod assist with ambulation 25 feet with RW and max assistance for dressing. He was evaluated by Physiatry on the medical service and was found to be a good acute rehab candidate.  (11 Aug 2022 11:30)    TODAY'S SUBJECTIVE & REVIEW OF SYMPTOMS:  Patient was seen and assessed at bedside. No overnight events. Patient denies any complaints at this time. Tolerating PT/OT. Tolerating oral diet. Voiding and passing stool spontaneously. Vital signs are stable. Denies any cough, difficulty breathing, shortness of breath.      Constitutional    [ x  ] WNL           [   ] poor appetite   [   ] insomnia   [   ] tired   Cardio:                [  x ] WNL           [   ] CP   [   ] VELASCO   [   ] palpitations               Resp:                   [   ] WNL           [   ] SOB   [  x ] cough improving   [   ] wheezing   GI:                        [ x  ] WNL           [   ] constipation   [   ] diarrhea   [   ] abdominal pain   [   ] nausea   [   ] emesis                                :                      [ x  ] WNL           [   ] PRITCHARD  [   ] dysuria   [   ] difficulty voiding             Endo:                   [ x  ] WNL          [   ] polyuria   [   ] temperature intolerance                 Skin:                     [ x  ] WNL          [   ] pain   [   ] wound   [   ] rash   MSK:                    [   x] WNL          [   ] muscle pain   [   ] joint pain/ stiffness   [   ] muscle tenderness   [   ] swelling   Neuro:                 [   ] WNL          [   ] HA   [   ] change in vision   [   ] tremor   [ x  ] weakness- Right side   [   ]dysphagia   [x  ] word finding difficulties- improving,   [ x ] numbness/ neuropathy both feet    Cognitive:           [ x  ] WNL           [   ]confusion   - daughter note some memory loss since stroke   Psych:                  [  x ] WNL           [   ] hallucinations   [   ]agitation   [   ] delusion   [   ]depression      PHYSICAL EXAM    ICU Vital Signs Last 24 Hrs  T(C): 37.1 (24 Aug 2022 06:52), Max: 37.1 (24 Aug 2022 06:52)  T(F): 98.8 (24 Aug 2022 06:52), Max: 98.8 (24 Aug 2022 06:52)  HR: 61 (24 Aug 2022 06:52) (61 - 75)  BP: 140/63 (24 Aug 2022 06:52) (131/74 - 162/85)  BP(mean): --  ABP: --  ABP(mean): --  RR: 18 (24 Aug 2022 06:52) (18 - 19)  SpO2: --      General:[  x ] NAD, Resting Comfortable,   [   ] other:                                HEENT: [  x ] NC/AT, EOMI, PERRL , Normal Conjunctivae,   [   ] other:  Cardio: [  x ] RRR, no murmer,   [   ] other:                              Pulm: [  x ] No Respiratory Distress,  Lungs CTAB,   [   ] other:                       Abdomen: [ x  ] ND/NT, Soft,   [   ] other:    : [ x  ] NO PIRTCHARD CATHETER,   [  ] PRITCHARD CATHETER- no meatal tear, no discharge, [   ] other:                                            MSK: [ x  ] No joint swelling, Full PROM,   [    ] other:                                       Ext: [  x ]No C/C/E, No calf tenderness,   [   ]other:    Skin: [ x  ]intact,   [   ] other:                                                                   Neurological Examination:  Cognitive: [ x   ] AAO x 3,   [    ]  other:                                                                 Attention/ Concentration:  [ x   ] intact   [    ]  other:   Memory: [    ] intact,    [ x  ]  other:  mildly impaired  Mood/Affect: [  x  ] wnl,    [    ]  other:                                                                             Communication: [ x  ]Fluent, no dysarthria, following commands:  [   ] other:  CN II - XII:  [  x  ] intact, VFF  [    ] other: mild right facial droop                                                                                         Motor:   RIGHT UE: [   ] WNL,  [  x ] other: 2/5 shoulder extension, 2+/5 elbow flexion, 2-/5 elbow extension, 2/5 finger flexion, 0/5 finger extension  LEFT    UE: [  x ] WNL  RIGHT LE: [ x  ] WNL,  [   ] other: Hip flexion 3+/5 with drift to bed, knee flexion 4/5, plantarflexion 5/5, dorsiflexion 5/5, toe extension 5/5  LEFT    LE: [  x ] WNL,  Hip flexion 4/5, knee flexion 5/5, plantarflexion 5/5, dorsiflexion 5/5, toe extension 5/5     Tone: [    ] wnl,   [ x   ]  other: mild increased tone in right triceps  DTRs: [ x  ]symmetric, 1+ [   ] other:   Coordination:   [    ] intact as testable,   [ not assesed   ]       CLOF:   Bed mobility- supervision  Transfers- touch assist  Gait: touch assist 60 feet with emmett-cane  Lower body dressing: supervision      MEDICATIONS  (STANDING):  aspirin enteric coated 81 milliGRAM(s) Oral daily  atorvastatin 80 milliGRAM(s) Oral at bedtime  citalopram 5 milliGRAM(s) Oral daily  dextrose 5%. 1000 milliLiter(s) (100 mL/Hr) IV Continuous <Continuous>  dextrose 50% Injectable 25 Gram(s) IV Push once  dextrose 50% Injectable 12.5 Gram(s) IV Push once  dextrose 50% Injectable 25 Gram(s) IV Push once  enoxaparin Injectable 30 milliGRAM(s) SubCutaneous every 12 hours  famotidine    Tablet 40 milliGRAM(s) Oral at bedtime  glucagon  Injectable 1 milliGRAM(s) IntraMuscular once  hydrocortisone 1% Cream 1 Application(s) Topical two times a day  insulin glargine Injectable (LANTUS) 30 Unit(s) SubCutaneous every morning  insulin lispro Injectable (ADMELOG) 8 Unit(s) SubCutaneous three times a day before meals  lisinopril 40 milliGRAM(s) Oral at bedtime  magnesium oxide 400 milliGRAM(s) Oral two times a day with meals  melatonin 10 milliGRAM(s) Oral at bedtime  metoprolol tartrate 50 milliGRAM(s) Oral two times a day  polyethylene glycol 3350 17 Gram(s) Oral two times a day  senna 2 Tablet(s) Oral at bedtime  sodium chloride 0.9%. 1000 milliLiter(s) (75 mL/Hr) IV Continuous <Continuous>  ticagrelor 90 milliGRAM(s) Oral every 12 hours    MEDICATIONS  (PRN):  acetaminophen     Tablet .. 650 milliGRAM(s) Oral every 6 hours PRN Temp greater or equal to 38C (100.4F), Mild Pain (1 - 3)  bisacodyl Suppository 10 milliGRAM(s) Rectal daily PRN Constipation  dextrose Oral Gel 15 Gram(s) Oral once PRN Blood Glucose LESS THAN 70 milliGRAM(s)/deciliter  diphenhydrAMINE 25 milliGRAM(s) Oral every 6 hours PRN Rash and/or Itching  guaifenesin/dextromethorphan Oral Liquid 10 milliLiter(s) Oral every 4 hours PRN Cough  lactulose Syrup 20 Gram(s) Oral two times a day PRN Constipation        RECENT LABS/IMAGING                        13.8   9.75  )-----------( 310      ( 23 Aug 2022 05:19 )             40.3     08-23    140  |  105  |  22<H>  ----------------------------<  169<H>  4.4   |  25  |  1.1    Ca    9.0      23 Aug 2022 05:19  Mg     1.7     08-23    TPro  6.5  /  Alb  3.2<L>  /  TBili  0.7  /  DBili  x   /  AST  30  /  ALT  36  /  AlkPhos  145<H>  08-23        POCT Blood Glucose.: 141 mg/dL (08-23-22 @ 16:47)  POCT Blood Glucose.: 245 mg/dL (08-23-22 @ 11:14)  POCT Blood Glucose.: 163 mg/dL (08-23-22 @ 07:27)  POCT Blood Glucose.: 139 mg/dL (08-22-22 @ 22:01)  POCT Blood Glucose.: 93 mg/dL (08-22-22 @ 16:30)  POCT Blood Glucose.: 248 mg/dL (08-22-22 @ 11:11)  POCT Blood Glucose.: 138 mg/dL (08-22-22 @ 08:15)  POCT Blood Glucose.: 117 mg/dL (08-21-22 @ 21:14)  POCT Blood Glucose.: 100 mg/dL (08-21-22 @ 16:26)  POCT Blood Glucose.: 260 mg/dL (08-21-22 @ 11:19)  POCT Blood Glucose.: 122 mg/dL (08-21-22 @ 08:06)  POCT Blood Glucose.: 227 mg/dL (08-20-22 @ 21:54)   Patient is a 77y old  Male who presents with a chief complaint of rehab s/p left ischemic stroke with aphasia and right hemiparesis (11 Aug 2022 11:30)      HPI:  Patient is a 78yo M with PMH of HTN, DM, HLD, CAD (s/p stents), GERD who presented to the ED on 8/3/2022 for right hand weakness. Patient noticed he started having difficulty with his speech, then developed weakness in his right hand and some visual field deficits. In the ED, patient received tPA. CT head at the time was negative, EDSON showed EF of 55-60%.  MRI head showed scattered left-sided acute cortical infarcts within the MCA and PCA territories, with trace petechial hemorrhage involving high left frontal region. There is a chronic left occipital lobe infarct. MRA of the neck showed moderate stenosis of the proximal right internal carotid artery. On 8/9, a right upper quadrant US was done to rule out cholecystitis, revealing an indeterminate 1.2 cm right renal lesion and moderate-severe hepatic steatosis. During the hospital stay, the visual field deficits improved but patient continued to have no motor function in the right hand and impaired RLE strength and mobility and ADL independence and a persistent aphasia.    On evaluation by PM&R, patient endorses no movement in his right hand and notes some difficulty with memory but otherwise has no other complaints. Patient is right handed. He lives by himself in a house with [address] and one flight of stairs inside (5+7 steps). Patient was previously independent in ADLs and ambulation, occasionally using a cane outdoors for balance. Currently patient requires mod assist for bed mobility, max assist with transfers, mod assist with ambulation 25 feet with RW and max assistance for dressing. He was evaluated by Physiatry on the medical service and was found to be a good acute rehab candidate.  (11 Aug 2022 11:30)    TODAY'S SUBJECTIVE & REVIEW OF SYMPTOMS:  Patient was seen and assessed at bedside. No overnight events. Patient denies any complaints at this time. Tolerating PT/OT. Tolerating oral diet. Voiding and passing stool spontaneously. Vital signs are stable. Denies any cough, difficulty breathing, shortness of breath.      Constitutional    [ x  ] WNL           [   ] poor appetite   [   ] insomnia   [   ] tired   Cardio:                [  x ] WNL           [   ] CP   [   ] VELASCO   [   ] palpitations               Resp:                   [   ] WNL           [   ] SOB   [  x ] cough improving   [   ] wheezing   GI:                        [ x  ] WNL           [   ] constipation   [   ] diarrhea   [   ] abdominal pain   [   ] nausea   [   ] emesis                                :                      [ x  ] WNL           [   ] PRITCHARD  [   ] dysuria   [   ] difficulty voiding             Endo:                   [ x  ] WNL          [   ] polyuria   [   ] temperature intolerance                 Skin:                     [ x  ] WNL          [   ] pain   [   ] wound   [   ] rash   MSK:                    [   x] WNL          [   ] muscle pain   [   ] joint pain/ stiffness   [   ] muscle tenderness   [   ] swelling   Neuro:                 [   ] WNL          [   ] HA   [   ] change in vision   [   ] tremor   [ x  ] weakness- Right side   [   ]dysphagia   [x  ] word finding difficulties- improving,   [ x ] numbness/ neuropathy both feet    Cognitive:           [ x  ] WNL           [   ]confusion   - daughter note some memory loss since stroke   Psych:                  [  x ] WNL           [   ] hallucinations   [   ]agitation   [   ] delusion   [   ]depression      PHYSICAL EXAM    ICU Vital Signs Last 24 Hrs  T(C): 37.1 (24 Aug 2022 06:52), Max: 37.1 (24 Aug 2022 06:52)  T(F): 98.8 (24 Aug 2022 06:52), Max: 98.8 (24 Aug 2022 06:52)  HR: 61 (24 Aug 2022 06:52) (61 - 75)  BP: 140/63 (24 Aug 2022 06:52) (131/74 - 162/85)  BP(mean): --  ABP: --  ABP(mean): --  RR: 18 (24 Aug 2022 06:52) (18 - 19)  SpO2: --      General:[  x ] NAD, Resting Comfortable,   [   ] other:                                HEENT: [  x ] NC/AT, EOMI, PERRL , Normal Conjunctivae,   [   ] other:  Cardio: [  x ] RRR, no murmer,   [   ] other:                              Pulm: [  x ] No Respiratory Distress,  Lungs CTAB,   [   ] other:                       Abdomen: [ x  ] ND/NT, Soft,   [   ] other:    : [ x  ] NO PRITCHARD CATHETER,   [  ] PRITCHARD CATHETER- no meatal tear, no discharge, [   ] other:                                            MSK: [ x  ] No joint swelling, Full PROM,   [    ] other:                                       Ext: [  x ]No C/C/E, No calf tenderness,   [   ]other:    Skin: [ x  ]intact,   [   ] other:                                                                   Neurological Examination:  Cognitive: [ x   ] AAO x 3,   [    ]  other:                                                                 Attention/ Concentration:  [ x   ] intact   [    ]  other:   Memory: [    ] intact,    [ x  ]  other:  mildly impaired  Mood/Affect: [  x  ] wnl,    [    ]  other:                                                                             Communication: [ x  ]Fluent, no dysarthria, following commands:  [   ] other:  CN II - XII:  [  x  ] intact, VFF  [    ] other: mild right facial droop                                                                                         Motor:   RIGHT UE: [   ] WNL,  [  x ] other: 2/5 shoulder extension, 2+/5 elbow flexion, 2-/5 elbow extension, 2/5 finger flexion, 0/5 finger extension  LEFT    UE: [  x ] WNL  RIGHT LE: [ x  ] WNL,  [   ] other: Hip flexion 3+/5 with drift to bed, knee flexion 4/5, plantarflexion 5/5, dorsiflexion 5/5, toe extension 5/5  LEFT    LE: [  x ] WNL,  Hip flexion 4/5, knee flexion 5/5, plantarflexion 5/5, dorsiflexion 5/5, toe extension 5/5     Tone: [    ] wnl,   [ x   ]  other: mild increased tone in right triceps  DTRs: [ x  ]symmetric, 1+ [   ] other:   Coordination:   [    ] intact as testable,   [ not assesed   ]       CLOF:   Bed mobility- supervision  Transfers- touch assist  Gait: touch assist 60 feet with emmett-cane  Lower body dressing: supervision      MEDICATIONS  (STANDING):  aspirin enteric coated 81 milliGRAM(s) Oral daily  atorvastatin 80 milliGRAM(s) Oral at bedtime  citalopram 5 milliGRAM(s) Oral daily  dextrose 5%. 1000 milliLiter(s) (100 mL/Hr) IV Continuous <Continuous>  dextrose 50% Injectable 25 Gram(s) IV Push once  dextrose 50% Injectable 12.5 Gram(s) IV Push once  dextrose 50% Injectable 25 Gram(s) IV Push once  enoxaparin Injectable 30 milliGRAM(s) SubCutaneous every 12 hours  famotidine    Tablet 40 milliGRAM(s) Oral at bedtime  glucagon  Injectable 1 milliGRAM(s) IntraMuscular once  hydrocortisone 1% Cream 1 Application(s) Topical two times a day  insulin glargine Injectable (LANTUS) 30 Unit(s) SubCutaneous every morning  insulin lispro Injectable (ADMELOG) 8 Unit(s) SubCutaneous three times a day before meals  lisinopril 40 milliGRAM(s) Oral at bedtime  magnesium oxide 400 milliGRAM(s) Oral two times a day with meals  melatonin 10 milliGRAM(s) Oral at bedtime  metoprolol tartrate 50 milliGRAM(s) Oral two times a day  polyethylene glycol 3350 17 Gram(s) Oral two times a day  senna 2 Tablet(s) Oral at bedtime  sodium chloride 0.9%. 1000 milliLiter(s) (75 mL/Hr) IV Continuous <Continuous>  ticagrelor 90 milliGRAM(s) Oral every 12 hours    MEDICATIONS  (PRN):  acetaminophen     Tablet .. 650 milliGRAM(s) Oral every 6 hours PRN Temp greater or equal to 38C (100.4F), Mild Pain (1 - 3)  bisacodyl Suppository 10 milliGRAM(s) Rectal daily PRN Constipation  dextrose Oral Gel 15 Gram(s) Oral once PRN Blood Glucose LESS THAN 70 milliGRAM(s)/deciliter  diphenhydrAMINE 25 milliGRAM(s) Oral every 6 hours PRN Rash and/or Itching  guaifenesin/dextromethorphan Oral Liquid 10 milliLiter(s) Oral every 4 hours PRN Cough  lactulose Syrup 20 Gram(s) Oral two times a day PRN Constipation        RECENT LABS/IMAGING                        13.8   9.75  )-----------( 310      ( 23 Aug 2022 05:19 )             40.3     08-23    140  |  105  |  22<H>  ----------------------------<  169<H>  4.4   |  25  |  1.1    Ca    9.0      23 Aug 2022 05:19  Mg     1.7     08-23    TPro  6.5  /  Alb  3.2<L>  /  TBili  0.7  /  DBili  x   /  AST  30  /  ALT  36  /  AlkPhos  145<H>  08-23        POCT Blood Glucose.: 141 mg/dL (08-23-22 @ 16:47)  POCT Blood Glucose.: 245 mg/dL (08-23-22 @ 11:14)  POCT Blood Glucose.: 163 mg/dL (08-23-22 @ 07:27)  POCT Blood Glucose.: 139 mg/dL (08-22-22 @ 22:01)  POCT Blood Glucose.: 93 mg/dL (08-22-22 @ 16:30)  POCT Blood Glucose.: 248 mg/dL (08-22-22 @ 11:11)  POCT Blood Glucose.: 138 mg/dL (08-22-22 @ 08:15)  POCT Blood Glucose.: 117 mg/dL (08-21-22 @ 21:14)  POCT Blood Glucose.: 100 mg/dL (08-21-22 @ 16:26)  POCT Blood Glucose.: 260 mg/dL (08-21-22 @ 11:19)  POCT Blood Glucose.: 122 mg/dL (08-21-22 @ 08:06)  POCT Blood Glucose.: 227 mg/dL (08-20-22 @ 21:54)

## 2022-08-25 LAB
GLUCOSE BLDC GLUCOMTR-MCNC: 137 MG/DL — HIGH (ref 70–99)
GLUCOSE BLDC GLUCOMTR-MCNC: 154 MG/DL — HIGH (ref 70–99)
GLUCOSE BLDC GLUCOMTR-MCNC: 209 MG/DL — HIGH (ref 70–99)

## 2022-08-25 RX ADMIN — TICAGRELOR 90 MILLIGRAM(S): 90 TABLET ORAL at 05:27

## 2022-08-25 RX ADMIN — ENOXAPARIN SODIUM 30 MILLIGRAM(S): 100 INJECTION SUBCUTANEOUS at 05:27

## 2022-08-25 RX ADMIN — Medication 50 MILLIGRAM(S): at 17:28

## 2022-08-25 RX ADMIN — ENOXAPARIN SODIUM 30 MILLIGRAM(S): 100 INJECTION SUBCUTANEOUS at 17:31

## 2022-08-25 RX ADMIN — Medication 10 MILLIGRAM(S): at 21:15

## 2022-08-25 RX ADMIN — Medication 81 MILLIGRAM(S): at 12:59

## 2022-08-25 RX ADMIN — ATORVASTATIN CALCIUM 80 MILLIGRAM(S): 80 TABLET, FILM COATED ORAL at 21:16

## 2022-08-25 RX ADMIN — MAGNESIUM OXIDE 400 MG ORAL TABLET 400 MILLIGRAM(S): 241.3 TABLET ORAL at 08:07

## 2022-08-25 RX ADMIN — Medication 50 MILLIGRAM(S): at 05:27

## 2022-08-25 RX ADMIN — FAMOTIDINE 40 MILLIGRAM(S): 10 INJECTION INTRAVENOUS at 21:16

## 2022-08-25 RX ADMIN — SENNA PLUS 2 TABLET(S): 8.6 TABLET ORAL at 21:15

## 2022-08-25 RX ADMIN — Medication 8 UNIT(S): at 17:09

## 2022-08-25 RX ADMIN — CITALOPRAM 5 MILLIGRAM(S): 10 TABLET, FILM COATED ORAL at 12:59

## 2022-08-25 RX ADMIN — LISINOPRIL 40 MILLIGRAM(S): 2.5 TABLET ORAL at 21:16

## 2022-08-25 RX ADMIN — MAGNESIUM OXIDE 400 MG ORAL TABLET 400 MILLIGRAM(S): 241.3 TABLET ORAL at 17:30

## 2022-08-25 RX ADMIN — TICAGRELOR 90 MILLIGRAM(S): 90 TABLET ORAL at 17:29

## 2022-08-25 RX ADMIN — Medication 8 UNIT(S): at 12:23

## 2022-08-25 RX ADMIN — INSULIN GLARGINE 30 UNIT(S): 100 INJECTION, SOLUTION SUBCUTANEOUS at 08:06

## 2022-08-25 RX ADMIN — Medication 8 UNIT(S): at 08:06

## 2022-08-25 RX ADMIN — Medication 1 APPLICATION(S): at 05:28

## 2022-08-25 NOTE — PROGRESS NOTE ADULT - SUBJECTIVE AND OBJECTIVE BOX
Patient is a 77y old  Male who presents with a chief complaint of rehab s/p left ischemic stroke with aphasia and right hemiparesis (11 Aug 2022 11:30)      HPI:  Patient is a 78yo M with PMH of HTN, DM, HLD, CAD (s/p stents), GERD who presented to the ED on 8/3/2022 for right hand weakness. Patient noticed he started having difficulty with his speech, then developed weakness in his right hand and some visual field deficits. In the ED, patient received tPA. CT head at the time was negative, EDSON showed EF of 55-60%.  MRI head showed scattered left-sided acute cortical infarcts within the MCA and PCA territories, with trace petechial hemorrhage involving high left frontal region. There is a chronic left occipital lobe infarct. MRA of the neck showed moderate stenosis of the proximal right internal carotid artery. On 8/9, a right upper quadrant US was done to rule out cholecystitis, revealing an indeterminate 1.2 cm right renal lesion and moderate-severe hepatic steatosis. During the hospital stay, the visual field deficits improved but patient continued to have no motor function in the right hand and impaired RLE strength and mobility and ADL independence and a persistent aphasia.    On evaluation by PM&R, patient endorses no movement in his right hand and notes some difficulty with memory but otherwise has no other complaints. Patient is right handed. He lives by himself in a house with 5 [address] and one flight of stairs inside (5+7 steps). Patient was previously independent in ADLs and ambulation, occasionally using a cane outdoors for balance. Currently patient requires mod assist for bed mobility, max assist with transfers, mod assist with ambulation 25 feet with RW and max assistance for dressing. He was evaluated by Physiatry on the medical service and was found to be a good acute rehab candidate.  (11 Aug 2022 11:30)    TODAY'S SUBJECTIVE & REVIEW OF SYMPTOMS:  Patient was seen and assessed at bedside. No overnight events. Patient denies any complaints at this time. Tolerating PT/OT. Tolerating oral diet. Voiding and passing stool spontaneously. Vital signs are stable. Denies any cough, difficulty breathing, shortness of breath.      Constitutional    [ x  ] WNL           [   ] poor appetite   [   ] insomnia   [   ] tired   Cardio:                [  x ] WNL           [   ] CP   [   ] VELASCO   [   ] palpitations               Resp:                   [   ] WNL           [   ] SOB   [  x ] cough improving   [   ] wheezing   GI:                        [ x  ] WNL           [   ] constipation   [   ] diarrhea   [   ] abdominal pain   [   ] nausea   [   ] emesis                                :                      [ x  ] WNL           [   ] PRITCHARD  [   ] dysuria   [   ] difficulty voiding             Endo:                   [ x  ] WNL          [   ] polyuria   [   ] temperature intolerance                 Skin:                     [ x  ] WNL          [   ] pain   [   ] wound   [   ] rash   MSK:                    [   x] WNL          [   ] muscle pain   [   ] joint pain/ stiffness   [   ] muscle tenderness   [   ] swelling   Neuro:                 [   ] WNL          [   ] HA   [   ] change in vision   [   ] tremor   [ x  ] weakness- Right side   [   ]dysphagia   [x  ] word finding difficulties- improving,   [ x ] numbness/ neuropathy both feet    Cognitive:           [ x  ] WNL           [   ]confusion   - daughter note some memory loss since stroke   Psych:                  [  x ] WNL           [   ] hallucinations   [   ]agitation   [   ] delusion   [   ]depression      PHYSICAL EXAM    ICU Vital Signs Last 24 Hrs  T(C): 35.9 (25 Aug 2022 05:47), Max: 36.8 (24 Aug 2022 13:24)  T(F): 96.6 (25 Aug 2022 05:47), Max: 98.2 (24 Aug 2022 13:24)  HR: 70 (25 Aug 2022 05:47) (66 - 78)  BP: 156/84 (25 Aug 2022 05:47) (144/76 - 156/84)  BP(mean): --  ABP: --  ABP(mean): --  RR: 17 (25 Aug 2022 05:47) (17 - 18)  SpO2: --      General:[  x ] NAD, Resting Comfortable,   [   ] other:                                HEENT: [  x ] NC/AT, EOMI, PERRL , Normal Conjunctivae,   [   ] other:  Cardio: [  x ] RRR, no murmer,   [   ] other:                              Pulm: [  x ] No Respiratory Distress,  Lungs CTAB,   [   ] other:                       Abdomen: [ x  ] ND/NT, Soft,   [   ] other:    : [ x  ] NO PRITCHARD CATHETER,   [  ] PRITCHARD CATHETER- no meatal tear, no discharge, [   ] other:                                            MSK: [ x  ] No joint swelling, Full PROM,   [    ] other:                                       Ext: [  x ]No C/C/E, No calf tenderness,   [   ]other:    Skin: [ x  ]intact,   [   ] other:                                                                   Neurological Examination:  Cognitive: [ x   ] AAO x 3,   [    ]  other:                                                                 Attention/ Concentration:  [ x   ] intact   [    ]  other:   Memory: [    ] intact,    [ x  ]  other:  mildly impaired  Mood/Affect: [  x  ] wnl,    [    ]  other:                                                                             Communication: [ x  ]Fluent, no dysarthria, following commands:  [   ] other:  CN II - XII:  [  x  ] intact, VFF  [    ] other: mild right facial droop                                                                                         Motor:   RIGHT UE: [   ] WNL,  [  x ] other: 2/5 shoulder extension, 2+/5 elbow flexion, 2-/5 elbow extension, 2/5 finger flexion, 0/5 finger extension  LEFT    UE: [  x ] WNL  RIGHT LE: [ x  ] WNL,  [   ] other: Hip flexion 3+/5 with drift to bed, knee flexion 4/5, plantarflexion 5/5, dorsiflexion 5/5, toe extension 5/5  LEFT    LE: [  x ] WNL,  Hip flexion 4/5, knee flexion 5/5, plantarflexion 5/5, dorsiflexion 5/5, toe extension 5/5     Tone: [    ] wnl,   [ x   ]  other: mild increased tone in right triceps  DTRs: [ x  ]symmetric, 1+ [   ] other:   Coordination:   [    ] intact as testable,   [ not assesed   ]       CLOF:   Bed mobility- supervision  Transfers- touch assist  Gait: touch assist 60 feet with emmett-cane  Lower body dressing: supervision    MEDICATIONS  (STANDING):  aspirin enteric coated 81 milliGRAM(s) Oral daily  atorvastatin 80 milliGRAM(s) Oral at bedtime  citalopram 5 milliGRAM(s) Oral daily  dextrose 5%. 1000 milliLiter(s) (100 mL/Hr) IV Continuous <Continuous>  dextrose 50% Injectable 25 Gram(s) IV Push once  dextrose 50% Injectable 12.5 Gram(s) IV Push once  dextrose 50% Injectable 25 Gram(s) IV Push once  enoxaparin Injectable 30 milliGRAM(s) SubCutaneous every 12 hours  famotidine    Tablet 40 milliGRAM(s) Oral at bedtime  glucagon  Injectable 1 milliGRAM(s) IntraMuscular once  hydrocortisone 1% Cream 1 Application(s) Topical two times a day  insulin glargine Injectable (LANTUS) 30 Unit(s) SubCutaneous every morning  insulin lispro Injectable (ADMELOG) 8 Unit(s) SubCutaneous three times a day before meals  lisinopril 40 milliGRAM(s) Oral at bedtime  magnesium oxide 400 milliGRAM(s) Oral two times a day with meals  melatonin 10 milliGRAM(s) Oral at bedtime  metoprolol tartrate 50 milliGRAM(s) Oral two times a day  polyethylene glycol 3350 17 Gram(s) Oral two times a day  senna 2 Tablet(s) Oral at bedtime  sodium chloride 0.9%. 1000 milliLiter(s) (75 mL/Hr) IV Continuous <Continuous>  ticagrelor 90 milliGRAM(s) Oral every 12 hours    MEDICATIONS  (PRN):  acetaminophen     Tablet .. 650 milliGRAM(s) Oral every 6 hours PRN Temp greater or equal to 38C (100.4F), Mild Pain (1 - 3)  bisacodyl Suppository 10 milliGRAM(s) Rectal daily PRN Constipation  dextrose Oral Gel 15 Gram(s) Oral once PRN Blood Glucose LESS THAN 70 milliGRAM(s)/deciliter  diphenhydrAMINE 25 milliGRAM(s) Oral every 6 hours PRN Rash and/or Itching  guaifenesin/dextromethorphan Oral Liquid 10 milliLiter(s) Oral every 4 hours PRN Cough  lactulose Syrup 20 Gram(s) Oral two times a day PRN Constipation          RECENT LABS/IMAGING      POCT Blood Glucose.: 137 mg/dL (08-25-22 @ 07:15)  POCT Blood Glucose.: 152 mg/dL (08-24-22 @ 22:29)  POCT Blood Glucose.: 71 mg/dL (08-24-22 @ 16:24)  POCT Blood Glucose.: 173 mg/dL (08-24-22 @ 11:16)  POCT Blood Glucose.: 152 mg/dL (08-24-22 @ 08:15)  POCT Blood Glucose.: 141 mg/dL (08-23-22 @ 16:47)  POCT Blood Glucose.: 245 mg/dL (08-23-22 @ 11:14)  POCT Blood Glucose.: 163 mg/dL (08-23-22 @ 07:27)  POCT Blood Glucose.: 139 mg/dL (08-22-22 @ 22:01)  POCT Blood Glucose.: 93 mg/dL (08-22-22 @ 16:30)  POCT Blood Glucose.: 248 mg/dL (08-22-22 @ 11:11)  POCT Blood Glucose.: 138 mg/dL (08-22-22 @ 08:15)

## 2022-08-25 NOTE — PROGRESS NOTE ADULT - ASSESSMENT
Patient is a 76yo M with PMH HTN, DM, CAD (s/p stents), GERD who presented for right sided weakness and aphasia. Patient was found to have a left acute cortical infarct within the MCA and PCA territories. Patient presented with visual field deficits that has since improved, but is still with impaired strength, balance, mobility and ADLs and language. Patient was previously independent in all ADLs and activities. Current level of function is mod assist for bed mobility, max assist with transfers, mod assist with ambulation 25 feet with RW. Patient is a good candidate for rehab due to significantly decreased level of function from prior.    #Left acute ischemic stroke with right hemiparesis (dominant) and aphasia  s/p tPA on 8/3  EDSON showed EF of 55-60%  8/6 MRI head: scattered left-sided acute cortical infarcts within the MCA and PCA territories, with trace petechial hemorrhage involving high left frontal region. There is a chronic left occipital lobe infarct.   MRA of the neck: moderate stenosis of the proximal right internal carotid artery  - start PT/OT/ST, Neuropsych eval  - c/w 5mg celexa  - c/w ASA 81mg  - c/w Brilinta 90mg BID  - c/w Lipitor 80mg daily  - Making significant gains in MP and mobility    #Diabetic Peripheral Polyneuropathy  - No pain. Teach patient skin monitoring an sensory awareness    #Covid positive   - PCR positive from 08/16/22   - Placed on Airborne Precautions  - CRP - 85, D-Dimer 323  - Ferritin 585- mild increase  - Completed Remdesivir  - Lovenox increased to 30 mg bid  - cough improving    #HTN - BP was low -   - stopped HCTZ  - stopped Amlodipine 5mg qd  - c/w Lisinopril 40mg   - c/w Lopressor 50mg BID  - now controlled    #Diabetes 2. Fair control  Ha1c 8.2  - c/w 30U Lantus  - c/w 8U Lispro    #CAD  -continue ASA 81, Brilinta, BB, statin    #Renal lesion  On 8/9, a right upper quadrant US was done to rule out cholecystitis, revealing an indeterminate 1.2 cm right renal lesion and moderate-severe hepatic steatosis.  -outpatient followup, non urgent MRI with renal mass protocol    #Mild persistent cough/ Increased WBC with left shift  - chest x-ray with mild bilateral atelectasis  - PCR positive  - doppler: < from: [facility] Dup[facility] Lower Ext Vein Scan, Bilat (08.17.22 @ 17:53) > No evidence of deep venous thrombosis in either lower extremity.  - Resolved on 8/18 CBC  - UA showed bacteriuria, but no wbcs     #Azotemia  - give IVF with improvement  - had negative PVR    -GI/Bowel Mgmt: senna, dulcolax, lactulose, miralax    - Diet: Dash Carb Consistent diet    - Melatonin 10mg qhs for difficulty sleeping       Precautions / PROPHYLAXIS:      - Falls    - Ortho: Weight bearing status: WBAT      - DVT prophylaxis: Lovenox 30 mg BID   Patient is a 76yo M with PMH HTN, DM, CAD (s/p stents), GERD who presented for right sided weakness and aphasia. Patient was found to have a left acute cortical infarct within the MCA and PCA territories. Patient presented with visual field deficits that has since improved, but is still with impaired strength, balance, mobility and ADLs and language. Patient was previously independent in all ADLs and activities. Current level of function is mod assist for bed mobility, max assist with transfers, mod assist with ambulation 25 feet with RW. Patient is a good candidate for rehab due to significantly decreased level of function from prior.    #Left acute ischemic stroke with right hemiparesis (dominant) and aphasia  s/p tPA on 8/3  EDSON showed EF of 55-60%  8/6 MRI head: scattered left-sided acute cortical infarcts within the MCA and PCA territories, with trace petechial hemorrhage involving high left frontal region. There is a chronic left occipital lobe infarct.   MRA of the neck: moderate stenosis of the proximal right internal carotid artery  - start PT/OT/ST, Neuropsych eval  - c/w 5mg celexa  - c/w ASA 81mg  - c/w Brilinta 90mg BID  - c/w Lipitor 80mg daily  - Making significant gains in MP and mobility    #Diabetic Peripheral Polyneuropathy  - No pain. Teach patient skin monitoring an sensory awareness    #Covid positive   - PCR positive from 08/16/22   - Placed on Airborne Precautions  - CRP - 85, D-Dimer 323  - Ferritin 585- mild increase  - Completed Remdesivir  - Lovenox increased to 30 mg bid  - Asymptomatic. To come off isolation in am post 10 days    #HTN - BP was low -   - stopped HCTZ  - stopped Amlodipine 5mg qd  - c/w Lisinopril 40mg   - c/w Lopressor 50mg BID  - now controlled    #Diabetes 2. Fair control  Ha1c 8.2  - c/w 30U Lantus  - c/w 8U Lispro    #CAD  -continue ASA 81, Brilinta, BB, statin    #Renal lesion  On 8/9, a right upper quadrant US was done to rule out cholecystitis, revealing an indeterminate 1.2 cm right renal lesion and moderate-severe hepatic steatosis.  -outpatient followup, non urgent MRI with renal mass protocol    #Azotemia  - given IVF with improvement  - had negative PVR    -GI/Bowel Mgmt: senna, dulcolax, lactulose, miralax    - Diet: Dash Carb Consistent diet    - Melatonin 10mg qhs for difficulty sleeping       Precautions / PROPHYLAXIS:      - Falls    - Ortho: Weight bearing status: WBAT      - DVT prophylaxis: Lovenox 30 mg BID

## 2022-08-26 LAB
GLUCOSE BLDC GLUCOMTR-MCNC: 149 MG/DL — HIGH (ref 70–99)
GLUCOSE BLDC GLUCOMTR-MCNC: 166 MG/DL — HIGH (ref 70–99)
GLUCOSE BLDC GLUCOMTR-MCNC: 262 MG/DL — HIGH (ref 70–99)

## 2022-08-26 PROCEDURE — 99222 1ST HOSP IP/OBS MODERATE 55: CPT

## 2022-08-26 RX ADMIN — ATORVASTATIN CALCIUM 80 MILLIGRAM(S): 80 TABLET, FILM COATED ORAL at 22:09

## 2022-08-26 RX ADMIN — INSULIN GLARGINE 30 UNIT(S): 100 INJECTION, SOLUTION SUBCUTANEOUS at 13:28

## 2022-08-26 RX ADMIN — FAMOTIDINE 40 MILLIGRAM(S): 10 INJECTION INTRAVENOUS at 22:04

## 2022-08-26 RX ADMIN — Medication 8 UNIT(S): at 08:44

## 2022-08-26 RX ADMIN — TICAGRELOR 90 MILLIGRAM(S): 90 TABLET ORAL at 05:27

## 2022-08-26 RX ADMIN — Medication 81 MILLIGRAM(S): at 13:27

## 2022-08-26 RX ADMIN — Medication 8 UNIT(S): at 12:28

## 2022-08-26 RX ADMIN — LISINOPRIL 40 MILLIGRAM(S): 2.5 TABLET ORAL at 22:09

## 2022-08-26 RX ADMIN — Medication 50 MILLIGRAM(S): at 05:26

## 2022-08-26 RX ADMIN — SENNA PLUS 2 TABLET(S): 8.6 TABLET ORAL at 22:05

## 2022-08-26 RX ADMIN — Medication 10 MILLIGRAM(S): at 22:09

## 2022-08-26 RX ADMIN — TICAGRELOR 90 MILLIGRAM(S): 90 TABLET ORAL at 17:47

## 2022-08-26 RX ADMIN — Medication 50 MILLIGRAM(S): at 17:47

## 2022-08-26 RX ADMIN — ENOXAPARIN SODIUM 30 MILLIGRAM(S): 100 INJECTION SUBCUTANEOUS at 17:48

## 2022-08-26 RX ADMIN — ENOXAPARIN SODIUM 30 MILLIGRAM(S): 100 INJECTION SUBCUTANEOUS at 05:26

## 2022-08-26 RX ADMIN — MAGNESIUM OXIDE 400 MG ORAL TABLET 400 MILLIGRAM(S): 241.3 TABLET ORAL at 17:46

## 2022-08-26 RX ADMIN — CITALOPRAM 5 MILLIGRAM(S): 10 TABLET, FILM COATED ORAL at 13:28

## 2022-08-26 RX ADMIN — MAGNESIUM OXIDE 400 MG ORAL TABLET 400 MILLIGRAM(S): 241.3 TABLET ORAL at 13:31

## 2022-08-26 RX ADMIN — Medication 8 UNIT(S): at 16:30

## 2022-08-26 NOTE — CONSULT NOTE ADULT - SUBJECTIVE AND OBJECTIVE BOX
Podiatry Consult Note    Subjective:  MARIA M CROFT  Seen Bedside 77y Male  .   Patient is a 77y old  Male who presents with a chief complaint of rehab s/p left ischemic stroke with aphasia and right hemiparesis (26 Aug 2022 08:04)    HPI:  Patient is a 78yo M with PMH of HTN, DM, HLD, CAD (s/p stents), GERD who presented to the ED on 8/3/2022 for right hand weakness. Patient noticed he started having difficulty with his speech, then developed weakness in his right hand and some visual field deficits. In the ED, patient received tPA. CT head at the time was negative, EDSON showed EF of 55-60%.  MRI head showed scattered left-sided acute cortical infarcts within the MCA and PCA territories, with trace petechial hemorrhage involving high left frontal region. There is a chronic left occipital lobe infarct. MRA of the neck showed moderate stenosis of the proximal right internal carotid artery. On 8/9, a right upper quadrant US was done to rule out cholecystitis, revealing an indeterminate 1.2 cm right renal lesion and moderate-severe hepatic steatosis. During the hospital stay, the visual field deficits improved but patient continued to have no motor function in the right hand and impaired RLE strength and mobility and ADL independence and a persistent aphasia.    On evaluation by PM&R, patient endorses no movement in his right hand and notes some difficulty with memory but otherwise has no other complaints. Patient is right handed. He lives by himself in a house with 5 [address] and one flight of stairs inside (5+7 steps). Patient was previously independent in ADLs and ambulation, occasionally using a cane outdoors for balance. Currently patient requires mod assist for bed mobility, max assist with transfers, mod assist with ambulation 25 feet with RW and max assistance for dressing. He was evaluated by Physiatry on the medical service and was found to be a good acute rehab candidate.  (11 Aug 2022 11:30)      Past Medical History and Surgical History  PAST MEDICAL & SURGICAL HISTORY:  Hypertension      Diabetes      Hyperlipidemia      GERD (gastroesophageal reflux disease)      Coronary artery disease      Diabetic polyneuropathy      No significant past surgical history           Review of Systems:  [X] Ten point review of systems is otherwise negative except as noted     Objective:  Vital Signs Last 24 Hrs  T(C): 36.8 (26 Aug 2022 13:00), Max: 36.8 (26 Aug 2022 13:00)  T(F): 98.2 (26 Aug 2022 13:00), Max: 98.2 (26 Aug 2022 13:00)  HR: 72 (26 Aug 2022 13:00) (70 - 75)  BP: 125/58 (26 Aug 2022 13:00) (125/58 - 160/87)  BP(mean): --  RR: 18 (26 Aug 2022 13:00) (17 - 18)  SpO2: --                              Physical Exam - Lower Extremity Focused:   Derm:    Right hallux nail medial border hematogenous drainage, no purulent drainage, no erythema no malodor.   Vascular: DP and PT Pulses Diminished; Foot is Warm to Warm to the touch; Capillary Refill Time < 3 Seconds;    Neuro: Protective Sensation Diminished / Moderately Neuropathic   MSK: Pain On Palpation at Wound Site     Assessment:  right hallux medial nail border lac  Plan:  Chart reviewed and Patient evaluated. All Questions and Concerns Addressed and Answere  Local Wound Care: bandaid to right hallux.   No signs of infection.   Weight Bearing Status; WBAT w/ Heel Touch     no further podiatric intervention required at this time.   Pt to follow up out pt with Dr. Paez at [address] [place] [address] for possible partial nail avulsion.   Discussed Plan w/     Podiatry

## 2022-08-26 NOTE — PROGRESS NOTE ADULT - ASSESSMENT
Patient is a 76yo M with PMH HTN, DM, CAD (s/p stents), GERD who presented for right sided weakness and aphasia. Patient was found to have a left acute cortical infarct within the MCA and PCA territories. Patient presented with visual field deficits that has since improved, but is still with impaired strength, balance, mobility and ADLs and language. Patient was previously independent in all ADLs and activities. Current level of function is mod assist for bed mobility, max assist with transfers, mod assist with ambulation 25 feet with RW. Patient is a good candidate for rehab due to significantly decreased level of function from prior.    #Left acute ischemic stroke with right hemiparesis (dominant) and aphasia  s/p tPA on 8/3  EDSON showed EF of 55-60%  8/6 MRI head: scattered left-sided acute cortical infarcts within the MCA and PCA territories, with trace petechial hemorrhage involving high left frontal region. There is a chronic left occipital lobe infarct.   MRA of the neck: moderate stenosis of the proximal right internal carotid artery  - start PT/OT/ST, Neuropsych eval  - c/w 5mg celexa  - c/w ASA 81mg  - c/w Brilinta 90mg BID  - c/w Lipitor 80mg daily  - Making significant gains in MP and mobility    #Diabetic Peripheral Polyneuropathy  - No pain. Teach patient skin monitoring an sensory awareness    #Covid positive   - PCR positive from 08/16/22   - Placed on Airborne Precautions  - CRP - 85, D-Dimer 323  - Ferritin 585- mild increase  - Completed Remdesivir  - Lovenox increased to 30 mg bid  - Asymptomatic. Completed 10 day isolation    #HTN - BP was low -   - stopped HCTZ  - stopped Amlodipine 5mg qd  - c/w Lisinopril 40mg   - c/w Lopressor 50mg BID  - now controlled    #Diabetes 2. Fair control  Ha1c 8.2  - c/w 30U Lantus  - c/w 8U Lispro    #CAD  -continue ASA 81, Brilinta, BB, statin    #Renal lesion  On 8/9, a right upper quadrant US was done to rule out cholecystitis, revealing an indeterminate 1.2 cm right renal lesion and moderate-severe hepatic steatosis.  -outpatient followup, non urgent MRI with renal mass protocol    #Azotemia  - given IVF with improvement  - had negative PVR    -GI/Bowel Mgmt: senna, dulcolax, lactulose, miralax    - Diet: Dash Carb Consistent diet    - Melatonin 10mg qhs for difficulty sleeping       Precautions / PROPHYLAXIS:      - Falls    - Ortho: Weight bearing status: WBAT      - DVT prophylaxis: Lovenox 30 mg BID

## 2022-08-26 NOTE — PROGRESS NOTE ADULT - ASSESSMENT
Pain: Denied Location: N/A Rating: N/A Intervention: N/A   Pain rating after Intervention: N/A   Orientation: WFL   Arousal Level: Alert   Behavior: Cooperative   Affect Range: WFL    Needed: No   Attention: WFL   Insight into illness/deficits: Good   Treatment Session Focused on Coping/Cognition     Mr. Proctor presented with a euthymic mood. He expressed happiness at the fact that he is newly able to move his right arm, which was a major concern of his. Mr. Proctor demonstrated good insight with regard to his recovery. He stated that while things are improving, he still experiences some word-finding problems.  His spontaneous speech, though generally fluent, was characterized by occasional word-finding difficulties, though these appeared to have improved since his previous evaluation (8.17.22). He also showed some paraphasias when asked to describe a picture of an outdoor scene, (e.g., “sliding pond” for slide), though he was able to correct these paraphasias when they were pointed out to him. Interestingly, Mr. Proctor did not show any word-finding difficulties or paraphasias during confrontation naming (NAB Naming), when he was asked to name individual items presented to him. This may indicate that visual cues are helpful to him when retrieving words. We discussed how this could be a compensatory strategy for him to use (e.g., attempting to visualize an image of the object when searching for the correct word to describe it). However, it is also possible that word-finding difficulties are not present on confrontation naming tasks as these tasks present less information to him at one time. He may be particularly vulnerable to word retrieval problems when he is presented with larger amounts of information at once, especially given that he showed mild inefficiency in working memory on previous evaluation (8.17.22). If this is the case, he may benefit from briefly pausing and taking time to gather his thoughts during conversations. Further improvement of these word-finding difficulties is likely to occur as his recovery continues.   In terms of visuospatial functioning, Mr. Proctor showed intact visual fields. His ability to copy a figure (RBANS Figure Copy) was negatively impacted by motor weakness as he used his non-dominant (left) hand. However, he included all of the figure elements and placed them in generally correct locations. Overall, this suggests intact visuospatial functioning.   Given his cognitive improvement and high levels of cognitive reserve, Mr. Proctor’s prognosis for neurocognitive recovery is good. Further improvement of mild cognitive inefficiencies is expected in the future. We discussed the benefits of a comprehensive neuropsychological evaluation in 3-6 months as well as possible cognitive remediation sessions, and he agreed that these services would be helpful. He will be contacted to set up a follow-up appointment with Outpatient Neuropsychology.    Goals: No further goals   Plan: Feedback to family

## 2022-08-26 NOTE — CONSULT NOTE ADULT - REASON FOR ADMISSION
rehab s/p left ischemic stroke with aphasia and right hemiparesis
rehab s/p left ischemic stroke with aphasia and right hemiparesis

## 2022-08-26 NOTE — CHART NOTE - NSCHARTNOTEFT_GEN_A_CORE
The patient stubbed his R great toe during therapy this morning and didn't even feel it; has some bleeding at base of the nail; the nail looks fungal and needs debridement--also his smaller toes are discolored with small necrotic areas at tips--he never saw a Podiatrist and he is diabetic--called Podiatry to evaluate feet--the bleeding area was cleaned with NS and DSD applied until patient is seen by Podiatry.    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 Patient noticed he started having difficulty with his speech, then developed weakness in his right hand and some visual field deficits. In the ED, patient received tPA. CT head at the time was negative, EDSON showed EF of 55-60%.  MRI head showed scattered left-sided acute cortical infarcts within the MCA and PCA territories, with trace petechial hemorrhage involving high left frontal region. There is a chronic left occipital lobe infarct. MRA of the neck showed moderate stenosis of the proximal right internal carotid artery.    He has been on airborne/contact isolation since + covid on 8/16; today is day #10 and can come off isolation tomorrow 8/27 (spoke to Infection Control).  He was treated with RDV x 3 days and is asymptomatic and he has also been vaccinated.    Vital Signs Last 24 Hrs  T(C): 36 (26 Aug 2022 05:39), Max: 36.2 (25 Aug 2022 20:51)  T(F): 96.8 (26 Aug 2022 05:39), Max: 97.2 (25 Aug 2022 20:51)  HR: 70 (26 Aug 2022 05:39) (70 - 75)  BP: 160/87 (26 Aug 2022 05:39) (135/74 - 160/87)  BP(mean): --  RR: 17 (26 Aug 2022 05:39) (17 - 17)  SpO2: -- The patient stubbed his R great toe and didn't even feel it; doesn't know when it happened; has some bleeding at base of the nail; the nail looks fungal and needs debridement--also his smaller toes are discolored with small necrotic areas at tips--he never saw a Podiatrist and he is diabetic--called Podiatry to evaluate feet--the bleeding area was cleaned with NS and DSD applied until patient is seen by Podiatry. Spoke to his therapist, who said she noticed that there was blood on the floor and on his sock during therapy and she saw dried blood on his toe when she removed the sock; he did not sustain any trauma while he was with her.    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 Patient noticed he started having difficulty with his speech, then developed weakness in his right hand and some visual field deficits. In the ED, patient received tPA. CT head at the time was negative, EDSON showed EF of 55-60%.  MRI head showed scattered left-sided acute cortical infarcts within the MCA and PCA territories, with trace petechial hemorrhage involving high left frontal region. There is a chronic left occipital lobe infarct. MRA of the neck showed moderate stenosis of the proximal right internal carotid artery.    He has been on airborne/contact isolation since + covid on 8/16; today is day #10 and can come off isolation tomorrow 8/27 (spoke to Infection Control).  He was treated with RDV x 3 days and is asymptomatic and he has also been vaccinated.    Vital Signs Last 24 Hrs  T(C): 36 (26 Aug 2022 05:39), Max: 36.2 (25 Aug 2022 20:51)  T(F): 96.8 (26 Aug 2022 05:39), Max: 97.2 (25 Aug 2022 20:51)  HR: 70 (26 Aug 2022 05:39) (70 - 75)  BP: 160/87 (26 Aug 2022 05:39) (135/74 - 160/87)  BP(mean): --  RR: 17 (26 Aug 2022 05:39) (17 - 17)  SpO2: --

## 2022-08-26 NOTE — PROGRESS NOTE ADULT - SUBJECTIVE AND OBJECTIVE BOX
Patient is a 77y old  Male who presents with a chief complaint of rehab s/p left ischemic stroke with aphasia and right hemiparesis (11 Aug 2022 11:30)      HPI:  Patient is a 76yo M with PMH of HTN, DM, HLD, CAD (s/p stents), GERD who presented to the ED on 8/3/2022 for right hand weakness. Patient noticed he started having difficulty with his speech, then developed weakness in his right hand and some visual field deficits. In the ED, patient received tPA. CT head at the time was negative, EDSON showed EF of 55-60%.  MRI head showed scattered left-sided acute cortical infarcts within the MCA and PCA territories, with trace petechial hemorrhage involving high left frontal region. There is a chronic left occipital lobe infarct. MRA of the neck showed moderate stenosis of the proximal right internal carotid artery. On 8/9, a right upper quadrant US was done to rule out cholecystitis, revealing an indeterminate 1.2 cm right renal lesion and moderate-severe hepatic steatosis. During the hospital stay, the visual field deficits improved but patient continued to have no motor function in the right hand and impaired RLE strength and mobility and ADL independence and a persistent aphasia.    On evaluation by PM&R, patient endorses no movement in his right hand and notes some difficulty with memory but otherwise has no other complaints. Patient is right handed. He lives by himself in a house with 5 [address] and one flight of stairs inside (5+7 steps). Patient was previously independent in ADLs and ambulation, occasionally using a cane outdoors for balance. Currently patient requires mod assist for bed mobility, max assist with transfers, mod assist with ambulation 25 feet with RW and max assistance for dressing. He was evaluated by Physiatry on the medical service and was found to be a good acute rehab candidate.  (11 Aug 2022 11:30)    TODAY'S SUBJECTIVE & REVIEW OF SYMPTOMS:  Patient was seen and assessed at bedside. No overnight events. Patient denies any complaints at this time. Tolerating PT/OT. Tolerating oral diet. Voiding and passing stool spontaneously. Vital signs are stable. Denies any cough, difficulty breathing, shortness of breath. Patient comes off isolation today.      Constitutional    [ x  ] WNL           [   ] poor appetite   [   ] insomnia   [   ] tired   Cardio:                [  x ] WNL           [   ] CP   [   ] VELASCO   [   ] palpitations               Resp:                   [   ] WNL           [   ] SOB   [  x ] cough improving   [   ] wheezing   GI:                        [ x  ] WNL           [   ] constipation   [   ] diarrhea   [   ] abdominal pain   [   ] nausea   [   ] emesis                                :                      [ x  ] WNL           [   ] PRITCHARD  [   ] dysuria   [   ] difficulty voiding             Endo:                   [ x  ] WNL          [   ] polyuria   [   ] temperature intolerance                 Skin:                     [ x  ] WNL          [   ] pain   [   ] wound   [   ] rash   MSK:                    [   x] WNL          [   ] muscle pain   [   ] joint pain/ stiffness   [   ] muscle tenderness   [   ] swelling   Neuro:                 [   ] WNL          [   ] HA   [   ] change in vision   [   ] tremor   [ x  ] weakness- Right side   [   ]dysphagia   [x  ] word finding difficulties- improving,   [ x ] numbness/ neuropathy both feet    Cognitive:           [ x  ] WNL           [   ]confusion   - daughter note some memory loss since stroke   Psych:                  [  x ] WNL           [   ] hallucinations   [   ]agitation   [   ] delusion   [   ]depression      PHYSICAL EXAM    ICU Vital Signs Last 24 Hrs  T(C): 36 (26 Aug 2022 05:39), Max: 36.9 (25 Aug 2022 12:43)  T(F): 96.8 (26 Aug 2022 05:39), Max: 98.4 (25 Aug 2022 12:43)  HR: 70 (26 Aug 2022 05:39) (68 - 75)  BP: 160/87 (26 Aug 2022 05:39) (124/71 - 160/87)  BP(mean): --  ABP: --  ABP(mean): --  RR: 17 (26 Aug 2022 05:39) (17 - 18)  SpO2: --      General:[  x ] NAD, Resting Comfortable,   [   ] other:                                HEENT: [  x ] NC/AT, EOMI, PERRL , Normal Conjunctivae,   [   ] other:  Cardio: [  x ] RRR, no murmer,   [   ] other:                              Pulm: [  x ] No Respiratory Distress,  Lungs CTAB,   [   ] other:                       Abdomen: [ x  ] ND/NT, Soft,   [   ] other:    : [ x  ] NO PRITCHARD CATHETER,   [  ] PRITCHARD CATHETER- no meatal tear, no discharge, [   ] other:                                            MSK: [ x  ] No joint swelling, Full PROM,   [    ] other:                                       Ext: [  x ]No C/C/E, No calf tenderness,   [   ]other:    Skin: [ x  ]intact,   [   ] other:                                                                   Neurological Examination:  Cognitive: [ x   ] AAO x 3,   [    ]  other:                                                                 Attention/ Concentration:  [ x   ] intact   [    ]  other:   Memory: [    ] intact,    [ x  ]  other:  mildly impaired  Mood/Affect: [  x  ] wnl,    [    ]  other:                                                                             Communication: [ x  ]Fluent, no dysarthria, following commands:  [   ] other:  CN II - XII:  [  x  ] intact, VFF  [    ] other: mild right facial droop                                                                                         Motor:   RIGHT UE: [   ] WNL,  [  x ] other: 2/5 shoulder extension, 2+/5 elbow flexion, 2-/5 elbow extension, 2/5 finger flexion, 0/5 finger extension  LEFT    UE: [  x ] WNL  RIGHT LE: [ x  ] WNL,  [   ] other: Hip flexion 3+/5 with drift to bed, knee flexion 4/5, plantarflexion 5/5, dorsiflexion 5/5, toe extension 5/5  LEFT    LE: [  x ] WNL,  Hip flexion 4/5, knee flexion 5/5, plantarflexion 5/5, dorsiflexion 5/5, toe extension 5/5     Tone: [    ] wnl,   [ x   ]  other: mild increased tone in right triceps  DTRs: [ x  ]symmetric, 1+ [   ] other:   Coordination:   [    ] intact as testable,   [ not assesed   ]       CLOF:   Bed mobility- supervision  Transfers- touch assist  Gait: touch assist 60 feet with emmett-cane  Lower body dressing: supervision    MEDICATIONS  (STANDING):  aspirin enteric coated 81 milliGRAM(s) Oral daily  atorvastatin 80 milliGRAM(s) Oral at bedtime  citalopram 5 milliGRAM(s) Oral daily  dextrose 5%. 1000 milliLiter(s) (100 mL/Hr) IV Continuous <Continuous>  dextrose 50% Injectable 25 Gram(s) IV Push once  dextrose 50% Injectable 12.5 Gram(s) IV Push once  dextrose 50% Injectable 25 Gram(s) IV Push once  enoxaparin Injectable 30 milliGRAM(s) SubCutaneous every 12 hours  famotidine    Tablet 40 milliGRAM(s) Oral at bedtime  glucagon  Injectable 1 milliGRAM(s) IntraMuscular once  hydrocortisone 1% Cream 1 Application(s) Topical two times a day  insulin glargine Injectable (LANTUS) 30 Unit(s) SubCutaneous every morning  insulin lispro Injectable (ADMELOG) 8 Unit(s) SubCutaneous three times a day before meals  lisinopril 40 milliGRAM(s) Oral at bedtime  magnesium oxide 400 milliGRAM(s) Oral two times a day with meals  melatonin 10 milliGRAM(s) Oral at bedtime  metoprolol tartrate 50 milliGRAM(s) Oral two times a day  polyethylene glycol 3350 17 Gram(s) Oral two times a day  senna 2 Tablet(s) Oral at bedtime  sodium chloride 0.9%. 1000 milliLiter(s) (75 mL/Hr) IV Continuous <Continuous>  ticagrelor 90 milliGRAM(s) Oral every 12 hours    MEDICATIONS  (PRN):  acetaminophen     Tablet .. 650 milliGRAM(s) Oral every 6 hours PRN Temp greater or equal to 38C (100.4F), Mild Pain (1 - 3)  bisacodyl Suppository 10 milliGRAM(s) Rectal daily PRN Constipation  dextrose Oral Gel 15 Gram(s) Oral once PRN Blood Glucose LESS THAN 70 milliGRAM(s)/deciliter  diphenhydrAMINE 25 milliGRAM(s) Oral every 6 hours PRN Rash and/or Itching  guaifenesin/dextromethorphan Oral Liquid 10 milliLiter(s) Oral every 4 hours PRN Cough  lactulose Syrup 20 Gram(s) Oral two times a day PRN Constipation        RECENT LABS/IMAGING   Patient is a 77y old  Male who presents with a chief complaint of rehab s/p left ischemic stroke with aphasia and right hemiparesis (11 Aug 2022 11:30)      HPI:  Patient is a 76yo M with PMH of HTN, DM, HLD, CAD (s/p stents), GERD who presented to the ED on 8/3/2022 for right hand weakness. Patient noticed he started having difficulty with his speech, then developed weakness in his right hand and some visual field deficits. In the ED, patient received tPA. CT head at the time was negative, EDSON showed EF of 55-60%.  MRI head showed scattered left-sided acute cortical infarcts within the MCA and PCA territories, with trace petechial hemorrhage involving high left frontal region. There is a chronic left occipital lobe infarct. MRA of the neck showed moderate stenosis of the proximal right internal carotid artery. On 8/9, a right upper quadrant US was done to rule out cholecystitis, revealing an indeterminate 1.2 cm right renal lesion and moderate-severe hepatic steatosis. During the hospital stay, the visual field deficits improved but patient continued to have no motor function in the right hand and impaired RLE strength and mobility and ADL independence and a persistent aphasia.    On evaluation by PM&R, patient endorses no movement in his right hand and notes some difficulty with memory but otherwise has no other complaints. Patient is right handed. He lives by himself in a house with [address] and one flight of stairs inside (5+7 steps). Patient was previously independent in ADLs and ambulation, occasionally using a cane outdoors for balance. Currently patient requires mod assist for bed mobility, max assist with transfers, mod assist with ambulation 25 feet with RW and max assistance for dressing. He was evaluated by Physiatry on the medical service and was found to be a good acute rehab candidate.  (11 Aug 2022 11:30)    TODAY'S SUBJECTIVE & REVIEW OF SYMPTOMS:  Patient was seen and assessed at bedside. No overnight events. Patient denies any complaints at this time. Tolerating PT/OT. Tolerating oral diet. Voiding and passing stool spontaneously. Vital signs are stable. Denies any cough, difficulty breathing, shortness of breath. Patient comes off Airborne isolation tomorrow. Pleased that right arm strength continues to improve daily      Constitutional    [ x  ] WNL           [   ] poor appetite   [   ] insomnia   [   ] tired   Cardio:                [  x ] WNL           [   ] CP   [   ] VELASCO   [   ] palpitations               Resp:                   [   ] WNL           [   ] SOB   [  x ] cough improving   [   ] wheezing   GI:                        [ x  ] WNL           [   ] constipation   [   ] diarrhea   [   ] abdominal pain   [   ] nausea   [   ] emesis                                :                      [ x  ] WNL           [   ] PRITCHARD  [   ] dysuria   [   ] difficulty voiding             Endo:                   [ x  ] WNL          [   ] polyuria   [   ] temperature intolerance                 Skin:                     [ x  ] WNL          [   ] pain   [   ] wound   [   ] rash   MSK:                    [   x] WNL          [   ] muscle pain   [   ] joint pain/ stiffness   [   ] muscle tenderness   [   ] swelling   Neuro:                 [   ] WNL          [   ] HA   [   ] change in vision   [   ] tremor   [ x  ] weakness- Right side   [   ]dysphagia   [x  ] word finding difficulties- improving,   [ x ] numbness/ neuropathy both feet    Cognitive:           [ x  ] WNL           [   ]confusion   - daughter note some memory loss since stroke   Psych:                  [  x ] WNL           [   ] hallucinations   [   ]agitation   [   ] delusion   [   ]depression      PHYSICAL EXAM    ICU Vital Signs Last 24 Hrs  T(C): 36 (26 Aug 2022 05:39), Max: 36.9 (25 Aug 2022 12:43)  T(F): 96.8 (26 Aug 2022 05:39), Max: 98.4 (25 Aug 2022 12:43)  HR: 70 (26 Aug 2022 05:39) (68 - 75)  BP: 160/87 (26 Aug 2022 05:39) (124/71 - 160/87)  BP(mean): --  ABP: --  ABP(mean): --  RR: 17 (26 Aug 2022 05:39) (17 - 18)  SpO2: --      General:[  x ] NAD, Resting Comfortable,   [   ] other:                                HEENT: [  x ] NC/AT, EOMI, PERRL , Normal Conjunctivae,   [   ] other:  Cardio: [  x ] RRR, no murmer,   [   ] other:                              Pulm: [  x ] No Respiratory Distress,  Lungs CTAB,   [   ] other:                       Abdomen: [ x  ] ND/NT, Soft,   [   ] other:    : [ x  ] NO PRITCHARD CATHETER,   [  ] PRITCHARD CATHETER- no meatal tear, no discharge, [   ] other:                                            MSK: [ x  ] No joint swelling, Full PROM,   [    ] other:                                       Ext: [  x ]No C/C/E, No calf tenderness,   [   ]other:    Skin: [ x  ]intact,   [   ] other:                                                                   Neurological Examination:  Cognitive: [ x   ] AAO x 3,   [    ]  other:                                                                 Attention/ Concentration:  [ x   ] intact   [    ]  other:   Memory: [    ] intact,    [ x  ]  other:  mildly impaired  Mood/Affect: [  x  ] wnl,    [    ]  other:                                                                             Communication: [ x  ]Fluent, no dysarthria, following commands:  [   ] other:  CN II - XII:  [  x  ] intact, VFF  [    ] other: mild right facial droop                                                                                         Motor:   RIGHT UE: [   ] WNL,  [  x ] other: 2/5 shoulder extension, 2+/5 elbow flexion, 2-/5 elbow extension, 2/5 finger flexion, 2-/5 finger extension  LEFT    UE: [  x ] WNL  RIGHT LE: [ x  ] WNL,  [   ] other: Hip flexion 3+/5 with drift to bed, knee flexion 4/5, plantarflexion 5/5, dorsiflexion 5/5, toe extension 5/5  LEFT    LE: [  x ] WNL,  Hip flexion 4/5, knee flexion 5/5, plantarflexion 5/5, dorsiflexion 5/5, toe extension 5/5     Tone: [    ] wnl,   [ x   ]  other: mild increased tone in right triceps  DTRs: [ x  ]symmetric, 1+ [   ] other:   Coordination:   [    ] intact as testable,   [ not assesed   ]       CLOF:   Bed mobility- supervision  Transfers- touch assist  Gait: touch assist 60 feet with emmett-cane  Lower body dressing: supervision    MEDICATIONS  (STANDING):  aspirin enteric coated 81 milliGRAM(s) Oral daily  atorvastatin 80 milliGRAM(s) Oral at bedtime  citalopram 5 milliGRAM(s) Oral daily  dextrose 5%. 1000 milliLiter(s) (100 mL/Hr) IV Continuous <Continuous>  dextrose 50% Injectable 25 Gram(s) IV Push once  dextrose 50% Injectable 12.5 Gram(s) IV Push once  dextrose 50% Injectable 25 Gram(s) IV Push once  enoxaparin Injectable 30 milliGRAM(s) SubCutaneous every 12 hours  famotidine    Tablet 40 milliGRAM(s) Oral at bedtime  glucagon  Injectable 1 milliGRAM(s) IntraMuscular once  hydrocortisone 1% Cream 1 Application(s) Topical two times a day  insulin glargine Injectable (LANTUS) 30 Unit(s) SubCutaneous every morning  insulin lispro Injectable (ADMELOG) 8 Unit(s) SubCutaneous three times a day before meals  lisinopril 40 milliGRAM(s) Oral at bedtime  magnesium oxide 400 milliGRAM(s) Oral two times a day with meals  melatonin 10 milliGRAM(s) Oral at bedtime  metoprolol tartrate 50 milliGRAM(s) Oral two times a day  polyethylene glycol 3350 17 Gram(s) Oral two times a day  senna 2 Tablet(s) Oral at bedtime  sodium chloride 0.9%. 1000 milliLiter(s) (75 mL/Hr) IV Continuous <Continuous>  ticagrelor 90 milliGRAM(s) Oral every 12 hours    MEDICATIONS  (PRN):  acetaminophen     Tablet .. 650 milliGRAM(s) Oral every 6 hours PRN Temp greater or equal to 38C (100.4F), Mild Pain (1 - 3)  bisacodyl Suppository 10 milliGRAM(s) Rectal daily PRN Constipation  dextrose Oral Gel 15 Gram(s) Oral once PRN Blood Glucose LESS THAN 70 milliGRAM(s)/deciliter  diphenhydrAMINE 25 milliGRAM(s) Oral every 6 hours PRN Rash and/or Itching  guaifenesin/dextromethorphan Oral Liquid 10 milliLiter(s) Oral every 4 hours PRN Cough  lactulose Syrup 20 Gram(s) Oral two times a day PRN Constipation        RECENT LABS/IMAGING

## 2022-08-27 LAB
GLUCOSE BLDC GLUCOMTR-MCNC: 108 MG/DL — HIGH (ref 70–99)
GLUCOSE BLDC GLUCOMTR-MCNC: 165 MG/DL — HIGH (ref 70–99)
GLUCOSE BLDC GLUCOMTR-MCNC: 240 MG/DL — HIGH (ref 70–99)

## 2022-08-27 RX ADMIN — Medication 10 MILLIGRAM(S): at 21:42

## 2022-08-27 RX ADMIN — Medication 8 UNIT(S): at 11:22

## 2022-08-27 RX ADMIN — Medication 8 UNIT(S): at 08:07

## 2022-08-27 RX ADMIN — FAMOTIDINE 40 MILLIGRAM(S): 10 INJECTION INTRAVENOUS at 21:42

## 2022-08-27 RX ADMIN — Medication 81 MILLIGRAM(S): at 11:29

## 2022-08-27 RX ADMIN — LISINOPRIL 40 MILLIGRAM(S): 2.5 TABLET ORAL at 21:42

## 2022-08-27 RX ADMIN — Medication 50 MILLIGRAM(S): at 18:34

## 2022-08-27 RX ADMIN — Medication 1 APPLICATION(S): at 05:47

## 2022-08-27 RX ADMIN — CITALOPRAM 5 MILLIGRAM(S): 10 TABLET, FILM COATED ORAL at 11:28

## 2022-08-27 RX ADMIN — INSULIN GLARGINE 30 UNIT(S): 100 INJECTION, SOLUTION SUBCUTANEOUS at 08:25

## 2022-08-27 RX ADMIN — ATORVASTATIN CALCIUM 80 MILLIGRAM(S): 80 TABLET, FILM COATED ORAL at 21:42

## 2022-08-27 RX ADMIN — TICAGRELOR 90 MILLIGRAM(S): 90 TABLET ORAL at 18:35

## 2022-08-27 RX ADMIN — ENOXAPARIN SODIUM 30 MILLIGRAM(S): 100 INJECTION SUBCUTANEOUS at 05:48

## 2022-08-27 RX ADMIN — Medication 8 UNIT(S): at 16:53

## 2022-08-27 RX ADMIN — Medication 1 APPLICATION(S): at 18:22

## 2022-08-27 RX ADMIN — POLYETHYLENE GLYCOL 3350 17 GRAM(S): 17 POWDER, FOR SOLUTION ORAL at 18:34

## 2022-08-27 RX ADMIN — POLYETHYLENE GLYCOL 3350 17 GRAM(S): 17 POWDER, FOR SOLUTION ORAL at 05:47

## 2022-08-27 RX ADMIN — SENNA PLUS 2 TABLET(S): 8.6 TABLET ORAL at 21:42

## 2022-08-27 RX ADMIN — ENOXAPARIN SODIUM 30 MILLIGRAM(S): 100 INJECTION SUBCUTANEOUS at 18:21

## 2022-08-27 RX ADMIN — MAGNESIUM OXIDE 400 MG ORAL TABLET 400 MILLIGRAM(S): 241.3 TABLET ORAL at 08:26

## 2022-08-27 RX ADMIN — TICAGRELOR 90 MILLIGRAM(S): 90 TABLET ORAL at 05:47

## 2022-08-27 RX ADMIN — Medication 50 MILLIGRAM(S): at 05:47

## 2022-08-27 RX ADMIN — MAGNESIUM OXIDE 400 MG ORAL TABLET 400 MILLIGRAM(S): 241.3 TABLET ORAL at 17:01

## 2022-08-27 NOTE — PROGRESS NOTE ADULT - ASSESSMENT
Patient is a 76yo M with PMH HTN, DM, CAD (s/p stents), GERD who presented for right sided weakness and aphasia. Patient was found to have a left acute cortical infarct within the MCA and PCA territories. Patient presented with visual field deficits that has since improved, but is still with impaired strength, balance, mobility and ADLs and language. Patient was previously independent in all ADLs and activities. Current level of function is mod assist for bed mobility, max assist with transfers, mod assist with ambulation 25 feet with RW. Patient is a good candidate for rehab due to significantly decreased level of function from prior.    #Left acute ischemic stroke with right hemiparesis (dominant) and aphasia  s/p tPA on 8/3  EDSON showed EF of 55-60%  8/6 MRI head: scattered left-sided acute cortical infarcts within the MCA and PCA territories, with trace petechial hemorrhage involving high left frontal region. There is a chronic left occipital lobe infarct.   MRA of the neck: moderate stenosis of the proximal right internal carotid artery  - start PT/OT/ST, Neuropsych eval  - c/w 5mg celexa  - c/w ASA 81mg  - c/w Brilinta 90mg BID  - c/w Lipitor 80mg daily  - Making significant gains in MP and mobility    #Diabetic Peripheral Polyneuropathy  - No pain. Teach patient skin monitoring an sensory awareness    #Covid positive (resolved)  - PCR positive from 08/16/22   - Placed on Airborne Precautions  - CRP - 85, D-Dimer 323  - Ferritin 585- mild increase  - Completed Remdesivir  - Lovenox increased to 30 mg bid  - Asymptomatic. Completed 10 day isolation, removed from isolation    #HTN - BP was low -   - stopped HCTZ  - stopped Amlodipine 5mg qd  - c/w Lisinopril 40mg   - c/w Lopressor 50mg BID  - now controlled    #Diabetes 2. Fair control  Ha1c 8.2  - c/w 30U Lantus  - c/w 8U Lispro    #CAD  -continue ASA 81, Brilinta, BB, statin    #Renal lesion  On 8/9, a right upper quadrant US was done to rule out cholecystitis, revealing an indeterminate 1.2 cm right renal lesion and moderate-severe hepatic steatosis.  -outpatient followup, non urgent MRI with renal mass protocol    #Azotemia  - given IVF with improvement  - had negative PVR    -GI/Bowel Mgmt: senna, dulcolax, lactulose, miralax    - Diet: Dash Carb Consistent diet    - Melatonin 10mg qhs for difficulty sleeping       Precautions / PROPHYLAXIS:      - Falls    - Ortho: Weight bearing status: WBAT      - DVT prophylaxis: Lovenox 30 mg BID   Patient is a 76yo M with PMH HTN, DM, CAD (s/p stents), GERD who presented for right sided weakness and aphasia. Patient was found to have a left acute cortical infarct within the MCA and PCA territories. Patient presented with visual field deficits that has since improved, but is still with impaired strength, balance, mobility and ADLs and language. Patient was previously independent in all ADLs and activities. Current level of function is mod assist for bed mobility, max assist with transfers, mod assist with ambulation 25 feet with RW. Patient is a good candidate for rehab due to significantly decreased level of function from prior.    #Left acute ischemic stroke with right hemiparesis (dominant) and aphasia  s/p tPA on 8/3  EDSON showed EF of 55-60%  8/6 MRI head: scattered left-sided acute cortical infarcts within the MCA and PCA territories, with trace petechial hemorrhage involving high left frontal region. There is a chronic left occipital lobe infarct.   MRA of the neck: moderate stenosis of the proximal right internal carotid artery  - start PT/OT/ST, Neuropsych eval  - c/w 5mg celexa  - c/w ASA 81mg  - c/w Brilinta 90mg BID  - c/w Lipitor 80mg daily  - Making significant gains in MP and mobility    #Diabetic Peripheral Polyneuropathy  - No pain. Teach patient skin monitoring an sensory awareness    #Covid positive quarantine completed)  - PCR positive from 08/16/22   - Placed on Airborne Precautions  - CRP - 85, D-Dimer 323  - Ferritin 585- mild increase  - Completed Remdesivir  - Lovenox increased to 30 mg bid  - Asymptomatic. Completed 10 day isolation, removed from isolation    #HTN - BP was low -   - stopped HCTZ  - stopped Amlodipine 5mg qd  - c/w Lisinopril 40mg   - c/w Lopressor 50mg BID  - now controlled    #Diabetes 2. Fair control  Ha1c 8.2  - c/w 30U Lantus  - c/w 8U Lispro    #CAD  -continue ASA 81, Brilinta, BB, statin    #Renal lesion  On 8/9, a right upper quadrant US was done to rule out cholecystitis, revealing an indeterminate 1.2 cm right renal lesion and moderate-severe hepatic steatosis.  -outpatient followup, non urgent MRI with renal mass protocol    #Azotemia  - given IVF with improvement  - had negative PVR    -GI/Bowel Mgmt: senna, dulcolax, lactulose, miralax    - Diet: Dash Carb Consistent diet    - Melatonin 10mg qhs for difficulty sleeping       Precautions / PROPHYLAXIS:      - Falls    - Ortho: Weight bearing status: WBAT      - DVT prophylaxis: Lovenox 30 mg BID

## 2022-08-27 NOTE — PROGRESS NOTE ADULT - SUBJECTIVE AND OBJECTIVE BOX
Patient is a 77y old  Male who presents with a chief complaint of rehab s/p left ischemic stroke with aphasia and right hemiparesis (11 Aug 2022 11:30)      HPI:  Patient is a 78yo M with PMH of HTN, DM, HLD, CAD (s/p stents), GERD who presented to the ED on 8/3/2022 for right hand weakness. Patient noticed he started having difficulty with his speech, then developed weakness in his right hand and some visual field deficits. In the ED, patient received tPA. CT head at the time was negative, EDSON showed EF of 55-60%.  MRI head showed scattered left-sided acute cortical infarcts within the MCA and PCA territories, with trace petechial hemorrhage involving high left frontal region. There is a chronic left occipital lobe infarct. MRA of the neck showed moderate stenosis of the proximal right internal carotid artery. On 8/9, a right upper quadrant US was done to rule out cholecystitis, revealing an indeterminate 1.2 cm right renal lesion and moderate-severe hepatic steatosis. During the hospital stay, the visual field deficits improved but patient continued to have no motor function in the right hand and impaired RLE strength and mobility and ADL independence and a persistent aphasia.    On evaluation by PM&R, patient endorses no movement in his right hand and notes some difficulty with memory but otherwise has no other complaints. Patient is right handed. He lives by himself in a house with [address] and one flight of stairs inside (5+7 steps). Patient was previously independent in ADLs and ambulation, occasionally using a cane outdoors for balance. Currently patient requires mod assist for bed mobility, max assist with transfers, mod assist with ambulation 25 feet with RW and max assistance for dressing. He was evaluated by Physiatry on the medical service and was found to be a good acute rehab candidate.  (11 Aug 2022 11:30)    TODAY'S SUBJECTIVE & REVIEW OF SYMPTOMS:  Patient was seen and assessed at bedside. No overnight events. Patient denies any complaints at this time. Tolerating PT/OT. Tolerating oral diet. Voiding and passing stool spontaneously. Vital signs are stable.       Constitutional    [ x  ] WNL           [   ] poor appetite   [   ] insomnia   [   ] tired   Cardio:                [  x ] WNL           [   ] CP   [   ] VELASCO   [   ] palpitations               Resp:                   [   ] WNL           [   ] SOB   [  x ] cough improving   [   ] wheezing   GI:                        [ x  ] WNL           [   ] constipation   [   ] diarrhea   [   ] abdominal pain   [   ] nausea   [   ] emesis                                :                      [ x  ] WNL           [   ] PRITCHARD  [   ] dysuria   [   ] difficulty voiding             Endo:                   [ x  ] WNL          [   ] polyuria   [   ] temperature intolerance                 Skin:                     [ x  ] WNL          [   ] pain   [   ] wound   [   ] rash   MSK:                    [   x] WNL          [   ] muscle pain   [   ] joint pain/ stiffness   [   ] muscle tenderness   [   ] swelling   Neuro:                 [   ] WNL          [   ] HA   [   ] change in vision   [   ] tremor   [ x  ] weakness- Right side   [   ]dysphagia   [x  ] word finding difficulties- improving,   [ x ] numbness/ neuropathy both feet    Cognitive:           [ x  ] WNL           [   ]confusion   - daughter note some memory loss since stroke   Psych:                  [  x ] WNL           [   ] hallucinations   [   ]agitation   [   ] delusion   [   ]depression      PHYSICAL EXAM    ICU Vital Signs Last 24 Hrs  T(C): 35.7 (27 Aug 2022 05:14), Max: 36.8 (26 Aug 2022 13:00)  T(F): 96.3 (27 Aug 2022 05:14), Max: 98.2 (26 Aug 2022 13:00)  HR: 44 (27 Aug 2022 05:14) (44 - 74)  BP: 127/81 (27 Aug 2022 05:14) (125/58 - 134/80)  BP(mean): --  ABP: --  ABP(mean): --  RR: 18 (27 Aug 2022 05:14) (18 - 19)  SpO2: --      General:[  x ] NAD, Resting Comfortable,   [   ] other:                                HEENT: [  x ] NC/AT, EOMI, PERRL , Normal Conjunctivae,   [   ] other:  Cardio: [  x ] RRR, no murmer,   [   ] other:                              Pulm: [  x ] No Respiratory Distress,  Lungs CTAB,   [   ] other:                       Abdomen: [ x  ] ND/NT, Soft,   [   ] other:    : [ x  ] NO PRITCHARD CATHETER,   [  ] PRITCHARD CATHETER- no meatal tear, no discharge, [   ] other:                                            MSK: [ x  ] No joint swelling, Full PROM,   [    ] other:                                       Ext: [  x ]No C/C/E, No calf tenderness,   [   ]other:    Skin: [ x  ]intact,   [   ] other:                                                                   Neurological Examination:  Cognitive: [ x   ] AAO x 3,   [    ]  other:                                                                 Attention/ Concentration:  [ x   ] intact   [    ]  other:   Memory: [    ] intact,    [ x  ]  other:  mildly impaired  Mood/Affect: [  x  ] wnl,    [    ]  other:                                                                             Communication: [ x  ]Fluent, no dysarthria, following commands:  [   ] other:  CN II - XII:  [  x  ] intact, VFF  [    ] other: mild right facial droop                                                                                         Motor:   RIGHT UE: [   ] WNL,  [  x ] other: 2/5 shoulder extension, 2+/5 elbow flexion, 2-/5 elbow extension, 2/5 finger flexion, 2-/5 finger extension  LEFT    UE: [  x ] WNL  RIGHT LE: [ x  ] WNL,  [   ] other: Hip flexion 3+/5 with drift to bed, knee flexion 4/5, plantarflexion 5/5, dorsiflexion 5/5, toe extension 5/5  LEFT    LE: [  x ] WNL,  Hip flexion 4/5, knee flexion 5/5, plantarflexion 5/5, dorsiflexion 5/5, toe extension 5/5     Tone: [    ] wnl,   [ x   ]  other: mild increased tone in right triceps  DTRs: [ x  ]symmetric, 1+ [   ] other:   Coordination:   [    ] intact as testable,   [ not assesed   ]       CLOF:   Bed mobility- supervision  Transfers- touch assist  Gait: touch assist 60 feet with emmett-cane  Lower body dressing: supervision    MEDICATIONS  (STANDING):  aspirin enteric coated 81 milliGRAM(s) Oral daily  atorvastatin 80 milliGRAM(s) Oral at bedtime  citalopram 5 milliGRAM(s) Oral daily  dextrose 5%. 1000 milliLiter(s) (100 mL/Hr) IV Continuous <Continuous>  dextrose 50% Injectable 25 Gram(s) IV Push once  dextrose 50% Injectable 12.5 Gram(s) IV Push once  dextrose 50% Injectable 25 Gram(s) IV Push once  enoxaparin Injectable 30 milliGRAM(s) SubCutaneous every 12 hours  famotidine    Tablet 40 milliGRAM(s) Oral at bedtime  glucagon  Injectable 1 milliGRAM(s) IntraMuscular once  hydrocortisone 1% Cream 1 Application(s) Topical two times a day  insulin glargine Injectable (LANTUS) 30 Unit(s) SubCutaneous every morning  insulin lispro Injectable (ADMELOG) 8 Unit(s) SubCutaneous three times a day before meals  lisinopril 40 milliGRAM(s) Oral at bedtime  magnesium oxide 400 milliGRAM(s) Oral two times a day with meals  melatonin 10 milliGRAM(s) Oral at bedtime  metoprolol tartrate 50 milliGRAM(s) Oral two times a day  polyethylene glycol 3350 17 Gram(s) Oral two times a day  senna 2 Tablet(s) Oral at bedtime  sodium chloride 0.9%. 1000 milliLiter(s) (75 mL/Hr) IV Continuous <Continuous>  ticagrelor 90 milliGRAM(s) Oral every 12 hours    MEDICATIONS  (PRN):  acetaminophen     Tablet .. 650 milliGRAM(s) Oral every 6 hours PRN Temp greater or equal to 38C (100.4F), Mild Pain (1 - 3)  bisacodyl Suppository 10 milliGRAM(s) Rectal daily PRN Constipation  dextrose Oral Gel 15 Gram(s) Oral once PRN Blood Glucose LESS THAN 70 milliGRAM(s)/deciliter  diphenhydrAMINE 25 milliGRAM(s) Oral every 6 hours PRN Rash and/or Itching  guaifenesin/dextromethorphan Oral Liquid 10 milliLiter(s) Oral every 4 hours PRN Cough  lactulose Syrup 20 Gram(s) Oral two times a day PRN Constipation        RECENT LABS/IMAGING              POCT Blood Glucose.: 240 mg/dL (08-27-22 @ 11:09)  POCT Blood Glucose.: 165 mg/dL (08-27-22 @ 07:45)  POCT Blood Glucose.: 166 mg/dL (08-26-22 @ 16:27)  POCT Blood Glucose.: 262 mg/dL (08-26-22 @ 11:21)  POCT Blood Glucose.: 149 mg/dL (08-26-22 @ 07:13)  POCT Blood Glucose.: 154 mg/dL (08-25-22 @ 16:39)  POCT Blood Glucose.: 209 mg/dL (08-25-22 @ 10:42)  POCT Blood Glucose.: 137 mg/dL (08-25-22 @ 07:15)  POCT Blood Glucose.: 152 mg/dL (08-24-22 @ 22:29)  POCT Blood Glucose.: 71 mg/dL (08-24-22 @ 16:24)  POCT Blood Glucose.: 173 mg/dL (08-24-22 @ 11:16)  POCT Blood Glucose.: 152 mg/dL (08-24-22 @ 08:15)

## 2022-08-28 ENCOUNTER — TRANSCRIPTION ENCOUNTER (OUTPATIENT)
Age: 77
End: 2022-08-28

## 2022-08-28 LAB
GLUCOSE BLDC GLUCOMTR-MCNC: 107 MG/DL — HIGH (ref 70–99)
GLUCOSE BLDC GLUCOMTR-MCNC: 126 MG/DL — HIGH (ref 70–99)
GLUCOSE BLDC GLUCOMTR-MCNC: 142 MG/DL — HIGH (ref 70–99)

## 2022-08-28 RX ORDER — LISINOPRIL 2.5 MG/1
1 TABLET ORAL
Qty: 0 | Refills: 0 | DISCHARGE
Start: 2022-08-28

## 2022-08-28 RX ORDER — INSULIN GLARGINE 100 [IU]/ML
30 INJECTION, SOLUTION SUBCUTANEOUS
Qty: 0 | Refills: 0 | DISCHARGE

## 2022-08-28 RX ORDER — HYDROCORTISONE 1 %
1 OINTMENT (GRAM) TOPICAL
Qty: 0 | Refills: 0 | DISCHARGE
Start: 2022-08-28

## 2022-08-28 RX ORDER — SENNA PLUS 8.6 MG/1
2 TABLET ORAL
Qty: 0 | Refills: 0 | DISCHARGE
Start: 2022-08-28

## 2022-08-28 RX ORDER — ACETAMINOPHEN 500 MG
2 TABLET ORAL
Qty: 0 | Refills: 0 | DISCHARGE
Start: 2022-08-28

## 2022-08-28 RX ORDER — MAGNESIUM OXIDE 400 MG ORAL TABLET 241.3 MG
1 TABLET ORAL
Qty: 0 | Refills: 0 | DISCHARGE
Start: 2022-08-28

## 2022-08-28 RX ORDER — TICAGRELOR 90 MG/1
1 TABLET ORAL
Qty: 0 | Refills: 0 | DISCHARGE
Start: 2022-08-28

## 2022-08-28 RX ORDER — LANOLIN ALCOHOL/MO/W.PET/CERES
1 CREAM (GRAM) TOPICAL
Qty: 0 | Refills: 0 | DISCHARGE
Start: 2022-08-28

## 2022-08-28 RX ORDER — ATORVASTATIN CALCIUM 80 MG/1
1 TABLET, FILM COATED ORAL
Qty: 0 | Refills: 0 | DISCHARGE
Start: 2022-08-28

## 2022-08-28 RX ORDER — INSULIN LISPRO 100/ML
8 VIAL (ML) SUBCUTANEOUS
Qty: 0 | Refills: 0 | DISCHARGE
Start: 2022-08-28

## 2022-08-28 RX ADMIN — ENOXAPARIN SODIUM 30 MILLIGRAM(S): 100 INJECTION SUBCUTANEOUS at 17:39

## 2022-08-28 RX ADMIN — Medication 8 UNIT(S): at 12:06

## 2022-08-28 RX ADMIN — ENOXAPARIN SODIUM 30 MILLIGRAM(S): 100 INJECTION SUBCUTANEOUS at 05:28

## 2022-08-28 RX ADMIN — Medication 50 MILLIGRAM(S): at 05:26

## 2022-08-28 RX ADMIN — MAGNESIUM OXIDE 400 MG ORAL TABLET 400 MILLIGRAM(S): 241.3 TABLET ORAL at 17:39

## 2022-08-28 RX ADMIN — MAGNESIUM OXIDE 400 MG ORAL TABLET 400 MILLIGRAM(S): 241.3 TABLET ORAL at 07:47

## 2022-08-28 RX ADMIN — INSULIN GLARGINE 30 UNIT(S): 100 INJECTION, SOLUTION SUBCUTANEOUS at 07:47

## 2022-08-28 RX ADMIN — Medication 10 MILLIGRAM(S): at 21:13

## 2022-08-28 RX ADMIN — ATORVASTATIN CALCIUM 80 MILLIGRAM(S): 80 TABLET, FILM COATED ORAL at 21:12

## 2022-08-28 RX ADMIN — POLYETHYLENE GLYCOL 3350 17 GRAM(S): 17 POWDER, FOR SOLUTION ORAL at 05:24

## 2022-08-28 RX ADMIN — SENNA PLUS 2 TABLET(S): 8.6 TABLET ORAL at 21:14

## 2022-08-28 RX ADMIN — FAMOTIDINE 40 MILLIGRAM(S): 10 INJECTION INTRAVENOUS at 21:13

## 2022-08-28 RX ADMIN — Medication 81 MILLIGRAM(S): at 12:06

## 2022-08-28 RX ADMIN — LISINOPRIL 40 MILLIGRAM(S): 2.5 TABLET ORAL at 21:13

## 2022-08-28 RX ADMIN — Medication 50 MILLIGRAM(S): at 17:39

## 2022-08-28 RX ADMIN — TICAGRELOR 90 MILLIGRAM(S): 90 TABLET ORAL at 06:40

## 2022-08-28 RX ADMIN — TICAGRELOR 90 MILLIGRAM(S): 90 TABLET ORAL at 17:39

## 2022-08-28 RX ADMIN — Medication 8 UNIT(S): at 16:48

## 2022-08-28 RX ADMIN — Medication 8 UNIT(S): at 07:46

## 2022-08-28 RX ADMIN — CITALOPRAM 5 MILLIGRAM(S): 10 TABLET, FILM COATED ORAL at 12:06

## 2022-08-28 RX ADMIN — Medication 1 APPLICATION(S): at 05:27

## 2022-08-28 NOTE — PROGRESS NOTE ADULT - SUBJECTIVE AND OBJECTIVE BOX
Patient is a 77y old  Male who presents with a chief complaint of rehab s/p left ischemic stroke with aphasia and right hemiparesis (11 Aug 2022 11:30)      HPI:  Patient is a 78yo M with PMH of HTN, DM, HLD, CAD (s/p stents), GERD who presented to the ED on 8/3/2022 for right hand weakness. Patient noticed he started having difficulty with his speech, then developed weakness in his right hand and some visual field deficits. In the ED, patient received tPA. CT head at the time was negative, EDSON showed EF of 55-60%.  MRI head showed scattered left-sided acute cortical infarcts within the MCA and PCA territories, with trace petechial hemorrhage involving high left frontal region. There is a chronic left occipital lobe infarct. MRA of the neck showed moderate stenosis of the proximal right internal carotid artery. On 8/9, a right upper quadrant US was done to rule out cholecystitis, revealing an indeterminate 1.2 cm right renal lesion and moderate-severe hepatic steatosis. During the hospital stay, the visual field deficits improved but patient continued to have no motor function in the right hand and impaired RLE strength and mobility and ADL independence and a persistent aphasia.    On evaluation by PM&R, patient endorses no movement in his right hand and notes some difficulty with memory but otherwise has no other complaints. Patient is right handed. He lives by himself in a house with [address] and one flight of stairs inside (5+7 steps). Patient was previously independent in ADLs and ambulation, occasionally using a cane outdoors for balance. Currently patient requires mod assist for bed mobility, max assist with transfers, mod assist with ambulation 25 feet with RW and max assistance for dressing. He was evaluated by Physiatry on the medical service and was found to be a good acute rehab candidate.  (11 Aug 2022 11:30)    TODAY'S SUBJECTIVE & REVIEW OF SYMPTOMS:  Patient was seen and assessed at bedside. No overnight events. Patient denies any complaints at this time. Tolerating PT/OT. Tolerating oral diet. Voiding and passing stool spontaneously. Vital signs are stable. Denies any cough, difficulty breathing, shortness of breath. Pleased that right arm strength continues to improve daily      Constitutional    [ x  ] WNL           [   ] poor appetite   [   ] insomnia   [   ] tired   Cardio:                [  x ] WNL           [   ] CP   [   ] VELASCO   [   ] palpitations               Resp:                   [ x  ] WNL           [   ] SOB   [   ] cough improving   [   ] wheezing   GI:                        [ x  ] WNL           [   ] constipation   [   ] diarrhea   [   ] abdominal pain   [   ] nausea   [   ] emesis                                :                      [ x  ] WNL           [   ] PRITCHARD  [   ] dysuria   [   ] difficulty voiding             Endo:                   [ x  ] WNL          [   ] polyuria   [   ] temperature intolerance                 Skin:                     [ x  ] WNL          [   ] pain   [   ] wound   [   ] rash   MSK:                    [   x] WNL          [   ] muscle pain   [   ] joint pain/ stiffness   [   ] muscle tenderness   [   ] swelling   Neuro:                 [   ] WNL          [   ] HA   [   ] change in vision   [   ] tremor   [ x  ] weakness- Right side   [   ]dysphagia   [  ] word finding difficulties- improving,   [ x ] numbness/ neuropathy both feet    Cognitive:           [ x  ] WNL           [   ]confusion   - daughter note some memory loss since stroke   Psych:                  [  x ] WNL           [   ] hallucinations   [   ]agitation   [   ] delusion   [   ]depression      PHYSICAL EXAM    Vital Signs Last 24 Hrs  T(C): 36.1 (28 Aug 2022 05:39), Max: 36.7 (27 Aug 2022 15:05)  T(F): 97 (28 Aug 2022 05:39), Max: 98 (27 Aug 2022 15:05)  HR: 68 (28 Aug 2022 05:39) (68 - 94)  BP: 143/85 (28 Aug 2022 05:39) (130/65 - 143/85)  BP(mean): --  RR: 18 (28 Aug 2022 05:39) (17 - 18)  SpO2: --        General:[  x ] NAD, Resting Comfortable,   [   ] other:                                HEENT: [  x ] NC/AT, EOMI, PERRL , Normal Conjunctivae,   [   ] other:  Cardio: [  x ] RRR, no murmer,   [   ] other:                              Pulm: [  x ] No Respiratory Distress,  Lungs CTAB,   [   ] other:                       Abdomen: [ x  ] ND/NT, Soft,   [   ] other:    : [ x  ] NO PRITCHARD CATHETER,   [  ] PRITCHARD CATHETER- no meatal tear, no discharge, [   ] other:                                            MSK: [ x  ] No joint swelling, Full PROM,   [    ] other:                                       Ext: [  x ]No C/C/E, No calf tenderness,   [   ]other:    Skin: [ x  ]intact,   [   ] other:                                                                   Neurological Examination:  Cognitive: [ x   ] AAO x 3,   [    ]  other:                                                                 Attention/ Concentration:  [ x   ] intact   [    ]  other:   Memory: [    ] intact,    [ x  ]  other:  mildly impaired  Mood/Affect: [  x  ] wnl,    [    ]  other:                                                                             Communication: [ x  ]Fluent, no dysarthria, following commands:  [   ] other:  CN II - XII:  [  x  ] intact, VFF  [    ] other: mild right facial droop                                                                                         Motor:   RIGHT UE: [   ] WNL,  [  x ] other: 3/5 shoulder flexion, 3/5 elbow flexion, 2/5 elbow extension, 2/5 finger flexion, 2-/5 finger extension  LEFT    UE: [  x ] WNL  RIGHT LE: [ x  ] WNL,  [   ] other: Hip flexion 3+/5 with drift to bed, knee flexion 4/5, plantarflexion 5/5, dorsiflexion 5/5, toe extension 5/5  LEFT    LE: [  x ] WNL,  Hip flexion 4/5, knee flexion 5/5, plantarflexion 5/5, dorsiflexion 5/5, toe extension 5/5     Tone: [    ] wnl,   [ x   ]  other: mild increased tone in right triceps  DTRs: [ x  ]symmetric, 1+ [   ] other:   Coordination:   [    ] intact as testable,   [ not assesed   ]       CLOF:   Bed mobility- supervision  Transfers- touch assist  Gait: touch assist 60 feet with emmett-cane  Lower body dressing: supervision    MEDICATIONS  (STANDING):  aspirin enteric coated 81 milliGRAM(s) Oral daily  atorvastatin 80 milliGRAM(s) Oral at bedtime  citalopram 5 milliGRAM(s) Oral daily  dextrose 5%. 1000 milliLiter(s) (100 mL/Hr) IV Continuous <Continuous>  dextrose 50% Injectable 25 Gram(s) IV Push once  dextrose 50% Injectable 12.5 Gram(s) IV Push once  dextrose 50% Injectable 25 Gram(s) IV Push once  enoxaparin Injectable 30 milliGRAM(s) SubCutaneous every 12 hours  famotidine    Tablet 40 milliGRAM(s) Oral at bedtime  glucagon  Injectable 1 milliGRAM(s) IntraMuscular once  hydrocortisone 1% Cream 1 Application(s) Topical two times a day  insulin glargine Injectable (LANTUS) 30 Unit(s) SubCutaneous every morning  insulin lispro Injectable (ADMELOG) 8 Unit(s) SubCutaneous three times a day before meals  lisinopril 40 milliGRAM(s) Oral at bedtime  magnesium oxide 400 milliGRAM(s) Oral two times a day with meals  melatonin 10 milliGRAM(s) Oral at bedtime  metoprolol tartrate 50 milliGRAM(s) Oral two times a day  polyethylene glycol 3350 17 Gram(s) Oral two times a day  senna 2 Tablet(s) Oral at bedtime  sodium chloride 0.9%. 1000 milliLiter(s) (75 mL/Hr) IV Continuous <Continuous>  ticagrelor 90 milliGRAM(s) Oral every 12 hours    MEDICATIONS  (PRN):  acetaminophen     Tablet .. 650 milliGRAM(s) Oral every 6 hours PRN Temp greater or equal to 38C (100.4F), Mild Pain (1 - 3)  bisacodyl Suppository 10 milliGRAM(s) Rectal daily PRN Constipation  dextrose Oral Gel 15 Gram(s) Oral once PRN Blood Glucose LESS THAN 70 milliGRAM(s)/deciliter  diphenhydrAMINE 25 milliGRAM(s) Oral every 6 hours PRN Rash and/or Itching  guaifenesin/dextromethorphan Oral Liquid 10 milliLiter(s) Oral every 4 hours PRN Cough  lactulose Syrup 20 Gram(s) Oral two times a day PRN Constipation      RECENT LABS/IMAGING    POCT Blood Glucose.: 142 mg/dL (08-28-22 @ 11:52)  POCT Blood Glucose.: 107 mg/dL (08-28-22 @ 07:26)  POCT Blood Glucose.: 108 mg/dL (08-27-22 @ 16:05)  POCT Blood Glucose.: 240 mg/dL (08-27-22 @ 11:09)  POCT Blood Glucose.: 165 mg/dL (08-27-22 @ 07:45)  POCT Blood Glucose.: 166 mg/dL (08-26-22 @ 16:27)  POCT Blood Glucose.: 262 mg/dL (08-26-22 @ 11:21)  POCT Blood Glucose.: 149 mg/dL (08-26-22 @ 07:13)  POCT Blood Glucose.: 154 mg/dL (08-25-22 @ 16:39)  POCT Blood Glucose.: 209 mg/dL (08-25-22 @ 10:42)  POCT Blood Glucose.: 137 mg/dL (08-25-22 @ 07:15)  POCT Blood Glucose.: 152 mg/dL (08-24-22 @ 22:29)

## 2022-08-28 NOTE — PROGRESS NOTE ADULT - ASSESSMENT
Patient is a 76yo M with PMH HTN, DM, CAD (s/p stents), GERD who presented for right sided weakness and aphasia. Patient was found to have a left acute cortical infarct within the MCA and PCA territories. Patient presented with visual field deficits that has since improved, but is still with impaired strength, balance, mobility and ADLs and language. Patient was previously independent in all ADLs and activities. Current level of function is mod assist for bed mobility, max assist with transfers, mod assist with ambulation 25 feet with RW. Patient is a good candidate for rehab due to significantly decreased level of function from prior.    #Left acute ischemic stroke with right hemiparesis (dominant) and aphasia  s/p tPA on 8/3  EDSON showed EF of 55-60%  8/6 MRI head: scattered left-sided acute cortical infarcts within the MCA and PCA territories, with trace petechial hemorrhage involving high left frontal region. There is a chronic left occipital lobe infarct.   MRA of the neck: moderate stenosis of the proximal right internal carotid artery  - start PT/OT/ST, Neuropsych eval  - c/w 5mg celexa  - c/w ASA 81mg  - c/w Brilinta 90mg BID  - c/w Lipitor 80mg daily  - Making significant gains in MP and mobility. Ambulates with a Ariel-cane with RA    #Diabetic Peripheral Polyneuropathy  - No pain. Teach patient skin monitoring an sensory awareness    #Covid positive   - PCR positive from 08/16/22   - Placed on Airborne Precautions  - CRP - 85, D-Dimer 323  - Ferritin 585- mild increase  - Completed Remdesivir  - Lovenox increased to 30 mg bid  - Asymptomatic. Completed 10 day isolation    #HTN - BP was low -   - stopped HCTZ  - stopped Amlodipine 5mg qd  - c/w Lisinopril 40mg   - c/w Lopressor 50mg BID  - now controlled    #Diabetes 2. Fair control  Ha1c 8.2  - c/w 30U Lantus  - c/w 8U Lispro    #CAD  -continue ASA 81, Brilinta, BB, statin    #Renal lesion  On 8/9, a right upper quadrant US was done to rule out cholecystitis, revealing an indeterminate 1.2 cm right renal lesion and moderate-severe hepatic steatosis.  -outpatient followup, non urgent MRI with renal mass protocol    #Azotemia  - given IVF with improvement  - had negative PVR    -GI/Bowel Mgmt: senna, dulcolax, lactulose, miralax    - Diet: Dash Carb Consistent diet    - Melatonin 10mg qhs for difficulty sleeping       Precautions / PROPHYLAXIS:      - Falls    - Ortho: Weight bearing status: WBAT      - DVT prophylaxis: Lovenox 30 mg BID

## 2022-08-28 NOTE — DISCHARGE NOTE PROVIDER - NSDCMRMEDTOKEN_GEN_ALL_CORE_FT
amLODIPine 5 mg oral tablet: 1 tab(s) orally once a day  aspirin 81 mg oral delayed release tablet: 1 tab(s) orally once a day  atorvastatin 80 mg oral tablet: 1 tab(s) orally once a day (at bedtime)  citalopram 10 mg oral tablet: 0.5 tab(s) orally once a day  famotidine 40 mg oral tablet: 1 tab(s) orally once a day (at bedtime)  insulin lispro 100 units/mL injectable solution: 10 unit(s) injectable 3 times a day (before meals)  Lantus 100 units/mL subcutaneous solution: 30 unit(s) subcutaneous once a day (at bedtime)  lisinopril 40 mg oral tablet: 1 tab(s) orally once a day  metoprolol tartrate 50 mg oral tablet: 1 tab(s) orally 2 times a day  polyethylene glycol 3350 oral powder for reconstitution: 17 gram(s) orally 2 times a day  senna leaf extract oral tablet: 2 tab(s) orally once a day (at bedtime)  ticagrelor 90 mg oral tablet: 1 tab(s) orally every 12 hours   acetaminophen 325 mg oral tablet: 2 tab(s) orally every 6 hours, As needed, for pain or fever  aspirin 81 mg oral delayed release tablet: 1 tab(s) orally once a day  atorvastatin 80 mg oral tablet: 1 tab(s) orally once a day (at bedtime)  citalopram 10 mg oral tablet: 0.5 tab(s) orally once a day  famotidine 40 mg oral tablet: 1 tab(s) orally once a day (at bedtime)  hydrocortisone 1% topical cream: Apply topically to affected area 2 times a day, As Needed  insulin lispro 100 units/mL injectable solution: 8 unit(s) subcutaneous 3 times a day (before meals), take just  before you eat your first bite  Lantus 100 units/mL subcutaneous solution: 30 unit(s) subcutaneous once a day (in the morning)  lisinopril 40 mg oral tablet: 1 tab(s) orally once a day (at bedtime)  magnesium oxide 400 mg oral tablet: 1 tab(s) orally 2 times a day (with meals)  melatonin 10 mg oral tablet: 1 tab(s) orally once a day (at bedtime)  metoprolol tartrate 50 mg oral tablet: 1 tab(s) orally 2 times a day  polyethylene glycol 3350 oral powder for reconstitution: 17 gram(s) orally 1 or 2 times a day, As Needed  senna leaf extract oral tablet: 1 or 2 tab(s) orally once a day (at bedtime), As Needed  ticagrelor 90 mg oral tablet: 1 tab(s) orally every 12 hours (2 times a day) until September 3, 2022   acetaminophen 325 mg oral tablet: 2 tab(s) orally every 6 hours, As needed, for pain or fever  aspirin 81 mg oral delayed release tablet: 1 tab(s) orally once a day  atorvastatin 80 mg oral tablet: 1 tab(s) orally once a day (at bedtime)  citalopram 10 mg oral tablet: 0.5 tab(s) orally once a day  enoxaparin: 30 milligram(s) subcutaneous every 12 hours until 9/16 /22  for dvt prevention after covid   famotidine 40 mg oral tablet: 1 tab(s) orally once a day (at bedtime)  hydrocortisone 1% topical cream: Apply topically to affected area 2 times a day, As Needed  insulin lispro 100 units/mL injectable solution: 8 unit(s) subcutaneous 3 times a day (before meals), take just  before you eat your first bite  Lantus 100 units/mL subcutaneous solution: 30 unit(s) subcutaneous once a day (in the morning)  lisinopril 40 mg oral tablet: 1 tab(s) orally once a day (at bedtime)  magnesium oxide 400 mg oral tablet: 1 tab(s) orally 2 times a day (with meals)  melatonin 10 mg oral tablet: 1 tab(s) orally once a day (at bedtime)  metoprolol tartrate 50 mg oral tablet: 1 tab(s) orally 2 times a day  Plavix 75 mg oral tablet: 1 tab(s) orally once a day start this on 9/4/22  after Brilinta stopped on 9/3  after last dose of the day  polyethylene glycol 3350 oral powder for reconstitution: 17 gram(s) orally 1 or 2 times a day, As Needed  senna leaf extract oral tablet: 1 or 2 tab(s) orally once a day (at bedtime), As Needed  ticagrelor 90 mg oral tablet: 1 tab(s) orally every 12 hours (2 times a day) until September 3, 2022

## 2022-08-28 NOTE — DISCHARGE NOTE PROVIDER - PROVIDER TOKENS
PROVIDER:[TOKEN:[[id number]]],FREE:[LAST:[MD Dc],FIRST:[Dr. Lockhart],PHONE:[phone]],FAX:[(   )    -],ADDRESS:[[facility] (Ne[facility])  [address] [place] [address]]],PROVIDER:[TOKEN:[[id number]],SCHEDULEDAPPT:[09/30/2022]] PROVIDER:[TOKEN:[[id number]]],PROVIDER:[TOKEN:[[id number]],SCHEDULEDAPPT:[09/30/2022]],FREE:[LAST:[MD Dc],FIRST:[Dr. Lockhart],PHONE:[phone]],FAX:[(   )    -],ADDRESS:[[facility] (Ne[facility])  [address] [place] [address]]],PROVIDER:[TOKEN:[[id number]],FOLLOWUP:[2 weeks]]

## 2022-08-28 NOTE — DISCHARGE NOTE PROVIDER - CARE PROVIDERS DIRECT ADDRESSES
,[email or web address],DirectAddress_Unknown,DirectAddress_Unknown

## 2022-08-28 NOTE — DISCHARGE NOTE PROVIDER - NSDCFUSCHEDAPPT_GEN_ALL_CORE_FT
Renate Meyers  [facility]  CARDIOLOGY [address]  Scheduled Appointment: 09/30/2022     [facility]  NEUROLOGY [address]  Scheduled Appointment: 09/22/2022    Renate Meyers  [facility]  CARDIOLOGY [address]  Scheduled Appointment: 09/30/2022

## 2022-08-28 NOTE — DISCHARGE NOTE PROVIDER - CARE PROVIDER_API CALL
Joaquín Rasheed)  Cardiovascular Disease; Internal Medicine; Interventional Cardiology; Nuclear Cardiology  [address] [place] [address], [address]  [place] [address]  Phone: [phone]  Fax: [phone]  Follow Up Time:     MD Dc, Dr. Lockhart  [facility] (Neurology)  [address] [place] [address]  Phone: [phone]  Fax: (   )    -  Follow Up Time:     Barron Palma; MARIN)  [place] [address]  Phone: [phone]  Fax: [phone]  Scheduled Appointment: 09/30/2022   Joaquín Rasheed)  Cardiovascular Disease; Internal Medicine; Interventional Cardiology; Nuclear Cardiology  [address] [facility] [address] [facility], [address]  [place] [address]  Phone: [phone]  Fax: [phone]  Follow Up Time:     Barron Palma; MARIN)  CardiologyElectrophyslgy [facility]  [address] [facility] [address]  [place] [address]  Phone: [phone]  Fax: [phone]  Scheduled Appointment: 09/30/2022    MD Dc, Dr. Lockhart  [facility] (Neurology)  [address] [place] [address] [address] [place] [address]  Phone: [phone]  Fax: (   )    -  Follow Up Time:     Sharif Paez)  Podiatric Medicine and Surgery  [address] [facility], [address], [address]  [place] [address]  Phone: [phone]  Fax: [phone]  Follow Up Time: 2 weeks

## 2022-08-28 NOTE — DISCHARGE NOTE PROVIDER - HOSPITAL COURSE
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 Patient noticed he started having difficulty with his speech, then developed weakness in his right hand and some visual field deficits. In the ED, patient received tPA. CT head at the time was negative, EDSON showed EF of 55-60%.  MRI head showed scattered left-sided acute cortical infarcts within the MCA and PCA territories, with trace petechial hemorrhage involving high left frontal region. There is a chronic left occipital lobe infarct. MRA of the neck showed moderate stenosis of the proximal right internal carotid artery. On 8/9, a right upper quadrant US was done to rule out cholecystitis, revealing an indeterminate 1.2 cm right renal lesion and moderate-severe hepatic steatosis. During the hospital stay, the visual field deficits improved but patient continued to have no motor function in the right hand and impaired RLE strength and mobility and ADL independence and a persistent aphasia.   He lives by himself in a house with 5 [address] and one flight of stairs inside (5+7 steps). Patient was previously independent in ADLs and ambulation, occasionally using a cane outdoors for balance.  He was evaluated by Physiatry on the medical service and was found to be a good acute rehab candidate. He was admitted to Rehab medicine service on 8/11/22.     ASSESSMENT/PLANS:   Patient is a 76yo M with PMH HTN, DM, CAD (s/p stents), GERD, who presented for right sided weakness and aphasia. Patient was found to have a left acute cortical infarct within the MCA and PCA territories. Patient presented with visual field deficits that has since improved, but is still with impaired strength, balance, mobility and ADLs and language. Patient was previously independent in all ADLs and activities. Current level of function is mod assist for bed mobility, max assist with transfers, mod assist with ambulation 25 feet with RW. Patient is a good candidate for rehab due to significantly decreased level of function from prior.    #Left acute ischemic stroke with right hemiparesis (dominant) and aphasia  s/p tPA on 8/3  EDSON showed EF of 55-60%  8/6 MRI head: scattered left-sided acute cortical infarcts within the MCA and PCA territories, with trace petechial hemorrhage involving high left frontal region. There is a chronic left occipital lobe infarct.   -ILR was placed by Dr. Palma on 8/8/22  MRA of the neck: moderate stenosis of the proximal right internal carotid artery  - start PT/OT/ST, Neuropsych eval  - c/w 5mg celexa  - c/w ASA 81mg  - c/w Brilinta 90mg BID x 30 days (8/6 - 9/4, then resume plavix 75mg po daily starting on 9/5)  - c/w Lipitor 80mg daily  - Making significant gains in MP and mobility. He is able to move his RUE, previously was 0/5. Ambulates with a Ariel-cane with RA    #Diabetic Peripheral Polyneuropathy  - No pain. Teach patient skin monitoring an sensory awareness  - advised him to wear shoes and socks at all times and to examine his feet and toes, including between toes, daily, for sx of trauma/inflammation  - was seen by Podiatry--to follow up out-patient    #Covid positive   - PCR positive from 08/16/22   - Placed on Airborne Precautions  - CRP - 85, D-Dimer 323  - Ferritin 585- mild increase  - Completed Remdesivir  - Lovenox increased to 30 mg bid  - Asymptomatic. Completed 10 day isolation    #HTN - BP was low -   - stopped HCTZ  - stopped Amlodipine 5mg qd  - c/w Lisinopril 40mg   - c/w Lopressor 50mg BID  - now controlled    #Diabetes 2. Fair control  Ha1c 8.2  - c/w 30U Lantus  - c/w 8U Lispro    #CAD  -continue ASA 81, Brilinta x 30 days, then resume Plavix, BB, statin    #Renal lesion  On 8/9, a right upper quadrant US was done to rule out cholecystitis, revealing an indeterminate 1.2 cm right renal lesion and moderate-severe hepatic steatosis.  -outpatient followup, non urgent MRI with renal mass protocol    #Azotemia  - given IVF with improvement  - had negative PVR    -GI/Bowel Mgmt: senna, dulcolax, lactulose, miralax    - Diet: Dash Carb Consistent diet    - Melatonin 10mg qhs for difficulty sleeping     Precautions / PROPHYLAXIS:      - Falls    - Ortho: Weight bearing status: WBAT      - DVT prophylaxis: Lovenox 30 mg BID    Date of Procedure: 08-08-22  EP Attending: FABIAN Palma MD  Assistant: JAY GARCIA  Referring Physician: DIMITRIS Beaver MD  Procedure: Loop Recorder Implant  Indication: 77y Male with history of ***CVA referred for implantable loop recorder.   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 Patient noticed he started having difficulty with his speech, then developed weakness in his right hand and some visual field deficits. In the ED, patient received tPA. CT head at the time was negative, EDSON showed EF of 55-60%.  MRI head showed scattered left-sided acute cortical infarcts within the MCA and PCA territories, with trace petechial hemorrhage involving high left frontal region. There is a chronic left occipital lobe infarct. MRA of the neck showed moderate stenosis of the proximal right internal carotid artery. On 8/9, a right upper quadrant US was done to rule out cholecystitis, revealing an indeterminate 1.2 cm right renal lesion and moderate-severe hepatic steatosis. During the hospital stay, the visual field deficits improved but patient continued to have no motor function in the right hand and impaired RLE strength and mobility and ADL independence and a persistent aphasia.   He lives by himself in a house with 5 [address] and one flight of stairs inside (5+7 steps). Patient was previously independent in ADLs and ambulation, occasionally using a cane outdoors for balance.  He was evaluated by Physiatry on the medical service and was found to be a good acute rehab candidate. He was admitted to Rehab medicine service on 8/11/22.  Patient denies any complaints at this time. Tolerating PT/OT. Tolerating oral diet. Voiding and passing stool spontaneously. Vital signs are stable. Patient making progress with right arm. D-dimer still elevated (341 from 323), Doppler was negative.    EDSON showed EF of 55-60%  8/6 MRI head: scattered left-sided acute cortical infarcts within the MCA and PCA territories, with trace petechial hemorrhage involving high left frontal region. There is a chronic left occipital lobe infarct.   -ILR was placed by Dr. Palma on 8/8/22  MRA of the neck: moderate stenosis of the proximal right internal carotid artery  - start PT/OT/ST, Neuropsych eval  - c/w 5mg celexa  - c/w ASA 81mg  - c/w Brilinta 90mg BID x 30 days (8/5 - 9/3then resume plavix 75mg po daily starting on 9/4  - c/w Lipitor 80mg daily  - Making significant gains in MP and mobility. He is able to move his RUE, previously was 0/5. Ambulates with a Ariel-cane with RA    #Diabetic Peripheral Polyneuropathy  - No pain. Teach patient skin monitoring an sensory awareness  - advised him to wear shoes and socks at all times and to examine his feet and toes, including between toes, daily, for sx of trauma/inflammation  - was seen by Podiatry--to follow up out-patient    #Covid positive   - PCR positive from 08/16/22   - Placed on Airborne Precautions  - CRP - 85, D-Dimer 323  - Ferritin 585- mild increase  - Completed Remdesivir  - Lovenox increased to 30 mg bid  - Asymptomatic. Completed 10 day isolation    #HTN - BP was low -   - stopped HCTZ  - stopped Amlodipine 5mg qd  - c/w Lisinopril 40mg   - c/w Lopressor 50mg BID  - now controlled    #Diabetes 2. Fair control  Ha1c 8.2  - c/w 30U Lantus  - c/w 8U Lispro    #CAD  -continue ASA 81, Brilinta x 30 days, then resume Plavix, BB, statin    #Renal lesion  On 8/9, a right upper quadrant US was done to rule out cholecystitis, revealing an indeterminate 1.2 cm right renal lesion and moderate-severe hepatic steatosis.  -outpatient followup, non urgent MRI with renal mass protocol    #Azotemia  - given IVF with improvement  - had negative PVR    -GI/Bowel Mgmt: senna, dulcolax, lactulose, miralax    - Diet: Dash Carb Consistent diet    - Melatonin 10mg qhs for difficulty sleeping     Precautions / PROPHYLAXIS:      - Falls    - Ortho: Weight bearing status: WBAT      - DVT prophylaxis: Lovenox 30 mg BID    Date of Procedure: 08-08-22  EP Attending: FABIAN Palma MD    Referring Physician: DIMITRIS Beaver MD  Procedure: Loop Recorder Implant  Indication: 77y Male with history of ***CVA referred for implantable loop recorder.   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 Patient noticed he started having difficulty with his speech, then developed weakness in his right hand and some visual field deficits. In the ED, patient received tPA. CT head at the time was negative, EDSON showed EF of 55-60%.  MRI head showed scattered left-sided acute cortical infarcts within the MCA and PCA territories, with trace petechial hemorrhage involving high left frontal region. There is a chronic left occipital lobe infarct. MRA of the neck showed moderate stenosis of the proximal right internal carotid artery. On 8/9, a right upper quadrant US was done to rule out cholecystitis, revealing an indeterminate 1.2 cm right renal lesion and moderate-severe hepatic steatosis. During the hospital stay, the visual field deficits improved but patient continued to have no motor function in the right hand and impaired RLE strength and mobility and ADL independence and a persistent aphasia.   He lives by himself in a house with [address] and one flight of stairs inside (5+7 steps). Patient was previously independent in ADLs and ambulation, occasionally using a cane outdoors for balance.  He was evaluated by Physiatry on the medical service and was found to be a good acute rehab candidate. He was admitted to Rehab medicine service on 8/11/22.  Patient denies any complaints at this time. Tolerating PT/OT. Tolerating oral diet. Voiding and passing stool spontaneously. Vital signs are stable. Patient making progress with right arm. D-dimer still elevated (341 from 323), Doppler was negative.    EDSON showed EF of 55-60%  8/6 MRI head: scattered left-sided acute cortical infarcts within the MCA and PCA territories, with trace petechial hemorrhage involving high left frontal region. There is a chronic left occipital lobe infarct.   -ILR was placed by Dr. Palma on 8/8/22  MRA of the neck: moderate stenosis of the proximal right internal carotid artery  - start PT/OT/ST, Neuropsych eval  - c/w 5mg celexa  - c/w ASA 81mg  - c/w Brilinta 90mg BID x 30 days (8/5 - 9/3then resume plavix 75mg po daily starting on 9/4  - c/w Lipitor 80mg daily  - Making significant gains in MP and mobility. He is able to move his RUE, previously was 0/5. Ambulates with a Ariel-cane with RA    #Diabetic Peripheral Polyneuropathy  - No pain. Teach patient skin monitoring an sensory awareness  - advised him to wear shoes and socks at all times and to examine his feet and toes, including between toes, daily, for sx of trauma/inflammation  - was seen by Podiatry--to follow up out-patient    #Covid positive   - PCR positive from 08/16/22   - Placed on Airborne Precautions  - CRP - 85, D-Dimer 323  - Ferritin 585- mild increase  - Completed Remdesivir  - Lovenox increased to 30 mg bid  - Asymptomatic. Completed 10 day isolation    #HTN - BP was low -   - stopped HCTZ  - stopped Amlodipine 5mg qd  - c/w Lisinopril 40mg   - c/w Lopressor 50mg BID  - now controlled    #Diabetes 2. Fair control  Ha1c 8.2  - c/w 30U Lantus  - c/w 8U Lispro    #CAD  -continue ASA 81, Brilinta x 30 days, then resume Plavix, BB, statin    #Renal lesion  On 8/9, a right upper quadrant US was done to rule out cholecystitis, revealing an indeterminate 1.2 cm right renal lesion and moderate-severe hepatic steatosis.  -outpatient followup, non urgent MRI with renal mass protocol    #Azotemia  - given IVF with improvement  - had negative PVR    -GI/Bowel Mgmt: senna, dulcolax, lactulose, miralax    - Diet: Dash Carb Consistent diet    - Melatonin 10mg qhs for difficulty sleeping     Precautions / PROPHYLAXIS:      - Falls    - Ortho: Weight bearing status: WBAT      - DVT prophylaxis: Lovenox 30 mg BID    Date of Procedure: 08-08-22  EP Attending: FABIAN Palma MD    Referring Physician: DIMITRIS Beaver MD  Procedure: Loop Recorder Implant  Indication: 77y Male with history of ***CVA referred for implantable loop recorder.   Anesthesia: Local  EQUIPMENT IMPLANTED  : [employer] Linq  Model Number: [id number]  Serial Number: [id number]  The patient is discharged on 8/30/22 to [facility] to continue PT/OT; he has appointments to follow up at [facility] on 9/22/22 and also with [facility] Cardiology on 9/30/22. He will also need to follow up with his PCP and Podiatry.

## 2022-08-28 NOTE — DISCHARGE NOTE PROVIDER - NSDCCPCAREPLAN_GEN_ALL_CORE_FT
PRINCIPAL DISCHARGE DIAGNOSIS  Diagnosis: Stroke  Assessment and Plan of Treatment:         SECONDARY DISCHARGE DIAGNOSES  Diagnosis: CAD (coronary artery disease)  Assessment and Plan of Treatment:     Diagnosis: DM (diabetes mellitus)  Assessment and Plan of Treatment:     Diagnosis: Renal lesion  Assessment and Plan of Treatment:     Diagnosis: 2019 novel coronavirus disease (COVID-19)  Assessment and Plan of Treatment:      PRINCIPAL DISCHARGE DIAGNOSIS  Diagnosis: Embolic stroke  Assessment and Plan of Treatment: You were admitted for a stoke and you were seen by neurologist and you were given Brilinta a blood thinner   to take for 30 days  whish means until Sept 3 rd. and on 9/4 th please start plavix and stop brilinta , watch for any signs of bleeding , eport any new changes with your doctor . Please continue physical therapy as advised      SECONDARY DISCHARGE DIAGNOSES  Diagnosis: CAD (coronary artery disease)  Assessment and Plan of Treatment: You had this condition  with hypertension, Blood pressure meds were adjusted,HCTZ  and aMiodarone was stopped and  please continue taking all  the other meds as advised and medical /Cardiology follow up in 2 to 4 weeks . You also had a LOOP recorder placed by EP cardiologist to monitor your Heart to see if you had a blood clot or irregular heatt beat that  contributed to the stroke , please follow the instructions of your cardiogist for this / follow  up    Diagnosis: DM (diabetes mellitus)  Assessment and Plan of Treatment: And diabetic peripheral neuropathy > your blood Sugar  is well controlled on current insulin and carb consistent diet , also need low fat low cholesterol diet for your hear health .     You are on lantus 30 units and lispro 8 to 10  units qac , please do Glucose monitoring 2 times a day and make adjust ments in insulin , please know the signs of low blood sugar levels and inform  MD accordingly .  lokesh dunlap to follow up with a podiatrist as you were doing prior to the  hospitalization    Diagnosis: Renal lesion  Assessment and Plan of Treatment: Incidental finding seen in Ultrasound a smalll lesion on kidney, You  will need  further work up  with a Nephrologist  for this.rick follow up with a Nephrologist dr hoff in few weeks    Diagnosis: 2019 novel coronavirus disease (COVID-19)  Assessment and Plan of Treatment: You stay was complicated with posisitve covid  virus, you were given  Remdesivir infusion to treat this virus, also placed on blood thinner called Lovenox  injectios taken 2 times  a day  , this need to continue until Sept 16 th .  and you may follow up with your PMD in 2 to 4 weeks     PRINCIPAL DISCHARGE DIAGNOSIS  Diagnosis: Embolic stroke  Assessment and Plan of Treatment: You were admitted for a stroke and you were seen by neurologist and you were given Brilinta a blood thinner   to take for 30 days (until Sept 3). On 9/4/22 please start plavix and stop brilinta , watch for any signs of bleeding , report any new changes with your doctor . Physical and occupational therapy will continue at [facility].      SECONDARY DISCHARGE DIAGNOSES  Diagnosis: CAD (coronary artery disease)  Assessment and Plan of Treatment: You had this condition  with hypertension, Blood pressure meds were adjusted,HCTZ  and amlodipine  were stopped and  please continue taking all  the other meds as advised and medical /Cardiology follow up in 2 to 4 weeks . You also had a LOOP recorder placed by EP cardiologist to monitor your Heart to see if you had a blood clot or irregular heart beat that  contributed to the stroke , please follow the instructions of your cardiogist for this / follow  up    Diagnosis: DM (diabetes mellitus)  Assessment and Plan of Treatment: And diabetic peripheral neuropathy > your blood Sugar  is well controlled on current insulin and carb consistent diet , also need low fat low cholesterol diet for your heart health .     You are on lantus 30 units in the morning and lispro 8 to 10  units qac , please do Glucose monitoring 3 times a day before meals and make adjustments in insulin , please know the signs of low blood sugar levels and inform  MD accordingly .  please continue to follow up with a podiatrist as you were doing prior to the  hospitalization    Diagnosis: Renal lesion  Assessment and Plan of Treatment: Incidental finding seen in Ultrasound a smalll lesion on kidney, You  will need  further work up  with a Nephrologist  for this.rick follow up with a Nephrologist dr hoff in few weeks    Diagnosis: 2019 novel coronavirus disease (COVID-19)  Assessment and Plan of Treatment: You stay was complicated with positive covid  virus, you were given  Remdesivir infusion to treat this virus, also placed on blood thinner called Lovenox  injections taken 2 times  a day  , this needs to continue until Sept 16 th .  and you may follow up with your PMD in 2 to 4 weeks

## 2022-08-28 NOTE — DISCHARGE NOTE PROVIDER - INSTRUCTIONS
It is advisable that you eat a high protein diabetic snack every night after dinner, about 2 to 3 hours before bedtime, to prevent low blood sugar during the night while you are sleeping, such as 1/2 sandwich with lowfat or fat-free milk, or 4 ounces of plain lowfat yogurt with a little fresh fruit

## 2022-08-29 LAB
ALBUMIN SERPL ELPH-MCNC: 3.8 G/DL — SIGNIFICANT CHANGE UP (ref 3.5–5.2)
ALP SERPL-CCNC: 139 U/L — HIGH (ref 30–115)
ALT FLD-CCNC: 33 U/L — SIGNIFICANT CHANGE UP (ref 0–41)
ANION GAP SERPL CALC-SCNC: 11 MMOL/L — SIGNIFICANT CHANGE UP (ref 7–14)
AST SERPL-CCNC: 24 U/L — SIGNIFICANT CHANGE UP (ref 0–41)
BASOPHILS # BLD AUTO: 0.04 K/UL — SIGNIFICANT CHANGE UP (ref 0–0.2)
BASOPHILS NFR BLD AUTO: 0.3 % — SIGNIFICANT CHANGE UP (ref 0–1)
BILIRUB SERPL-MCNC: 0.9 MG/DL — SIGNIFICANT CHANGE UP (ref 0.2–1.2)
BUN SERPL-MCNC: 21 MG/DL — HIGH (ref 10–20)
CALCIUM SERPL-MCNC: 9.4 MG/DL — SIGNIFICANT CHANGE UP (ref 8.5–10.1)
CHLORIDE SERPL-SCNC: 100 MMOL/L — SIGNIFICANT CHANGE UP (ref 98–110)
CO2 SERPL-SCNC: 27 MMOL/L — SIGNIFICANT CHANGE UP (ref 17–32)
CREAT SERPL-MCNC: 1.1 MG/DL — SIGNIFICANT CHANGE UP (ref 0.7–1.5)
EGFR: 70 ML/MIN/1.73M2 — SIGNIFICANT CHANGE UP
EOSINOPHIL # BLD AUTO: 0.35 K/UL — SIGNIFICANT CHANGE UP (ref 0–0.7)
EOSINOPHIL NFR BLD AUTO: 2.8 % — SIGNIFICANT CHANGE UP (ref 0–8)
GLUCOSE BLDC GLUCOMTR-MCNC: 105 MG/DL — HIGH (ref 70–99)
GLUCOSE BLDC GLUCOMTR-MCNC: 280 MG/DL — HIGH (ref 70–99)
GLUCOSE SERPL-MCNC: 127 MG/DL — HIGH (ref 70–99)
HCT VFR BLD CALC: 41.1 % — LOW (ref 42–52)
HGB BLD-MCNC: 14.4 G/DL — SIGNIFICANT CHANGE UP (ref 14–18)
IMM GRANULOCYTES NFR BLD AUTO: 0.6 % — HIGH (ref 0.1–0.3)
LYMPHOCYTES # BLD AUTO: 19.6 % — LOW (ref 20.5–51.1)
LYMPHOCYTES # BLD AUTO: 2.48 K/UL — SIGNIFICANT CHANGE UP (ref 1.2–3.4)
MAGNESIUM SERPL-MCNC: 2 MG/DL — SIGNIFICANT CHANGE UP (ref 1.8–2.4)
MCHC RBC-ENTMCNC: 29.7 PG — SIGNIFICANT CHANGE UP (ref 27–31)
MCHC RBC-ENTMCNC: 35 G/DL — SIGNIFICANT CHANGE UP (ref 32–37)
MCV RBC AUTO: 84.7 FL — SIGNIFICANT CHANGE UP (ref 80–94)
MONOCYTES # BLD AUTO: 1.3 K/UL — HIGH (ref 0.1–0.6)
MONOCYTES NFR BLD AUTO: 10.3 % — HIGH (ref 1.7–9.3)
NEUTROPHILS # BLD AUTO: 8.4 K/UL — HIGH (ref 1.4–6.5)
NEUTROPHILS NFR BLD AUTO: 66.4 % — SIGNIFICANT CHANGE UP (ref 42.2–75.2)
NRBC # BLD: 0 /100 WBCS — SIGNIFICANT CHANGE UP (ref 0–0)
PLATELET # BLD AUTO: 267 K/UL — SIGNIFICANT CHANGE UP (ref 130–400)
POTASSIUM SERPL-MCNC: 4.5 MMOL/L — SIGNIFICANT CHANGE UP (ref 3.5–5)
POTASSIUM SERPL-SCNC: 4.5 MMOL/L — SIGNIFICANT CHANGE UP (ref 3.5–5)
PROT SERPL-MCNC: 7.2 G/DL — SIGNIFICANT CHANGE UP (ref 6–8)
RBC # BLD: 4.85 M/UL — SIGNIFICANT CHANGE UP (ref 4.7–6.1)
RBC # FLD: 13.5 % — SIGNIFICANT CHANGE UP (ref 11.5–14.5)
SODIUM SERPL-SCNC: 138 MMOL/L — SIGNIFICANT CHANGE UP (ref 135–146)
WBC # BLD: 12.64 K/UL — HIGH (ref 4.8–10.8)
WBC # FLD AUTO: 12.64 K/UL — HIGH (ref 4.8–10.8)

## 2022-08-29 PROCEDURE — 71045 X-RAY EXAM CHEST 1 VIEW: CPT | Mod: 26

## 2022-08-29 PROCEDURE — 93970 EXTREMITY STUDY: CPT | Mod: 26

## 2022-08-29 RX ADMIN — MAGNESIUM OXIDE 400 MG ORAL TABLET 400 MILLIGRAM(S): 241.3 TABLET ORAL at 17:29

## 2022-08-29 RX ADMIN — MAGNESIUM OXIDE 400 MG ORAL TABLET 400 MILLIGRAM(S): 241.3 TABLET ORAL at 09:24

## 2022-08-29 RX ADMIN — ATORVASTATIN CALCIUM 80 MILLIGRAM(S): 80 TABLET, FILM COATED ORAL at 21:22

## 2022-08-29 RX ADMIN — Medication 50 MILLIGRAM(S): at 05:57

## 2022-08-29 RX ADMIN — INSULIN GLARGINE 30 UNIT(S): 100 INJECTION, SOLUTION SUBCUTANEOUS at 09:19

## 2022-08-29 RX ADMIN — Medication 8 UNIT(S): at 12:48

## 2022-08-29 RX ADMIN — Medication 8 UNIT(S): at 17:11

## 2022-08-29 RX ADMIN — TICAGRELOR 90 MILLIGRAM(S): 90 TABLET ORAL at 17:29

## 2022-08-29 RX ADMIN — CITALOPRAM 5 MILLIGRAM(S): 10 TABLET, FILM COATED ORAL at 12:52

## 2022-08-29 RX ADMIN — Medication 10 MILLIGRAM(S): at 21:22

## 2022-08-29 RX ADMIN — Medication 8 UNIT(S): at 09:21

## 2022-08-29 RX ADMIN — Medication 81 MILLIGRAM(S): at 12:51

## 2022-08-29 RX ADMIN — ENOXAPARIN SODIUM 30 MILLIGRAM(S): 100 INJECTION SUBCUTANEOUS at 05:57

## 2022-08-29 RX ADMIN — ENOXAPARIN SODIUM 30 MILLIGRAM(S): 100 INJECTION SUBCUTANEOUS at 17:29

## 2022-08-29 RX ADMIN — FAMOTIDINE 40 MILLIGRAM(S): 10 INJECTION INTRAVENOUS at 21:22

## 2022-08-29 RX ADMIN — TICAGRELOR 90 MILLIGRAM(S): 90 TABLET ORAL at 05:57

## 2022-08-29 RX ADMIN — SENNA PLUS 2 TABLET(S): 8.6 TABLET ORAL at 21:22

## 2022-08-29 RX ADMIN — Medication 1 APPLICATION(S): at 05:57

## 2022-08-29 RX ADMIN — Medication 50 MILLIGRAM(S): at 17:29

## 2022-08-29 NOTE — PROGRESS NOTE ADULT - TIME BILLING
patient contact and cognitive assessment
30 mins family and patient contact
patient contact and assessment
patient's family feedback

## 2022-08-29 NOTE — CHART NOTE - NSCHARTNOTEFT_GEN_A_CORE
patient had refused blood work  in am , labs drrawn at 4 pm cbc resulted  as below . wbc is elevated,                         14.4   12.64 )-----------( 267      ( 29 Aug 2022 17:25 )             41.1       08-29    138  |  100  |  21<H>  ----------------------------<  127<H>  4.5   |  27  |  1.1    Ca    9.4      29 Aug 2022 17:25  Mg     2.0     08-29    TPro  7.2  /  Alb  3.8  /  TBili  0.9  /  DBili  x   /  AST  24  /  ALT  33  /  AlkPhos  139<H>  08-29       will check Ua, cxr , vasc dopler to r/o dvt , on lovenox 30  mg q12h .  vitals stable       Vital Signs Last 24 Hrs  T(C): 36.3 (29 Aug 2022 17:35), Max: 36.5 (29 Aug 2022 14:30)  T(F): 97.4 (29 Aug 2022 17:35), Max: 97.7 (29 Aug 2022 14:30)  HR: 80 (29 Aug 2022 17:35) (71 - 86)  BP: 135/71 (29 Aug 2022 17:35) (117/74 - 137/73)    RR: 17 (29 Aug 2022 17:35) (17 - 18)  SpO2: 97% (29 Aug 2022 17:35) (96% - 97%)    Parameters below as of 29 Aug 2022 17:35  Patient On (Oxygen Delivery Method): room air                          CAPILLARY BLOOD GLUCOSE      POCT Blood Glucose.: 105 mg/dL (29 Aug 2022 16:37)

## 2022-08-29 NOTE — PROGRESS NOTE ADULT - ASSESSMENT
Treatment Session Focused on: Feedback      Mr. Proctor’s daughter, Tegan Slade, was contacted on 8/29/22 to review neurocognitive assessment results and to provide psychoeducation and recommendations. We discussed benefits of obtaining a comprehensive neuropsychological evaluation and of cognitive remediation/psychotherapy sessions.  His daughter agreed that this would be helpful. Mr. Proctor was discharged from neuropsychology services. He was referred to outpatient neuropsychology services and will follow-up in 3-6 months.        Goals: No further goals at this time.      Plan: Discharge from neuropsychology services.      Brain Injury Protocol: No      Cognitive Behavioral Guidelines: No

## 2022-08-29 NOTE — PROGRESS NOTE ADULT - ASSESSMENT
Patient is a 76yo M with PMH HTN, DM, CAD (s/p stents), GERD who presented for right sided weakness and aphasia. Patient was found to have a left acute cortical infarct within the MCA and PCA territories. Patient presented with visual field deficits that has since improved, but is still with impaired strength, balance, mobility and ADLs and language. Patient was previously independent in all ADLs and activities. Current level of function is mod assist for bed mobility, max assist with transfers, mod assist with ambulation 25 feet with RW. Patient is a good candidate for rehab due to significantly decreased level of function from prior.    #Left acute ischemic stroke with right hemiparesis (dominant) and aphasia  s/p tPA on 8/3  EDSON showed EF of 55-60%  8/6 MRI head: scattered left-sided acute cortical infarcts within the MCA and PCA territories, with trace petechial hemorrhage involving high left frontal region. There is a chronic left occipital lobe infarct.   MRA of the neck: moderate stenosis of the proximal right internal carotid artery  - start PT/OT/ST, Neuropsych eval  - c/w 5mg celexa  - c/w ASA 81mg  - c/w Brilinta 90mg BID  - c/w Lipitor 80mg daily  - Making significant gains in MP and mobility. Ambulates with a Ariel-cane with RA    #Diabetic Peripheral Polyneuropathy  - No pain. Teach patient skin monitoring an sensory awareness    #Covid positive (resolved)  - PCR positive from 08/16/22   - Placed on Airborne Precautions  - CRP - 85, D-Dimer 323  - Ferritin 585- mild increase  - Completed Remdesivir  - Lovenox increased to 30 mg bid  - Asymptomatic. Completed 10 day isolation    #HTN - BP was low -   - stopped HCTZ  - stopped Amlodipine 5mg qd  - c/w Lisinopril 40mg   - c/w Lopressor 50mg BID  - now controlled    #Diabetes 2. Fair control  Ha1c 8.2  - c/w 30U Lantus  - c/w 8U Lispro    #CAD  -continue ASA 81, Brilinta, BB, statin    #Renal lesion  On 8/9, a right upper quadrant US was done to rule out cholecystitis, revealing an indeterminate 1.2 cm right renal lesion and moderate-severe hepatic steatosis.  -outpatient followup, non urgent MRI with renal mass protocol    #Azotemia  - given IVF with improvement  - had negative PVR    -GI/Bowel Mgmt: senna, dulcolax, lactulose, miralax    - Diet: Dash Carb Consistent diet    - Melatonin 10mg qhs for difficulty sleeping       Precautions / PROPHYLAXIS:      - Falls    - Ortho: Weight bearing status: WBAT      - DVT prophylaxis: Lovenox 30 mg BID     Patient is a 78yo M with PMH HTN, DM, CAD (s/p stents), GERD who presented for right sided weakness and aphasia. Patient was found to have a left acute cortical infarct within the MCA and PCA territories. Patient presented with visual field deficits that has since improved, but is still with impaired strength, balance, mobility and ADLs and language. Patient was previously independent in all ADLs and activities. Current level of function is mod assist for bed mobility, max assist with transfers, mod assist with ambulation 25 feet with RW. Patient is a good candidate for rehab due to significantly decreased level of function from prior.    #Left acute ischemic stroke with right hemiparesis (dominant) and aphasia  s/p tPA on 8/3  EDSON showed EF of 55-60%  8/6 MRI head: scattered left-sided acute cortical infarcts within the MCA and PCA territories, with trace petechial hemorrhage involving high left frontal region. There is a chronic left occipital lobe infarct.   MRA of the neck: moderate stenosis of the proximal right internal carotid artery  - start PT/OT/ST, Neuropsych eval  - c/w 5mg celexa  - c/w ASA 81mg  - c/w Brilinta 90mg BID  - c/w Lipitor 80mg daily  - Making significant gains in MP and mobility. Ambulates with a Ariel-cane with TA    #Diabetic Peripheral Polyneuropathy  - No pain. Teach patient skin monitoring an sensory awareness    #Covid -  - PCR positive from 08/16/22   - Placed on Airborne Precautions  - CRP - 85, D-Dimer 323  - Ferritin 585- mild increase  - Completed Remdesivir  - Lovenox increased to 30 mg bid  - Asymptomatic. Completed 10 day isolation    #HTN - BP was low -   - stopped HCTZ  - stopped Amlodipine 5mg qd  - c/w Lisinopril 40mg   - c/w Lopressor 50mg BID  - now controlled    #Diabetes 2. Fair control  Ha1c 8.2  - c/w 30U Lantus  - c/w 8U Lispro    #CAD  -continue ASA 81, Brilinta, BB, statin    #Renal lesion  On 8/9, a right upper quadrant US was done to rule out cholecystitis, revealing an indeterminate 1.2 cm right renal lesion and moderate-severe hepatic steatosis.  -outpatient followup, non urgent MRI with renal mass protocol    #Azotemia  - given IVF with improvement  - had negative PVR    -GI/Bowel Mgmt: senna, dulcolax, lactulose, miralax    - Diet: Dash Carb Consistent diet    - Melatonin 10mg qhs for difficulty sleeping       Precautions / PROPHYLAXIS:      - Falls    - Ortho: Weight bearing status: WBAT      - DVT prophylaxis: Lovenox 30 mg BID

## 2022-08-29 NOTE — PROGRESS NOTE ADULT - SUBJECTIVE AND OBJECTIVE BOX
Patient is a 77y old  Male who presents with a chief complaint of rehab s/p left ischemic stroke with aphasia and right hemiparesis (11 Aug 2022 11:30)      HPI:  Patient is a 76yo M with PMH of HTN, DM, HLD, CAD (s/p stents), GERD who presented to the ED on 8/3/2022 for right hand weakness. Patient noticed he started having difficulty with his speech, then developed weakness in his right hand and some visual field deficits. In the ED, patient received tPA. CT head at the time was negative, EDSON showed EF of 55-60%.  MRI head showed scattered left-sided acute cortical infarcts within the MCA and PCA territories, with trace petechial hemorrhage involving high left frontal region. There is a chronic left occipital lobe infarct. MRA of the neck showed moderate stenosis of the proximal right internal carotid artery. On 8/9, a right upper quadrant US was done to rule out cholecystitis, revealing an indeterminate 1.2 cm right renal lesion and moderate-severe hepatic steatosis. During the hospital stay, the visual field deficits improved but patient continued to have no motor function in the right hand and impaired RLE strength and mobility and ADL independence and a persistent aphasia.    On evaluation by PM&R, patient endorses no movement in his right hand and notes some difficulty with memory but otherwise has no other complaints. Patient is right handed. He lives by himself in a house with 5 [address] and one flight of stairs inside (5+7 steps). Patient was previously independent in ADLs and ambulation, occasionally using a cane outdoors for balance. Currently patient requires mod assist for bed mobility, max assist with transfers, mod assist with ambulation 25 feet with RW and max assistance for dressing. He was evaluated by Physiatry on the medical service and was found to be a good acute rehab candidate.  (11 Aug 2022 11:30)    TODAY'S SUBJECTIVE & REVIEW OF SYMPTOMS:  Patient was seen and assessed at bedside. No overnight events. Patient denies any complaints at this time. Tolerating PT/OT. Tolerating oral diet. Voiding and passing stool spontaneously. Vital signs are stable. Patient making progress with right arm.      Constitutional    [ x  ] WNL           [   ] poor appetite   [   ] insomnia   [   ] tired   Cardio:                [  x ] WNL           [   ] CP   [   ] VELASCO   [   ] palpitations               Resp:                   [ x  ] WNL           [   ] SOB   [   ] cough improving   [   ] wheezing   GI:                        [ x  ] WNL           [   ] constipation   [   ] diarrhea   [   ] abdominal pain   [   ] nausea   [   ] emesis                                :                      [ x  ] WNL           [   ] PRITCHARD  [   ] dysuria   [   ] difficulty voiding             Endo:                   [ x  ] WNL          [   ] polyuria   [   ] temperature intolerance                 Skin:                     [ x  ] WNL          [   ] pain   [   ] wound   [   ] rash   MSK:                    [   x] WNL          [   ] muscle pain   [   ] joint pain/ stiffness   [   ] muscle tenderness   [   ] swelling   Neuro:                 [   ] WNL          [   ] HA   [   ] change in vision   [   ] tremor   [ x  ] weakness- Right side   [   ]dysphagia   [  ] word finding difficulties- improving,   [ x ] numbness/ neuropathy both feet    Cognitive:           [ x  ] WNL           [   ]confusion   - daughter note some memory loss since stroke   Psych:                  [  x ] WNL           [   ] hallucinations   [   ]agitation   [   ] delusion   [   ]depression      PHYSICAL EXAM    ICU Vital Signs Last 24 Hrs  T(C): 36.2 (29 Aug 2022 05:39), Max: 37.2 (28 Aug 2022 13:01)  T(F): 97.1 (29 Aug 2022 05:39), Max: 99 (28 Aug 2022 13:01)  HR: 74 (29 Aug 2022 05:39) (74 - 88)  BP: 137/73 (29 Aug 2022 05:39) (130/73 - 143/68)  BP(mean): --  ABP: --  ABP(mean): --  RR: 18 (29 Aug 2022 05:39) (17 - 18)  SpO2: 96% (29 Aug 2022 05:39) (96% - 96%)        General:[  x ] NAD, Resting Comfortable,   [   ] other:                                HEENT: [  x ] NC/AT, EOMI, PERRL , Normal Conjunctivae,   [   ] other:  Cardio: [  x ] RRR, no murmer,   [   ] other:                              Pulm: [  x ] No Respiratory Distress,  Lungs CTAB,   [   ] other:                       Abdomen: [ x  ] ND/NT, Soft,   [   ] other:    : [ x  ] NO PRITCHARD CATHETER,   [  ] PRITCHARD CATHETER- no meatal tear, no discharge, [   ] other:                                            MSK: [ x  ] No joint swelling, Full PROM,   [    ] other:                                       Ext: [  x ]No C/C/E, No calf tenderness,   [   ]other:    Skin: [ x  ]intact,   [   ] other:                                                                   Neurological Examination:  Cognitive: [ x   ] AAO x 3,   [    ]  other:                                                                 Attention/ Concentration:  [ x   ] intact   [    ]  other:   Memory: [    ] intact,    [ x  ]  other:  mildly impaired  Mood/Affect: [  x  ] wnl,    [    ]  other:                                                                             Communication: [ x  ]Fluent, no dysarthria, following commands:  [   ] other:  CN II - XII:  [  x  ] intact, VFF  [    ] other: mild right facial droop                                                                                         Motor:   RIGHT UE: [   ] WNL,  [  x ] other: 3/5 shoulder flexion, 3/5 elbow flexion, 2/5 elbow extension, 2/5 finger flexion, 2-/5 finger extension  LEFT    UE: [  x ] WNL  RIGHT LE: [ x  ] WNL,  [   ] other: Hip flexion 3+/5 with drift to bed, knee flexion 4/5, plantarflexion 5/5, dorsiflexion 5/5, toe extension 5/5  LEFT    LE: [  x ] WNL,  Hip flexion 4/5, knee flexion 5/5, plantarflexion 5/5, dorsiflexion 5/5, toe extension 5/5     Tone: [    ] wnl,   [ x   ]  other: mild increased tone in right triceps  DTRs: [ x  ]symmetric, 1+ [   ] other:   Coordination:   [    ] intact as testable,   [ not assesed   ]       CLOF:   Bed mobility- supervision  Transfers- touch assist  Gait: touch assist 60 feet with emmett-cane  Lower body dressing: supervision    MEDICATIONS  (STANDING):  aspirin enteric coated 81 milliGRAM(s) Oral daily  atorvastatin 80 milliGRAM(s) Oral at bedtime  citalopram 5 milliGRAM(s) Oral daily  dextrose 5%. 1000 milliLiter(s) (100 mL/Hr) IV Continuous <Continuous>  dextrose 50% Injectable 25 Gram(s) IV Push once  dextrose 50% Injectable 12.5 Gram(s) IV Push once  dextrose 50% Injectable 25 Gram(s) IV Push once  enoxaparin Injectable 30 milliGRAM(s) SubCutaneous every 12 hours  famotidine    Tablet 40 milliGRAM(s) Oral at bedtime  glucagon  Injectable 1 milliGRAM(s) IntraMuscular once  hydrocortisone 1% Cream 1 Application(s) Topical two times a day  insulin glargine Injectable (LANTUS) 30 Unit(s) SubCutaneous every morning  insulin lispro Injectable (ADMELOG) 8 Unit(s) SubCutaneous three times a day before meals  lisinopril 40 milliGRAM(s) Oral at bedtime  magnesium oxide 400 milliGRAM(s) Oral two times a day with meals  melatonin 10 milliGRAM(s) Oral at bedtime  metoprolol tartrate 50 milliGRAM(s) Oral two times a day  polyethylene glycol 3350 17 Gram(s) Oral two times a day  senna 2 Tablet(s) Oral at bedtime  sodium chloride 0.9%. 1000 milliLiter(s) (75 mL/Hr) IV Continuous <Continuous>  ticagrelor 90 milliGRAM(s) Oral every 12 hours    MEDICATIONS  (PRN):  acetaminophen     Tablet .. 650 milliGRAM(s) Oral every 6 hours PRN Temp greater or equal to 38C (100.4F), Mild Pain (1 - 3)  bisacodyl Suppository 10 milliGRAM(s) Rectal daily PRN Constipation  dextrose Oral Gel 15 Gram(s) Oral once PRN Blood Glucose LESS THAN 70 milliGRAM(s)/deciliter  diphenhydrAMINE 25 milliGRAM(s) Oral every 6 hours PRN Rash and/or Itching  guaifenesin/dextromethorphan Oral Liquid 10 milliLiter(s) Oral every 4 hours PRN Cough  lactulose Syrup 20 Gram(s) Oral two times a day PRN Constipation      RECENT LABS/IMAGING        POCT Blood Glucose.: 126 mg/dL (08-28-22 @ 16:04)  POCT Blood Glucose.: 142 mg/dL (08-28-22 @ 11:52)  POCT Blood Glucose.: 107 mg/dL (08-28-22 @ 07:26)  POCT Blood Glucose.: 108 mg/dL (08-27-22 @ 16:05)  POCT Blood Glucose.: 240 mg/dL (08-27-22 @ 11:09)  POCT Blood Glucose.: 165 mg/dL (08-27-22 @ 07:45)  POCT Blood Glucose.: 166 mg/dL (08-26-22 @ 16:27)  POCT Blood Glucose.: 262 mg/dL (08-26-22 @ 11:21)  POCT Blood Glucose.: 149 mg/dL (08-26-22 @ 07:13)  POCT Blood Glucose.: 154 mg/dL (08-25-22 @ 16:39)  POCT Blood Glucose.: 209 mg/dL (08-25-22 @ 10:42)   Patient is a 77y old  Male who presents with a chief complaint of rehab s/p left ischemic stroke with aphasia and right hemiparesis (11 Aug 2022 11:30)      HPI:  Patient is a 78yo M with PMH of HTN, DM, HLD, CAD (s/p stents), GERD who presented to the ED on 8/3/2022 for right hand weakness. Patient noticed he started having difficulty with his speech, then developed weakness in his right hand and some visual field deficits. In the ED, patient received tPA. CT head at the time was negative, EDSON showed EF of 55-60%.  MRI head showed scattered left-sided acute cortical infarcts within the MCA and PCA territories, with trace petechial hemorrhage involving high left frontal region. There is a chronic left occipital lobe infarct. MRA of the neck showed moderate stenosis of the proximal right internal carotid artery. On 8/9, a right upper quadrant US was done to rule out cholecystitis, revealing an indeterminate 1.2 cm right renal lesion and moderate-severe hepatic steatosis. During the hospital stay, the visual field deficits improved but patient continued to have no motor function in the right hand and impaired RLE strength and mobility and ADL independence and a persistent aphasia.    On evaluation by PM&R, patient endorses no movement in his right hand and notes some difficulty with memory but otherwise has no other complaints. Patient is right handed. He lives by himself in a house with [address] and one flight of stairs inside (5+7 steps). Patient was previously independent in ADLs and ambulation, occasionally using a cane outdoors for balance. Currently patient requires mod assist for bed mobility, max assist with transfers, mod assist with ambulation 25 feet with RW and max assistance for dressing. He was evaluated by Physiatry on the medical service and was found to be a good acute rehab candidate.  (11 Aug 2022 11:30)    TODAY'S SUBJECTIVE & REVIEW OF SYMPTOMS:  Patient was seen and assessed at bedside. No overnight events. Patient denies any complaints at this time. Tolerating PT/OT. Tolerating oral diet. Voiding and passing stool spontaneously. Vital signs are stable. Patient making progress with right arm.      Constitutional    [ x  ] WNL           [   ] poor appetite   [   ] insomnia   [   ] tired   Cardio:                [  x ] WNL           [   ] CP   [   ] VELASCO   [   ] palpitations               Resp:                   [ x  ] WNL           [   ] SOB   [   ] cough improving   [   ] wheezing   GI:                        [ x  ] WNL           [   ] constipation   [   ] diarrhea   [   ] abdominal pain   [   ] nausea   [   ] emesis                                :                      [ x  ] WNL           [   ] PRITCHARD  [   ] dysuria   [   ] difficulty voiding             Endo:                   [ x  ] WNL          [   ] polyuria   [   ] temperature intolerance                 Skin:                     [ x  ] WNL          [   ] pain   [   ] wound   [   ] rash   MSK:                    [   x] WNL          [   ] muscle pain   [   ] joint pain/ stiffness   [   ] muscle tenderness   [   ] swelling   Neuro:                 [   ] WNL          [   ] HA   [   ] change in vision   [   ] tremor   [ x  ] weakness- Right side   [   ]dysphagia   [  ] word finding difficulties- improving,   [ x ] numbness/ neuropathy both feet    Cognitive:           [ x  ] WNL           [   ]confusion   - daughter note some memory loss since stroke   Psych:                  [  x ] WNL           [   ] hallucinations   [   ]agitation   [   ] delusion   [   ]depression      PHYSICAL EXAM    ICU Vital Signs Last 24 Hrs  T(C): 36.2 (29 Aug 2022 05:39), Max: 37.2 (28 Aug 2022 13:01)  T(F): 97.1 (29 Aug 2022 05:39), Max: 99 (28 Aug 2022 13:01)  HR: 74 (29 Aug 2022 05:39) (74 - 88)  BP: 137/73 (29 Aug 2022 05:39) (130/73 - 143/68)  BP(mean): --  ABP: --  ABP(mean): --  RR: 18 (29 Aug 2022 05:39) (17 - 18)  SpO2: 96% (29 Aug 2022 05:39) (96% - 96%)        General:[  x ] NAD, Resting Comfortable,   [   ] other:                                HEENT: [  x ] NC/AT, EOMI, PERRL , Normal Conjunctivae,   [   ] other:  Cardio: [  x ] RRR, no murmer,   [   ] other:                              Pulm: [  x ] No Respiratory Distress,  Lungs CTAB,   [   ] other:                       Abdomen: [ x  ] ND/NT, Soft,   [   ] other:    : [ x  ] NO PRITCHARD CATHETER,   [  ] PRITCHARD CATHETER- no meatal tear, no discharge, [   ] other:                                            MSK: [ x  ] No joint swelling, Full PROM,   [    ] other:                                       Ext: [  x ]No C/C/E, No calf tenderness,   [   ]other:    Skin: [ x  ]intact,   [   ] other:                                                                   Neurological Examination:  Cognitive: [ x   ] AAO x 3,   [    ]  other:                                                                 Attention/ Concentration:  [ x   ] intact   [    ]  other:   Memory: [    ] intact,    [ x  ]  other:  mildly impaired  Mood/Affect: [  x  ] wnl,    [    ]  other:                                                                             Communication: [ x  ]Fluent, no dysarthria, following commands:  [   ] other:  CN II - XII:  [  x  ] intact, VFF  [    ] other: mild right facial droop                                                                                         Motor:   RIGHT UE: [   ] WNL,  [  x ] other: 3+/5 shoulder flexion, 4+/5 elbow flexion, 4+/5 elbow extension, 3+/5 finger flexion, 2/5 finger extension  LEFT    UE: [  x ] WNL  RIGHT LE: [ x  ] WNL,  [   ] other: Hip flexion 3+/5 with drift to bed, knee flexion 4/5, plantarflexion 5/5, dorsiflexion 5/5, toe extension 5/5  LEFT    LE: [  x ] WNL,  Hip flexion 4/5, knee flexion 5/5, plantarflexion 5/5, dorsiflexion 5/5, toe extension 5/5     Tone: [    ] wnl,   [ x   ]  other: mild increased tone in right triceps  DTRs: [ x  ]symmetric, 1+ [   ] other:   Coordination:   [    ] intact as testable,   [ not assesed   ]       CLOF:   Bed mobility- supervision  Transfers- touch assist  Gait: touch assist 75 feet with emmett-cane  Lower body dressing: supervision    MEDICATIONS  (STANDING):  aspirin enteric coated 81 milliGRAM(s) Oral daily  atorvastatin 80 milliGRAM(s) Oral at bedtime  citalopram 5 milliGRAM(s) Oral daily  dextrose 5%. 1000 milliLiter(s) (100 mL/Hr) IV Continuous <Continuous>  dextrose 50% Injectable 25 Gram(s) IV Push once  dextrose 50% Injectable 12.5 Gram(s) IV Push once  dextrose 50% Injectable 25 Gram(s) IV Push once  enoxaparin Injectable 30 milliGRAM(s) SubCutaneous every 12 hours  famotidine    Tablet 40 milliGRAM(s) Oral at bedtime  glucagon  Injectable 1 milliGRAM(s) IntraMuscular once  hydrocortisone 1% Cream 1 Application(s) Topical two times a day  insulin glargine Injectable (LANTUS) 30 Unit(s) SubCutaneous every morning  insulin lispro Injectable (ADMELOG) 8 Unit(s) SubCutaneous three times a day before meals  lisinopril 40 milliGRAM(s) Oral at bedtime  magnesium oxide 400 milliGRAM(s) Oral two times a day with meals  melatonin 10 milliGRAM(s) Oral at bedtime  metoprolol tartrate 50 milliGRAM(s) Oral two times a day  polyethylene glycol 3350 17 Gram(s) Oral two times a day  senna 2 Tablet(s) Oral at bedtime  sodium chloride 0.9%. 1000 milliLiter(s) (75 mL/Hr) IV Continuous <Continuous>  ticagrelor 90 milliGRAM(s) Oral every 12 hours    MEDICATIONS  (PRN):  acetaminophen     Tablet .. 650 milliGRAM(s) Oral every 6 hours PRN Temp greater or equal to 38C (100.4F), Mild Pain (1 - 3)  bisacodyl Suppository 10 milliGRAM(s) Rectal daily PRN Constipation  dextrose Oral Gel 15 Gram(s) Oral once PRN Blood Glucose LESS THAN 70 milliGRAM(s)/deciliter  diphenhydrAMINE 25 milliGRAM(s) Oral every 6 hours PRN Rash and/or Itching  guaifenesin/dextromethorphan Oral Liquid 10 milliLiter(s) Oral every 4 hours PRN Cough  lactulose Syrup 20 Gram(s) Oral two times a day PRN Constipation      RECENT LABS/IMAGING    ICU Vital Signs Last 24 Hrs  T(C): 36.2 (29 Aug 2022 05:39), Max: 37.2 (28 Aug 2022 13:01)  T(F): 97.1 (29 Aug 2022 05:39), Max: 99 (28 Aug 2022 13:01)  HR: 74 (29 Aug 2022 05:39) (74 - 88)  BP: 137/73 (29 Aug 2022 05:39) (130/73 - 143/68)  BP(mean): --  ABP: --  ABP(mean): --  RR: 18 (29 Aug 2022 05:39) (17 - 18)  SpO2: 96% (29 Aug 2022 05:39) (96% - 96%)   Patient is a 77y old  Male who presents with a chief complaint of rehab s/p left ischemic stroke with aphasia and right hemiparesis (11 Aug 2022 11:30)      HPI:  Patient is a 76yo M with PMH of HTN, DM, HLD, CAD (s/p stents), GERD who presented to the ED on 8/3/2022 for right hand weakness. Patient noticed he started having difficulty with his speech, then developed weakness in his right hand and some visual field deficits. In the ED, patient received tPA. CT head at the time was negative, EDSON showed EF of 55-60%.  MRI head showed scattered left-sided acute cortical infarcts within the MCA and PCA territories, with trace petechial hemorrhage involving high left frontal region. There is a chronic left occipital lobe infarct. MRA of the neck showed moderate stenosis of the proximal right internal carotid artery. On 8/9, a right upper quadrant US was done to rule out cholecystitis, revealing an indeterminate 1.2 cm right renal lesion and moderate-severe hepatic steatosis. During the hospital stay, the visual field deficits improved but patient continued to have no motor function in the right hand and impaired RLE strength and mobility and ADL independence and a persistent aphasia.    On evaluation by PM&R, patient endorses no movement in his right hand and notes some difficulty with memory but otherwise has no other complaints. Patient is right handed. He lives by himself in a house with [address] and one flight of stairs inside (5+7 steps). Patient was previously independent in ADLs and ambulation, occasionally using a cane outdoors for balance. Currently patient requires mod assist for bed mobility, max assist with transfers, mod assist with ambulation 25 feet with RW and max assistance for dressing. He was evaluated by Physiatry on the medical service and was found to be a good acute rehab candidate.  (11 Aug 2022 11:30)    TODAY'S SUBJECTIVE & REVIEW OF SYMPTOMS:  Patient was seen and assessed at bedside. No overnight events. Patient denies any complaints at this time. Tolerating PT/OT. Tolerating oral diet. Voiding and passing stool spontaneously. Vital signs are stable. Patient making progress with right arm.      Constitutional    [ x  ] WNL           [   ] poor appetite   [   ] insomnia   [   ] tired   Cardio:                [  x ] WNL           [   ] CP   [   ] VELASCO   [   ] palpitations               Resp:                   [ x  ] WNL           [   ] SOB   [   ] cough improving   [   ] wheezing   GI:                        [ x  ] WNL           [   ] constipation   [   ] diarrhea   [   ] abdominal pain   [   ] nausea   [   ] emesis                                :                      [ x  ] WNL           [   ] PRITCHARD  [   ] dysuria   [   ] difficulty voiding             Endo:                   [ x  ] WNL          [   ] polyuria   [   ] temperature intolerance                 Skin:                     [ x  ] WNL          [   ] pain   [   ] wound   [   ] rash   MSK:                    [   x] WNL          [   ] muscle pain   [   ] joint pain/ stiffness   [   ] muscle tenderness   [   ] swelling   Neuro:                 [   ] WNL          [   ] HA   [   ] change in vision   [   ] tremor   [ x  ] weakness- Right side   [   ]dysphagia   [  ] word finding difficulties- improving,   [ x ] numbness/ neuropathy both feet    Cognitive:           [ x  ] WNL           [   ]confusion   - daughter note some memory loss since stroke   Psych:                  [  x ] WNL           [   ] hallucinations   [   ]agitation   [   ] delusion   [   ]depression      PHYSICAL EXAM    ICU Vital Signs Last 24 Hrs  T(C): 36.2 (29 Aug 2022 05:39), Max: 37.2 (28 Aug 2022 13:01)  T(F): 97.1 (29 Aug 2022 05:39), Max: 99 (28 Aug 2022 13:01)  HR: 74 (29 Aug 2022 05:39) (74 - 88)  BP: 137/73 (29 Aug 2022 05:39) (130/73 - 143/68)  BP(mean): --  ABP: --  ABP(mean): --  RR: 18 (29 Aug 2022 05:39) (17 - 18)  SpO2: 96% (29 Aug 2022 05:39) (96% - 96%)        General:[  x ] NAD, Resting Comfortable,   [   ] other:                                HEENT: [  x ] NC/AT, EOMI, PERRL , Normal Conjunctivae,   [   ] other:  Cardio: [  x ] RRR, no murmer,   [   ] other:                              Pulm: [  x ] No Respiratory Distress,  Lungs CTAB,   [   ] other:                       Abdomen: [ x  ] ND/NT, Soft,   [   ] other:    : [ x  ] NO PRITCHARD CATHETER,   [  ] PRITCHARD CATHETER- no meatal tear, no discharge, [   ] other:                                            MSK: [ x  ] No joint swelling, Full PROM,   [    ] other:                                       Ext: [  x ]No C/C/E, No calf tenderness,   [   ]other:    Skin: [ x  ]intact,   [   ] other:                                                                   Neurological Examination:  Cognitive: [ x   ] AAO x 3,   [    ]  other:                                                                 Attention/ Concentration:  [ x   ] intact   [    ]  other:   Memory: [    ] intact,    [ x  ]  other:  mildly impaired  Mood/Affect: [  x  ] wnl,    [    ]  other:                                                                             Communication: [ x  ]Fluent, no dysarthria, following commands:  [   ] other:  CN II - XII:  [  x  ] intact, VFF  [    ] other: mild right facial droop                                                                                         Motor:   RIGHT UE: [   ] WNL,  [  x ] other: 3+/5 shoulder flexion, 4+/5 elbow flexion, 4+/5 elbow extension, 3+/5 finger flexion, 2/5 finger extension  LEFT    UE: [  x ] WNL  RIGHT LE: [ x  ] WNL,  [   ] other: Hip flexion 3+/5 with drift to bed, knee flexion 4/5, plantarflexion 5/5, dorsiflexion 5/5, toe extension 5/5  LEFT    LE: [  x ] WNL,  Hip flexion 4/5, knee flexion 5/5, plantarflexion 5/5, dorsiflexion 5/5, toe extension 5/5     Tone: [    ] wnl,   [ x   ]  other: mild increased tone in right triceps  DTRs: [ x  ]symmetric, 1+ [   ] other:   Coordination:   [    ] intact as testable,   [ not assesed   ]       CLOF:   Bed mobility- supervision  Transfers- touch assist  Gait: touch assist 75 feet with emmett-cane  Lower body dressing: supervision    MEDICATIONS  (STANDING):  aspirin enteric coated 81 milliGRAM(s) Oral daily  atorvastatin 80 milliGRAM(s) Oral at bedtime  citalopram 5 milliGRAM(s) Oral daily  dextrose 5%. 1000 milliLiter(s) (100 mL/Hr) IV Continuous <Continuous>  dextrose 50% Injectable 25 Gram(s) IV Push once  dextrose 50% Injectable 12.5 Gram(s) IV Push once  dextrose 50% Injectable 25 Gram(s) IV Push once  enoxaparin Injectable 30 milliGRAM(s) SubCutaneous every 12 hours  famotidine    Tablet 40 milliGRAM(s) Oral at bedtime  glucagon  Injectable 1 milliGRAM(s) IntraMuscular once  hydrocortisone 1% Cream 1 Application(s) Topical two times a day  insulin glargine Injectable (LANTUS) 30 Unit(s) SubCutaneous every morning  insulin lispro Injectable (ADMELOG) 8 Unit(s) SubCutaneous three times a day before meals  lisinopril 40 milliGRAM(s) Oral at bedtime  magnesium oxide 400 milliGRAM(s) Oral two times a day with meals  melatonin 10 milliGRAM(s) Oral at bedtime  metoprolol tartrate 50 milliGRAM(s) Oral two times a day  polyethylene glycol 3350 17 Gram(s) Oral two times a day  senna 2 Tablet(s) Oral at bedtime  sodium chloride 0.9%. 1000 milliLiter(s) (75 mL/Hr) IV Continuous <Continuous>  ticagrelor 90 milliGRAM(s) Oral every 12 hours    MEDICATIONS  (PRN):  acetaminophen     Tablet .. 650 milliGRAM(s) Oral every 6 hours PRN Temp greater or equal to 38C (100.4F), Mild Pain (1 - 3)  bisacodyl Suppository 10 milliGRAM(s) Rectal daily PRN Constipation  dextrose Oral Gel 15 Gram(s) Oral once PRN Blood Glucose LESS THAN 70 milliGRAM(s)/deciliter  diphenhydrAMINE 25 milliGRAM(s) Oral every 6 hours PRN Rash and/or Itching  guaifenesin/dextromethorphan Oral Liquid 10 milliLiter(s) Oral every 4 hours PRN Cough  lactulose Syrup 20 Gram(s) Oral two times a day PRN Constipation      RECENT LABS/IMAGING        POCT Blood Glucose.: 126 mg/dL (08-28-22 @ 16:04)  POCT Blood Glucose.: 142 mg/dL (08-28-22 @ 11:52)  POCT Blood Glucose.: 107 mg/dL (08-28-22 @ 07:26)  POCT Blood Glucose.: 108 mg/dL (08-27-22 @ 16:05)  POCT Blood Glucose.: 240 mg/dL (08-27-22 @ 11:09)  POCT Blood Glucose.: 165 mg/dL (08-27-22 @ 07:45)  POCT Blood Glucose.: 166 mg/dL (08-26-22 @ 16:27)  POCT Blood Glucose.: 262 mg/dL (08-26-22 @ 11:21)  POCT Blood Glucose.: 149 mg/dL (08-26-22 @ 07:13)  POCT Blood Glucose.: 154 mg/dL (08-25-22 @ 16:39)  POCT Blood Glucose.: 209 mg/dL (08-25-22 @ 10:42)

## 2022-08-30 ENCOUNTER — TRANSCRIPTION ENCOUNTER (OUTPATIENT)
Age: 77
End: 2022-08-30

## 2022-08-30 VITALS
DIASTOLIC BLOOD PRESSURE: 82 MMHG | SYSTOLIC BLOOD PRESSURE: 166 MMHG | HEART RATE: 68 BPM | TEMPERATURE: 96 F | RESPIRATION RATE: 17 BRPM

## 2022-08-30 PROBLEM — E11.42 TYPE 2 DIABETES MELLITUS WITH DIABETIC POLYNEUROPATHY: Chronic | Status: ACTIVE | Noted: 2022-08-11

## 2022-08-30 LAB
BASOPHILS # BLD AUTO: 0.04 K/UL — SIGNIFICANT CHANGE UP (ref 0–0.2)
BASOPHILS NFR BLD AUTO: 0.4 % — SIGNIFICANT CHANGE UP (ref 0–1)
D DIMER BLD IA.RAPID-MCNC: 341 NG/ML DDU — HIGH (ref 0–230)
EOSINOPHIL # BLD AUTO: 0.33 K/UL — SIGNIFICANT CHANGE UP (ref 0–0.7)
EOSINOPHIL NFR BLD AUTO: 3.6 % — SIGNIFICANT CHANGE UP (ref 0–8)
GLUCOSE BLDC GLUCOMTR-MCNC: 275 MG/DL — HIGH (ref 70–99)
GLUCOSE BLDC GLUCOMTR-MCNC: 95 MG/DL — SIGNIFICANT CHANGE UP (ref 70–99)
HCT VFR BLD CALC: 41.2 % — LOW (ref 42–52)
HGB BLD-MCNC: 14 G/DL — SIGNIFICANT CHANGE UP (ref 14–18)
IMM GRANULOCYTES NFR BLD AUTO: 0.2 % — SIGNIFICANT CHANGE UP (ref 0.1–0.3)
LYMPHOCYTES # BLD AUTO: 1.91 K/UL — SIGNIFICANT CHANGE UP (ref 1.2–3.4)
LYMPHOCYTES # BLD AUTO: 20.7 % — SIGNIFICANT CHANGE UP (ref 20.5–51.1)
MCHC RBC-ENTMCNC: 29 PG — SIGNIFICANT CHANGE UP (ref 27–31)
MCHC RBC-ENTMCNC: 34 G/DL — SIGNIFICANT CHANGE UP (ref 32–37)
MCV RBC AUTO: 85.5 FL — SIGNIFICANT CHANGE UP (ref 80–94)
MONOCYTES # BLD AUTO: 0.91 K/UL — HIGH (ref 0.1–0.6)
MONOCYTES NFR BLD AUTO: 9.9 % — HIGH (ref 1.7–9.3)
NEUTROPHILS # BLD AUTO: 6.02 K/UL — SIGNIFICANT CHANGE UP (ref 1.4–6.5)
NEUTROPHILS NFR BLD AUTO: 65.2 % — SIGNIFICANT CHANGE UP (ref 42.2–75.2)
NRBC # BLD: 0 /100 WBCS — SIGNIFICANT CHANGE UP (ref 0–0)
PLATELET # BLD AUTO: 216 K/UL — SIGNIFICANT CHANGE UP (ref 130–400)
RBC # BLD: 4.82 M/UL — SIGNIFICANT CHANGE UP (ref 4.7–6.1)
RBC # FLD: 13.2 % — SIGNIFICANT CHANGE UP (ref 11.5–14.5)
WBC # BLD: 9.23 K/UL — SIGNIFICANT CHANGE UP (ref 4.8–10.8)
WBC # FLD AUTO: 9.23 K/UL — SIGNIFICANT CHANGE UP (ref 4.8–10.8)

## 2022-08-30 RX ORDER — ENOXAPARIN SODIUM 100 MG/ML
30 INJECTION SUBCUTANEOUS
Qty: 0 | Refills: 0 | DISCHARGE
Start: 2022-08-30

## 2022-08-30 RX ADMIN — CITALOPRAM 5 MILLIGRAM(S): 10 TABLET, FILM COATED ORAL at 12:15

## 2022-08-30 RX ADMIN — MAGNESIUM OXIDE 400 MG ORAL TABLET 400 MILLIGRAM(S): 241.3 TABLET ORAL at 08:21

## 2022-08-30 RX ADMIN — INSULIN GLARGINE 30 UNIT(S): 100 INJECTION, SOLUTION SUBCUTANEOUS at 08:21

## 2022-08-30 RX ADMIN — Medication 81 MILLIGRAM(S): at 12:15

## 2022-08-30 RX ADMIN — Medication 50 MILLIGRAM(S): at 06:30

## 2022-08-30 RX ADMIN — Medication 8 UNIT(S): at 11:15

## 2022-08-30 RX ADMIN — TICAGRELOR 90 MILLIGRAM(S): 90 TABLET ORAL at 06:30

## 2022-08-30 RX ADMIN — ENOXAPARIN SODIUM 30 MILLIGRAM(S): 100 INJECTION SUBCUTANEOUS at 06:30

## 2022-08-30 NOTE — PROGRESS NOTE ADULT - ATTENDING COMMENTS
I reviewed the chart and examined the patient with the resident and we discussed the findings and treatment plan.  The patient is tolerating the rehab program well. I agree with the findings and treatment plan above, which I modified as indicated. The patient requires 3 hrs a day of acute inpatient rehab. No new complaints. Feeling well. Denies cough/ congestion. Ambulates with Ariel-cane with CG. RUE MP improving steadily. Continue full rehab program.     I read, edited and agree with the Assessment:  #Left acute ischemic stroke with right hemiparesis (dominant) and aphasia  s/p tPA on 8/3  EDSON showed EF of 55-60%  8/6 MRI head: scattered left-sided acute cortical infarcts within the MCA and PCA territories, with trace petechial hemorrhage involving high left frontal region. There is a chronic left occipital lobe infarct.   MRA of the neck: moderate stenosis of the proximal right internal carotid artery  - start PT/OT/ST, Neuropsych eval  - c/w 5mg celexa  - c/w ASA 81mg  - c/w Brilinta 90mg BID  - c/w Lipitor 80mg daily  - Making significant gains in MP and mobility    #Diabetic Peripheral Polyneuropathy  - No pain. Teach patient skin monitoring an sensory awareness    #Covid positive   - PCR positive from 08/16/22   - Placed on Airborne Precautions  - CRP - 85, D-Dimer 323  - Ferritin 585- mild increase  - Completed Remdesivir  - Lovenox increased to 30 mg bid  - cough improving    #HTN - BP was low -   - stopped HCTZ  - stopped Amlodipine 5mg qd  - c/w Lisinopril 40mg   - c/w Lopressor 50mg BID  - now controlled    #Diabetes 2 - controlled  Ha1c 8.2  - c/w 30U Lantus  - c/w 8U Lispro    #CAD  -continue ASA 81, Brilinta, BB, statin    #Renal lesion  On 8/9, a right upper quadrant US was done to rule out cholecystitis, revealing an indeterminate 1.2 cm right renal lesion and moderate-severe hepatic steatosis.  -outpatient followup, non urgent MRI with renal mass protocol    #Mild persistent cough/ Increased WBC with left shift  - chest x-ray with mild bilateral atelectasis  - PCR positive  - doppler: < from: [facility] Dup[facility] Lower Ext Vein Scan, Bilat (08.17.22 @ 17:53) > No evidence of deep venous thrombosis in either lower extremity.  - Resolved on 8/18 CBC  - UA showed bacteriuria, but no wbcs     #s/p Azotemia  - given IVF with improvement  - had negative PVR    -GI/Bowel Mgmt: senna, dulcolax, lactulose, miralax    - Diet: Dash Carb Consistent diet    - Melatonin 10mg qhs for difficulty sleeping
Patient seen and examined with the resident. We discussed the case. I have directed the care. I edited the note. She requires acute rehab with 3 hours of daily therapies and close physiatry follow up.  #Left acute ischemic stroke with right hemiparesis (dominant) and aphasia  s/p tPA on 8/3  EDSON showed EF of 55-60%  8/6 MRI head: scattered left-sided acute cortical infarcts within the MCA and PCA territories, with trace petechial hemorrhage involving high left frontal region. There is a chronic left occipital lobe infarct.   MRA of the neck: moderate stenosis of the proximal right internal carotid artery  - start PT/OT/ST, Neuropsych eval  - c/w 5mg celexa  - c/w ASA 81mg  - c/w Brilinta 90mg BID  - c/w Lipitor 80mg daily  - monitor vitals    NIHSS =7, mRankin = 4    #Diabetic Peripheral Polyneuropathy  - No pain. Teach patient skin monitoring an sensory awareness    #HTN   - c/w Amlodipine 5mg qd  - c/w Lisinopril 40mg   - c/w Lopressor 50mg BID    #Diabetes 2  Ha1c 8.2  - c/w 30U Lantus  - c/w 10U Lispro    #Leukocytosis - resolved   8/12 WBC 11.7  -repeat CBC tomorrow  -trended down, afebrile     #CAD  -continue ASA 81, Brilinta, BB, statin    #Renal lesion  On 8/9, a right upper quadrant US was done to rule out cholecystitis, revealing an indeterminate 1.2 cm right renal lesion and moderate-severe hepatic steatosis.  -outpatient followup, non urgent MRI with renal mass protocol    -GI/Bowel Mgmt: senna, dulcolax, lactulose, miralax    - Diet: Dash Carb Consistent diet       Precautions / PROPHYLAXIS:      - Falls    - Ortho: Weight bearing status: WBAT      - DVT prophylaxis: Lovenox 40mg subQ
I reviewed the chart and examined the patient with the resident and we discussed the findings and treatment plan.  The patient is tolerating the rehab program well. I agree with the findings and treatment plan above, which I modified as indicated.   For discharge to [facility] today to continue rehab. Doing well. Ambulates with Ariel-cane with Touch Assist. RUE markedly improved, 3+ shoulder abd, 4/5 elbow, 3+ grasp. Medically stable. CBC stable. VSS.   Diet, DASH/TLC:   Sodium & Cholesterol Restricted  Consistent Carbohydrate {Evening Snack} (08-18-22 @ 15:10) [Active]  Activities as tolerated.  To change ASA and Brillinta to Plavix about Sept 3 per Neuro instructions and f/u in [facility] in about 3-4 weeks.     I read, edited and agree with the Assessment.
I reviewed the chart and examined the patient with the resident and we discussed the findings and treatment plan.  The patient is tolerating the rehab program well. I agree with the findings and treatment plan above, which I modified as indicated. The patient requires 3 hrs a day of acute inpatient rehab. No new complaints. Alert. Neuro exam stable. Speech fluency improving. RUE strength improving: 3+/5 shoulder and hand grasp, 4/5 elbow. VSS. F/U labs pending. Ambulates with Ariel-cane with Touch Assistance. Continue rehab program. Working on clearance for further rehab in STR program in SNF prior to d/c home alone, ( he has little home support ).     I read, edited and agree with the Assessment:  #Left acute ischemic stroke with right hemiparesis (dominant) and aphasia  s/p tPA on 8/3  EDSON showed EF of 55-60%  8/6 MRI head: scattered left-sided acute cortical infarcts within the MCA and PCA territories, with trace petechial hemorrhage involving high left frontal region. There is a chronic left occipital lobe infarct.   MRA of the neck: moderate stenosis of the proximal right internal carotid artery  - start PT/OT/ST, Neuropsych eval  - c/w 5mg celexa  - c/w ASA 81mg  - c/w Brilinta 90mg BID  - c/w Lipitor 80mg daily  - Making significant gains in MP and mobility. Ambulates with a Ariel-cane with TA    #Diabetic Peripheral Polyneuropathy  - No pain. Teach patient skin monitoring an sensory awareness    #Covid -  - PCR positive from 08/16/22   - Placed on Airborne Precautions  - CRP - 85, D-Dimer 323  - Ferritin 585- mild increase  - Completed Remdesivir  - Lovenox increased to 30 mg bid  - Asymptomatic. Completed 10 day isolation    #HTN - BP was low -   - stopped HCTZ  - stopped Amlodipine 5mg qd  - c/w Lisinopril 40mg   - c/w Lopressor 50mg BID  - now controlled    #Diabetes 2. Fair control  Ha1c 8.2  - c/w 30U Lantus  - c/w 8U Lispro    #CAD  -continue ASA 81, Brilinta, BB, statin    #Renal lesion  On 8/9, a right upper quadrant US was done to rule out cholecystitis, revealing an indeterminate 1.2 cm right renal lesion and moderate-severe hepatic steatosis.  -outpatient followup, non urgent MRI with renal mass protocol    #Azotemia  - given IVF with improvement  - had negative PVR    -GI/Bowel Mgmt: senna, dulcolax, lactulose, miralax    - Diet: Dash Carb Consistent diet    - Melatonin 10mg qhs for difficulty sleeping       Precautions / PROPHYLAXIS:      - Falls    - Ortho: Weight bearing status: WBAT      - DVT prophylaxis: Lovenox 30 mg BID
I reviewed the chart and examined the patient with the resident and we discussed the findings and treatment plan.  The patient is tolerating the rehab program well. I agree with the findings and treatment plan above, which I modified as indicated. The patient requires 3 hrs a day of acute inpatient rehab. No new complaints. Cough resolved. No dyspnea, fever or chills. VSS. RUE MP improvin-/5 range. Ambulates with cane and min assistance. Continue rehab program.    I read, edited and agree with the Assessment:  #Left acute ischemic stroke with right hemiparesis (dominant) and aphasia  s/p tPA on 8/3  EDSON showed EF of 55-60%   MRI head: scattered left-sided acute cortical infarcts within the MCA and PCA territories, with trace petechial hemorrhage involving high left frontal region. There is a chronic left occipital lobe infarct.   MRA of the neck: moderate stenosis of the proximal right internal carotid artery  - start PT/OT/ST, Neuropsych eval  - c/w 5mg celexa  - c/w ASA 81mg  - c/w Brilinta 90mg BID  - c/w Lipitor 80mg daily  - Making significant gains in MP and mobility    #Diabetic Peripheral Polyneuropathy  - No pain. Teach patient skin monitoring an sensory awareness    #Covid positive   - PCR positive from 22   - Placed on Airborne Precautions  - CRP - 85, D-Dimer 323  - Ferritin 585- mild increase  - Completed Remdesivir  - Lovenox increased to 30 mg bid  - cough improving    #HTN - BP was low -   - stopped HCTZ  - stopped Amlodipine 5mg qd  - c/w Lisinopril 40mg   - c/w Lopressor 50mg BID  - now controlled    #Diabetes 2  Ha1c 8.2  - c/w 30U Lantus  - c/w 8U Lispro    #CAD  -continue ASA 81, Brilinta, BB, statin    #Renal lesion  On , a right upper quadrant US was done to rule out cholecystitis, revealing an indeterminate 1.2 cm right renal lesion and moderate-severe hepatic steatosis.  -outpatient followup, non urgent MRI with renal mass protocol    #Mild persistent cough/ Increased WBC with left shift  - chest x-ray with mild bilateral atelectasis  - PCR positive  - doppler: < from: [facility] Duplex Lower Ext Vein Scan, Bilat (22 @ 17:53) > No evidence of deep venous thrombosis in either lower extremity.  - Resolved on  CBC  - UA showed bacteriuria, but no wbcs     #Azotemia  - give IVF with improvement  - had negative PVR  - recheck
I reviewed the chart and examined the patient with the resident and we discussed the findings and treatment plan.  The patient is tolerating the rehab program well. I agree with the findings and treatment plan above, which I modified as indicated. The patient requires 3 hrs a day of acute inpatient rehab. No new complaints. VSS. Neuro exam stable. Making daily improvements in RUE MP control. Ambuting with TA with Ariel-cane. Continue full rehab program.    I read, edited and agree with the Assessment:  #Left acute ischemic stroke with right hemiparesis (dominant) and aphasia  s/p tPA on 8/3  EDSON showed EF of 55-60%  8/6 MRI head: scattered left-sided acute cortical infarcts within the MCA and PCA territories, with trace petechial hemorrhage involving high left frontal region. There is a chronic left occipital lobe infarct.   MRA of the neck: moderate stenosis of the proximal right internal carotid artery  - start PT/OT/ST, Neuropsych eval  - c/w 5mg celexa  - c/w ASA 81mg  - c/w Brilinta 90mg BID  - c/w Lipitor 80mg daily  - Making significant gains in MP and mobility    #Diabetic Peripheral Polyneuropathy  - No pain. Teach patient skin monitoring an sensory awareness    #Covid positive   - PCR positive from 08/16/22   - Placed on Airborne Precautions  - CRP - 85, D-Dimer 323  - Ferritin 585- mild increase  - Completed Remdesivir  - Lovenox increased to 30 mg bid  - Asymptomatic. Completed 10 day isolation    #HTN - BP was low -   - stopped HCTZ  - stopped Amlodipine 5mg qd  - c/w Lisinopril 40mg   - c/w Lopressor 50mg BID  - now controlled    #Diabetes 2. Fair control  Ha1c 8.2  - c/w 30U Lantus  - c/w 8U Lispro    #CAD  -continue ASA 81, Brilinta, BB, statin    #Renal lesion  On 8/9, a right upper quadrant US was done to rule out cholecystitis, revealing an indeterminate 1.2 cm right renal lesion and moderate-severe hepatic steatosis.  -outpatient followup, non urgent MRI with renal mass protocol    #Azotemia  - given IVF with improvement  - had negative PVR    -GI/Bowel Mgmt: senna, dulcolax, lactulose, miralax    - Diet: Dash Carb Consistent diet    - Melatonin 10mg qhs for difficulty sleeping
Patient seen and examined with the resident. We discussed the case. I have directed the care. He has completed his quarantine for COVID. He has a right hemiparesis.  #Left acute ischemic stroke with right hemiparesis (dominant) and aphasia  s/p tPA on 8/3  EDSON showed EF of 55-60%  8/6 MRI head: scattered left-sided acute cortical infarcts within the MCA and PCA territories, with trace petechial hemorrhage involving high left frontal region. There is a chronic left occipital lobe infarct.   MRA of the neck: moderate stenosis of the proximal right internal carotid artery  - start PT/OT/ST, Neuropsych eval  - c/w 5mg celexa  - c/w ASA 81mg  - c/w Brilinta 90mg BID  - c/w Lipitor 80mg daily  - Making significant gains in MP and mobility    #Diabetic Peripheral Polyneuropathy  - No pain. Teach patient skin monitoring an sensory awareness    #Covid positive quarantine completed)  - PCR positive from 08/16/22   - Placed on Airborne Precautions  - CRP - 85, D-Dimer 323  - Ferritin 585- mild increase  - Completed Remdesivir  - Lovenox increased to 30 mg bid  - Asymptomatic. Completed 10 day isolation, removed from isolation    #HTN - BP was low -   - stopped HCTZ  - stopped Amlodipine 5mg qd  - c/w Lisinopril 40mg   - c/w Lopressor 50mg BID  - now controlled    #Diabetes 2. Fair control  Ha1c 8.2  - c/w 30U Lantus  - c/w 8U Lispro    #CAD  -continue ASA 81, Brilinta, BB, statin    #Renal lesion  On 8/9, a right upper quadrant US was done to rule out cholecystitis, revealing an indeterminate 1.2 cm right renal lesion and moderate-severe hepatic steatosis.  -outpatient followup, non urgent MRI with renal mass protocol    #Azotemia  - given IVF with improvement  - had negative PVR    -GI/Bowel Mgmt: senna, dulcolax, lactulose, miralax    - Diet: Dash Carb Consistent diet    - Melatonin 10mg qhs for difficulty sleeping       Precautions / PROPHYLAXIS:      - Falls    - Ortho: Weight bearing status: WBAT      - DVT prophylaxis: Lovenox 30 mg BID
I reviewed the chart and examined the patient with the resident and we discussed the findings and treatment plan.  The patient is tolerating the rehab program well. I agree with the findings and treatment plan above, which I modified as indicated. The patient requires 3 hrs a day of acute inpatient rehab. No new complaints. Alert. VSS. Neuro exam improving. Able to abd. right shoulder about 50% range against gravity. Ambulates with emmett-cane with CG. Continue rehab.     I read, edited and agree with the Assessment:  #Left acute ischemic stroke with right hemiparesis (dominant) and aphasia  s/p tPA on 8/3  EDSON showed EF of 55-60%  8/6 MRI head: scattered left-sided acute cortical infarcts within the MCA and PCA territories, with trace petechial hemorrhage involving high left frontal region. There is a chronic left occipital lobe infarct.   MRA of the neck: moderate stenosis of the proximal right internal carotid artery  - start PT/OT/ST, Neuropsych eval  - c/w 5mg celexa  - c/w ASA 81mg  - c/w Brilinta 90mg BID  - c/w Lipitor 80mg daily  - Making significant gains in MP and mobility    #Diabetic Peripheral Polyneuropathy  - No pain. Teach patient skin monitoring an sensory awareness    #Covid positive   - PCR positive from 08/16/22   - Placed on Airborne Precautions  - CRP - 85, D-Dimer 323  - Ferritin 585- mild increase  - Completed Remdesivir  - Lovenox increased to 30 mg bid  - Asymptomatic. To come off isolation in am post 10 days    #HTN - BP was low -   - stopped HCTZ  - stopped Amlodipine 5mg qd  - c/w Lisinopril 40mg   - c/w Lopressor 50mg BID  - now controlled    #Diabetes 2. Fair control  Ha1c 8.2  - c/w 30U Lantus  - c/w 8U Lispro    #CAD  -continue ASA 81, Brilinta, BB, statin    #Renal lesion  On 8/9, a right upper quadrant US was done to rule out cholecystitis, revealing an indeterminate 1.2 cm right renal lesion and moderate-severe hepatic steatosis.  -outpatient followup, non urgent MRI with renal mass protocol    #Azotemia  - given IVF with improvement  - had negative PVR    -GI/Bowel Mgmt: senna, dulcolax, lactulose, miralax    - Diet: Dash Carb Consistent diet    - Melatonin 10mg qhs for difficulty sleeping       Precautions / PROPHYLAXIS:      - Falls    - Ortho: Weight bearing status: WBAT      - DVT prophylaxis: Lovenox 30 mg BID
I reviewed the chart and examined the patient with the resident and we discussed the findings and treatment plan.  The patient is tolerating the rehab program well. I agree with the findings and treatment plan above, which I modified as indicated. The patient requires 3 hrs a day of acute inpatient rehab. No new complaints. No pulm symptoms. Feeling well. Able to transfer and ambulate with Ariel-cane with CG. RUE strength improving daily. Shoulder abd 3- about 40% range, biceps 3+, grasp 2. Continue rehab program.     I read, edited and agree with the Assessment:  #Left acute ischemic stroke with right hemiparesis (dominant) and aphasia  s/p tPA on 8/3  EDSON showed EF of 55-60%  8/6 MRI head: scattered left-sided acute cortical infarcts within the MCA and PCA territories, with trace petechial hemorrhage involving high left frontal region. There is a chronic left occipital lobe infarct.   MRA of the neck: moderate stenosis of the proximal right internal carotid artery  - start PT/OT/ST, Neuropsych eval  - c/w 5mg celexa  - c/w ASA 81mg  - c/w Brilinta 90mg BID  - c/w Lipitor 80mg daily  - Making significant gains in MP and mobility    #Diabetic Peripheral Polyneuropathy  - No pain. Teach patient skin monitoring an sensory awareness    #Covid positive   - PCR positive from 08/16/22   - Placed on Airborne Precautions  - CRP - 85, D-Dimer 323  - Ferritin 585- mild increase  - Completed Remdesivir  - Lovenox increased to 30 mg bid  - cough improving    #HTN - BP was low -   - stopped HCTZ  - stopped Amlodipine 5mg qd  - c/w Lisinopril 40mg   - c/w Lopressor 50mg BID  - now controlled    #Diabetes 2. Fair control  Ha1c 8.2  - c/w 30U Lantus  - c/w 8U Lispro    #CAD  -continue ASA 81, Brilinta, BB, statin    #Renal lesion  On 8/9, a right upper quadrant US was done to rule out cholecystitis, revealing an indeterminate 1.2 cm right renal lesion and moderate-severe hepatic steatosis.  -outpatient followup, non urgent MRI with renal mass protocol    #s/p Mild persistent cough/ Increased WBC with left shift  - chest x-ray with mild bilateral atelectasis  - Covid PCR positive  - doppler: < from: [facility] Duplex Lower Ext Vein Scan, Bilat (08.17.22 @ 17:53) > No evidence of deep venous thrombosis in either lower extremity.  - Resolved on 8/18 CBC  - cough resolved    #Azotemia  - give IVF with improvement  - had negative PVR    -GI/Bowel Mgmt: senna, dulcolax, lactulose, miralax    - Diet: Dash Carb Consistent diet    - Melatonin 10mg qhs for difficulty sleeping
I reviewed the chart and examined the patient with the resident and we discussed the findings and treatment plan.  The patient is tolerating the rehab program well. I agree with the findings and treatment plan above, which I modified as indicated. The patient requires 3 hrs a day of acute inpatient rehab. No new complaints. VSS. Labs stable. Neuro exam stable. Transfers with mod assist and ambulates with cane with min assistance. Continue full rehab program.    I read, edited and agree with the Assessment:  #Left acute ischemic stroke with right hemiparesis (dominant) and aphasia  s/p tPA on 8/3  EDSON showed EF of 55-60%  8/6 MRI head: scattered left-sided acute cortical infarcts within the MCA and PCA territories, with trace petechial hemorrhage involving high left frontal region. There is a chronic left occipital lobe infarct.   MRA of the neck: moderate stenosis of the proximal right internal carotid artery  - start PT/OT/ST, Neuropsych eval  - c/w 5mg celexa  - c/w ASA 81mg  - c/w Brilinta 90mg BID  - c/w Lipitor 80mg daily  - monitor vitals    Admission to Rehab NIHSS =7, mRankin = 4    #Diabetic Peripheral Polyneuropathy  - No pain. Teach patient skin monitoring an sensory awareness    #HTN   - c/w Amlodipine 5mg qd  - c/w Lisinopril 40mg   - c/w Lopressor 50mg BID    #Diabetes 2  Ha1c 8.2  - c/w 30U Lantus  - c/w 8U Lispro    #CAD  -continue ASA 81, Brilinta, BB, statin    #Renal lesion  On 8/9, a right upper quadrant US was done to rule out cholecystitis, revealing an indeterminate 1.2 cm right renal lesion and moderate-severe hepatic steatosis.  -outpatient followup, non urgent MRI with renal mass protocol    -GI/Bowel Mgmt: senna, dulcolax, lactulose, miralax    - Diet: Dash Carb Consistent diet       Precautions / PROPHYLAXIS:      - Falls    - Ortho: Weight bearing status: WBAT      - DVT prophylaxis: Lovenox 40mg subQ
I reviewed the chart and examined the patient with the resident and we discussed the findings and treatment plan. I agree with the findings and treatment plan above, which I modified as indicated. The patient requires 3 hrs a day of acute inpatient rehab.   No new complaints. Neurologically stable. Full rehab eval in progress. VSS. Labs stable. Continue full rehab program.    I read, edited and agree with the Assessment:  #Left acute ischemic stroke with right hemiparesis (dominant) and aphasia  s/p tPA on 8/3  EDSON showed EF of 55-60%  8/6 MRI head: scattered left-sided acute cortical infarcts within the MCA and PCA territories, with trace petechial hemorrhage involving high left frontal region. There is a chronic left occipital lobe infarct.   MRA of the neck: moderate stenosis of the proximal right internal carotid artery  - start PT/OT/ST, Neuropsych eval  - c/w 5mg celexa  - c/w ASA 81mg  - c/w Brilinta 90mg BID  - c/w Lipitor 80mg daily  - monitor vitals    NIHSS =7, mRankin = 4    #Diabetic Peripheral Polyneuropathy  - No pain. Teach patient skin monitoring an sensory awareness    #HTN - controlled  - c/w Amlodipine 5mg qd  - c/w Lisinopril 40mg   - c/w Lopressor 50mg BID  - monitor vitals  -holding parameters in place, adjust BP medications as necessary    #Diabetes 2  Ha1c 8.2  - c/w 30U Lantus  - c/w 10U Lispro  - ISS  - monitor FS, will adjust medications and hold as necessary    #Leukocytosis  8/12 WBC 11.7  -repeat CBC tomorrow  -UA  -will continue to follow    #CAD  -continue ASA 81, Brilinta, BB, statin    #Renal lesion  On 8/9, a right upper quadrant US was done to rule out cholecystitis, revealing an indeterminate 1.2 cm right renal lesion and moderate-severe hepatic steatosis.  -outpatient followup, non urgent MRI with renal mass protocol    -GI/Bowel Mgmt: senna, dulcolax, lactulose, miralax    - Diet: Dash Carb Consistent diet       Precautions / PROPHYLAXIS:      - Falls    - Ortho: Weight bearing status: WBAT      - DVT prophylaxis: Lovenox 40mg subQ
I reviewed the chart and examined the patient with the resident and we discussed the findings and treatment plan.  The patient is tolerating the rehab program well. I agree with the findings and treatment plan above, which I modified as indicated. The patient requires 3 hrs a day of acute inpatient rehab. No new complaints except has persistent cough. WBC also mildly elevated. UA negative. Chest x-ray with no obvious consolidation. Read pending. Nasal PCR sent. Feels ok. Will repeat CBC and get Doppler. Give IVF tonight as BUN is elevated and may be dry. Continue full rehab program. Ambulating with RW with min assist.   I read, edited and agree with the Assessment:  #Left acute ischemic stroke with right hemiparesis (dominant) and aphasia  s/p tPA on 8/3  EDSON showed EF of 55-60%  8/6 MRI head: scattered left-sided acute cortical infarcts within the MCA and PCA territories, with trace petechial hemorrhage involving high left frontal region. There is a chronic left occipital lobe infarct.   MRA of the neck: moderate stenosis of the proximal right internal carotid artery  - start PT/OT/ST, Neuropsych eval  - c/w 5mg celexa  - c/w ASA 81mg  - c/w Brilinta 90mg BID  - c/w Lipitor 80mg daily  - monitor vitals    NIHSS =7, mRankin = 4    #Diabetic Peripheral Polyneuropathy  - No pain. Teach patient skin monitoring an sensory awareness    #HTN   - c/w Amlodipine 5mg qd  - c/w Lisinopril 40mg   - c/w Lopressor 50mg BID    #Diabetes 2  Ha1c 8.2  - c/w 30U Lantus  - c/w 8U Lispro    #CAD  -continue ASA 81, Brilinta, BB, statin    #Renal lesion  On 8/9, a right upper quadrant US was done to rule out cholecystitis, revealing an indeterminate 1.2 cm right renal lesion and moderate-severe hepatic steatosis.  -outpatient followup, non urgent MRI with renal mass protocol    Mild persistent cough/ Increased WBC with left shift  - chest x-ray read pending - no consolidation seen  - UA negative.  - Nasal Viral PCR sent  - check Doppler BLE  - repeat CBC in am    Azotemia  - give IVF overnight and recheck  - had negative PVR    -GI/Bowel Mgmt: senna, dulcolax, lactulose, miralax    - Diet: Dash Carb Consistent diet       Precautions / PROPHYLAXIS:      - Falls    - Ortho: Weight bearing status: WBAT      - DVT prophylaxis: Lovenox 40mg subQ
I reviewed the chart and examined the patient with the resident and we discussed the findings and treatment plan.  The patient is tolerating the rehab program well. I agree with the findings and treatment plan above, which I modified as indicated. The patient requires 3 hrs a day of acute inpatient rehab. No new complaints. Alert. Cough improving. Denies SOB, chills, diarrhea. Was noted to be tachycardic on rounds. . Lungs clear. POx 97%. Recent Doppler BLE negative. Metoprolol was apparently held x 2 doses for low BP. D/c amlodipine and lower hold parameters on Metoprolol and monitor. Continue rehab program.     I read, edited and agree with the Assessment:  #Left acute ischemic stroke with right hemiparesis (dominant) and aphasia  s/p tPA on 8/3  EDSON showed EF of 55-60%  8/6 MRI head: scattered left-sided acute cortical infarcts within the MCA and PCA territories, with trace petechial hemorrhage involving high left frontal region. There is a chronic left occipital lobe infarct.   MRA of the neck: moderate stenosis of the proximal right internal carotid artery  - start PT/OT/ST, Neuropsych eval  - c/w 5mg celexa  - c/w ASA 81mg  - c/w Brilinta 90mg BID  - c/w Lipitor 80mg daily    NIHSS =7, mRankin = 4    #Diabetic Peripheral Polyneuropathy  - No pain. Teach patient skin monitoring an sensory awareness    #Covid positive   - PCR positive from 08/16/22   - Placed on Airborne Precautions  - CRP - 85, D-Dimer 323  - Ferritin 585- mild increase  - Started Remdesivir, Lovenox increased to 30 mg bid  - cough improving    #HTN - BP was low  - stopped HCTZ  - stopped Amlodipine 5mg qd  - c/w Lisinopril 40mg   - c/w Lopressor 50mg BID    #Diabetes 2  Ha1c 8.2  - c/w 30U Lantus  - c/w 8U Lispro    #CAD  -continue ASA 81, Brilinta, BB, statin    #Renal lesion  On 8/9, a right upper quadrant US was done to rule out cholecystitis, revealing an indeterminate 1.2 cm right renal lesion and moderate-severe hepatic steatosis.  -outpatient followup, non urgent MRI with renal mass protocol    #Mild persistent cough/ Increased WBC with left shift  - chest x-ray with mild bilateral atelectasis  - PCR positive  - doppler: < from: [facility] Dup[facility] Lower Ext Vein Scan, Bilat (08.17.22 @ 17:53) > No evidence of deep venous thrombosis in either lower extremity.  - Resolved on 8/18 CBC  - UA showed bacteriuria, but no wbcs     #Azotemia  - give IVF with improvement  - had negative PVR    -GI/Bowel Mgmt: senna, dulcolax, lactulose, miralax    - Diet: Dash Carb Consistent diet       Precautions / PROPHYLAXIS:      - Falls    - Ortho: Weight bearing status: WBAT      - DVT prophylaxis: Lovenox 30 mg BID
I reviewed the chart and examined the patient with the resident and we discussed the findings and treatment plan.  The patient is tolerating the rehab program well. I agree with the findings and treatment plan above, which I modified as indicated. The patient requires 3 hrs a day of acute inpatient rehab. Feeling well. Alert. Notes improvement in cough. Started on Remdesivir per ID. Mild increase in D-Dimer. Lovenox increased to 30mg bid. WBC improved. ID to follow. Neuro exam stable. Developing some shoulder extension RUE. Mod assist to transfer, min assist to ambulate. Continue rehab program.     I read, edited and agree with the Assessment:  #Left acute ischemic stroke with right hemiparesis (dominant) and aphasia  s/p tPA on 8/3  EDSON showed EF of 55-60%  8/6 MRI head: scattered left-sided acute cortical infarcts within the MCA and PCA territories, with trace petechial hemorrhage involving high left frontal region. There is a chronic left occipital lobe infarct.   MRA of the neck: moderate stenosis of the proximal right internal carotid artery  - start PT/OT/ST, Neuropsych eval  - c/w 5mg celexa  - c/w ASA 81mg  - c/w Brilinta 90mg BID  - c/w Lipitor 80mg daily    NIHSS =7, mRankin = 4    #Diabetic Peripheral Polyneuropathy  - No pain. Teach patient skin monitoring an sensory awareness    #Covid positive   - PCR positive from 08/16/22   - Placed on Airborne Precautions  - CRP - 85, D-Dimer 323  - Ferritin and Procal pending. IDconsult pending    #HTN   - c/w Amlodipine 5mg qd  - c/w Lisinopril 40mg   - c/w Lopressor 50mg BID    #Diabetes 2  Ha1c 8.2  - c/w 30U Lantus  - c/w 8U Lispro    #CAD  -continue ASA 81, Brilinta, BB, statin    #Renal lesion  On 8/9, a right upper quadrant US was done to rule out cholecystitis, revealing an indeterminate 1.2 cm right renal lesion and moderate-severe hepatic steatosis.  -outpatient followup, non urgent MRI with renal mass protocol    Mild persistent cough/ Increased WBC with left shift  - chest x-ray with mild bilateral atelectasis  - PCR positive  - doppler: < from: [facility] Dup[facility] Lower Ext Vein Scan, Bilat (08.17.22 @ 17:53) > No evidence of deep venous thrombosis in either lower extremity.  - Resolved on 8/18 CBC  - UA showed bacteriuria, but no wbcs     Azotemia  - give IVF overnight and recheck improved  - had negative PVR    -GI/Bowel Mgmt: senna, dulcolax, lactulose, miralax    - Diet: Dash Carb Consistent diet       Precautions / PROPHYLAXIS:      - Falls    - Ortho: Weight bearing status: WBAT      - DVT prophylaxis: Lovenox 30 mg bid
Patient with cough for a few days. No fever or resp. distress. Covid PCR swabbed yesterday and came back positive today. Placed on Airborne isolation. Family aware. CRP, WBC and D-Dimer elevated. Procal and ferritin pending. ID consult pending. Participating in therapies and able to ambulate with min assist with RW. F/U labs in am.     I read, edited and agree with the Assessment:  #Left acute ischemic stroke with right hemiparesis (dominant) and aphasia  s/p tPA on 8/3  EDSON showed EF of 55-60%  8/6 MRI head: scattered left-sided acute cortical infarcts within the MCA and PCA territories, with trace petechial hemorrhage involving high left frontal region. There is a chronic left occipital lobe infarct.   MRA of the neck: moderate stenosis of the proximal right internal carotid artery  - start PT/OT/ST, Neuropsych eval  - c/w 5mg celexa  - c/w ASA 81mg  - c/w Brilinta 90mg BID  - c/w Lipitor 80mg daily    NIHSS =7, mRankin = 4    #Diabetic Peripheral Polyneuropathy  - No pain. Teach patient skin monitoring an sensory awareness    #Covid positive   - PCR positive from 08/16/22   - Placed on Airborne Precautions  - CRP - 85, D-Dimer 323  - Ferritin and Procal pending. IDconsult pending    #HTN   - c/w Amlodipine 5mg qd  - c/w Lisinopril 40mg   - c/w Lopressor 50mg BID    #Diabetes 2  Ha1c 8.2  - c/w 30U Lantus  - c/w 8U Lispro    #CAD  -continue ASA 81, Brilinta, BB, statin    #Renal lesion  On 8/9, a right upper quadrant US was done to rule out cholecystitis, revealing an indeterminate 1.2 cm right renal lesion and moderate-severe hepatic steatosis.  -outpatient followup, non urgent MRI with renal mass protocol    Mild persistent cough/ Increased WBC with left shift  - chest x-ray read pending - no consolidation seen  - PCR positive  - doppler pending   - repeat CBC pending   - UA showed bacteriuria, but no wbcs     Azotemia  - give IVF overnight and recheck improved  - had negative PVR    -GI/Bowel Mgmt: senna, dulcolax, lactulose, miralax    - Diet: Dash Carb Consistent diet       Precautions / PROPHYLAXIS:      - Falls    - Ortho: Weight bearing status: WBAT      - DVT prophylaxis: Lovenox 40mg subQ

## 2022-08-30 NOTE — DISCHARGE NOTE NURSING/CASE MANAGEMENT/SOCIAL WORK - PATIENT PORTAL LINK FT
You can access the FollowMyHealth Patient Portal offered by [facility] by registering at the following website: [email or web address]. By joining [email or web address]’s FollowMyHealth portal, you will also be able to view your health information using other applications (apps) compatible with our system.

## 2022-08-30 NOTE — PROGRESS NOTE ADULT - ASSESSMENT
Patient is a 76yo M with PMH HTN, DM, CAD (s/p stents), GERD who presented for right sided weakness and aphasia. Patient was found to have a left acute cortical infarct within the MCA and PCA territories. Patient presented with visual field deficits that has since improved, but is still with impaired strength, balance, mobility and ADLs and language. Patient was previously independent in all ADLs and activities. Current level of function is mod assist for bed mobility, max assist with transfers, mod assist with ambulation 25 feet with RW. Patient is a good candidate for rehab due to significantly decreased level of function from prior.    #Left acute ischemic stroke with right hemiparesis (dominant) and aphasia  s/p tPA on 8/3  EDSON showed EF of 55-60%  8/6 MRI head: scattered left-sided acute cortical infarcts within the MCA and PCA territories, with trace petechial hemorrhage involving high left frontal region. There is a chronic left occipital lobe infarct.   MRA of the neck: moderate stenosis of the proximal right internal carotid artery  - start PT/OT/ST, Neuropsych eval  - c/w 5mg celexa  - c/w ASA 81mg  - c/w Brilinta 90mg BID  - c/w Lipitor 80mg daily  - Making significant gains in MP and mobility. Ambulates with a Ariel-cane with TA    #Diabetic Peripheral Polyneuropathy  - No pain. Teach patient skin monitoring an sensory awareness    #Covid -  - PCR positive from 08/16/22   - Placed on Airborne Precautions  - CRP - 85, D-Dimer 323  - Ferritin 585- mild increase  - Completed Remdesivir  - Lovenox increased to 30 mg bid  - Asymptomatic. Completed 10 day isolation  - 8/29 D-Dimer 341. Doppler negative    #HTN - BP was low -   - stopped HCTZ  - stopped Amlodipine 5mg qd  - c/w Lisinopril 40mg   - c/w Lopressor 50mg BID  - now controlled    #Diabetes 2. Fair control  Ha1c 8.2  - c/w 30U Lantus  - c/w 8U Lispro    #CAD  -continue ASA 81, Brilinta, BB, statin    #Renal lesion  On 8/9, a right upper quadrant US was done to rule out cholecystitis, revealing an indeterminate 1.2 cm right renal lesion and moderate-severe hepatic steatosis.  -outpatient followup, non urgent MRI with renal mass protocol    #Azotemia  - given IVF with improvement  - had negative PVR    -GI/Bowel Mgmt: senna, dulcolax, lactulose, miralax    - Diet: Dash Carb Consistent diet    - Melatonin 10mg qhs for difficulty sleeping       Precautions / PROPHYLAXIS:      - Falls    - Ortho: Weight bearing status: WBAT      - DVT prophylaxis: Lovenox 30 mg BID   Patient is a 76yo M with PMH HTN, DM, CAD (s/p stents), GERD who presented for right sided weakness and aphasia. Patient was found to have a left acute cortical infarct within the MCA and PCA territories. Patient presented with visual field deficits that has since improved, but is still with impaired strength, balance, mobility and ADLs and language. Patient was previously independent in all ADLs and activities. Current level of function is mod assist for bed mobility, max assist with transfers, mod assist with ambulation 25 feet with RW. Patient is a good candidate for rehab due to significantly decreased level of function from prior.    #Left acute ischemic stroke with right hemiparesis (dominant) and aphasia  s/p tPA on 8/3  EDSON showed EF of 55-60%  8/6 MRI head: scattered left-sided acute cortical infarcts within the MCA and PCA territories, with trace petechial hemorrhage involving high left frontal region. There is a chronic left occipital lobe infarct.   MRA of the neck: moderate stenosis of the proximal right internal carotid artery  - start PT/OT/ST, Neuropsych eval  - c/w 5mg celexa  - c/w ASA 81mg  - c/w Brilinta 90mg BID  - c/w Lipitor 80mg daily  - Making significant gains in MP and mobility. Ambulates with a Ariel-cane with TA        #Diabetic Peripheral Polyneuropathy  - No pain. Teach patient skin monitoring an sensory awareness    #Covid -  - PCR positive from 08/16/22   - Placed on Airborne Precautions  - CRP - 85, D-Dimer 323  - Ferritin 585- mild increase  - Completed Remdesivir  - Lovenox increased to 30 mg bid  - Asymptomatic. Completed 10 day isolation  - 8/29 D-Dimer 341. Doppler negative    #HTN - BP was low -   - stopped HCTZ  - stopped Amlodipine 5mg qd  - c/w Lisinopril 40mg   - c/w Lopressor 50mg BID  - now controlled    #Diabetes 2. Fair control  Ha1c 8.2  - c/w 30U Lantus  - c/w 8U Lispro    #CAD  -continue ASA 81, Brilinta, BB, statin    #Renal lesion  On 8/9, a right upper quadrant US was done to rule out cholecystitis, revealing an indeterminate 1.2 cm right renal lesion and moderate-severe hepatic steatosis.  -outpatient followup, non urgent MRI with renal mass protocol    #Azotemia  - given IVF with improvement  - had negative PVR    -GI/Bowel Mgmt: senna, dulcolax, lactulose, miralax    - Diet: Dash Carb Consistent diet    - Melatonin 10mg qhs for difficulty sleeping       Precautions / PROPHYLAXIS:      - Falls    - Ortho: Weight bearing status: WBAT      - DVT prophylaxis: Lovenox 30 mg BID

## 2022-08-30 NOTE — PROGRESS NOTE ADULT - REASON FOR ADMISSION
rehab s/p left ischemic stroke with aphasia and right hemiparesis

## 2022-08-30 NOTE — PROGRESS NOTE ADULT - PROVIDER SPECIALTY LIST ADULT
Neuropsychology
Rehab Medicine
Neuropsychology
Physiatry
Rehab Medicine
Rehab Medicine
Physiatry
Physiatry
Rehab Medicine
Neuropsychology
Rehab Medicine
Neuropsychology

## 2022-08-30 NOTE — PROGRESS NOTE ADULT - SUBJECTIVE AND OBJECTIVE BOX
Patient is a 77y old  Male who presents with a chief complaint of rehab s/p left ischemic stroke with aphasia and right hemiparesis (11 Aug 2022 11:30)      HPI:  Patient is a 78yo M with PMH of HTN, DM, HLD, CAD (s/p stents), GERD who presented to the ED on 8/3/2022 for right hand weakness. Patient noticed he started having difficulty with his speech, then developed weakness in his right hand and some visual field deficits. In the ED, patient received tPA. CT head at the time was negative, EDSON showed EF of 55-60%.  MRI head showed scattered left-sided acute cortical infarcts within the MCA and PCA territories, with trace petechial hemorrhage involving high left frontal region. There is a chronic left occipital lobe infarct. MRA of the neck showed moderate stenosis of the proximal right internal carotid artery. On 8/9, a right upper quadrant US was done to rule out cholecystitis, revealing an indeterminate 1.2 cm right renal lesion and moderate-severe hepatic steatosis. During the hospital stay, the visual field deficits improved but patient continued to have no motor function in the right hand and impaired RLE strength and mobility and ADL independence and a persistent aphasia.    On evaluation by PM&R, patient endorses no movement in his right hand and notes some difficulty with memory but otherwise has no other complaints. Patient is right handed. He lives by himself in a house with 5 [address] and one flight of stairs inside (5+7 steps). Patient was previously independent in ADLs and ambulation, occasionally using a cane outdoors for balance. Currently patient requires mod assist for bed mobility, max assist with transfers, mod assist with ambulation 25 feet with RW and max assistance for dressing. He was evaluated by Physiatry on the medical service and was found to be a good acute rehab candidate.  (11 Aug 2022 11:30)    TODAY'S SUBJECTIVE & REVIEW OF SYMPTOMS:  Patient was seen and assessed at bedside. No overnight events. Patient denies any complaints at this time. Tolerating PT/OT. Tolerating oral diet. Voiding and passing stool spontaneously. Vital signs are stable. Patient making progress with right arm. D-dimer still elevated (341 from 323), Doppler was negative.      Constitutional    [ x  ] WNL           [   ] poor appetite   [   ] insomnia   [   ] tired   Cardio:                [  x ] WNL           [   ] CP   [   ] VELASCO   [   ] palpitations               Resp:                   [ x  ] WNL           [   ] SOB   [   ] cough improving   [   ] wheezing   GI:                        [ x  ] WNL           [   ] constipation   [   ] diarrhea   [   ] abdominal pain   [   ] nausea   [   ] emesis                                :                      [ x  ] WNL           [   ] PRITCHARD  [   ] dysuria   [   ] difficulty voiding             Endo:                   [ x  ] WNL          [   ] polyuria   [   ] temperature intolerance                 Skin:                     [ x  ] WNL          [   ] pain   [   ] wound   [   ] rash   MSK:                    [   x] WNL          [   ] muscle pain   [   ] joint pain/ stiffness   [   ] muscle tenderness   [   ] swelling   Neuro:                 [   ] WNL          [   ] HA   [   ] change in vision   [   ] tremor   [ x  ] weakness- Right side   [   ]dysphagia   [  ] word finding difficulties- improving,   [ x ] numbness/ neuropathy both feet    Cognitive:           [ x  ] WNL           [   ]confusion   - daughter note some memory loss since stroke   Psych:                  [  x ] WNL           [   ] hallucinations   [   ]agitation   [   ] delusion   [   ]depression      PHYSICAL EXAM    ICU Vital Signs Last 24 Hrs  T(C): 35.6 (30 Aug 2022 05:38), Max: 36.5 (29 Aug 2022 14:30)  T(F): 96 (30 Aug 2022 05:38), Max: 97.7 (29 Aug 2022 14:30)  HR: 68 (30 Aug 2022 05:38) (68 - 80)  BP: 166/82 (30 Aug 2022 05:38) (108/64 - 166/82)  BP(mean): --  ABP: --  ABP(mean): --  RR: 17 (30 Aug 2022 05:38) (17 - 18)  SpO2: 97% (29 Aug 2022 17:35) (96% - 97%)    O2 Parameters below as of 29 Aug 2022 17:35  Patient On (Oxygen Delivery Method): room air        General:[  x ] NAD, Resting Comfortable,   [   ] other:                                HEENT: [  x ] NC/AT, EOMI, PERRL , Normal Conjunctivae,   [   ] other:  Cardio: [  x ] RRR, no murmer,   [   ] other:                              Pulm: [  x ] No Respiratory Distress,  Lungs CTAB,   [   ] other:                       Abdomen: [ x  ] ND/NT, Soft,   [   ] other:    : [ x  ] NO PRITCHARD CATHETER,   [  ] PRITCHARD CATHETER- no meatal tear, no discharge, [   ] other:                                            MSK: [ x  ] No joint swelling, Full PROM,   [    ] other:                                       Ext: [  x ]No C/C/E, No calf tenderness,   [   ]other:    Skin: [ x  ]intact,   [   ] other:                                                                   Neurological Examination:  Cognitive: [ x   ] AAO x 3,   [    ]  other:                                                                 Attention/ Concentration:  [ x   ] intact   [    ]  other:   Memory: [    ] intact,    [ x  ]  other:  mildly impaired  Mood/Affect: [  x  ] wnl,    [    ]  other:                                                                             Communication: [ x  ]Fluent, no dysarthria, following commands:  [   ] other:  CN II - XII:  [  x  ] intact, VFF  [    ] other: mild right facial droop                                                                                         Motor:   RIGHT UE: [   ] WNL,  [  x ] other: 3+/5 shoulder flexion, 4+/5 elbow flexion, 4+/5 elbow extension, 3+/5 finger flexion, 2/5 finger extension  LEFT    UE: [  x ] WNL  RIGHT LE: [ x  ] WNL,  [   ] other: Hip flexion 3+/5 with drift to bed, knee flexion 4/5, plantarflexion 5/5, dorsiflexion 5/5, toe extension 5/5  LEFT    LE: [  x ] WNL,  Hip flexion 4/5, knee flexion 5/5, plantarflexion 5/5, dorsiflexion 5/5, toe extension 5/5     Tone: [    ] wnl,   [ x   ]  other: mild increased tone in right triceps  DTRs: [ x  ]symmetric, 1+ [   ] other:   Coordination:   [    ] intact as testable,   [ not assesed   ]       CLOF:   Bed mobility- supervision  Transfers- touch assist  Gait: touch assist 75 feet with emmett-cane  Lower body dressing: supervision    MEDICATIONS  (STANDING):  aspirin enteric coated 81 milliGRAM(s) Oral daily  atorvastatin 80 milliGRAM(s) Oral at bedtime  citalopram 5 milliGRAM(s) Oral daily  dextrose 5%. 1000 milliLiter(s) (100 mL/Hr) IV Continuous <Continuous>  dextrose 50% Injectable 25 Gram(s) IV Push once  dextrose 50% Injectable 12.5 Gram(s) IV Push once  dextrose 50% Injectable 25 Gram(s) IV Push once  enoxaparin Injectable 30 milliGRAM(s) SubCutaneous every 12 hours  famotidine    Tablet 40 milliGRAM(s) Oral at bedtime  glucagon  Injectable 1 milliGRAM(s) IntraMuscular once  hydrocortisone 1% Cream 1 Application(s) Topical two times a day  insulin glargine Injectable (LANTUS) 30 Unit(s) SubCutaneous every morning  insulin lispro Injectable (ADMELOG) 8 Unit(s) SubCutaneous three times a day before meals  lisinopril 40 milliGRAM(s) Oral at bedtime  magnesium oxide 400 milliGRAM(s) Oral two times a day with meals  melatonin 10 milliGRAM(s) Oral at bedtime  metoprolol tartrate 50 milliGRAM(s) Oral two times a day  polyethylene glycol 3350 17 Gram(s) Oral two times a day  senna 2 Tablet(s) Oral at bedtime  ticagrelor 90 milliGRAM(s) Oral every 12 hours    MEDICATIONS  (PRN):  acetaminophen     Tablet .. 650 milliGRAM(s) Oral every 6 hours PRN Temp greater or equal to 38C (100.4F), Mild Pain (1 - 3)  bisacodyl Suppository 10 milliGRAM(s) Rectal daily PRN Constipation  dextrose Oral Gel 15 Gram(s) Oral once PRN Blood Glucose LESS THAN 70 milliGRAM(s)/deciliter  diphenhydrAMINE 25 milliGRAM(s) Oral every 6 hours PRN Rash and/or Itching  guaifenesin/dextromethorphan Oral Liquid 10 milliLiter(s) Oral every 4 hours PRN Cough  lactulose Syrup 20 Gram(s) Oral two times a day PRN Constipation        RECENT LABS/IMAGING                        14.4   12.64 )-----------( 267      ( 29 Aug 2022 17:25 )             41.1     08-29    138  |  100  |  21<H>  ----------------------------<  127<H>  4.5   |  27  |  1.1    Ca    9.4      29 Aug 2022 17:25  Mg     2.0     08-29    TPro  7.2  /  Alb  3.8  /  TBili  0.9  /  DBili  x   /  AST  24  /  ALT  33  /  AlkPhos  139<H>  08-29        POCT Blood Glucose.: 95 mg/dL (08-30-22 @ 07:08)  POCT Blood Glucose.: 105 mg/dL (08-29-22 @ 16:37)  POCT Blood Glucose.: 280 mg/dL (08-29-22 @ 10:58)  POCT Blood Glucose.: 126 mg/dL (08-28-22 @ 16:04)  POCT Blood Glucose.: 142 mg/dL (08-28-22 @ 11:52)  POCT Blood Glucose.: 107 mg/dL (08-28-22 @ 07:26)  POCT Blood Glucose.: 108 mg/dL (08-27-22 @ 16:05)  POCT Blood Glucose.: 240 mg/dL (08-27-22 @ 11:09)  POCT Blood Glucose.: 165 mg/dL (08-27-22 @ 07:45)  POCT Blood Glucose.: 166 mg/dL (08-26-22 @ 16:27)  POCT Blood Glucose.: 262 mg/dL (08-26-22 @ 11:21)   Patient is a 77y old  Male who presents with a chief complaint of rehab s/p left ischemic stroke with aphasia and right hemiparesis (11 Aug 2022 11:30)      HPI:  Patient is a 78yo M with PMH of HTN, DM, HLD, CAD (s/p stents), GERD who presented to the ED on 8/3/2022 for right hand weakness. Patient noticed he started having difficulty with his speech, then developed weakness in his right hand and some visual field deficits. In the ED, patient received tPA. CT head at the time was negative, EDSON showed EF of 55-60%.  MRI head showed scattered left-sided acute cortical infarcts within the MCA and PCA territories, with trace petechial hemorrhage involving high left frontal region. There is a chronic left occipital lobe infarct. MRA of the neck showed moderate stenosis of the proximal right internal carotid artery. On 8/9, a right upper quadrant US was done to rule out cholecystitis, revealing an indeterminate 1.2 cm right renal lesion and moderate-severe hepatic steatosis. During the hospital stay, the visual field deficits improved but patient continued to have no motor function in the right hand and impaired RLE strength and mobility and ADL independence and a persistent aphasia.    On evaluation by PM&R, patient endorses no movement in his right hand and notes some difficulty with memory but otherwise has no other complaints. Patient is right handed. He lives by himself in a house with [address] and one flight of stairs inside (5+7 steps). Patient was previously independent in ADLs and ambulation, occasionally using a cane outdoors for balance. Currently patient requires mod assist for bed mobility, max assist with transfers, mod assist with ambulation 25 feet with RW and max assistance for dressing. He was evaluated by Physiatry on the medical service and was found to be a good acute rehab candidate.  (11 Aug 2022 11:30)    TODAY'S SUBJECTIVE & REVIEW OF SYMPTOMS:  Patient was seen and assessed at bedside. No overnight events. Patient denies any complaints at this time. Tolerating PT/OT. Tolerating oral diet. Voiding and passing stool spontaneously. Vital signs are stable. Patient making progress with right arm. D-dimer still elevated (341 from 323), Doppler was negative.      Constitutional    [ x  ] WNL           [   ] poor appetite   [   ] insomnia   [   ] tired   Cardio:                [  x ] WNL           [   ] CP   [   ] VELASCO   [   ] palpitations               Resp:                   [ x  ] WNL           [   ] SOB   [   ] cough improving   [   ] wheezing   GI:                        [ x  ] WNL           [   ] constipation   [   ] diarrhea   [   ] abdominal pain   [   ] nausea   [   ] emesis                                :                      [ x  ] WNL           [   ] PRITCHARD  [   ] dysuria   [   ] difficulty voiding             Endo:                   [ x  ] WNL          [   ] polyuria   [   ] temperature intolerance                 Skin:                     [ x  ] WNL          [   ] pain   [   ] wound   [   ] rash   MSK:                    [   x] WNL          [   ] muscle pain   [   ] joint pain/ stiffness   [   ] muscle tenderness   [   ] swelling   Neuro:                 [   ] WNL          [   ] HA   [   ] change in vision   [   ] tremor   [ x  ] weakness- Right side   [   ]dysphagia   [  ] word finding difficulties- improving,   [ x ] numbness/ neuropathy both feet    Cognitive:           [ x  ] WNL           [   ]confusion   - daughter note some memory loss since stroke   Psych:                  [  x ] WNL           [   ] hallucinations   [   ]agitation   [   ] delusion   [   ]depression      PHYSICAL EXAM    ICU Vital Signs Last 24 Hrs  T(C): 35.6 (30 Aug 2022 05:38), Max: 36.5 (29 Aug 2022 14:30)  T(F): 96 (30 Aug 2022 05:38), Max: 97.7 (29 Aug 2022 14:30)  HR: 68 (30 Aug 2022 05:38) (68 - 80)  BP: 166/82 (30 Aug 2022 05:38) (108/64 - 166/82)  BP(mean): --  ABP: --  ABP(mean): --  RR: 17 (30 Aug 2022 05:38) (17 - 18)  SpO2: 97% (29 Aug 2022 17:35) (96% - 97%)    O2 Parameters below as of 29 Aug 2022 17:35  Patient On (Oxygen Delivery Method): room air        General:[  x ] NAD, Resting Comfortable,   [   ] other:                                HEENT: [  x ] NC/AT, EOMI, PERRL , Normal Conjunctivae,   [   ] other:  Cardio: [  x ] RRR, no murmer,   [   ] other:                              Pulm: [  x ] No Respiratory Distress,  Lungs CTAB,   [   ] other:                       Abdomen: [ x  ] ND/NT, Soft,   [   ] other:    : [ x  ] NO PRITCHARD CATHETER,   [  ] PRITCHARD CATHETER- no meatal tear, no discharge, [   ] other:                                            MSK: [ x  ] No joint swelling, Full PROM,   [    ] other:                                       Ext: [  x ]No C/C/E, No calf tenderness,   [   ]other:    Skin: [ x  ]intact,   [   ] other:                                                                   Neurological Examination:  Cognitive: [ x   ] AAO x 3,   [    ]  other:                                                                 Attention/ Concentration:  [ x   ] intact   [    ]  other:   Memory: [    ] intact,    [ x  ]  other:  mildly impaired  Mood/Affect: [  x  ] wnl,    [    ]  other:                                                                             Communication: [ x  ]Fluent, no dysarthria, following commands:  [   ] other:  CN II - XII:  [  x  ] intact, VFF  [    ] other: mild right facial droop                                                                                         Motor:   RIGHT UE: [   ] WNL,  [  x ] other: 3+/5 shoulder flexion, 4+/5 elbow flexion, 4+/5 elbow extension, 3+/5 finger flexion, 2/5 finger extension  LEFT    UE: [  x ] WNL  RIGHT LE: [ x  ] WNL,  [   ] other: Hip flexion 3+/5 with drift to bed, knee flexion 4/5, plantarflexion 5/5, dorsiflexion 5/5, toe extension 5/5  LEFT    LE: [  x ] WNL,  Hip flexion 4/5, knee flexion 5/5, plantarflexion 5/5, dorsiflexion 5/5, toe extension 5/5     Tone: [    ] wnl,   [ x   ]  other: mild increased tone in right triceps  DTRs: [ x  ]symmetric, 1+ [   ] other:   Coordination:   [    ] intact as testable,   [ not assesed   ]       CLOF:   Bed mobility- supervision  Transfers- touch assist  Gait: touch assist 75 feet with emmett-cane  Lower body dressing: supervision    MEDICATIONS  (STANDING):  aspirin enteric coated 81 milliGRAM(s) Oral daily  atorvastatin 80 milliGRAM(s) Oral at bedtime  citalopram 5 milliGRAM(s) Oral daily  dextrose 5%. 1000 milliLiter(s) (100 mL/Hr) IV Continuous <Continuous>  dextrose 50% Injectable 25 Gram(s) IV Push once  dextrose 50% Injectable 12.5 Gram(s) IV Push once  dextrose 50% Injectable 25 Gram(s) IV Push once  enoxaparin Injectable 30 milliGRAM(s) SubCutaneous every 12 hours  famotidine    Tablet 40 milliGRAM(s) Oral at bedtime  glucagon  Injectable 1 milliGRAM(s) IntraMuscular once  hydrocortisone 1% Cream 1 Application(s) Topical two times a day  insulin glargine Injectable (LANTUS) 30 Unit(s) SubCutaneous every morning  insulin lispro Injectable (ADMELOG) 8 Unit(s) SubCutaneous three times a day before meals  lisinopril 40 milliGRAM(s) Oral at bedtime  magnesium oxide 400 milliGRAM(s) Oral two times a day with meals  melatonin 10 milliGRAM(s) Oral at bedtime  metoprolol tartrate 50 milliGRAM(s) Oral two times a day  polyethylene glycol 3350 17 Gram(s) Oral two times a day  senna 2 Tablet(s) Oral at bedtime  ticagrelor 90 milliGRAM(s) Oral every 12 hours    MEDICATIONS  (PRN):  acetaminophen     Tablet .. 650 milliGRAM(s) Oral every 6 hours PRN Temp greater or equal to 38C (100.4F), Mild Pain (1 - 3)  bisacodyl Suppository 10 milliGRAM(s) Rectal daily PRN Constipation  dextrose Oral Gel 15 Gram(s) Oral once PRN Blood Glucose LESS THAN 70 milliGRAM(s)/deciliter  diphenhydrAMINE 25 milliGRAM(s) Oral every 6 hours PRN Rash and/or Itching  guaifenesin/dextromethorphan Oral Liquid 10 milliLiter(s) Oral every 4 hours PRN Cough  lactulose Syrup 20 Gram(s) Oral two times a day PRN Constipation        RECENT LABS/IMAGING                          14.0   9.23  )-----------( 216      ( 30 Aug 2022 07:01 )             41.2                           14.4   12.64 )-----------( 267      ( 29 Aug 2022 17:25 )             41.1     08-29    138  |  100  |  21<H>  ----------------------------<  127<H>  4.5   |  27  |  1.1    Ca    9.4      29 Aug 2022 17:25  Mg     2.0     08-29    TPro  7.2  /  Alb  3.8  /  TBili  0.9  /  DBili  x   /  AST  24  /  ALT  33  /  AlkPhos  139<H>  08-29        POCT Blood Glucose.: 95 mg/dL (08-30-22 @ 07:08)  POCT Blood Glucose.: 105 mg/dL (08-29-22 @ 16:37)  POCT Blood Glucose.: 280 mg/dL (08-29-22 @ 10:58)  POCT Blood Glucose.: 126 mg/dL (08-28-22 @ 16:04)  POCT Blood Glucose.: 142 mg/dL (08-28-22 @ 11:52)  POCT Blood Glucose.: 107 mg/dL (08-28-22 @ 07:26)  POCT Blood Glucose.: 108 mg/dL (08-27-22 @ 16:05)  POCT Blood Glucose.: 240 mg/dL (08-27-22 @ 11:09)  POCT Blood Glucose.: 165 mg/dL (08-27-22 @ 07:45)  POCT Blood Glucose.: 166 mg/dL (08-26-22 @ 16:27)  POCT Blood Glucose.: 262 mg/dL (08-26-22 @ 11:21)   Patient is a 77y old  Male who presents with a chief complaint of rehab s/p left ischemic stroke with aphasia and right hemiparesis (11 Aug 2022 11:30)      HPI:  Patient is a 76yo M with PMH of HTN, DM, HLD, CAD (s/p stents), GERD who presented to the ED on 8/3/2022 for right hand weakness. Patient noticed he started having difficulty with his speech, then developed weakness in his right hand and some visual field deficits. In the ED, patient received tPA. CT head at the time was negative, EDSON showed EF of 55-60%.  MRI head showed scattered left-sided acute cortical infarcts within the MCA and PCA territories, with trace petechial hemorrhage involving high left frontal region. There is a chronic left occipital lobe infarct. MRA of the neck showed moderate stenosis of the proximal right internal carotid artery. On 8/9, a right upper quadrant US was done to rule out cholecystitis, revealing an indeterminate 1.2 cm right renal lesion and moderate-severe hepatic steatosis. During the hospital stay, the visual field deficits improved but patient continued to have no motor function in the right hand and impaired RLE strength and mobility and ADL independence and a persistent aphasia.    On evaluation by PM&R, patient endorses no movement in his right hand and notes some difficulty with memory but otherwise has no other complaints. Patient is right handed. He lives by himself in a house with [address] and one flight of stairs inside (5+7 steps). Patient was previously independent in ADLs and ambulation, occasionally using a cane outdoors for balance. Currently patient requires mod assist for bed mobility, max assist with transfers, mod assist with ambulation 25 feet with RW and max assistance for dressing. He was evaluated by Physiatry on the medical service and was found to be a good acute rehab candidate.  (11 Aug 2022 11:30)    TODAY'S SUBJECTIVE & REVIEW OF SYMPTOMS:  Patient was seen and assessed at bedside. No overnight events. Patient denies any complaints at this time. Tolerating PT/OT. Tolerating oral diet. Voiding and passing stool spontaneously. Vital signs are stable. Patient making progress with right arm. D-dimer still elevated (341 from 323), Doppler was negative.      Constitutional    [ x  ] WNL           [   ] poor appetite   [   ] insomnia   [   ] tired   Cardio:                [  x ] WNL           [   ] CP   [   ] VELASCO   [   ] palpitations               Resp:                   [ x  ] WNL           [   ] SOB   [   ] cough improving   [   ] wheezing   GI:                        [ x  ] WNL           [   ] constipation   [   ] diarrhea   [   ] abdominal pain   [   ] nausea   [   ] emesis                                :                      [ x  ] WNL           [   ] PRITCHARD  [   ] dysuria   [   ] difficulty voiding             Endo:                   [ x  ] WNL          [   ] polyuria   [   ] temperature intolerance                 Skin:                     [ x  ] WNL          [   ] pain   [   ] wound   [   ] rash   MSK:                    [   x] WNL          [   ] muscle pain   [   ] joint pain/ stiffness   [   ] muscle tenderness   [   ] swelling   Neuro:                 [   ] WNL          [   ] HA   [   ] change in vision   [   ] tremor   [ x  ] weakness- Right side   [   ]dysphagia   [  ] word finding difficulties- improving,   [ x ] numbness/ neuropathy both feet    Cognitive:           [ x  ] WNL           [   ]confusion   - daughter note some memory loss since stroke   Psych:                  [  x ] WNL           [   ] hallucinations   [   ]agitation   [   ] delusion   [   ]depression      PHYSICAL EXAM    ICU Vital Signs Last 24 Hrs  T(C): 35.6 (30 Aug 2022 05:38), Max: 36.5 (29 Aug 2022 14:30)  T(F): 96 (30 Aug 2022 05:38), Max: 97.7 (29 Aug 2022 14:30)  HR: 68 (30 Aug 2022 05:38) (68 - 80)  BP: 166/82 (30 Aug 2022 05:38) (108/64 - 166/82)  BP(mean): --  ABP: --  ABP(mean): --  RR: 17 (30 Aug 2022 05:38) (17 - 18)  SpO2: 97% (29 Aug 2022 17:35) (96% - 97%)    O2 Parameters below as of 29 Aug 2022 17:35  Patient On (Oxygen Delivery Method): room air        General:[  x ] NAD, Resting Comfortable,   [   ] other:                                HEENT: [  x ] NC/AT, EOMI, PERRL , Normal Conjunctivae,   [   ] other:  Cardio: [  x ] RRR, no murmer,   [   ] other:                              Pulm: [  x ] No Respiratory Distress,  Lungs CTAB,   [   ] other:                       Abdomen: [ x  ] ND/NT, Soft,   [   ] other:    : [ x  ] NO PRITCHARD CATHETER,   [  ] PRITCHARD CATHETER- no meatal tear, no discharge, [   ] other:                                            MSK: [ x  ] No joint swelling, Full PROM,   [    ] other:                                       Ext: [  x ]No C/C/E, No calf tenderness,   [   ]other:    Skin: [ x  ]intact,   [   ] other:                                                                   Neurological Examination:  Cognitive: [ x   ] AAO x 3,   [    ]  other:                                                                 Attention/ Concentration:  [ x   ] intact   [    ]  other:   Memory: [    ] intact,    [ x  ]  other:  mildly impaired  Mood/Affect: [  x  ] wnl,    [    ]  other:                                                                             Communication: [ x  ]Fluent, no dysarthria, following commands:  [   ] other:  CN II - XII:  [  x  ] intact, VFF  [    ] other: mild right facial droop                                                                                         Motor:   RIGHT UE: [   ] WNL,  [  x ] other: 3+/5 shoulder flexion, 4+/5 elbow flexion, 4+/5 elbow extension, 3+/5 finger flexion, 2/5 finger extension  LEFT    UE: [  x ] WNL  RIGHT LE: [ x  ] WNL,  [   ] other: Hip flexion 3+/5 with drift to bed, knee flexion 4/5, plantarflexion 5/5, dorsiflexion 5/5, toe extension 5/5  LEFT    LE: [  x ] WNL,  Hip flexion 4/5, knee flexion 5/5, plantarflexion 5/5, dorsiflexion 5/5, toe extension 5/5     Tone: [    ] wnl,   [ x   ]  other: mild increased tone in right triceps  DTRs: [ x  ]symmetric, 1+ [   ] other:   Coordination:   [    ] intact as testable,   [ not assesed   ]       CLOF:   Bed mobility- supervision  Transfers- supervision  Gait: contact guard 100 feet with emmett-cane  Lower body dressing: supervision    MEDICATIONS  (STANDING):  aspirin enteric coated 81 milliGRAM(s) Oral daily  atorvastatin 80 milliGRAM(s) Oral at bedtime  citalopram 5 milliGRAM(s) Oral daily  dextrose 5%. 1000 milliLiter(s) (100 mL/Hr) IV Continuous <Continuous>  dextrose 50% Injectable 25 Gram(s) IV Push once  dextrose 50% Injectable 12.5 Gram(s) IV Push once  dextrose 50% Injectable 25 Gram(s) IV Push once  enoxaparin Injectable 30 milliGRAM(s) SubCutaneous every 12 hours  famotidine    Tablet 40 milliGRAM(s) Oral at bedtime  glucagon  Injectable 1 milliGRAM(s) IntraMuscular once  hydrocortisone 1% Cream 1 Application(s) Topical two times a day  insulin glargine Injectable (LANTUS) 30 Unit(s) SubCutaneous every morning  insulin lispro Injectable (ADMELOG) 8 Unit(s) SubCutaneous three times a day before meals  lisinopril 40 milliGRAM(s) Oral at bedtime  magnesium oxide 400 milliGRAM(s) Oral two times a day with meals  melatonin 10 milliGRAM(s) Oral at bedtime  metoprolol tartrate 50 milliGRAM(s) Oral two times a day  polyethylene glycol 3350 17 Gram(s) Oral two times a day  senna 2 Tablet(s) Oral at bedtime  ticagrelor 90 milliGRAM(s) Oral every 12 hours    MEDICATIONS  (PRN):  acetaminophen     Tablet .. 650 milliGRAM(s) Oral every 6 hours PRN Temp greater or equal to 38C (100.4F), Mild Pain (1 - 3)  bisacodyl Suppository 10 milliGRAM(s) Rectal daily PRN Constipation  dextrose Oral Gel 15 Gram(s) Oral once PRN Blood Glucose LESS THAN 70 milliGRAM(s)/deciliter  diphenhydrAMINE 25 milliGRAM(s) Oral every 6 hours PRN Rash and/or Itching  guaifenesin/dextromethorphan Oral Liquid 10 milliLiter(s) Oral every 4 hours PRN Cough  lactulose Syrup 20 Gram(s) Oral two times a day PRN Constipation        RECENT LABS/IMAGING                          14.0   9.23  )-----------( 216      ( 30 Aug 2022 07:01 )             41.2                           14.4   12.64 )-----------( 267      ( 29 Aug 2022 17:25 )             41.1     08-29    138  |  100  |  21<H>  ----------------------------<  127<H>  4.5   |  27  |  1.1    Ca    9.4      29 Aug 2022 17:25  Mg     2.0     08-29    TPro  7.2  /  Alb  3.8  /  TBili  0.9  /  DBili  x   /  AST  24  /  ALT  33  /  AlkPhos  139<H>  08-29        POCT Blood Glucose.: 95 mg/dL (08-30-22 @ 07:08)  POCT Blood Glucose.: 105 mg/dL (08-29-22 @ 16:37)  POCT Blood Glucose.: 280 mg/dL (08-29-22 @ 10:58)  POCT Blood Glucose.: 126 mg/dL (08-28-22 @ 16:04)  POCT Blood Glucose.: 142 mg/dL (08-28-22 @ 11:52)  POCT Blood Glucose.: 107 mg/dL (08-28-22 @ 07:26)  POCT Blood Glucose.: 108 mg/dL (08-27-22 @ 16:05)  POCT Blood Glucose.: 240 mg/dL (08-27-22 @ 11:09)  POCT Blood Glucose.: 165 mg/dL (08-27-22 @ 07:45)  POCT Blood Glucose.: 166 mg/dL (08-26-22 @ 16:27)  POCT Blood Glucose.: 262 mg/dL (08-26-22 @ 11:21)

## 2022-09-01 DIAGNOSIS — I69.351 HEMIPLEGIA AND HEMIPARESIS FOLLOWING CEREBRAL INFARCTION AFFECTING RIGHT DOMINANT SIDE: ICD-10-CM

## 2022-09-01 DIAGNOSIS — Z87.891 PERSONAL HISTORY OF NICOTINE DEPENDENCE: ICD-10-CM

## 2022-09-01 DIAGNOSIS — U07.1 COVID-19: ICD-10-CM

## 2022-09-01 DIAGNOSIS — I10 ESSENTIAL (PRIMARY) HYPERTENSION: ICD-10-CM

## 2022-09-01 DIAGNOSIS — N28.89 OTHER SPECIFIED DISORDERS OF KIDNEY AND URETER: ICD-10-CM

## 2022-09-01 DIAGNOSIS — K76.0 FATTY (CHANGE OF) LIVER, NOT ELSEWHERE CLASSIFIED: ICD-10-CM

## 2022-09-01 DIAGNOSIS — I25.10 ATHEROSCLEROTIC HEART DISEASE OF NATIVE CORONARY ARTERY WITHOUT ANGINA PECTORIS: ICD-10-CM

## 2022-09-01 DIAGNOSIS — K21.9 GASTRO-ESOPHAGEAL REFLUX DISEASE WITHOUT ESOPHAGITIS: ICD-10-CM

## 2022-09-01 DIAGNOSIS — Z79.4 LONG TERM (CURRENT) USE OF INSULIN: ICD-10-CM

## 2022-09-01 DIAGNOSIS — I69.320 APHASIA FOLLOWING CEREBRAL INFARCTION: ICD-10-CM

## 2022-09-01 DIAGNOSIS — R79.89 OTHER SPECIFIED ABNORMAL FINDINGS OF BLOOD CHEMISTRY: ICD-10-CM

## 2022-09-01 DIAGNOSIS — Z95.5 PRESENCE OF CORONARY ANGIOPLASTY IMPLANT AND GRAFT: ICD-10-CM

## 2022-09-01 DIAGNOSIS — J98.11 ATELECTASIS: ICD-10-CM

## 2022-09-01 DIAGNOSIS — E11.42 TYPE 2 DIABETES MELLITUS WITH DIABETIC POLYNEUROPATHY: ICD-10-CM

## 2022-09-04 RX ORDER — CLOPIDOGREL BISULFATE 75 MG/1
1 TABLET, FILM COATED ORAL
Qty: 0 | Refills: 0 | DISCHARGE
Start: 2022-09-04

## 2022-09-22 ENCOUNTER — APPOINTMENT (OUTPATIENT)
Dept: FAMILY MEDICINE | Facility: CLINIC | Age: 77
End: 2022-09-22

## 2022-09-22 VITALS
HEIGHT: 72 IN | BODY MASS INDEX: 28.31 KG/M2 | TEMPERATURE: 97.2 F | WEIGHT: 209 LBS | OXYGEN SATURATION: 98 % | DIASTOLIC BLOOD PRESSURE: 82 MMHG | HEART RATE: 79 BPM | SYSTOLIC BLOOD PRESSURE: 130 MMHG

## 2022-09-22 DIAGNOSIS — E78.5 HYPERLIPIDEMIA, UNSPECIFIED: ICD-10-CM

## 2022-09-22 DIAGNOSIS — R53.1 WEAKNESS: ICD-10-CM

## 2022-09-22 DIAGNOSIS — E11.9 TYPE 2 DIABETES MELLITUS W/OUT COMPLICATIONS: ICD-10-CM

## 2022-09-22 PROCEDURE — 99203 OFFICE O/P NEW LOW 30 MIN: CPT

## 2022-09-22 NOTE — ASSESSMENT
[FreeTextEntry1] : dicussed need for stroke management\par \par F.u with Cardio- hx of loop in place\par needs f/u for repeat echo\par \par Neuro followup scheduled on Monday\par \par Needs home PT/OT for strengthening. \par no signs of facial drop \par BP well controlled\par \par Pt needs to change pharmacy- will call back when pharmacy is changed to resent 90 day supplies of all meds.

## 2022-09-22 NOTE — PHYSICAL EXAM
[Normal] : normal rate, regular rhythm, normal S1 and S2 and no murmur heard [de-identified] : decreased ROM on right, difficultly with fine, motor movements

## 2022-09-22 NOTE — REVIEW OF SYSTEMS
[Muscle Pain] : muscle pain [Muscle Weakness] : muscle weakness [Back Pain] : back pain [Negative] : Respiratory

## 2022-09-22 NOTE — HISTORY OF PRESENT ILLNESS
[FreeTextEntry8] : PT presenting with son in law. \par Suffered stroke 8/3/22\par was admitted to [facility] - s./p TPA\par had severe muscle weakness on right, had sensation but unable to move. \par Currently working on fine motor movements on right arm \par \par HTN- ramipril 10 mg \par metoprolol 50 mg BID\par Metformin 1000 IU BID\par Lantus 30 units at night. \par Simvastatin 80 mg (increased from 40 mg due to stroke) \par ASA+ Plavis due to recent stroke.

## 2022-09-23 RX ORDER — FAMOTIDINE 40 MG/1
40 TABLET, FILM COATED ORAL
Qty: 90 | Refills: 1 | Status: ACTIVE | COMMUNITY
Start: 1900-01-01 | End: 1900-01-01

## 2022-09-23 RX ORDER — METOPROLOL TARTRATE 50 MG/1
50 TABLET, FILM COATED ORAL
Qty: 180 | Refills: 0 | Status: ACTIVE | COMMUNITY
Start: 1900-01-01 | End: 1900-01-01

## 2022-09-23 RX ORDER — RAMIPRIL 10 MG/1
10 CAPSULE ORAL
Qty: 90 | Refills: 0 | Status: ACTIVE | COMMUNITY
Start: 1900-01-01 | End: 1900-01-01

## 2022-09-23 RX ORDER — INSULIN GLARGINE 100 [IU]/ML
100 INJECTION, SOLUTION SUBCUTANEOUS
Qty: 25 | Refills: 1 | Status: ACTIVE | COMMUNITY
Start: 1900-01-01 | End: 1900-01-01

## 2022-09-23 RX ORDER — METFORMIN HYDROCHLORIDE 1000 MG/1
1000 TABLET, COATED ORAL TWICE DAILY
Qty: 180 | Refills: 1 | Status: ACTIVE | COMMUNITY
Start: 1900-01-01 | End: 1900-01-01

## 2022-09-26 ENCOUNTER — APPOINTMENT (OUTPATIENT)
Dept: NEUROLOGY | Facility: CLINIC | Age: 77
End: 2022-09-26

## 2022-09-26 VITALS
TEMPERATURE: 97.8 F | BODY MASS INDEX: 27.5 KG/M2 | OXYGEN SATURATION: 97 % | HEART RATE: 65 BPM | HEIGHT: 72 IN | SYSTOLIC BLOOD PRESSURE: 151 MMHG | WEIGHT: 203 LBS | DIASTOLIC BLOOD PRESSURE: 84 MMHG

## 2022-09-26 PROCEDURE — 99215 OFFICE O/P EST HI 40 MIN: CPT

## 2022-09-26 NOTE — HISTORY OF PRESENT ILLNESS
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 \par \par He pw/co of RUE weakness and dysarthria. NIHSS 2 for RUE drift. CTH neg. HTN was treated with Labetalol and Cardene gtt causing causing NIHSS to 5 w/ mild RLQ visual defect. STAT CTH neg for hemorrhage. Then NIHSS to 3.  \par \par OTHER: b/l LE swelling w/ NEG duplex  \par \par MRA N: MOD short segment stenosis of the proximal RIGHT internal carotid artery about 50%. \par \par B/l CUS: Mild 20-39% stenosis in bilateral internal carotid arteries \par \par MRI H: Scattered left-sided acute cortical infarcts within the MCA and PCA territories. Suggestion of trace petechial hemorrhage involving the high left frontal region. MILD chronic microvascular type changes as well as a CHRONIC LEFT occipital lobe infarct. \par \par EDSON: NML LA size, EF 55-60%, NO PFO  \par \par ILR placed  \par D/c plan was Switch from ASA/plavix-> ASA/brilinta x 1 mo, then resume ASA/plavix; C/w high intensity statin, DM optimization \par Start escitalopram 5mg daily for post-stroke anxiety/depression \par \par LDL 72 and A1c 7.9  \par -----------------------\par Today pw daughter Parris \par FMHx: Denies MI/Stroke <age 55, denies clotting issues/miscarriage in 1st degree relative \par Denies any hx of palpitations\par Also with neuropathy of feet \par 1 mo prior had sx of vision changes, saw ophtho who said may be related to sugar control \par PTA: ASA/Plavix and Simvastatin 40; living totally independently w/o weakness anywhere \par Says A1c 7.9 is not high for him, per endocrinology in the 7s is good, lower may cause cardiac issues \par Says two weeks PTA  A1c was 7.7 \par -----------------------------\par Getting PT and OT \par Left NH and living with daughter in  \par Using walker\par Incident in bathroom with R knee giving out and R knee bruising \par Has wrist guard at home for RH \par Says he IS motivated to do therapy. He's NOT taking Escitalopram \par Is on Simvastatin 80 Now\par \par

## 2022-09-26 NOTE — REASON FOR VISIT
[Follow-Up: _____] : a [unfilled] follow-up visit [Family Member] : family member [FreeTextEntry1] : stroke

## 2022-09-26 NOTE — ASSESSMENT
[FreeTextEntry1] : Patient is 76 yo RH man, remote smoker, with PMHx of HTN, psoriatic arthritis, HLD, CAD s/p PCI who presents as HFU from 8/3/22 for Scattered LEFT acute cortical infarcts within the MCA and PCA territories, with trace hemorrhagic transformation of high Left frontal region, as well as CHRONIC LEFT occipital lobe infarct while on ASA/Plavix, with concern for cardioembolic etiology s/p ILR. He has incidental mod short segment stenosis of R cervical ICA. Today, NIHSS 1 for sensory deficit and he continues to have R hemiparesis, with recent RLE "giving out" (no HT, no associated BEFAST sx) that was more mechanical an related to break in PT. \par \par PLAN: \par -C/w ASA/Plavix (as he was PTA for cards reasons)\par -C/w Simvastatin 80 and repeat Lipid panel and CMP for elevated T bili inpatient \par -F/u with endocrinologist for sugar control \par -F/u with Cardiology of b/l LE swelling\par -F/u ILR reports \par -C/w PT/OT  \par -F/u in 3 mo with NP Rosemary Baez bc they would like f/u closer to [place] \par -Instructed patient to call 911 or report to ED if any acute neurological changes occur, such as having severe, sudden, unusual headache or BEFAST sx.\par \par Aggressive risk factor modification should be continued for secondary stroke prevention, consisting of intensive blood pressure control and cholesterol monitoring. I encouraged the patient to discuss these important issues with her primary care doctor.  \par \par I have reviewed the goals of stroke risk factor modification. I counseled the patient on measures to reduce stroke risk, including the importance of medication compliance, risk factor control, exercise, healthy diet and avoidance of smoking. I reviewed stroke warning signs and symptoms and appropriate actions to take if such occur.\par

## 2022-09-26 NOTE — PHYSICAL EXAM
[FreeTextEntry1] : Focal neurological exam:\par \par MS: Awake, alert, oriented to person, place, situation and time. Normal affect. Follows commands. \par \par Language: Speech is clear, fluent with good repetition & comprehension. No dysarthria. \par \par CNs 2 - 12 intact. EOMI no nystagmus, no diplopia. VFF. No facial asymmetry b/l, full eye closure strength b/l. Hearing grossly normal. Head turning & shoulder shrug intact b/l. Tongue midline, normal movements, no atrophy.\par \par Motor: Normal muscle bulk & tone. No noticeable tremor or seizure. No pronator drift. RUE 4+/5, RH  4/5, RH decr dexterity, Pincer is 4-/5. NO DRIFT. RLE dorsiflexion 5-/5. \par \par Reflexes: DTR of biceps 1+, knees 2+  \par \par Sensation: R ankle decreased to vibration. \par \par Cortical: No extinction\par \par Coordination: No dysmetria to FTN.\par \par Gait: No postural instability. Normal stance and tandem gait.\par \par Also RLE pitting edema 2+\par \par NIHSS 1 for sensory deficit \par \par \par \par \par

## 2022-09-30 ENCOUNTER — APPOINTMENT (OUTPATIENT)
Dept: CARDIOLOGY | Facility: CLINIC | Age: 77
End: 2022-09-30

## 2022-10-05 ENCOUNTER — NON-APPOINTMENT (OUTPATIENT)
Age: 77
End: 2022-10-05

## 2022-10-05 ENCOUNTER — APPOINTMENT (OUTPATIENT)
Dept: CARDIOLOGY | Facility: CLINIC | Age: 77
End: 2022-10-05

## 2022-10-05 PROCEDURE — G2066: CPT

## 2022-10-05 PROCEDURE — 93298 REM INTERROG DEV EVAL SCRMS: CPT

## 2022-12-06 ENCOUNTER — APPOINTMENT (OUTPATIENT)
Dept: NEUROLOGY | Facility: CLINIC | Age: 77
End: 2022-12-06

## 2022-12-06 ENCOUNTER — NON-APPOINTMENT (OUTPATIENT)
Age: 77
End: 2022-12-06

## 2022-12-14 ENCOUNTER — APPOINTMENT (OUTPATIENT)
Dept: CARDIOLOGY | Facility: CLINIC | Age: 77
End: 2022-12-14

## 2023-08-01 ENCOUNTER — APPOINTMENT (OUTPATIENT)
Dept: ORTHOPEDIC SURGERY | Facility: CLINIC | Age: 78
End: 2023-08-01

## 2023-08-01 ENCOUNTER — APPOINTMENT (OUTPATIENT)
Dept: ORTHOPEDIC SURGERY | Facility: CLINIC | Age: 78
End: 2023-08-01
Payer: MEDICARE

## 2023-08-01 VITALS — BODY MASS INDEX: 28.58 KG/M2 | HEIGHT: 72 IN | WEIGHT: 211 LBS

## 2023-08-01 DIAGNOSIS — M17.11 UNILATERAL PRIMARY OSTEOARTHRITIS, RIGHT KNEE: ICD-10-CM

## 2023-08-01 PROCEDURE — 99213 OFFICE O/P EST LOW 20 MIN: CPT

## 2023-08-01 PROCEDURE — 73562 X-RAY EXAM OF KNEE 3: CPT | Mod: RT

## 2023-08-01 NOTE — HISTORY OF PRESENT ILLNESS
[de-identified] : 78-year-old male presents with right knee pain.  Patient has been having this pain for approximately 3 weeks.  Patient states he was walking the stairs his friend's house, and he took a step down on a steep reverse step that he anticipated and felt pain.  Since then, the pain has mildly improved but he still feeling a dull ache pretty consistently.  Patient has been going to physical therapy for history of a stroke, which affected the right side.  Patient denies any past injuries or surgeries to the knee.  Patient takes Tylenol as needed for pain relief, he is on blood thinners cannot take anti-inflammatories.

## 2023-08-01 NOTE — DATA REVIEWED
[FreeTextEntry1] : X-ray images were obtained at the office today.  AP, lateral, oblique views of the right knee reveal mild degenerative changes, no acute fractures, discussions, bony abnormalities

## 2023-08-01 NOTE — PHYSICAL EXAM
[de-identified] : Physical exam of right knee: -No erythema, edema, ecchymosis present.  Skin intact -TTP over medial joint line  -Calf is soft and nontender -Negative Diogenes's, negative anterior drawer, patient able to straight leg raise -Varus and valgus stability -Full ROM with no pain, pt is stiff on exam  -+2 posterior tibialis pulse -Sensation intact to light touch

## 2023-08-01 NOTE — DISCUSSION/SUMMARY
[de-identified] : Patient has arthritis of the right knee.  At this time, patient was given a prescription for physical therapy to work on strengthening, stretching, range of motion and modalities.  Patient was advised to take Tylenol, apply heat, and wear a knee brace as needed.  I educated patient about gel injections and cortisone injections to consider for pain relief as well.  Patient will follow-up with me in approximately 4 weeks for further evaluation.  Patient is agreeable to above plan all questions were answered today

## 2023-08-02 ENCOUNTER — APPOINTMENT (OUTPATIENT)
Dept: PAIN MANAGEMENT | Facility: CLINIC | Age: 78
End: 2023-08-02

## 2023-08-15 ENCOUNTER — APPOINTMENT (OUTPATIENT)
Dept: CARDIOLOGY | Facility: CLINIC | Age: 78
End: 2023-08-15

## 2023-08-29 ENCOUNTER — APPOINTMENT (OUTPATIENT)
Dept: ORTHOPEDIC SURGERY | Facility: CLINIC | Age: 78
End: 2023-08-29

## 2024-02-27 ENCOUNTER — APPOINTMENT (OUTPATIENT)
Dept: NEUROLOGY | Facility: CLINIC | Age: 79
End: 2024-02-27
Payer: MEDICARE

## 2024-02-27 VITALS
DIASTOLIC BLOOD PRESSURE: 105 MMHG | TEMPERATURE: 97.6 F | WEIGHT: 216 LBS | SYSTOLIC BLOOD PRESSURE: 162 MMHG | HEIGHT: 72 IN | OXYGEN SATURATION: 100 % | BODY MASS INDEX: 29.26 KG/M2 | HEART RATE: 97 BPM

## 2024-02-27 PROCEDURE — 99215 OFFICE O/P EST HI 40 MIN: CPT

## 2024-02-27 RX ORDER — SIMVASTATIN 40 MG/1
40 TABLET, FILM COATED ORAL DAILY
Refills: 0 | Status: DISCONTINUED | COMMUNITY
End: 2024-02-27

## 2024-02-27 RX ORDER — AMLODIPINE BESYLATE 10 MG/1
10 TABLET ORAL DAILY
Refills: 0 | Status: ACTIVE | COMMUNITY

## 2024-02-27 RX ORDER — ATORVASTATIN CALCIUM 40 MG/1
40 TABLET, FILM COATED ORAL
Qty: 90 | Refills: 2 | Status: ACTIVE | COMMUNITY
Start: 2024-02-27 | End: 1900-01-01

## 2024-02-27 RX ORDER — CLOPIDOGREL BISULFATE 75 MG/1
75 TABLET, FILM COATED ORAL
Qty: 90 | Refills: 1 | Status: DISCONTINUED | COMMUNITY
End: 2024-02-27

## 2024-02-27 NOTE — HISTORY OF PRESENT ILLNESS
[FreeTextEntry1] : Pt is a 79 yo M with PMHx of HTN, psoriatic arthritis, HLD, CAD s/p PCI, former smoker, prior scattered l MCA territory ischemic strokes 08/2022, who presents with his friend and son with c/o new stroke. Pt was visiting his daughter in [place], he had difficulty with his balance (more than usual), when he got off the plane. A week later when he returned to [place], he had an outpt MRI brain completed which showed small right frontal subacute ischemic infarct and left posterior parietal acute ischemic infarct. Pt has been using a walker recently due to imbalance. He has been compliant with his medications including DAPT. Has not had follow up for loop recorder since placement after the stroke in 2022. Has an appointment with Cardiology, Dr. Denise coming up.

## 2024-02-27 NOTE — PHYSICAL EXAM
[General Appearance - Well Nourished] : well nourished [General Appearance - In No Acute Distress] : in no acute distress [General Appearance - Alert] : alert [Affect] : the affect was normal [General Appearance - Well Developed] : well developed [Oriented To Time, Place, And Person] : oriented to person, place, and time [Person] : oriented to person [Naming Objects] : no difficulty naming common objects [Time] : oriented to time [Place] : oriented to place [Cranial Nerves Oculomotor (III)] : extraocular motion intact [Fluency] : fluency intact [Cranial Nerves Optic (II)] : visual acuity intact bilaterally,  visual fields full to confrontation, pupils equal round and reactive to light [Cranial Nerves Vestibulocochlear (VIII)] : hearing was intact bilaterally [Cranial Nerves Trigeminal (V)] : facial sensation intact symmetrically [Cranial Nerves Facial (VII)] : face symmetrical [Cranial Nerves Hypoglossal (XII)] : there was no tongue deviation with protrusion [Paresis Pronator Drift Right-Sided] : a right-sided pronator drift was present [Paresis Pronator Drift Left-Sided] : no pronator drift on the left [Sensation Tactile Decrease] : light touch was intact [Coordination - Dysmetria Impaired Finger-to-Nose Bilateral] : not present [2+] : Patella right 2+ [FreeTextEntry6] : RUE, RLE 4/5 LUE/LLE 5/5

## 2024-02-27 NOTE — DISCUSSION/SUMMARY
[FreeTextEntry1] : Pt is a 77 yo M with PMHx of HTN, psoriatic arthritis, HLD, CAD s/p PCI, former smoker, prior scattered l MCA territory ischemic strokes 08/2022, who presents with his friend and son with c/o new stroke.  # Subacute right frontal periventricular and acute left posterior parietal periventricular ischemic strokes: reviewed outside MRI brain imaging. Left parietal stroke is adjacent to prior L MCA territory ischemic strokes. Etiology ESUS, although given the new strokes are periventricular, would also consider small vessel disease. NIHSS 2, mRS 2. - CTA head/neck - TTE - Lipid panel, A1c, CMP - F/u with EP for ILR interrogation - Agree with Cardiology f/u - PTA: ASA/plavix-> ASA 81mg and ticagrelor 90mg BID. If no significant flow limiting stenosis on CTA head/neck, may d/c ASA - Switch to high intensity statin. Atorvastatin 40mg daily. Goal LDL <70 -  PT/OT referral  RTC in 4 mo [Goals and Counseling] : I have reviewed the goals of stroke risk factor modification. I counseled the patient on measures to reduce stroke risk, including the importance of medication compliance, risk factor control, exercise, healthy diet and avoidance of smoking. I reviewed stroke warning signs and symptoms and appropriate actions to take if such occur.

## 2024-03-25 ENCOUNTER — APPOINTMENT (OUTPATIENT)
Dept: ELECTROPHYSIOLOGY | Facility: CLINIC | Age: 79
End: 2024-03-25
Payer: MEDICARE

## 2024-03-25 VITALS
HEART RATE: 93 BPM | WEIGHT: 216 LBS | HEIGHT: 72 IN | DIASTOLIC BLOOD PRESSURE: 70 MMHG | TEMPERATURE: 98 F | SYSTOLIC BLOOD PRESSURE: 120 MMHG | BODY MASS INDEX: 29.26 KG/M2

## 2024-03-25 DIAGNOSIS — I10 ESSENTIAL (PRIMARY) HYPERTENSION: ICD-10-CM

## 2024-03-25 DIAGNOSIS — Z45.09 ENCOUNTER FOR ADJUSTMENT AND MANAGEMENT OF OTHER CARDIAC DEVICE: ICD-10-CM

## 2024-03-25 PROCEDURE — 99214 OFFICE O/P EST MOD 30 MIN: CPT | Mod: 25

## 2024-03-25 PROCEDURE — 93000 ELECTROCARDIOGRAM COMPLETE: CPT | Mod: 59

## 2024-03-25 PROCEDURE — 93285 PRGRMG DEV EVAL SCRMS IP: CPT

## 2024-03-25 RX ORDER — GABAPENTIN 300 MG
300 TABLET ORAL TWICE DAILY
Refills: 0 | Status: ACTIVE | COMMUNITY

## 2024-03-25 NOTE — HISTORY OF PRESENT ILLNESS
[de-identified] :  Cardio: Has an appt with Dr. Denise  80 yo M with history of HTN, HL, psoriatic arthritis, CAD s/p PCI, former smoker, prior scattered l MCA territory ischemic strokes s/p tPA 8/2022, who presents to establish care in our device clinic for a loop recorder that was implanted in 8/2022 for cryptogenic CVA. Patient was visiting his daughter in [place] and noticed difficulty with his balance (more than usual), when he got off the plane. A week later when he returned to [place], he had an outpt MRI brain completed which showed small right frontal subacute ischemic infarct and left posterior parietal acute ischemic infarct. Pt has been using a walker recently due to imbalance. He has been compliant with his medications including DAPT. Has not had follow up for loop recorder since placement after the stroke in 2022.   He denies chest pain, dyspnea, palpitations, presyncope or syncope. Biggest complaint is imbalance. His wife is my patient and he is here to establish care.

## 2024-03-25 NOTE — PHYSICAL EXAM
[General Appearance - Well Developed] : well developed [Normal Appearance] : normal appearance [Well Groomed] : well groomed [No Deformities] : no deformities [General Appearance - Well Nourished] : well nourished [General Appearance - In No Acute Distress] : no acute distress [Heart Rate And Rhythm] : heart rate and rhythm were normal [Heart Sounds] : normal S1 and S2 [Murmurs] : no murmurs present [] : no respiratory distress [Respiration, Rhythm And Depth] : normal respiratory rhythm and effort [Exaggerated Use Of Accessory Muscles For Inspiration] : no accessory muscle use [Auscultation Breath Sounds / Voice Sounds] : lungs were clear to auscultation bilaterally [Well-Healed] : well-healed [Abdomen Soft] : soft [Nail Clubbing] : no clubbing of the fingernails [Edema] : no peripheral edema present [FreeTextEntry1] : parasternal

## 2024-03-25 NOTE — ASSESSMENT
[FreeTextEntry1] : # CVA s/p ILR (8/2022) with recurrent CVA - No AF detected on interrogation - Patient will be enrolled in remote monitor. I reviewed remote transmission process with the patient, as well as schedule and ability to do manual transmission. We also spoke about associated co-payments with remote monitoring that may not be covered by insurance. - Pt will follow up with neuro and is being worked up for small vessel disease - Now on Brilinta  # HTN - BP well controlled - Cont Amlodipine 10 mg daily, Ramipril 10 mg daily, and Metoprolol Tartrate 50 mg BID. - 2g Na diet enforced  # DM  I have also advised the patient to go to the nearest emergency room if he experiences any chest pain, dyspnea, syncope, or has any other compelling symptoms.  Follow up in 6 mo with ACP for device check

## 2024-03-25 NOTE — CARDIOLOGY SUMMARY
[de-identified] : 3/25/2024 NSR (HR 93 bpm), inferior q waves. Delayed R wave progression. [de-identified] : 8/5/2022 EF 55-60%. Normal LA size. Mild MR. Color flow doppler and IV agitated saline demonstrate presence of intact intra atrial septum.

## 2024-03-25 NOTE — PROCEDURE
[No] : not [NSR] : normal sinus rhythm [Longevity: ___ months] : The estimated remaining battery life is [unfilled] months [Sensing Amplitude ___mv] : sensing amplitude was [unfilled] mv [See Device Printout] : See device printout [Programmed for Longevity] : output reprogrammed for improved battery longevity [de-identified] : ADOLFO [de-identified] : [id number] [de-identified] : 8/8/22 [de-identified] : [id number] [de-identified] : Good [de-identified] : No events

## 2024-04-06 ENCOUNTER — RESULT REVIEW (OUTPATIENT)
Age: 79
End: 2024-04-06

## 2024-04-06 ENCOUNTER — OUTPATIENT (OUTPATIENT)
Dept: OUTPATIENT SERVICES | Facility: HOSPITAL | Age: 79
LOS: 1 days | End: 2024-04-06
Payer: MEDICARE

## 2024-04-06 DIAGNOSIS — Z00.8 ENCOUNTER FOR OTHER GENERAL EXAMINATION: ICD-10-CM

## 2024-04-06 DIAGNOSIS — I63.9 CEREBRAL INFARCTION, UNSPECIFIED: ICD-10-CM

## 2024-04-06 PROCEDURE — 70496 CT ANGIOGRAPHY HEAD: CPT

## 2024-04-06 PROCEDURE — 70498 CT ANGIOGRAPHY NECK: CPT | Mod: 26

## 2024-04-06 PROCEDURE — 70496 CT ANGIOGRAPHY HEAD: CPT | Mod: 26

## 2024-04-06 PROCEDURE — 70498 CT ANGIOGRAPHY NECK: CPT

## 2024-04-07 DIAGNOSIS — I63.9 CEREBRAL INFARCTION, UNSPECIFIED: ICD-10-CM

## 2024-04-09 DIAGNOSIS — I65.22 OCCLUSION AND STENOSIS OF LEFT CAROTID ARTERY: ICD-10-CM

## 2024-04-17 ENCOUNTER — NON-APPOINTMENT (OUTPATIENT)
Age: 79
End: 2024-04-17

## 2024-04-19 ENCOUNTER — INPATIENT (INPATIENT)
Facility: HOSPITAL | Age: 79
LOS: 4 days | Discharge: HOME HEALTH | DRG: 556 | End: 2024-04-24
Attending: PSYCHIATRY & NEUROLOGY | Admitting: PSYCHIATRY & NEUROLOGY
Payer: MEDICARE

## 2024-04-19 VITALS
WEIGHT: 216.93 LBS | SYSTOLIC BLOOD PRESSURE: 150 MMHG | TEMPERATURE: 98 F | DIASTOLIC BLOOD PRESSURE: 95 MMHG | HEART RATE: 110 BPM | RESPIRATION RATE: 18 BRPM | OXYGEN SATURATION: 98 %

## 2024-04-19 DIAGNOSIS — R26.2 DIFFICULTY IN WALKING, NOT ELSEWHERE CLASSIFIED: ICD-10-CM

## 2024-04-19 LAB
ALBUMIN SERPL ELPH-MCNC: 4.7 G/DL — SIGNIFICANT CHANGE UP (ref 3.5–5.2)
ALP SERPL-CCNC: 128 U/L — HIGH (ref 30–115)
ALT FLD-CCNC: 16 U/L — SIGNIFICANT CHANGE UP (ref 0–41)
ANION GAP SERPL CALC-SCNC: 17 MMOL/L — HIGH (ref 7–14)
APPEARANCE UR: CLEAR — SIGNIFICANT CHANGE UP
APTT BLD: 35.1 SEC — SIGNIFICANT CHANGE UP (ref 27–39.2)
AST SERPL-CCNC: 13 U/L — SIGNIFICANT CHANGE UP (ref 0–41)
BACTERIA # UR AUTO: NEGATIVE /HPF — SIGNIFICANT CHANGE UP
BASOPHILS # BLD AUTO: 0.05 K/UL — SIGNIFICANT CHANGE UP (ref 0–0.2)
BASOPHILS NFR BLD AUTO: 0.4 % — SIGNIFICANT CHANGE UP (ref 0–1)
BILIRUB SERPL-MCNC: 0.8 MG/DL — SIGNIFICANT CHANGE UP (ref 0.2–1.2)
BILIRUB UR-MCNC: NEGATIVE — SIGNIFICANT CHANGE UP
BUN SERPL-MCNC: 26 MG/DL — HIGH (ref 10–20)
CALCIUM SERPL-MCNC: 9.3 MG/DL — SIGNIFICANT CHANGE UP (ref 8.4–10.5)
CAST: 2 /LPF — SIGNIFICANT CHANGE UP (ref 0–4)
CHLORIDE SERPL-SCNC: 101 MMOL/L — SIGNIFICANT CHANGE UP (ref 98–110)
CO2 SERPL-SCNC: 24 MMOL/L — SIGNIFICANT CHANGE UP (ref 17–32)
COLOR SPEC: YELLOW — SIGNIFICANT CHANGE UP
CREAT SERPL-MCNC: 1.1 MG/DL — SIGNIFICANT CHANGE UP (ref 0.7–1.5)
DIFF PNL FLD: NEGATIVE — SIGNIFICANT CHANGE UP
EGFR: 69 ML/MIN/1.73M2 — SIGNIFICANT CHANGE UP
EOSINOPHIL # BLD AUTO: 0.38 K/UL — SIGNIFICANT CHANGE UP (ref 0–0.7)
EOSINOPHIL NFR BLD AUTO: 2.9 % — SIGNIFICANT CHANGE UP (ref 0–8)
GLUCOSE SERPL-MCNC: 189 MG/DL — HIGH (ref 70–99)
GLUCOSE UR QL: 250 MG/DL
HCT VFR BLD CALC: 43.6 % — SIGNIFICANT CHANGE UP (ref 42–52)
HGB BLD-MCNC: 14.8 G/DL — SIGNIFICANT CHANGE UP (ref 14–18)
IMM GRANULOCYTES NFR BLD AUTO: 0.3 % — SIGNIFICANT CHANGE UP (ref 0.1–0.3)
INR BLD: 0.94 RATIO — SIGNIFICANT CHANGE UP (ref 0.65–1.3)
KETONES UR-MCNC: ABNORMAL MG/DL
LEUKOCYTE ESTERASE UR-ACNC: NEGATIVE — SIGNIFICANT CHANGE UP
LYMPHOCYTES # BLD AUTO: 2.95 K/UL — SIGNIFICANT CHANGE UP (ref 1.2–3.4)
LYMPHOCYTES # BLD AUTO: 22.5 % — SIGNIFICANT CHANGE UP (ref 20.5–51.1)
MCHC RBC-ENTMCNC: 28.7 PG — SIGNIFICANT CHANGE UP (ref 27–31)
MCHC RBC-ENTMCNC: 33.9 G/DL — SIGNIFICANT CHANGE UP (ref 32–37)
MCV RBC AUTO: 84.5 FL — SIGNIFICANT CHANGE UP (ref 80–94)
MONOCYTES # BLD AUTO: 1.17 K/UL — HIGH (ref 0.1–0.6)
MONOCYTES NFR BLD AUTO: 8.9 % — SIGNIFICANT CHANGE UP (ref 1.7–9.3)
NEUTROPHILS # BLD AUTO: 8.54 K/UL — HIGH (ref 1.4–6.5)
NEUTROPHILS NFR BLD AUTO: 65 % — SIGNIFICANT CHANGE UP (ref 42.2–75.2)
NITRITE UR-MCNC: NEGATIVE — SIGNIFICANT CHANGE UP
NRBC # BLD: 0 /100 WBCS — SIGNIFICANT CHANGE UP (ref 0–0)
PH UR: 6.5 — SIGNIFICANT CHANGE UP (ref 5–8)
PLATELET # BLD AUTO: 299 K/UL — SIGNIFICANT CHANGE UP (ref 130–400)
PMV BLD: 9 FL — SIGNIFICANT CHANGE UP (ref 7.4–10.4)
POTASSIUM SERPL-MCNC: 4.4 MMOL/L — SIGNIFICANT CHANGE UP (ref 3.5–5)
POTASSIUM SERPL-SCNC: 4.4 MMOL/L — SIGNIFICANT CHANGE UP (ref 3.5–5)
PROT SERPL-MCNC: 8 G/DL — SIGNIFICANT CHANGE UP (ref 6–8)
PROT UR-MCNC: 100 MG/DL
PROTHROM AB SERPL-ACNC: 10.7 SEC — SIGNIFICANT CHANGE UP (ref 9.95–12.87)
RBC # BLD: 5.16 M/UL — SIGNIFICANT CHANGE UP (ref 4.7–6.1)
RBC # FLD: 13.5 % — SIGNIFICANT CHANGE UP (ref 11.5–14.5)
RBC CASTS # UR COMP ASSIST: 1 /HPF — SIGNIFICANT CHANGE UP (ref 0–4)
SODIUM SERPL-SCNC: 142 MMOL/L — SIGNIFICANT CHANGE UP (ref 135–146)
SP GR SPEC: 1.03 — SIGNIFICANT CHANGE UP (ref 1–1.03)
SQUAMOUS # UR AUTO: 0 /HPF — SIGNIFICANT CHANGE UP (ref 0–5)
UROBILINOGEN FLD QL: 0.2 MG/DL — SIGNIFICANT CHANGE UP (ref 0.2–1)
WBC # BLD: 13.13 K/UL — HIGH (ref 4.8–10.8)
WBC # FLD AUTO: 13.13 K/UL — HIGH (ref 4.8–10.8)
WBC UR QL: 0 /HPF — SIGNIFICANT CHANGE UP (ref 0–5)

## 2024-04-19 PROCEDURE — 70496 CT ANGIOGRAPHY HEAD: CPT | Mod: 26,MC

## 2024-04-19 PROCEDURE — 36415 COLL VENOUS BLD VENIPUNCTURE: CPT

## 2024-04-19 PROCEDURE — 83036 HEMOGLOBIN GLYCOSYLATED A1C: CPT

## 2024-04-19 PROCEDURE — C1894: CPT

## 2024-04-19 PROCEDURE — 93325 DOPPLER ECHO COLOR FLOW MAPG: CPT

## 2024-04-19 PROCEDURE — 70551 MRI BRAIN STEM W/O DYE: CPT | Mod: MC

## 2024-04-19 PROCEDURE — 97167 OT EVAL HIGH COMPLEX 60 MIN: CPT | Mod: GO

## 2024-04-19 PROCEDURE — C1887: CPT

## 2024-04-19 PROCEDURE — 76377 3D RENDER W/INTRP POSTPROCES: CPT

## 2024-04-19 PROCEDURE — 93320 DOPPLER ECHO COMPLETE: CPT

## 2024-04-19 PROCEDURE — 93880 EXTRACRANIAL BILAT STUDY: CPT

## 2024-04-19 PROCEDURE — 70498 CT ANGIOGRAPHY NECK: CPT | Mod: 26,MC

## 2024-04-19 PROCEDURE — 36223 PLACE CATH CAROTID/INOM ART: CPT | Mod: 50

## 2024-04-19 PROCEDURE — 84443 ASSAY THYROID STIM HORMONE: CPT

## 2024-04-19 PROCEDURE — 92526 ORAL FUNCTION THERAPY: CPT | Mod: GN

## 2024-04-19 PROCEDURE — 99285 EMERGENCY DEPT VISIT HI MDM: CPT | Mod: GC

## 2024-04-19 PROCEDURE — 93312 ECHO TRANSESOPHAGEAL: CPT

## 2024-04-19 PROCEDURE — 87086 URINE CULTURE/COLONY COUNT: CPT

## 2024-04-19 PROCEDURE — 81001 URINALYSIS AUTO W/SCOPE: CPT

## 2024-04-19 PROCEDURE — 93010 ELECTROCARDIOGRAM REPORT: CPT

## 2024-04-19 PROCEDURE — 92610 EVALUATE SWALLOWING FUNCTION: CPT | Mod: GN

## 2024-04-19 PROCEDURE — 80061 LIPID PANEL: CPT

## 2024-04-19 PROCEDURE — 85025 COMPLETE CBC W/AUTO DIFF WBC: CPT

## 2024-04-19 PROCEDURE — 93290 INTERROG DEV EVAL ICPMS IP: CPT

## 2024-04-19 PROCEDURE — 97535 SELF CARE MNGMENT TRAINING: CPT | Mod: GO

## 2024-04-19 PROCEDURE — 85730 THROMBOPLASTIN TIME PARTIAL: CPT

## 2024-04-19 PROCEDURE — 80048 BASIC METABOLIC PNL TOTAL CA: CPT

## 2024-04-19 PROCEDURE — C1769: CPT

## 2024-04-19 PROCEDURE — 97110 THERAPEUTIC EXERCISES: CPT | Mod: GP

## 2024-04-19 PROCEDURE — 0042T: CPT | Mod: MC

## 2024-04-19 PROCEDURE — 36225 PLACE CATH SUBCLAVIAN ART: CPT | Mod: 50

## 2024-04-19 PROCEDURE — 85610 PROTHROMBIN TIME: CPT

## 2024-04-19 PROCEDURE — 76937 US GUIDE VASCULAR ACCESS: CPT

## 2024-04-19 PROCEDURE — 70450 CT HEAD/BRAIN W/O DYE: CPT | Mod: 26,59,MC

## 2024-04-19 PROCEDURE — 83735 ASSAY OF MAGNESIUM: CPT

## 2024-04-19 PROCEDURE — A9579: CPT

## 2024-04-19 PROCEDURE — 97163 PT EVAL HIGH COMPLEX 45 MIN: CPT | Mod: GP

## 2024-04-19 PROCEDURE — C1760: CPT

## 2024-04-19 PROCEDURE — 82962 GLUCOSE BLOOD TEST: CPT

## 2024-04-19 PROCEDURE — 93306 TTE W/DOPPLER COMPLETE: CPT

## 2024-04-19 PROCEDURE — 74183 MRI ABD W/O CNTR FLWD CNTR: CPT | Mod: MC

## 2024-04-19 RX ORDER — ASPIRIN/CALCIUM CARB/MAGNESIUM 324 MG
81 TABLET ORAL DAILY
Refills: 0 | Status: DISCONTINUED | OUTPATIENT
Start: 2024-04-19 | End: 2024-04-24

## 2024-04-19 RX ORDER — SENNA PLUS 8.6 MG/1
2 TABLET ORAL AT BEDTIME
Refills: 0 | Status: DISCONTINUED | OUTPATIENT
Start: 2024-04-19 | End: 2024-04-24

## 2024-04-19 RX ORDER — INSULIN LISPRO 100/ML
VIAL (ML) SUBCUTANEOUS
Refills: 0 | Status: DISCONTINUED | OUTPATIENT
Start: 2024-04-19 | End: 2024-04-24

## 2024-04-19 RX ORDER — ATORVASTATIN CALCIUM 80 MG/1
80 TABLET, FILM COATED ORAL AT BEDTIME
Refills: 0 | Status: DISCONTINUED | OUTPATIENT
Start: 2024-04-19 | End: 2024-04-22

## 2024-04-19 RX ORDER — SODIUM CHLORIDE 9 MG/ML
1000 INJECTION, SOLUTION INTRAVENOUS
Refills: 0 | Status: DISCONTINUED | OUTPATIENT
Start: 2024-04-19 | End: 2024-04-24

## 2024-04-19 RX ORDER — POLYETHYLENE GLYCOL 3350 17 G/17G
17 POWDER, FOR SOLUTION ORAL
Refills: 0 | Status: DISCONTINUED | OUTPATIENT
Start: 2024-04-19 | End: 2024-04-24

## 2024-04-19 RX ORDER — METOPROLOL TARTRATE 50 MG
50 TABLET ORAL
Refills: 0 | Status: DISCONTINUED | OUTPATIENT
Start: 2024-04-19 | End: 2024-04-24

## 2024-04-19 RX ORDER — GLUCAGON INJECTION, SOLUTION 0.5 MG/.1ML
1 INJECTION, SOLUTION SUBCUTANEOUS ONCE
Refills: 0 | Status: DISCONTINUED | OUTPATIENT
Start: 2024-04-19 | End: 2024-04-24

## 2024-04-19 RX ORDER — MAGNESIUM OXIDE 400 MG ORAL TABLET 241.3 MG
400 TABLET ORAL
Refills: 0 | Status: DISCONTINUED | OUTPATIENT
Start: 2024-04-19 | End: 2024-04-24

## 2024-04-19 RX ORDER — DEXTROSE 50 % IN WATER 50 %
12.5 SYRINGE (ML) INTRAVENOUS ONCE
Refills: 0 | Status: DISCONTINUED | OUTPATIENT
Start: 2024-04-19 | End: 2024-04-24

## 2024-04-19 RX ORDER — DEXTROSE 50 % IN WATER 50 %
25 SYRINGE (ML) INTRAVENOUS ONCE
Refills: 0 | Status: DISCONTINUED | OUTPATIENT
Start: 2024-04-19 | End: 2024-04-24

## 2024-04-19 RX ORDER — LANOLIN ALCOHOL/MO/W.PET/CERES
10 CREAM (GRAM) TOPICAL AT BEDTIME
Refills: 0 | Status: DISCONTINUED | OUTPATIENT
Start: 2024-04-19 | End: 2024-04-24

## 2024-04-19 RX ORDER — RAMIPRIL 5 MG
1 CAPSULE ORAL
Refills: 0 | DISCHARGE

## 2024-04-19 RX ORDER — ENOXAPARIN SODIUM 100 MG/ML
40 INJECTION SUBCUTANEOUS EVERY 24 HOURS
Refills: 0 | Status: DISCONTINUED | OUTPATIENT
Start: 2024-04-19 | End: 2024-04-23

## 2024-04-19 RX ORDER — DEXTROSE 50 % IN WATER 50 %
15 SYRINGE (ML) INTRAVENOUS ONCE
Refills: 0 | Status: DISCONTINUED | OUTPATIENT
Start: 2024-04-19 | End: 2024-04-24

## 2024-04-19 RX ORDER — DEXTROSE 10 % IN WATER 10 %
125 INTRAVENOUS SOLUTION INTRAVENOUS ONCE
Refills: 0 | Status: DISCONTINUED | OUTPATIENT
Start: 2024-04-19 | End: 2024-04-24

## 2024-04-19 RX ORDER — CLOPIDOGREL BISULFATE 75 MG/1
75 TABLET, FILM COATED ORAL DAILY
Refills: 0 | Status: DISCONTINUED | OUTPATIENT
Start: 2024-04-19 | End: 2024-04-23

## 2024-04-19 RX ADMIN — CLOPIDOGREL BISULFATE 75 MILLIGRAM(S): 75 TABLET, FILM COATED ORAL at 23:33

## 2024-04-19 NOTE — ED ADULT TRIAGE NOTE - CHIEF COMPLAINT QUOTE
pt c/o of both eyes not able to focus with blurriness in b/l eyes starting today. recently got new glasses. c/o of full body weakness as well. hx of stroke of 2022 and 2 TIA in last 4 months. hx of DM

## 2024-04-19 NOTE — H&P ADULT - NSHPLABSRESULTS_GEN_ALL_CORE
LABS:  cret                        14.8   13.13 )-----------( 299      ( 19 Apr 2024 20:27 )             43.6     04-19    142  |  101  |  26<H>  ----------------------------<  189<H>  4.4   |  24  |  1.1    Ca    9.3      19 Apr 2024 20:27    TPro  8.0  /  Alb  4.7  /  TBili  0.8  /  DBili  x   /  AST  13  /  ALT  16  /  AlkPhos  128<H>  04-19    PT/INR - ( 19 Apr 2024 20:27 )   PT: 10.70 sec;   INR: 0.94 ratio         PTT - ( 19 Apr 2024 20:27 )  PTT:35.1 sec

## 2024-04-19 NOTE — H&P ADULT - NSICDXPASTMEDICALHX_GEN_ALL_CORE_FT
PAST MEDICAL HISTORY:  Coronary artery disease     Diabetes     Diabetic polyneuropathy     GERD (gastroesophageal reflux disease)     Hyperlipidemia     Hypertension

## 2024-04-19 NOTE — H&P ADULT - HISTORY OF PRESENT ILLNESS
Patient is a 78yo M with PMH of HTN, DM, HLD, CAD (s/p stents), GERD, multiple strokes in the past (last in 2/2024), presented with diplopia. The patient woke this morning with diplopia that lasted for a few hours and resolved. He took a nap and woke up again at about 3:30 pm with diplopia again, that is still there. He also mentioned imbalance, and inability to walk without a walker. He states that he used to use a walker when he had his stroke in 2022, but he improved after rehab.   The patient was on aspirin and Brilinta but he had side effect on Brilinta that included breathing issues and diarrhea that caused to stop the medication and start him on clopidogrel about 3 days ago.

## 2024-04-19 NOTE — H&P ADULT - ATTENDING COMMENTS
79 year old man w/ PMH of ischemic stroke (8/2022, L. MCA/PCA infarction w/ residual RHP, currently on ASA 81mg + Plavix 75mg), HTN, DM, HLD, CAD s/p stents, presented w/ acute onset double vision and L. facial droop. Of note, patient w/ stroke 8/2022, was on ASA/Plavix, switched to ASA/Brillinta after 2/2024 MRI demonstrates punctate bilateral infarction, developed "watery stools" on Brillinta, and switched back to ASA and Plavix 3 days prior to admission.     On exam, fluent, fc; On primary gaze, OS is hypertropic; Cannot abduct OD past 45 degrees from midline and cannot abduct OS 30 degrees past midline; Mild L. facial droop w/ subtle decrease in forehead wrinkle; L. eye appears larger than the left. RUE drift, RLE drift, FNF dysmetria bilaterally, H2S ataxia on the left.     MR brain 2.6.24 demonstrates diffusion restriction in the R. CR and subcortical white matter of the L. occipitotemporal lobe. 79 year old man w/ PMH of ischemic stroke (8/2022, L. MCA/PCA infarction w/ residual RHP, currently on ASA 81mg + Plavix 75mg), HTN, DM, HLD, CAD s/p stents, presented w/ acute onset double vision and L. facial droop. Of note, patient w/ stroke 8/2022, was on ASA/Plavix, switched to ASA/Brillinta after 2/2024 MRI demonstrates punctate bilateral infarction, developed "watery stools" on Brillinta, and switched back to ASA and Plavix 3 days prior to admission.     On exam, fluent, fc; On primary gaze, OS is hypertropic; Cannot abduct OD past 45 degrees from midline and cannot abduct OS 30 degrees past midline; Mild L. facial droop w/ subtle decrease in forehead wrinkle; L. eye appears larger than the left. RUE drift, RLE drift, FNF dysmetria bilaterally, H2S ataxia on the left.     MR brain 8.3.24 infarction in the L. temporal occipital (PCA) and L. frontal (MCA).   MR brain 2.6.24 demonstrates diffusion restriction in the R. CR and subcortical white matter of the L. occipitotemporal lobe.  CTA h/n 4.19.24 severe carotid bifurcation and bulb stenosis; Moderate R. carotid bifurcation and bulb stenosis. L. Fetal PCA.   TTE neg    Imp:   1, L. gaze palsy + L. 6N palsy + L. 7N palsy likely localizing to the L. chaparrita, likely due to acute ischemic stroke. Mechanism unknown due to incomplete workup.   2. Symptomatic ?high-grade L. carotid stenosis (from February 2024) -- its possible that the ischemic stroke in 2022 may have been secondary artery-to-artery embolization from this carotid (L. MCA and L. PCA infarction in presence of fetal PCA) and even the L. cerebral infarction from 2.6.24 (although there was also right cerebral stroke).   3. Asymptomatic moderate-grade R. carotid stenosis     Plan:   Continue ASA 81mg + Plavix 75mg for 3w followed by ASA 81mg indefinitely. PRU; May need to    Continue statin   MR brain w/o contrast  Bilateral carotid US   MR abdomen to eval renal mass   ILR interrogation - neg  Will likely need AMINA consult on Monday for possible revascularization of the L. carotid     75 minutes spent on total encounter. The necessity of the time spent during the encounter on this date of service was due to:     Review of imaging and chart; obtaining history; examination of pt; discussion and coordination of care, and discussion of lifestyle modification and risk factor control.

## 2024-04-19 NOTE — H&P ADULT - ASSESSMENT
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 CTH showed no acute findings, CTP no deficit, CTA showed same known stenosis and no LVO. He is out of the window for TNK, and no LVO to intervene on.    He has ILR that was interrogated on 3/25/2024 and showed no A. fib     Neuro  #Stroke workup  - continue aspirin 81mg and plavix 75mg daily  - Check PRU   - continue atorvastatin 80mg daily  - q4hr stroke neuro checks and vitals  - obtain MRI Brain without contrast  - Stroke Code HCT Results: reviewed   - Stroke Code CTA Results: reviewed   - Stroke education  - ILR interrogation     Cards  #HTN  - permissive hypertension, Goal -220  - hold home blood pressure medication for now (Home meds are: amlodipine 10 mg daily, ramipril 10 mg daily, and metoprolol tartrate 50 mg bid)   - obtain TTE with bubble  - Stroke Code EKG Results: reviewed     #HLD  - high dose statin as above   - LDL results: pending     Pulm  - call provider if SPO2 < 94%  - HOB >30 degree    GI  #Nutrition/Fluids/Electrolytes   - replete K<4 and Mg <2  - Diet: pending swallow assessment   - IVF: none     Renal  - Daily BMP      Endocrine  #DM  - A1C results: pending   - ISS    - TSH results: pending     DVT Prophylaxis  - lovenox sq for DVT prophylaxis   - SCDs for DVT prophylaxis          Discussed daily hospital plans and goals with patient and family at bedside.     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 CTH showed no acute findings, CTP no deficit, CTA showed same known stenosis and no LVO. He is out of the window for TNK, and no LVO to intervene on.    He has ILR that was interrogated on 3/25/2024 and showed no A. fib     Neuro  #Stroke workup  - continue aspirin 81mg and plavix 75mg daily  - Check PRU   - continue atorvastatin 80mg daily  - q4hr stroke neuro checks and vitals  - obtain MRI Brain without contrast  - Stroke Code HCT Results: reviewed   - Stroke Code CTA Results: reviewed   - Stroke education  - ILR interrogation     Cards  #HTN  - permissive hypertension, Goal -220  - hold home blood pressure medication for now (Home meds are: amlodipine 10 mg daily, ramipril 10 mg daily, and metoprolol tartrate 50 mg bid)   - obtain TTE with bubble  - Stroke Code EKG Results: reviewed     #HLD  - high dose statin as above   - LDL results: pending     Pulm  - call provider if SPO2 < 94%  - HOB >30 degree    GI  #Nutrition/Fluids/Electrolytes   - replete K<4 and Mg <2  - Diet: pending swallow assessment   - IVF: none     Renal  #1.7 cm right renal lesion  - MR abdomen renal mass protocol   - Daily BMP      Endocrine  #DM  - A1C results: pending   - ISS    - TSH results: pending     DVT Prophylaxis  - lovenox sq for DVT prophylaxis   - SCDs for DVT prophylaxis          Discussed daily hospital plans and goals with patient and family at bedside.     Discussed with Neurology Attending Flavia Solis

## 2024-04-19 NOTE — H&P ADULT - NSHPPHYSICALEXAM_GEN_ALL_CORE
Cognitive:  Orientation, language, memory and knowledge screens intact.    Cranial Nerves:  II: Full to confrontation. III/IV/VI: PERRL, Lt abduction limited, with limited Rt adduction with some nystagmus.   V1V2V3: Symmetric, VII:  mild effacement of the Rt nasolabial fold. VIII: Normal to screening, IX/X: Palate Elevates Symmetrical  XI: Trapezius Symmetric  XII: Tongue midline  Motor:  Power: 5/5 throughout, tone: normal x 4 limbs, no tremor   Sensation:  Intact to light touch. Intact to pinprick/temperature and vibration. No neglect  Coordination: Finger-nose-finger mildly abnormal bilaterally Rt>Lt  Reflexes:  DTR: 1+ arms, absent in both legs, bilateral up going planters, no clonus  Gait: wide base, and ataxic Cognitive:  Orientation, language, memory and knowledge screens intact.    Cranial Nerves:  II: Full to confrontation. III/IV/VI: PERRL, Lt abduction limited, and less sever limited adduction, with limited Rt adduction with some nystagmus on Rt gaze (one and a half syndrome).   V1V2V3: Symmetric, VII:  mild effacement of the Rt nasolabial fold. VIII: Normal to screening, IX/X: Palate Elevates Symmetrical  XI: Trapezius Symmetric  XII: Tongue midline  Motor:  Power: 5/5 throughout, tone: normal x 4 limbs, no tremor   Sensation:  Intact to light touch. Intact to pinprick/temperature and vibration. No neglect  Coordination: Finger-nose-finger mildly abnormal bilaterally Rt>Lt  Reflexes:  DTR: 1+ arms, absent in both legs, bilateral up going planters, no clonus  Gait: wide base, and ataxic

## 2024-04-19 NOTE — ED PROVIDER NOTE - OBJECTIVE STATEMENT
Pt is a 78 y/o male with PMH of HTN, DM, HLD, CAD s/p PCI, GERD and h/o stomach tumor resection and CVA on plavix Pt is a 78 y/o male with PMH of HTN, DM, HLD, CAD s/p PCI, GERD and h/o stomach tumor resection and CVA on plavix presenting for blurry vision today. Pt reports both his eyes had blurry vision since he woke up and he has had trouble walking. Last known well last night. No headache, vomiting, diarrhea, numbness or weakness.

## 2024-04-19 NOTE — ED PROVIDER NOTE - PHYSICAL EXAMINATION
CONST: well appearing for age  HEAD:  normocephalic, atraumatic  EYES:  20/40 OD, 20/40 OS, conjunctivae without injection, drainage or discharge  ENMT:  nasal mucosa moist; mouth moist without ulcerations or lesions; throat moist without erythema, exudate, ulcerations or lesions  NECK:  supple  CARDIAC:  regular rate and rhythm, normal S1 and S2, no murmurs, rubs or gallops  RESP:  respiratory rate and effort appear normal for age; lungs are clear to auscultation bilaterally; no rales or wheezes  ABDOMEN:  soft, nontender, nondistended  MUSCULOSKELETAL/NEURO: awake and alert, CN II-XII grossly intact, sensation intact throughout, unsteady gait  SKIN:  normal skin color for age and race, well-perfused; warm and dry

## 2024-04-19 NOTE — ED PROVIDER NOTE - ATTENDING CONTRIBUTION TO CARE
Pt is a 80 y/o male with PMH of HTN, DM, HLD, CAD s/p PCI, GERD and h/o stomach tumor resection and CVA on plavix and aspirin, recent CT angios with severe stenosis of left ICA, follows with Dr. Irwin p/w blurry/double vision  and difficulty ambulating. to me, patient states he intermittently uses a walker since last CVA with mild residual right sided weakness. States he woke up this morning with dizziness and difficulty ambulating however states it may have resolved later. states then he took a nap around 3 pm, and woke up with the symptoms once again. already took his ASA and plavix today. denies chest pain, sob, abd pain, or other symptoms.     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 noted with chronic minimal RLE/RUE weakness. however dysmetria of the RUE and the RLE also noted- unsure if chronic 2/2 previous CVA. gait deferred 2/2 concern for fall.

## 2024-04-19 NOTE — ED PROVIDER NOTE - CLINICAL SUMMARY MEDICAL DECISION MAKING FREE TEXT BOX
78 y/o male with PMH of HTN, DM, HLD, CAD s/p PCI, GERD and h/o stomach tumor resection and CVA on plavix and aspirin, recent CT angios with severe stenosis of left ICA, follows with Dr. Irwin p/w blurry/double vision  and difficulty ambulating. to me, patient states he intermittently uses a walker since last CVA with mild residual right sided weakness. LKW 3 pm. Noted with ophthalmoplegia of the left eye, dysmetria of the RUE/RLE. stroke code called- patient is not a candidate for TNK. CT head and angio with stable stenosis, perfusion negative. ekg independently interpreted by me Dr. Neff showing sinus tachycardia. Labs stable. admitted to stroke unit for further management.

## 2024-04-20 ENCOUNTER — RESULT REVIEW (OUTPATIENT)
Age: 79
End: 2024-04-20

## 2024-04-20 LAB
A1C WITH ESTIMATED AVERAGE GLUCOSE RESULT: 8.5 % — HIGH (ref 4–5.6)
ANION GAP SERPL CALC-SCNC: 16 MMOL/L — HIGH (ref 7–14)
BUN SERPL-MCNC: 21 MG/DL — HIGH (ref 10–20)
CALCIUM SERPL-MCNC: 8.5 MG/DL — SIGNIFICANT CHANGE UP (ref 8.4–10.4)
CHLORIDE SERPL-SCNC: 101 MMOL/L — SIGNIFICANT CHANGE UP (ref 98–110)
CHOLEST SERPL-MCNC: 106 MG/DL — SIGNIFICANT CHANGE UP
CO2 SERPL-SCNC: 21 MMOL/L — SIGNIFICANT CHANGE UP (ref 17–32)
CREAT SERPL-MCNC: 1.1 MG/DL — SIGNIFICANT CHANGE UP (ref 0.7–1.5)
EGFR: 69 ML/MIN/1.73M2 — SIGNIFICANT CHANGE UP
ESTIMATED AVERAGE GLUCOSE: 197 MG/DL — HIGH (ref 68–114)
GLUCOSE SERPL-MCNC: 202 MG/DL — HIGH (ref 70–99)
HDLC SERPL-MCNC: 51 MG/DL — SIGNIFICANT CHANGE UP
LIPID PNL WITH DIRECT LDL SERPL: 39 MG/DL — SIGNIFICANT CHANGE UP
NON HDL CHOLESTEROL: 55 MG/DL — SIGNIFICANT CHANGE UP
POTASSIUM SERPL-MCNC: 3.9 MMOL/L — SIGNIFICANT CHANGE UP (ref 3.5–5)
POTASSIUM SERPL-SCNC: 3.9 MMOL/L — SIGNIFICANT CHANGE UP (ref 3.5–5)
SODIUM SERPL-SCNC: 138 MMOL/L — SIGNIFICANT CHANGE UP (ref 135–146)
TRIGL SERPL-MCNC: 78 MG/DL — SIGNIFICANT CHANGE UP
TSH SERPL-MCNC: 2.64 UIU/ML — SIGNIFICANT CHANGE UP (ref 0.27–4.2)

## 2024-04-20 PROCEDURE — 99232 SBSQ HOSP IP/OBS MODERATE 35: CPT

## 2024-04-20 PROCEDURE — 99223 1ST HOSP IP/OBS HIGH 75: CPT

## 2024-04-20 PROCEDURE — 93306 TTE W/DOPPLER COMPLETE: CPT | Mod: 26

## 2024-04-20 RX ORDER — INFLUENZA VIRUS VACCINE 15; 15; 15; 15 UG/.5ML; UG/.5ML; UG/.5ML; UG/.5ML
0.7 SUSPENSION INTRAMUSCULAR ONCE
Refills: 0 | Status: DISCONTINUED | OUTPATIENT
Start: 2024-04-20 | End: 2024-04-24

## 2024-04-20 RX ADMIN — Medication 2: at 07:33

## 2024-04-20 RX ADMIN — Medication 4: at 13:58

## 2024-04-20 RX ADMIN — Medication 10 MILLIGRAM(S): at 21:39

## 2024-04-20 RX ADMIN — Medication 50 MILLIGRAM(S): at 18:10

## 2024-04-20 RX ADMIN — ATORVASTATIN CALCIUM 80 MILLIGRAM(S): 80 TABLET, FILM COATED ORAL at 21:39

## 2024-04-20 RX ADMIN — ENOXAPARIN SODIUM 40 MILLIGRAM(S): 100 INJECTION SUBCUTANEOUS at 06:07

## 2024-04-20 RX ADMIN — MAGNESIUM OXIDE 400 MG ORAL TABLET 400 MILLIGRAM(S): 241.3 TABLET ORAL at 11:37

## 2024-04-20 RX ADMIN — MAGNESIUM OXIDE 400 MG ORAL TABLET 400 MILLIGRAM(S): 241.3 TABLET ORAL at 17:37

## 2024-04-20 RX ADMIN — CLOPIDOGREL BISULFATE 75 MILLIGRAM(S): 75 TABLET, FILM COATED ORAL at 11:37

## 2024-04-20 RX ADMIN — Medication 50 MILLIGRAM(S): at 06:07

## 2024-04-20 RX ADMIN — Medication 81 MILLIGRAM(S): at 11:37

## 2024-04-20 RX ADMIN — Medication 4: at 17:37

## 2024-04-20 NOTE — PHYSICAL THERAPY INITIAL EVALUATION ADULT - MANUAL MUSCLE TESTING RESULTS, REHAB EVAL
No acute deficits noted. Pt. has chronic dec strength of RUE and RLE, grossly >/ 4/5, residual from stroke 2022.

## 2024-04-20 NOTE — PHYSICAL THERAPY INITIAL EVALUATION ADULT - GENERAL OBSERVATIONS, REHAB EVAL
Chart reviewed. Pt. in bed upon PT arrival eating breakfast with L hand. Agreeable to PT eval without c/o, reports double vision is resolved after donning glasses.

## 2024-04-20 NOTE — PATIENT PROFILE ADULT - FALL HARM RISK - HARM RISK INTERVENTIONS

## 2024-04-20 NOTE — PROGRESS NOTE ADULT - ASSESSMENT
#Diplopia with Left 6th N Palsy, Right CURRY concerning for acute stroke despite ASA/PLVX therapy, possible malignancy (renal mass on imaging)  - Stroke Code CTH/CTP/CTA: Completed and reviewed  - Admit to Stroke Unit and Tele monitoring  - Neurochecks & Vitals q4 hrs  - Fall and Aspiration precautions  - Provide stroke education  - Physiatry eval/Physical therapy/Occupational therapy/Speech and Swallow  - F/u HbA1c, TSH and Lipid panel  - F/u PRU  - F/u MRI Brain Non-con  - F/u TTE w bubble study  - F/u ILR interrogation   - C/w Aspirin 81mg and Plavix 75mg daily  - C/w Atorvastatin 80mg daily  - C/w DVT PPX as below    #) HTN  Blood pressure control parameter per neuro    #) feeding per speech    #) Renal mass? MRI abd/ pelvis / LE duplex     #) DVT PPX     #) PT / OT

## 2024-04-20 NOTE — PHYSICAL THERAPY INITIAL EVALUATION ADULT - PATIENT PROFILE REVIEW, REHAB EVAL
-As a reminder, please remember to come 15 minutes early to your next cardiology appointment. We use this time to obtain necessary EKGs, go over medications, and obtain vital signs. This helps optimize your appointment, allowing for adequate time with LEIGHANN Castrejon, Kingston Archer PA-C, or Dr. Rudolph MD.      -Echo in 6 months.    -Continue same medications.     -Call for new/changing symptoms, questions, or concerns.    -Follow up with primary care in the future as planned.    -Return for follow up in 6 months.    
[id number]/yes

## 2024-04-20 NOTE — SWALLOW BEDSIDE ASSESSMENT ADULT - NS SPL SWALLOW CLINIC TRIAL FT
+ toleration of ETC/ thin liquids w/o overt s/s of aspiration/penetration. + mild oral dysphagia 2/2 L facial droop + toleration of ETC/ thin liquids w/o overt s/s of aspiration/penetration. + mild oral dysphagia 2/2 facial droop

## 2024-04-20 NOTE — PHYSICAL THERAPY INITIAL EVALUATION ADULT - PERTINENT HX OF CURRENT PROBLEM, REHAB EVAL
Admitted for acute onset double vision, with pmh significant for multiple strokes, residual R emmett with minimal impairment. PMH as below.

## 2024-04-20 NOTE — PROGRESS NOTE ADULT - SUBJECTIVE AND OBJECTIVE BOX
SUBJECTIVE:    Patient is a 79y old Male who presents with a chief complaint of   Currently admitted to medicine with the primary diagnosis of Trouble walking       Today is hospital day 1d. This morning he is resting comfortably in bed and reports no new issues or overnight events.     PAST MEDICAL & SURGICAL HISTORY  Hypertension    Diabetes    Hyperlipidemia    GERD (gastroesophageal reflux disease)    Coronary artery disease    Diabetic polyneuropathy    No significant past surgical history      SOCIAL HISTORY:  Negative for smoking/alcohol/drug use.     ALLERGIES:  No Known Allergies    MEDICATIONS:  STANDING MEDICATIONS  aspirin enteric coated 81 milliGRAM(s) Oral daily  atorvastatin 80 milliGRAM(s) Oral at bedtime  clopidogrel Tablet 75 milliGRAM(s) Oral daily  dextrose 10% Bolus 125 milliLiter(s) IV Bolus once  dextrose 5%. 1000 milliLiter(s) IV Continuous <Continuous>  dextrose 5%. 1000 milliLiter(s) IV Continuous <Continuous>  dextrose 50% Injectable 12.5 Gram(s) IV Push once  dextrose 50% Injectable 25 Gram(s) IV Push once  enoxaparin Injectable 40 milliGRAM(s) SubCutaneous every 24 hours  glucagon  Injectable 1 milliGRAM(s) IntraMuscular once  influenza  Vaccine (HIGH DOSE) 0.7 milliLiter(s) IntraMuscular once  insulin lispro (ADMELOG) corrective regimen sliding scale   SubCutaneous three times a day before meals  magnesium oxide 400 milliGRAM(s) Oral two times a day with meals  melatonin 10 milliGRAM(s) Oral at bedtime  metoprolol tartrate 50 milliGRAM(s) Oral two times a day    PRN MEDICATIONS  dextrose Oral Gel 15 Gram(s) Oral once PRN  polyethylene glycol 3350 17 Gram(s) Oral two times a day PRN  senna 2 Tablet(s) Oral at bedtime PRN    VITALS:   T(F): 97.2  HR: 80  BP: 164/93  RR: 18  SpO2: 95%    PHYSICAL EXAM:  GEN: No acute distress  LUNGS: Clear to auscultation bilaterally   HEART: S1/S2 present.    ABD: Soft, non-tender, non-distended. Bowel sounds present  EXT: Right sided weakness          LABS:                        14.8   13.13 )-----------( 299      ( 19 Apr 2024 20:27 )             43.6     04-20    138  |  101  |  21<H>  ----------------------------<  202<H>  3.9   |  21  |  1.1    Ca    8.5      20 Apr 2024 06:07    TPro  8.0  /  Alb  4.7  /  TBili  0.8  /  DBili  x   /  AST  13  /  ALT  16  /  AlkPhos  128<H>  04-19    PT/INR - ( 19 Apr 2024 20:27 )   PT: 10.70 sec;   INR: 0.94 ratio         PTT - ( 19 Apr 2024 20:27 )  PTT:35.1 sec  Urinalysis Basic - ( 20 Apr 2024 06:07 )    Color: x / Appearance: x / SG: x / pH: x  Gluc: 202 mg/dL / Ketone: x  / Bili: x / Urobili: x   Blood: x / Protein: x / Nitrite: x   Leuk Esterase: x / RBC: x / WBC x   Sq Epi: x / Non Sq Epi: x / Bacteria: x                    RADIOLOGY:

## 2024-04-20 NOTE — CONSULT NOTE ADULT - TIME BILLING
Rehab of stroke, left 6th cranial nerve palsy, left sided horizontal diplopia, right hemiparesis. History of stroke 2022. He amb 100 ft RW with sup with PT. I spoke to the patient and the family and answered questions. He amb 100 ft RW sup with PT. OT should include vision therapy. He isn't safe to drive at this point. He was give encouragement that there will likely be some improvement. I answered his and his families questions.

## 2024-04-20 NOTE — CONSULT NOTE ADULT - ASSESSMENT
IMPRESSION: Rehab of stroke, left 6th cranial nerve palsy, left sided horizontal diplopia, right hemiparesis. History of stroke 2022. He amb 100 ft RW with sup with PT. I spoke to the patient and the family and answered questions. He amb 100 ft RW sup with PT. OT should include vision therapy. He isn't safe to drive at this point. He was give encouragement that there will likely be some improvement. I answered his and his families questions.        PRECAUTIONS: [ x ] Cardiac  [  ] Respiratory  [  ] Seizures [  ] Contact Isolation  [  ] Droplet Isolation  [  ] Other    Weight Bearing Status:     RECOMMENDATION: He should drive at this time.      Out of Bed to Chair     DVT/Decubiti Prophylaxis    REHAB PLAN:     [ x  ] Bedside P/T 3-5 times a week   [x  ]   Bedside O/T  2-3 times a week             [   ] No Rehab Therapy Indicated                   [   ]  Speech Therapy   Conditioning/ROM                                    ADL  Bed Mobility                                               Conditioning/ROM  Transfers                                                     Bed Mobility  Sitting /Standing Balance                         Transfers                                        Gait Training                                               Sitting/Standing Balance  Stair Training [   ]Applicable                    Home equipment Eval                                                                        Splinting  [   ] Only      GOALS:   ADL   [ x  ]   Independent                    Transfers  [  x ] Independent                          Ambulation  [ x  ] Independent     [  x  ] With device                            [   ]  CG                                                         [   ]  CG                                                                  [   ] CG                            [    ] Min A                                                   [   ] Min A                                                              [   ] Min  A          DISCHARGE PLAN:   [   ]  Good candidate for Intensive Rehabilitation/Hospital based-4A [facility]                                             Will tolerate 3hrs Intensive Rehab Daily                                       [    ]  Short Term Rehab in Skilled Nursing Facility                                       [  x  ]  Home with Outpatient or VN services with home PT and OT including vison therapy.                                         [    ]  Possible Candidate for Intensive Hospital based Rehab

## 2024-04-20 NOTE — PHYSICAL THERAPY INITIAL EVALUATION ADULT - ADDITIONAL COMMENTS
Pt. lives in private home in [place], 5-6 stairs with 2 rails to enter, does not utilize second floor of home, bed and bath including walk in shower on first floor. Previously independent without device or assist for mobility or ADLs, however does have RW and SC at home. R hand dominant. Pt. reports independent with bathing, toileting, hygiene, food prep, dressing. Does not drive. Has support of children and significant other.

## 2024-04-20 NOTE — PROGRESS NOTE ADULT - ASSESSMENT
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 CTH showed no acute findings, CTP no deficit, CTA showed same known stenosis and no LVO. He is out of the window for TNK, and no LVO to intervene on.    He has ILR that was interrogated on 3/25/2024 and showed no A. fib     NEUROLOGY  #Diplopia with Left 6th N Palsy, Right CURRY concerning for acute stroke despite ASA/PLVX therapy, possible malignancy (renal mass on imaging)  - Stroke Code CTH/CTP/CTA: Completed and reviewed  - Admit to Stroke Unit and Tele monitoring  - Neurochecks & Vitals q4 hrs  - Fall and Aspiration precautions  - Provide stroke education  - Physiatry eval/Physical therapy/Occupational therapy/Speech and Swallow  - F/u HbA1c, TSH and Lipid panel  - F/u PRU  - F/u MRI Brain Non-con  - F/u TTE w bubble study  - F/u ILR interrogation   - C/w Aspirin 81mg and Plavix 75mg daily  - C/w Atorvastatin 80mg daily  - C/w DVT PPX as below    CARDIOLOGY  #Hypertension  - Stroke Code EKG Results: Reviewed  - permissive hypertension, Goal -220 for first 24H  - hold home blood pressure medication for now  (Home meds are: amlodipine 10 mg daily, ramipril 10 mg daily, and metoprolol tartrate 50 mg bid)   - obtain TTE with bubble    #HLD  - high dose statin as above  - LDL results:     PULMONOLOGY  - call provider if SPO2 < 94%    GATROENTEROLOGY  - Speech and Swallow Eval: Pending assessment  - Diet: NPO until bedside dysphagia     RENAL/ELECTROLYTES  - Replete K<4 and Mg <2    INFECTIOUS DISEASE  - Monitor     ENDOCRINE  - A1C results:   - TSH results:  - ISS if indicated    HEME/ONC  - DVT ppx: Lovenox, SCDs    #Malignancy workup for renal mass  - F/u MR abdomen renal mass protocol     Dispo  - Stroke Unit  - Physiatry eval:  - Physical therapy Eval: This 79y Male with PMH as above, presented with acute diplopia and his exam showed 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 CTH showed no acute findings, CTP no deficit, CTA showed same known stenosis and no LVO. He is out of the window for TNK, and no LVO to intervene on.    He has ILR that was interrogated on 3/25/2024 and showed no A. fib     NEUROLOGY  #Diplopia concerning for acute stroke despite ASA/PLVX therapy, possible malignancy (renal mass on imaging)  - Stroke Code CTH/CTP/CTA: Completed and reviewed  - Admit to Stroke Unit and Tele monitoring  - Neurochecks & Vitals q4 hrs  - Fall and Aspiration precautions  - Provide stroke education  - Physiatry eval/Physical therapy/Occupational therapy/Speech and Swallow  - F/u HbA1c, TSH and Lipid panel  - F/u PRU  - F/u MRI Brain Non-con  - F/u TTE w bubble study  - F/u ILR interrogation - EP consulted   - C/w Aspirin 81mg and Plavix 75mg daily - may switch to Eliquis pending PRU  - C/w Atorvastatin 80mg daily  - C/w DVT PPX as below    CARDIOLOGY  #Hypertension  - Stroke Code EKG Results: Reviewed  - permissive hypertension, Goal -220 for first 24H  - hold home blood pressure medication for now  (Home meds are: amlodipine 10 mg daily, ramipril 10 mg daily, and metoprolol tartrate 50 mg bid)   - obtain TTE with bubble    #HLD  - high dose statin as above  - LDL results:     PULMONOLOGY  - call provider if SPO2 < 94%    GATROENTEROLOGY  - Speech and Swallow Eval: Pending assessment  - Diet: NPO until bedside dysphagia     RENAL/ELECTROLYTES  - Replete K<4 and Mg <2    INFECTIOUS DISEASE  #Mild leukocytosis - no overt signs of infection - possibly reactive  - UA (-)  - Monitor     ENDOCRINE  - A1C results:   - TSH results:  - ISS if indicated    HEME/ONC  - DVT ppx: Lovenox, SCDs    #Malignancy workup for renal mass  - F/u MR abdomen renal mass protocol     Dispo  - Stroke Unit  - Physiatry eval:  - Physical therapy Eval:

## 2024-04-20 NOTE — PHYSICAL THERAPY INITIAL EVALUATION ADULT - GAIT DEVIATIONS NOTED, PT EVAL
Pt. makes occasional contact of R foot with inside of RW during swing phase, self corrects without LOB./decreased trinity/decreased velocity of limb motion/decreased step length/decreased weight-shifting ability

## 2024-04-20 NOTE — PROGRESS NOTE ADULT - SUBJECTIVE AND OBJECTIVE BOX
Neurovascular Progress Note     MARIA M CROFT 79y Male [id number]    Hospital Day: 1d     HPI:     HPI:  Patient is a 76yo M with PMH of HTN, DM, HLD, CAD (s/p stents), GERD, multiple strokes in the past (last in 2024), presented with diplopia. The patient woke this morning with diplopia that lasted for a few hours and resolved. He took a nap and woke up again at about 3:30 pm with diplopia again, that is still there. He also mentioned imbalance, and inability to walk without a walker. He states that he used to use a walker when he had his stroke in , but he improved after rehab.   The patient was on aspirin and Brilinta but he had side effect on Brilinta that included breathing issues and diarrhea that caused to stop the medication and start him on clopidogrel about 3 days ago.  (2024 22:50)      Overnight events:  None    Subjective complaints:  Pt evaluated at bedside. Pt has no acute complaints.     Vital Signs  T(F): 97.3 (04:00), Max: 98.1 (18:02)  HR: 97 (04:00) (97 - 110)  BP: 143/81 (04:00) (143/81 - 151/90)  RR: 18 (23:30) (18 - 18)  SpO2: 95% (04:00) (95% - 98%)    Neurological Exam:         Labs:  WBC 13.13 /HGB 14.8 /MCV 84.5 /HCT 43.6 / /     142  |  101  |  26<H>  ----------------------------<  189<H>  4.4   |  24  |  1.1    Ca    9.3      2024 20:27    TPro  8.0  /  Alb  4.7  /  TBili  0.8  /  DBili  x   /  AST  13  /  ALT  16  /  AlkPhos  128<H>      LIVER FUNCTIONS - ( 2024 20:27 )  Alb: 4.7 g/dL / Pro: 8.0 g/dL / ALK PHOS: 128 U/L / ALT: 16 U/L / AST: 13 U/L / GGT: x           PT/INR - ( 2024 20:27 )   PT: 10.70 sec;   INR: 0.94 ratio         PTT - ( 2024 20:27 )  PTT:35.1 sec  Urinalysis Basic - ( 2024 22:56 )    Color: Yellow / Appearance: Clear / S.028 / pH: x  Gluc: x / Ketone: Trace mg/dL  / Bili: Negative / Urobili: 0.2 mg/dL   Blood: x / Protein: 100 mg/dL / Nitrite: Negative   Leuk Esterase: Negative / RBC: 1 /HPF / WBC 0 /HPF   Sq Epi: x / Non Sq Epi: 0 /HPF / Bacteria: Negative /HPF        Medications:  aspirin enteric coated 81 milliGRAM(s) Oral daily  atorvastatin 80 milliGRAM(s) Oral at bedtime  clopidogrel Tablet 75 milliGRAM(s) Oral daily  dextrose 10% Bolus 125 milliLiter(s) IV Bolus once  dextrose 5%. 1000 milliLiter(s) IV Continuous <Continuous>  dextrose 5%. 1000 milliLiter(s) IV Continuous <Continuous>  dextrose 50% Injectable 12.5 Gram(s) IV Push once  dextrose 50% Injectable 25 Gram(s) IV Push once  dextrose Oral Gel 15 Gram(s) Oral once PRN  enoxaparin Injectable 40 milliGRAM(s) SubCutaneous every 24 hours  glucagon  Injectable 1 milliGRAM(s) IntraMuscular once  influenza  Vaccine (HIGH DOSE) 0.7 milliLiter(s) IntraMuscular once  insulin lispro (ADMELOG) corrective regimen sliding scale   SubCutaneous three times a day before meals  magnesium oxide 400 milliGRAM(s) Oral two times a day with meals  melatonin 10 milliGRAM(s) Oral at bedtime  metoprolol tartrate 50 milliGRAM(s) Oral two times a day  polyethylene glycol 3350 17 Gram(s) Oral two times a day PRN  senna 2 Tablet(s) Oral at bedtime PRN      Neuroimaging:  CT Head No Cont:   ACC: [id number] EXAM:  CT BRAIN   ORDERED BY: DIVINE BLAIR     PROCEDURE DATE:  2024          INTERPRETATION:  CLINICAL INDICATION: Difficulty ambulating.    TECHNIQUE: CT of the head was performed without the administration of   intravenous contrast.    COMPARISON: CT head 2022, CTA head and neck 2024, MRI 2024.    FINDINGS:    There is slightly increased prominence of the sulci, sylvian fissures,   and ventricles likely reflecting mild diffuse parenchymal volume loss.    There are increased scattered patchy low attenuations in bilateral   periventricular cerebral white matter consistent with mild chronic   microvascular ischemic changes. There are evolved chronic infarcts in the   left frontal lobe and left occipital lobe since the prior MR brain from   2024.    There is stable 0.3 cm nodular density in the right frontal convexity   which could reflect a small meningioma.    There is no evidence of acute territorial infarct or intracranial   hemorrhage. There is no midline shift.    There is no evidence of hydrocephalus. There are no extra-axial fluid   collections.    The visualized intraorbital contents are normal. There is small mucosal   polyp in the left maxillary sinus. The mastoid air cells are aerated. The   visualized soft tissues and osseous structures appear normal.      IMPRESSION:    No acute intracranial pathology.    Slightly increased mild chronic microvascular ischemic changes since the   prior CT head from 2022. Evolved chronic infarcts in the left frontal   and left occipital lobes.    --- End of Report ---          GEOVANI ANN MD; Resident Radiologist  This document has been electronically signed.  MOLLY DURBIN MD; Attending Radiologist  This document has been electronically signed. 2024 10:10PM (24 @ 20:32)   Neurovascular Progress Note     MARIA M CROFT 79y Male [id number]    Hospital Day: 1d     HPI:     HPI:  Patient is a 78yo M with PMH of HTN, DM, HLD, CAD (s/p stents), GERD, multiple strokes in the past (last in 2024), presented with diplopia. The patient woke this morning with diplopia that lasted for a few hours and resolved. He took a nap and woke up again at about 3:30 pm with diplopia again, that is still there. He also mentioned imbalance, and inability to walk without a walker. He states that he used to use a walker when he had his stroke in , but he improved after rehab.   The patient was on aspirin and Brilinta but he had side effect on Brilinta that included breathing issues and diarrhea that caused to stop the medication and start him on clopidogrel about 3 days ago.  (2024 22:50)      Overnight events:  None    Subjective complaints:  Pt evaluated at bedside. Pt has no acute complaints.     Vital Signs  T(F): 97.3 (04:00), Max: 98.1 (18:02)  HR: 97 (04:00) (97 - 110)  BP: 143/81 (04:00) (143/81 - 151/90)  RR: 18 (23:30) (18 - 18)  SpO2: 95% (04:00) (95% - 98%)    Neurological Exam:   Mental status: Awake, alert and oriented to person, place, and time. Naming, repetition and comprehension intact. Attention/concentration intact. No dysarthria, no aphasia. Fund of knowledge appropriate.  Cranial nerves:  - Eyes: PERRL, Left EYe: Limited ABduction on left gaze, Limited ADduction of R eye with left gaze (less pronounced than left eye limitation), R sided nystagmus on R gaze, Visual fields full  - Face: BL V1-V3 sensation intact symmetrically, R facial droop, with limited furrowing on R brow   - ENT: Hearing intact to voice, tongue was midline  Motor: Possible slight RU&LE drift Normal tone and bulk. No abnormal movements.  Sensation: Intact to light touch , temperature, vibration, proprioception, no extinction  Coordination: Subtle dysmetria BLUE FTN, and LLE HTS  Reflexes: 1+ arms, absent in both legs, bilateral up going planters, no clonus  Gait: Deferred  NIHSS: 8 (Partial gaze, 3-complete face, RU&LE drift, ataxia in 3 limbs)        Labs:  WBC 13.13 /HGB 14.8 /MCV 84.5 /HCT 43.6 / /     142  |  101  |  26<H>  ----------------------------<  189<H>  4.4   |  24  |  1.1    Ca    9.3      2024 20:27    TPro  8.0  /  Alb  4.7  /  TBili  0.8  /  DBili  x   /  AST  13  /  ALT  16  /  AlkPhos  128<H>      LIVER FUNCTIONS - ( 2024 20:27 )  Alb: 4.7 g/dL / Pro: 8.0 g/dL / ALK PHOS: 128 U/L / ALT: 16 U/L / AST: 13 U/L / GGT: x           PT/INR - ( 2024 20:27 )   PT: 10.70 sec;   INR: 0.94 ratio         PTT - ( 2024 20:27 )  PTT:35.1 sec  Urinalysis Basic - ( 2024 22:56 )    Color: Yellow / Appearance: Clear / S.028 / pH: x  Gluc: x / Ketone: Trace mg/dL  / Bili: Negative / Urobili: 0.2 mg/dL   Blood: x / Protein: 100 mg/dL / Nitrite: Negative   Leuk Esterase: Negative / RBC: 1 /HPF / WBC 0 /HPF   Sq Epi: x / Non Sq Epi: 0 /HPF / Bacteria: Negative /HPF        Medications:  aspirin enteric coated 81 milliGRAM(s) Oral daily  atorvastatin 80 milliGRAM(s) Oral at bedtime  clopidogrel Tablet 75 milliGRAM(s) Oral daily  dextrose 10% Bolus 125 milliLiter(s) IV Bolus once  dextrose 5%. 1000 milliLiter(s) IV Continuous <Continuous>  dextrose 5%. 1000 milliLiter(s) IV Continuous <Continuous>  dextrose 50% Injectable 12.5 Gram(s) IV Push once  dextrose 50% Injectable 25 Gram(s) IV Push once  dextrose Oral Gel 15 Gram(s) Oral once PRN  enoxaparin Injectable 40 milliGRAM(s) SubCutaneous every 24 hours  glucagon  Injectable 1 milliGRAM(s) IntraMuscular once  influenza  Vaccine (HIGH DOSE) 0.7 milliLiter(s) IntraMuscular once  insulin lispro (ADMELOG) corrective regimen sliding scale   SubCutaneous three times a day before meals  magnesium oxide 400 milliGRAM(s) Oral two times a day with meals  melatonin 10 milliGRAM(s) Oral at bedtime  metoprolol tartrate 50 milliGRAM(s) Oral two times a day  polyethylene glycol 3350 17 Gram(s) Oral two times a day PRN  senna 2 Tablet(s) Oral at bedtime PRN      Neuroimaging:  CT Head No Cont:   ACC: [id number] EXAM:  CT BRAIN   ORDERED BY: DIVINE BLAIR     PROCEDURE DATE:  2024          INTERPRETATION:  CLINICAL INDICATION: Difficulty ambulating.    TECHNIQUE: CT of the head was performed without the administration of   intravenous contrast.    COMPARISON: CT head 2022, CTA head and neck 2024, MRI 2024.    FINDINGS:    There is slightly increased prominence of the sulci, sylvian fissures,   and ventricles likely reflecting mild diffuse parenchymal volume loss.    There are increased scattered patchy low attenuations in bilateral   periventricular cerebral white matter consistent with mild chronic   microvascular ischemic changes. There are evolved chronic infarcts in the   left frontal lobe and left occipital lobe since the prior MR brain from   2024.    There is stable 0.3 cm nodular density in the right frontal convexity   which could reflect a small meningioma.    There is no evidence of acute territorial infarct or intracranial   hemorrhage. There is no midline shift.    There is no evidence of hydrocephalus. There are no extra-axial fluid   collections.    The visualized intraorbital contents are normal. There is small mucosal   polyp in the left maxillary sinus. The mastoid air cells are aerated. The   visualized soft tissues and osseous structures appear normal.      IMPRESSION:    No acute intracranial pathology.    Slightly increased mild chronic microvascular ischemic changes since the   prior CT head from 2022. Evolved chronic infarcts in the left frontal   and left occipital lobes.    --- End of Report ---          GEOVANI ANN MD; Resident Radiologist  This document has been electronically signed.  MOLLY DURBIN MD; Attending Radiologist  This document has been electronically signed. 2024 10:10PM (24 @ 20:32)

## 2024-04-21 LAB
CULTURE RESULTS: SIGNIFICANT CHANGE UP
SPECIMEN SOURCE: SIGNIFICANT CHANGE UP

## 2024-04-21 PROCEDURE — 70551 MRI BRAIN STEM W/O DYE: CPT | Mod: 26

## 2024-04-21 PROCEDURE — 93880 EXTRACRANIAL BILAT STUDY: CPT | Mod: 26

## 2024-04-21 PROCEDURE — 74183 MRI ABD W/O CNTR FLWD CNTR: CPT | Mod: 26,XU

## 2024-04-21 PROCEDURE — 99233 SBSQ HOSP IP/OBS HIGH 50: CPT

## 2024-04-21 PROCEDURE — 99232 SBSQ HOSP IP/OBS MODERATE 35: CPT

## 2024-04-21 RX ORDER — AMLODIPINE BESYLATE 2.5 MG/1
10 TABLET ORAL DAILY
Refills: 0 | Status: DISCONTINUED | OUTPATIENT
Start: 2024-04-21 | End: 2024-04-24

## 2024-04-21 RX ADMIN — Medication 4: at 09:14

## 2024-04-21 RX ADMIN — Medication 50 MILLIGRAM(S): at 17:38

## 2024-04-21 RX ADMIN — ENOXAPARIN SODIUM 40 MILLIGRAM(S): 100 INJECTION SUBCUTANEOUS at 05:40

## 2024-04-21 RX ADMIN — Medication 10 MILLIGRAM(S): at 21:36

## 2024-04-21 RX ADMIN — Medication 4: at 11:53

## 2024-04-21 RX ADMIN — Medication 2: at 17:38

## 2024-04-21 RX ADMIN — AMLODIPINE BESYLATE 10 MILLIGRAM(S): 2.5 TABLET ORAL at 13:45

## 2024-04-21 RX ADMIN — Medication 81 MILLIGRAM(S): at 11:53

## 2024-04-21 RX ADMIN — ATORVASTATIN CALCIUM 80 MILLIGRAM(S): 80 TABLET, FILM COATED ORAL at 21:36

## 2024-04-21 RX ADMIN — Medication 50 MILLIGRAM(S): at 05:40

## 2024-04-21 RX ADMIN — CLOPIDOGREL BISULFATE 75 MILLIGRAM(S): 75 TABLET, FILM COATED ORAL at 11:53

## 2024-04-21 RX ADMIN — MAGNESIUM OXIDE 400 MG ORAL TABLET 400 MILLIGRAM(S): 241.3 TABLET ORAL at 17:37

## 2024-04-21 RX ADMIN — MAGNESIUM OXIDE 400 MG ORAL TABLET 400 MILLIGRAM(S): 241.3 TABLET ORAL at 09:14

## 2024-04-21 NOTE — OCCUPATIONAL THERAPY INITIAL EVALUATION ADULT - BATHING, PREVIOUS LEVEL OF FUNCTION, OT EVAL
Supervision for shower stall transfer due to fear of becoming dizzy. Bathes independently seated on shower chair./needed assist/needs device

## 2024-04-21 NOTE — OCCUPATIONAL THERAPY INITIAL EVALUATION ADULT - BALANCE TRAINING, PT EVAL
In one week, pt will demonstrate increased s/d sitting/standing balance by 1/2 grade, enabling increased independence in ADLs/transfers.

## 2024-04-21 NOTE — OCCUPATIONAL THERAPY INITIAL EVALUATION ADULT - GENERAL OBSERVATIONS, REHAB EVAL
Pt received semifowler in bed. + IV lock, + cardiac monitor, + B/L sequentials. Pt left seated upright in bedside recliner. + IV lock, + cardiac monitor. Vitals taken at rest. BP: 142/87 HR: 84 Daughter, grandson and grandson's girlfriend present and observed session.

## 2024-04-21 NOTE — OCCUPATIONAL THERAPY INITIAL EVALUATION ADULT - ADL RETRAINING, OT EVAL
In one week, pt will demonstrate LB dressing (pants) with contact guard assist. Pt will be offered education and demonstration of sock aid and dressing stick to assist with don/doff socks.

## 2024-04-21 NOTE — OCCUPATIONAL THERAPY INITIAL EVALUATION ADULT - PERSONAL SAFETY AND JUDGMENT, REHAB EVAL
mild. When educating pt on recommended DME, pt resistant., despite pt made aware of decine in function./impaired

## 2024-04-21 NOTE — OCCUPATIONAL THERAPY INITIAL EVALUATION ADULT - TRANSFER TRAINING, PT EVAL
In one week, pt will demonstrate sit<>stand and bed transfers with close supervision with use of appropriate AD. In one week, pt will demonstrate commode transfer with contact guard assist with appropriate AD.

## 2024-04-21 NOTE — OCCUPATIONAL THERAPY INITIAL EVALUATION ADULT - PERTINENT HX OF CURRENT PROBLEM, REHAB EVAL
76yo M with PMH of HTN, DM, HLD, CAD (s/p stents), GERD, multiple strokes in the past (last in 2/2024), presented with diplopia. The patient woke this morning with diplopia that lasted for a few hours and resolved. He took a nap and woke up again at about 3:30 pm with diplopia again, that is still there. He also mentioned imbalance, and inability to walk without a walker. He states that he used to use a walker when he had his stroke in 2022, but he improved after rehab.   The patient was on aspirin and Brilinta but he had side effect on Brilinta that included breathing issues and diarrhea that caused to stop the medication and start him on clopidogrel about 3 days ago.

## 2024-04-21 NOTE — OCCUPATIONAL THERAPY INITIAL EVALUATION ADULT - TRANSFER SAFETY CONCERNS NOTED: BED/CHAIR, REHAB EVAL
decreased balance during turns/decreased safety awareness/losing balance/stand pivot/decreased weight-shifting ability

## 2024-04-21 NOTE — PROVIDER CONTACT NOTE (OTHER) - ASSESSMENT
This RN administered pt amlodipine when pt began to cough. Noted with facial droop more pronounced than earlier. Call placed to Dr Ch. [facility] otherwise unchanged.

## 2024-04-21 NOTE — OCCUPATIONAL THERAPY INITIAL EVALUATION ADULT - ADDITIONAL COMMENTS
As per pt report, resides in private house with 5 steps to enter with B/L handrails. Pt stays on first floor. Stopped driving a while ago.

## 2024-04-21 NOTE — PROGRESS NOTE ADULT - SUBJECTIVE AND OBJECTIVE BOX
SUBJECTIVE:    Patient is a 79y old Male who presents with a chief complaint of   Currently admitted to medicine with the primary diagnosis of Trouble walking       Today is hospital day 2d. This morning he is resting comfortably in bed and reports no new issues or overnight events.     PAST MEDICAL & SURGICAL HISTORY  Hypertension    Diabetes    Hyperlipidemia    GERD (gastroesophageal reflux disease)    Coronary artery disease    Diabetic polyneuropathy    No significant past surgical history      SOCIAL HISTORY:  Negative for smoking/alcohol/drug use.     ALLERGIES:  No Known Allergies    MEDICATIONS:  STANDING MEDICATIONS  aspirin enteric coated 81 milliGRAM(s) Oral daily  atorvastatin 80 milliGRAM(s) Oral at bedtime  clopidogrel Tablet 75 milliGRAM(s) Oral daily  dextrose 10% Bolus 125 milliLiter(s) IV Bolus once  dextrose 5%. 1000 milliLiter(s) IV Continuous <Continuous>  dextrose 5%. 1000 milliLiter(s) IV Continuous <Continuous>  dextrose 50% Injectable 12.5 Gram(s) IV Push once  dextrose 50% Injectable 25 Gram(s) IV Push once  enoxaparin Injectable 40 milliGRAM(s) SubCutaneous every 24 hours  glucagon  Injectable 1 milliGRAM(s) IntraMuscular once  influenza  Vaccine (HIGH DOSE) 0.7 milliLiter(s) IntraMuscular once  insulin lispro (ADMELOG) corrective regimen sliding scale   SubCutaneous three times a day before meals  magnesium oxide 400 milliGRAM(s) Oral two times a day with meals  melatonin 10 milliGRAM(s) Oral at bedtime  metoprolol tartrate 50 milliGRAM(s) Oral two times a day    PRN MEDICATIONS  dextrose Oral Gel 15 Gram(s) Oral once PRN  polyethylene glycol 3350 17 Gram(s) Oral two times a day PRN  senna 2 Tablet(s) Oral at bedtime PRN    VITALS:   T(F): 97.1  HR: 80  BP: 146/82  RR: 18  SpO2: 99%    LABS:                        14.8   13.13 )-----------( 299      ( 19 Apr 2024 20:27 )             43.6     04-20    138  |  101  |  21<H>  ----------------------------<  202<H>  3.9   |  21  |  1.1    Ca    8.5      20 Apr 2024 06:07    TPro  8.0  /  Alb  4.7  /  TBili  0.8  /  DBili  x   /  AST  13  /  ALT  16  /  AlkPhos  128<H>  04-19    PT/INR - ( 19 Apr 2024 20:27 )   PT: 10.70 sec;   INR: 0.94 ratio         PTT - ( 19 Apr 2024 20:27 )  PTT:35.1 sec  Urinalysis Basic - ( 20 Apr 2024 06:07 )    Color: x / Appearance: x / SG: x / pH: x  Gluc: 202 mg/dL / Ketone: x  / Bili: x / Urobili: x   Blood: x / Protein: x / Nitrite: x   Leuk Esterase: x / RBC: x / WBC x   Sq Epi: x / Non Sq Epi: x / Bacteria: x            Culture - Urine (collected 19 Apr 2024 22:56)  Source: Clean Catch Clean Catch (Midstream)  Final Report (21 Apr 2024 07:17):    <10,000 CFU/mL Normal Urogenital Blanca          RADIOLOGY:    < from: CT Head No Cont (04.19.24 @ 20:32) >    IMPRESSION:    No acute intracranial pathology.    Slightly increased mild chronic microvascular ischemic changes since the   prior CT head from 8/4/2022. Evolved chronic infarcts in the left frontal   and left occipital lobes.    < end of copied text >  < from: CT Angio Neck Stroke Protocol w/ IV Cont (04.19.24 @ 22:18) >      IMPRESSION:    Moderate stenosis at the right carotid bifurcation and bulb.    Severe stenosis at the left carotid bifurcation and bulb.    No evidence of major vascular occlusion or aneurysm.    No perfusion abnormalities. No areas of core infarct or ischemic penumbra.    < end of copied text >  < from: MR Head No Cont (04.21.24 @ 07:59) >    IMPRESSION:  Punctate acute infarct involving the dorsal aspect of chaparrita as well as a   late subacute to chronic infarct involving the right splenium of the   corpus callosum.    Mild chronic microvascular ischemic changes as well as additional chronic   infarcts, detailed above    < end of copied text >      PHYSICAL EXAM:  GEN: No acute distress  LUNGS: Clear to auscultation bilaterally   HEART: S1/S2 present. RRR.   ABD: Soft, non-tender, non-distended. Bowel sounds present  EXT: NC/NC/NE/2+PP/HERNANDEZ  NEURO: AAOX3, facial droop noted

## 2024-04-21 NOTE — OCCUPATIONAL THERAPY INITIAL EVALUATION ADULT - STRENGTHENING, PT EVAL
In one week, pt will demonstrate increase in L UE strength by 1/2 grade, enabling increased independence in ADL/transfers.

## 2024-04-21 NOTE — PROGRESS NOTE ADULT - SUBJECTIVE AND OBJECTIVE BOX
Neurology Progress Note    Interval History:    No acute events overnight.    Medications:  amLODIPine   Tablet 10 milliGRAM(s) Oral daily  aspirin enteric coated 81 milliGRAM(s) Oral daily  atorvastatin 80 milliGRAM(s) Oral at bedtime  clopidogrel Tablet 75 milliGRAM(s) Oral daily  dextrose 10% Bolus 125 milliLiter(s) IV Bolus once  dextrose 5%. 1000 milliLiter(s) IV Continuous <Continuous>  dextrose 5%. 1000 milliLiter(s) IV Continuous <Continuous>  dextrose 50% Injectable 12.5 Gram(s) IV Push once  dextrose 50% Injectable 25 Gram(s) IV Push once  dextrose Oral Gel 15 Gram(s) Oral once PRN  enoxaparin Injectable 40 milliGRAM(s) SubCutaneous every 24 hours  glucagon  Injectable 1 milliGRAM(s) IntraMuscular once  influenza  Vaccine (HIGH DOSE) 0.7 milliLiter(s) IntraMuscular once  insulin lispro (ADMELOG) corrective regimen sliding scale   SubCutaneous three times a day before meals  magnesium oxide 400 milliGRAM(s) Oral two times a day with meals  melatonin 10 milliGRAM(s) Oral at bedtime  metoprolol tartrate 50 milliGRAM(s) Oral two times a day  polyethylene glycol 3350 17 Gram(s) Oral two times a day PRN  senna 2 Tablet(s) Oral at bedtime PRN      Vital Signs Last 24 Hrs  T(C): 36.2 (21 Apr 2024 12:00), Max: 36.5 (20 Apr 2024 20:00)  T(F): 97.1 (21 Apr 2024 12:00), Max: 97.7 (20 Apr 2024 20:00)  HR: 78 (21 Apr 2024 12:00) (74 - 94)  BP: 136/77 (21 Apr 2024 12:00) (121/84 - 146/82)  BP(mean): 95 (21 Apr 2024 04:00) (93 - 95)  RR: 18 (21 Apr 2024 12:00) (18 - 18)  SpO2: 99% (21 Apr 2024 12:00) (95% - 99%)    Parameters below as of 21 Apr 2024 12:00  Patient On (Oxygen Delivery Method): room air        Neurological Examination:  Mental status: Awake, alert and oriented to person, place, and time. Naming, repetition and comprehension intact. Attention/concentration intact. No dysarthria, no aphasia. Fund of knowledge appropriate.  Cranial nerves:  - Eyes: PERRL, Skew deviation on primary gaze. Left Eye: limited ABduction. Right eye: limited ADduction (less pronounced than left eye limitation), R sided torsional nystagmus on R gaze, Visual fields full  - Face: B/l V1-V3 sensation intact symmetrically, R facial droop, with limited furrowing on R brow   - ENT: Hearing intact to voice, tongue was midline  Motor: Possible slight RU&LE drift Normal tone and bulk. No abnormal movements.  Sensation: Intact to light touch , temperature, vibration, proprioception, no extinction  Coordination: Subtle dysmetria b/l UE FTN, and LLE HTS  Gait: Deferred  NIHSS: 8 (Partial gaze, 3-complete face, RU&LE drift, ataxia in 3 limbs)        Labs:  CBC Full  -  ( 19 Apr 2024 20:27 )  WBC Count : 13.13 K/uL  RBC Count : 5.16 M/uL  Hemoglobin : 14.8 g/dL  Hematocrit : 43.6 %  Platelet Count - Automated : 299 K/uL  Mean Cell Volume : 84.5 fL  Mean Cell Hemoglobin : 28.7 pg  Mean Cell Hemoglobin Concentration : 33.9 g/dL  Auto Neutrophil # : 8.54 K/uL  Auto Lymphocyte # : 2.95 K/uL  Auto Monocyte # : 1.17 K/uL  Auto Eosinophil # : 0.38 K/uL  Auto Basophil # : 0.05 K/uL  Auto Neutrophil % : 65.0 %  Auto Lymphocyte % : 22.5 %  Auto Monocyte % : 8.9 %  Auto Eosinophil % : 2.9 %  Auto Basophil % : 0.4 %    04-20    138  |  101  |  21<H>  ----------------------------<  202<H>  3.9   |  21  |  1.1    Ca    8.5      20 Apr 2024 06:07    TPro  8.0  /  Alb  4.7  /  TBili  0.8  /  DBili  x   /  AST  13  /  ALT  16  /  AlkPhos  128<H>  04-19    LIVER FUNCTIONS - ( 19 Apr 2024 20:27 )  Alb: 4.7 g/dL / Pro: 8.0 g/dL / ALK PHOS: 128 U/L / ALT: 16 U/L / AST: 13 U/L / GGT: x           PT/INR - ( 19 Apr 2024 20:27 )   PT: 10.70 sec;   INR: 0.94 ratio         PTT - ( 19 Apr 2024 20:27 )  PTT:35.1 sec  Urinalysis Basic - ( 20 Apr 2024 06:07 )    Color: x / Appearance: x / SG: x / pH: x  Gluc: 202 mg/dL / Ketone: x  / Bili: x / Urobili: x   Blood: x / Protein: x / Nitrite: x   Leuk Esterase: x / RBC: x / WBC x   Sq Epi: x / Non Sq Epi: x / Bacteria: x

## 2024-04-21 NOTE — CHART NOTE - NSCHARTNOTEFT_GEN_A_CORE
I have attempted to interrogate pt's loop recorder twice.  Pt was off the floor each time.  Will re-attempt to interrogate tomorrow

## 2024-04-21 NOTE — OCCUPATIONAL THERAPY INITIAL EVALUATION ADULT - COGNITIVE, VISUAL PERCEPTUAL, OT EVAL
Pt will participate in visual retraining. In one week, pt will be able to see object as one beginning at 4.0 inches.

## 2024-04-21 NOTE — PROGRESS NOTE ADULT - ASSESSMENT
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 CTH showed no acute findings, CTP no deficit, CTA showed same known stenosis and no LVO. He is out of the window for TNK, and no LVO to intervene on.    He has ILR that was interrogated on 3/25/2024 and showed no A. fib     NEUROLOGY  #Diplopia concerning for acute stroke despite ASA/PLVX therapy, possible malignancy (renal mass on imaging)  - MRI Brain Non-con: Punctate acute infarct involving the dorsal aspect of chaparrita as well as a late subacute to chronic infarct involving the right splenium of the corpus callosum.  - TTE w bubble study: EF 60-65%, G1DD, no PFO, normal RA  - F/u ILR interrogation - EP consulted   - F/u EDSON (ordered)  - F/u PRU  - Neuroendovascular consulted for carotid stenosis  - C/w Aspirin 81mg and Plavix 75mg daily - may switch to Eliquis pending PRU  - C/w Atorvastatin 80mg daily  - Neurochecks & Vitals q4 hrs  - Fall and Aspiration precautions  - Provide stroke education    CARDIOLOGY  #Hypertension  - Stroke Code EKG Results: Reviewed  - Restarted home Amlodipine on 4/21  - SBP goal 120-160    #HLD  - high dose statin as above  - LDL results: 39    PULMONOLOGY  - call provider if SPO2 < 94%    GATROENTEROLOGY  - Speech and Swallow Eval: Pending assessment  - Diet: NPO until bedside dysphagia     RENAL/ELECTROLYTES  - Replete K<4 and Mg <2    INFECTIOUS DISEASE  #Mild leukocytosis - no overt signs of infection - possibly reactive  - UA (-)  - Monitor     ENDOCRINE  - A1C results: 8.5  - TSH results: 2.64  - ISS if indicated    HEME/ONC  - DVT ppx: Lovenox, SCDs    #Malignancy workup for renal mass  - F/u MR abdomen renal mass protocol     Dispo  - Stroke Unit  - Physiatry eval:  - Physical therapy Eval:

## 2024-04-21 NOTE — PROGRESS NOTE ADULT - ASSESSMENT
# Acute CVA  # Diplopia with Left 6th N Palsy, Right CURRY concerning for acute stroke despite ASA/PLVX therapy, possible malignancy (renal mass on imaging)  - Stroke Code CTH/CTP/CTA: Completed and reviewed  - Admit to Stroke Unit and Tele monitoring  - Neurochecks & Vitals q4 hrs  - Fall and Aspiration precautions  - Provide stroke education  - Physiatry eval/Physical therapy/Occupational therapy/Speech and Swallow  - F/u HbA1c 8.5, LDL 39  - F/u MRI Brain Non-con: Punctate acute infarct involving the dorsal aspect of chaparrita as well as a late subacute to chronic infarct involving the right splenium of the corpus callosum.  - F/u TTE w bubble study: EF 60%, G1DD, intact interatrial septum.  - F/u ILR interrogation   - C/w Aspirin 81mg and Plavix 75mg daily  - C/w Atorvastatin 80mg daily  - C/w DVT PPX as below    #) HTN Blood pressure control parameter per neuro    #) feeding per speech    #) Renal mass? MRI abd/ pelvis / LE duplex     #) DVT PPX   #) PT / OT    discussed with patient and wife at bedside

## 2024-04-21 NOTE — CHART NOTE - NSCHARTNOTEFT_GEN_A_CORE
78 YO M with PMHx of ischemic stroke, HTN, DM type II, HLD, CAD s/p stents (last PCI 10 years ago), presented w/ acute onset double vision and L. facial droop. MRI Brain showing punctate acute infarct involving the dorsal aspect of chaparrita as well as a late subacute to chronic infarct involving the right splenium of the corpus callosum. TTE -ve for PFO.  Cardiology consulted for EDSON. Keep NPO after midnight. Will schedule for EDSON in AM.

## 2024-04-22 ENCOUNTER — RESULT REVIEW (OUTPATIENT)
Age: 79
End: 2024-04-22

## 2024-04-22 PROCEDURE — 93285 PRGRMG DEV EVAL SCRMS IP: CPT | Mod: 26

## 2024-04-22 PROCEDURE — 99233 SBSQ HOSP IP/OBS HIGH 50: CPT | Mod: 25

## 2024-04-22 PROCEDURE — 93320 DOPPLER ECHO COMPLETE: CPT | Mod: 26

## 2024-04-22 PROCEDURE — 99223 1ST HOSP IP/OBS HIGH 75: CPT

## 2024-04-22 PROCEDURE — 93312 ECHO TRANSESOPHAGEAL: CPT | Mod: 26,XU

## 2024-04-22 PROCEDURE — 99232 SBSQ HOSP IP/OBS MODERATE 35: CPT

## 2024-04-22 PROCEDURE — 93325 DOPPLER ECHO COLOR FLOW MAPG: CPT | Mod: 26

## 2024-04-22 RX ORDER — ATORVASTATIN CALCIUM 80 MG/1
40 TABLET, FILM COATED ORAL AT BEDTIME
Refills: 0 | Status: DISCONTINUED | OUTPATIENT
Start: 2024-04-22 | End: 2024-04-24

## 2024-04-22 RX ORDER — INSULIN LISPRO 100/ML
3 VIAL (ML) SUBCUTANEOUS
Refills: 0 | Status: DISCONTINUED | OUTPATIENT
Start: 2024-04-22 | End: 2024-04-24

## 2024-04-22 RX ADMIN — AMLODIPINE BESYLATE 10 MILLIGRAM(S): 2.5 TABLET ORAL at 05:29

## 2024-04-22 RX ADMIN — Medication 50 MILLIGRAM(S): at 05:29

## 2024-04-22 RX ADMIN — CLOPIDOGREL BISULFATE 75 MILLIGRAM(S): 75 TABLET, FILM COATED ORAL at 12:45

## 2024-04-22 RX ADMIN — MAGNESIUM OXIDE 400 MG ORAL TABLET 400 MILLIGRAM(S): 241.3 TABLET ORAL at 08:56

## 2024-04-22 RX ADMIN — ATORVASTATIN CALCIUM 40 MILLIGRAM(S): 80 TABLET, FILM COATED ORAL at 21:45

## 2024-04-22 RX ADMIN — Medication 2: at 17:33

## 2024-04-22 RX ADMIN — Medication 81 MILLIGRAM(S): at 12:45

## 2024-04-22 RX ADMIN — ENOXAPARIN SODIUM 40 MILLIGRAM(S): 100 INJECTION SUBCUTANEOUS at 05:30

## 2024-04-22 RX ADMIN — Medication 50 MILLIGRAM(S): at 17:32

## 2024-04-22 RX ADMIN — MAGNESIUM OXIDE 400 MG ORAL TABLET 400 MILLIGRAM(S): 241.3 TABLET ORAL at 17:32

## 2024-04-22 RX ADMIN — Medication 4: at 12:44

## 2024-04-22 RX ADMIN — Medication 10 MILLIGRAM(S): at 21:45

## 2024-04-22 NOTE — CHART NOTE - NSCHARTNOTEFT_GEN_A_CORE
[employer] loop recorder interrogated 4/22/24  No events noted  Device functioning properly    Recall EP as needed [id number]

## 2024-04-22 NOTE — PROGRESS NOTE ADULT - SUBJECTIVE AND OBJECTIVE BOX
MARIA M CROFT  79y, Male  Allergy: eggs (Vomiting)  No Known Allergies    Hospital Day: 3d    Patient seen and examined earlier today. Feeling well no complaints.     PMH/PSH:  PAST MEDICAL & SURGICAL HISTORY:  Hypertension      Diabetes      Hyperlipidemia      GERD (gastroesophageal reflux disease)      Coronary artery disease      Diabetic polyneuropathy      No significant past surgical history          LAST 24-Hr EVENTS:    VITALS:  T(F): 97.2 (04-22-24 @ 16:00), Max: 97.7 (04-21-24 @ 20:00)  HR: 87 (04-22-24 @ 16:00)  BP: 151/88 (04-22-24 @ 16:00) (151/88 - 163/91)  RR: 20 (04-22-24 @ 16:00)  SpO2: 96% (04-22-24 @ 12:00)        TESTS & MEASUREMENTS:  Weight (Kg): 98.4 (04-22-24 @ 09:58)  BMI (kg/m2): 29.4 (04-22)    04-20-24 @ 07:01  -  04-21-24 @ 07:00  --------------------------------------------------------  IN: 0 mL / OUT: 850 mL / NET: -850 mL                        Culture - Urine (collected 04-19-24 @ 22:56)  Source: Clean Catch Clean Catch (Midstream)  Final Report (04-21-24 @ 07:17):    <10,000 CFU/mL Normal Urogenital Blanca                    RADIOLOGY, ECG, & ADDITIONAL TESTS:      RECENT DIAGNOSTIC ORDERS:  Activated Partial Thromboplastin Time:  Start Date:22-Apr-2024. AM Sched. Collection:23-Apr-2024 04:30 (04-22-24 @ 16:53)  Prothrombin Time and INR, Plasma:  Start Date:22-Apr-2024. AM Sched. Collection:23-Apr-2024 04:30 (04-22-24 @ 16:53)  Magnesium: AM Sched. Collection: 23-Apr-2024 04:30 (04-22-24 @ 16:53)  Basic Metabolic Panel: AM Sched. Collection: 23-Apr-2024 04:30 (04-22-24 @ 16:53)  Complete Blood Count + Automated Diff: AM Sched. Collection: 23-Apr-2024 04:30 (04-22-24 @ 16:53)  Diet, NPO after Midnight:      NPO Start Date: 22-Apr-2024,   NPO Start Time: 23:59  Except Medications (04-22-24 @ 16:53)      MEDICATIONS:  MEDICATIONS  (STANDING):  amLODIPine   Tablet 10 milliGRAM(s) Oral daily  aspirin enteric coated 81 milliGRAM(s) Oral daily  atorvastatin 40 milliGRAM(s) Oral at bedtime  clopidogrel Tablet 75 milliGRAM(s) Oral daily  dextrose 10% Bolus 125 milliLiter(s) IV Bolus once  dextrose 5%. 1000 milliLiter(s) (50 mL/Hr) IV Continuous <Continuous>  dextrose 5%. 1000 milliLiter(s) (100 mL/Hr) IV Continuous <Continuous>  dextrose 50% Injectable 12.5 Gram(s) IV Push once  dextrose 50% Injectable 25 Gram(s) IV Push once  enoxaparin Injectable 40 milliGRAM(s) SubCutaneous every 24 hours  glucagon  Injectable 1 milliGRAM(s) IntraMuscular once  influenza  Vaccine (HIGH DOSE) 0.7 milliLiter(s) IntraMuscular once  insulin lispro (ADMELOG) corrective regimen sliding scale   SubCutaneous three times a day before meals  magnesium oxide 400 milliGRAM(s) Oral two times a day with meals  melatonin 10 milliGRAM(s) Oral at bedtime  metoprolol tartrate 50 milliGRAM(s) Oral two times a day    MEDICATIONS  (PRN):  dextrose Oral Gel 15 Gram(s) Oral once PRN Blood Glucose LESS THAN 70 milliGRAM(s)/deciliter  polyethylene glycol 3350 17 Gram(s) Oral two times a day PRN Constipation  senna 2 Tablet(s) Oral at bedtime PRN Constipation      HOME MEDICATIONS:  acetaminophen 325 mg oral tablet (08-28)  amLODIPine 10 mg oral tablet (04-19)  aspirin 81 mg oral delayed release tablet (08-11)  atorvastatin 80 mg oral tablet (08-28)  citalopram 10 mg oral tablet (08-11)  enoxaparin (08-30)  famotidine 40 mg oral tablet (08-04)  hydrocortisone 1% topical cream (08-28)  insulin lispro 100 units/mL injectable solution (08-28)  Lantus 100 units/mL subcutaneous solution (08-28)  lisinopril 40 mg oral tablet (08-28)  magnesium oxide 400 mg oral tablet (08-28)  melatonin 10 mg oral tablet (08-28)  metoprolol tartrate 50 mg oral tablet (08-04)  Plavix 75 mg oral tablet (08-30)  polyethylene glycol 3350 oral powder for reconstitution (08-28)  ramipril 10 mg oral capsule (04-19)  senna leaf extract oral tablet (08-28)      PHYSICAL EXAM:  GEN: No acute distress  LUNGS: Clear to auscultation bilaterally   HEART: S1/S2 present. RRR.   ABD: Soft, non-tender, non-distended. Bowel sounds present  EXT: warm well perfused no edema   NEURO: AAOX3, facial droop noted

## 2024-04-22 NOTE — PROGRESS NOTE ADULT - ASSESSMENT
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 CTH showed no acute findings, CTP no deficit, CTA showed same known stenosis and no LVO. He is out of the window for TNK, and no LVO to intervene on.    He has ILR that was interrogated on 3/25/2024 and showed no A. fib. Repeat interrogation () (-) for Afib. TTE ()/EDSON ()    NEUROLOGY  #Acute dorsal pontine stroke despite ASA/PLVX therapy etiology  vs ESUS  #Subacute Infarct R splenium corpus collosum - new since prior admission  - Stroke Code CTH/CTP/CTA: Completed and reviewed  - Neurochecks & Vitals q8 hrs  - F/u PRU  - F/u TTE w bubble study  - C/w Aspirin 81mg and Plavix 75mg daily - may switch to Eliquis pending PRU  - C/w Atorvastatin 40mg daily  - C/w DVT PPX as below    CARDIOLOGY  #Hypertension  - Stroke Code EKG Results: Reviewed  - permissive hypertension, Goal -220 for first 24H  - hold home blood pressure medication for now  (Home meds are: amlodipine 10 mg daily, ramipril 10 mg daily, and metoprolol tartrate 50 mg bid)   - obtain TTE with bubble    #HLD  - high dose statin as above  - LDL results:     PULMONOLOGY  - call provider if SPO2 < 94%    GATROENTEROLOGY  - Speech and Swallow Eval: Pending assessment  - Diet: NPO until bedside dysphagia     RENAL/ELECTROLYTES  - Replete K<4 and Mg <2    INFECTIOUS DISEASE  #Mild leukocytosis - no overt signs of infection - possibly reactive  - UA (-)  - Monitor     ENDOCRINE  - A1C results:   - TSH results:  - ISS if indicated    HEME/ONC  - DVT ppx: Lovenox, SCDs    #Malignancy workup for renal mass  - F/u MR abdomen renal mass protocol     Dispo  - Stroke Unit  - Physiatry eval:  - Physical therapy Eval: This 79y Male with PMH as above, presented with acute diplopia and his exam showed 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 CTH showed no acute findings, CTP no deficit, CTA showed same known stenosis and no LVO. He is out of the window for TNK, and no LVO to intervene on.    He has ILR that was interrogated on 3/25/2024 and showed no A. fib. Repeat interrogation () (-) for Afib. TTE ()/EDSON ()    NEUROLOGY  #Acute dorsal pontine stroke despite ASA/PLVX therapy etiology  vs ESUS  #Subacute Infarct R splenium corpus collosum - new since prior admission  - Stroke Code CTH/CTP/CTA: Completed and reviewed  - Neurochecks & Vitals q8 hrs  - F/u PRU  - C/w Aspirin 81mg and Plavix 75mg daily - may switch to Eliquis pending PRU  - C/w Atorvastatin decreased to 40mg daily  - C/w DVT PPX as below    CARDIOLOGY  #Hypertension  - Home meds are: amlodipine 10 mg daily, ramipril 10 mg daily, and metoprolol tartrate 50 mg bid  - Stroke Code EKG Results: Reviewed  - Goal -160  - obtain TTE with bubble  - Restarted home amlodipine 10 mg daily,  metoprolol tartrate 50 mg bid    #HLD - LDL 39  - high dose statin as above     PULMONOLOGY  - call provider if SPO2 < 94%    GATROENTEROLOGY  - Speech and Swallow Eval: Easy to chew  - Diet: Dash/CC - easy to chew     RENAL/ELECTROLYTES  - Replete K<4 and Mg <2    INFECTIOUS DISEASE  #Mild leukocytosis - no overt signs of infection - possibly reactive  - UA (-)  - Monitor     ENDOCRINE  #NID-DM  - A1C results: 8.5  - ISS      HEME/ONC  - DVT ppx: Lovenox     #Malignancy workup for renal mass - Renal Cysts  - [] MR Abdomen and renal mass protocol: Small R renal cyst - no suspicious mass    Dispo  - DGTF, Tele   - Physiatry eval (): Home with OP services  - Physical therapy Eval (): Home PT 3-5x/week

## 2024-04-22 NOTE — CHART NOTE - NSCHARTNOTEFT_GEN_A_CORE
Procedure:  EDSON  Indication:  CVA  Pre-procedure Assessment:  Chart reviewed.  I agree with the assessment and plan and noted any changes below.  04-22-24 @ 07:36

## 2024-04-22 NOTE — CONSULT NOTE ADULT - NS ATTEND AMEND GEN_ALL_CORE FT
I have independently reviewed all Neuroradiology including CTH, CTA, and MRI    Acute left pontine infarct causing left CN 6 and CN 7 palsy.  Chronic Left cerebral infarct, LHP.  CTA shows high grade stenosis left ICA    For Diagnostic angiogram to better characterize the degree of stenosis and also flow physiology

## 2024-04-22 NOTE — PROGRESS NOTE ADULT - SUBJECTIVE AND OBJECTIVE BOX
Neurovascular Progress Note     MARIA M CROFT 79y Male [id number]    Hospital Day: 3d     HPI:     HPI:  Patient is a 78yo M with PMH of HTN, DM, HLD, CAD (s/p stents), GERD, multiple strokes in the past (last in 2/2024), presented with diplopia. The patient woke this morning with diplopia that lasted for a few hours and resolved. He took a nap and woke up again at about 3:30 pm with diplopia again, that is still there. He also mentioned imbalance, and inability to walk without a walker. He states that he used to use a walker when he had his stroke in 2022, but he improved after rehab.   The patient was on aspirin and Brilinta but he had side effect on Brilinta that included breathing issues and diarrhea that caused to stop the medication and start him on clopidogrel about 3 days ago.  (19 Apr 2024 22:50)      Overnight events:  None    Subjective complaints:  Pt evaluated at bedside. Pt has no acute complaints.     Vital Signs  T(F): 97 (12:00), Max: 97.7 (20:00)  HR: 76 (12:00) (72 - 88)  BP: 154/86 (12:00) (126/77 - 163/91)  RR: 18 (12:00) (18 - 20)  SpO2: 96% (12:00) (95% - 98%)    Neurological Exam:   Mental status: Awake, alert and oriented to person, place, and time. Naming, repetition and comprehension intact. Attention/concentration intact. No dysarthria, no aphasia. Fund of knowledge appropriate.  Cranial nerves:  - Eyes: PERRL, Skew deviation on primary gaze. Left Eye: limited ABduction. Right eye: limited ADduction (less pronounced than left eye limitation), R sided torsional nystagmus on R gaze, Visual fields full  - Face: B/l V1-V3 sensation intact symmetrically, R facial droop, with limited furrowing on R brow   - ENT: Hearing intact to voice, tongue was midline  Motor: Possible slight RU&LE drift Normal tone and bulk. No abnormal movements.  Sensation: Intact to light touch , temperature, vibration, proprioception, no extinction  Coordination: Subtle dysmetria b/l UE FTN, and LLE HTS  Gait: Deferred  NIHSS: 8 (Partial gaze, 3-complete face, RU&LE drift, ataxia in 3 limbs)      Labs:                  Medications:  amLODIPine   Tablet 10 milliGRAM(s) Oral daily  aspirin enteric coated 81 milliGRAM(s) Oral daily  atorvastatin 80 milliGRAM(s) Oral at bedtime  clopidogrel Tablet 75 milliGRAM(s) Oral daily  dextrose 10% Bolus 125 milliLiter(s) IV Bolus once  dextrose 5%. 1000 milliLiter(s) IV Continuous <Continuous>  dextrose 5%. 1000 milliLiter(s) IV Continuous <Continuous>  dextrose 50% Injectable 12.5 Gram(s) IV Push once  dextrose 50% Injectable 25 Gram(s) IV Push once  dextrose Oral Gel 15 Gram(s) Oral once PRN  enoxaparin Injectable 40 milliGRAM(s) SubCutaneous every 24 hours  glucagon  Injectable 1 milliGRAM(s) IntraMuscular once  influenza  Vaccine (HIGH DOSE) 0.7 milliLiter(s) IntraMuscular once  insulin lispro (ADMELOG) corrective regimen sliding scale   SubCutaneous three times a day before meals  magnesium oxide 400 milliGRAM(s) Oral two times a day with meals  melatonin 10 milliGRAM(s) Oral at bedtime  metoprolol tartrate 50 milliGRAM(s) Oral two times a day  polyethylene glycol 3350 17 Gram(s) Oral two times a day PRN  senna 2 Tablet(s) Oral at bedtime PRN      Neuroimaging:   Neurovascular Progress Note     MARIA M CROFT 79y Male [id number]    Hospital Day: 3d     HPI:     HPI:  Patient is a 80yo M with PMH of HTN, DM, HLD, CAD (s/p stents), GERD, multiple strokes in the past (last in 2/2024), presented with diplopia. The patient woke this morning with diplopia that lasted for a few hours and resolved. He took a nap and woke up again at about 3:30 pm with diplopia again, that is still there. He also mentioned imbalance, and inability to walk without a walker. He states that he used to use a walker when he had his stroke in 2022, but he improved after rehab.   The patient was on aspirin and Brilinta but he had side effect on Brilinta that included breathing issues and diarrhea that caused to stop the medication and start him on clopidogrel about 3 days ago.  (19 Apr 2024 22:50)      Overnight events:  None    Subjective complaints:  Pt evaluated at bedside. Pt has no acute complaints.     Vital Signs  T(F): 97 (12:00), Max: 97.7 (20:00)  HR: 76 (12:00) (72 - 88)  BP: 154/86 (12:00) (126/77 - 163/91)  RR: 18 (12:00) (18 - 20)  SpO2: 96% (12:00) (95% - 98%)    Neurological Exam:   Mental status: Awake, alert and oriented to person, place, and time. Naming, repetition and comprehension intact. Attention/concentration intact. No dysarthria, no aphasia. Fund of knowledge appropriate.  Cranial nerves:  - Eyes: PERRL, Skew deviation on primary gaze. Left Eye: limited ABduction. Right eye: limited ADduction (less pronounced than left eye limitation), R sided torsional nystagmus on R gaze, Visual fields full  - Face: B/l V1-V3 sensation intact symmetrically, R facial droop, with limited furrowing on R brow   - ENT: Hearing intact to voice, tongue was midline  Motor: Possible slight RU&LE drift Normal tone and bulk. No abnormal movements.  Sensation: Intact to light touch , temperature, vibration, proprioception, no extinction  Coordination: Subtle dysmetria b/l UE FTN, and LLE HTS  Gait: Deferred  NIHSS: 8 (Partial gaze, 3-complete face, RU&LE drift, ataxia in 3 limbs)      Labs:                  Medications:  amLODIPine   Tablet 10 milliGRAM(s) Oral daily  aspirin enteric coated 81 milliGRAM(s) Oral daily  atorvastatin 80 milliGRAM(s) Oral at bedtime  clopidogrel Tablet 75 milliGRAM(s) Oral daily  dextrose 10% Bolus 125 milliLiter(s) IV Bolus once  dextrose 5%. 1000 milliLiter(s) IV Continuous <Continuous>  dextrose 5%. 1000 milliLiter(s) IV Continuous <Continuous>  dextrose 50% Injectable 12.5 Gram(s) IV Push once  dextrose 50% Injectable 25 Gram(s) IV Push once  dextrose Oral Gel 15 Gram(s) Oral once PRN  enoxaparin Injectable 40 milliGRAM(s) SubCutaneous every 24 hours  glucagon  Injectable 1 milliGRAM(s) IntraMuscular once  influenza  Vaccine (HIGH DOSE) 0.7 milliLiter(s) IntraMuscular once  insulin lispro (ADMELOG) corrective regimen sliding scale   SubCutaneous three times a day before meals  magnesium oxide 400 milliGRAM(s) Oral two times a day with meals  melatonin 10 milliGRAM(s) Oral at bedtime  metoprolol tartrate 50 milliGRAM(s) Oral two times a day  polyethylene glycol 3350 17 Gram(s) Oral two times a day PRN  senna 2 Tablet(s) Oral at bedtime PRN      Neuroimaging:

## 2024-04-22 NOTE — CHART NOTE - NSCHARTNOTEFT_GEN_A_CORE
POST OPERATIVE PROCEDURAL DOCUMENTATION  PRE-OP DIAGNOSIS: CVA    PROCEDURE: Transesophageal Echocardiogram     Primary Physician: Dr. Johnson  Cardiology Fellow: Sully    Anesthesia:  Sedation as per documentation from anesthesia    CONDITION  [  ] Critical  [  ] Serious  [  ] Fair  [ x ] Good      ESTIMATED BLOOD LOSS: None    COMPLICATIONS: None    After risks and benefits of procedures were explained, informed consent was obtained and placed in chart.  Sedation as per documentation from anesthesia. The EDSON probe was passed into the esophagus without difficulty.  Transesophageal images were obtained.  The EDSON probe was removed without difficulty and examined.  There was no evidence for bleeding.  The patient tolerated the procedure well without any immediate EDSON-related complications.      Preliminary Findings:  LA: Mildly enlarged  ALTAGRACIA: Left atrial appendage was clear of clot and smoke. Normal doppler velocities   LV: LVEF was estimated at 55-65%  MV: Trace MR, No MS  AV: No AI, no  AS  RA: Mildly enlarged  RV: Normal size and function  TV: No TR  PV: No VA, no PS  IAS: No PFO or ASD. No R-> L shunt by color doppler and injection of agitated saline  Aorta: There was mild, non-mobile atheroma seen in the thoracic aorta.    Full report to follow    PLAN OF CARE:  [x] Return to inpatient bed when stable and fully awake.  [x] No eating or drinking for 1 hour      Results of procedure/ plan of care discussed with patient/  in detail. POST OPERATIVE PROCEDURAL DOCUMENTATION    PRE-OP DIAGNOSIS:  CVA    PROCEDURE: Transesophageal Echocardiogram     Primary Physician:  Alex  Cardiology Fellow:  Sully    Anesthesia:  Sedation as per documentation from anesthesia    CONDITION  [  ] Critical  [  ] Serious  [  ] Fair  [ x ] Good      ESTIMATED BLOOD LOSS: None    COMPLICATIONS: None    After risks and benefits of procedures were explained, informed consent was obtained and placed in chart.  Sedation as per documentation from anesthesia. The EDSON probe was passed into the esophagus without difficulty.  Transesophageal images were obtained.  The EDSON probe was removed without difficulty and examined.  There was no evidence for bleeding.  The patient tolerated the procedure well without any immediate EDSON-related complications.      Preliminary Findings:  LVEF >55%  No ALTAGRACIA thrombus  No PFO  Mild MR    Full report to follow.      PLAN OF CARE:  [x] Return to inpatient bed when stable and fully awake.  [x] No eating or drinking for 1 hour      Results of procedure/plan of care discussed with patient/ in detail.

## 2024-04-22 NOTE — PROGRESS NOTE ADULT - ASSESSMENT
Patient is a 76yo M with PMH of HTN, DM, HLD, CAD (s/p stents), GERD, multiple strokes in the past (last in 2/2024), presented with acute diplopia and his exam showed 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 CTH showed no acute findings, CTP no deficit, CTA showed same known stenosis and no LVO. He is out of the window for TNK, and no LVO to intervene on.    He has ILR that was interrogated on 3/25/2024 and showed no A. fib. Repeat interrogation (4/22) (-) for Afib. TTE (4/20)/EDSON (4/22)    #Acute dorsal pontine stroke   #Subacute Infarct R splenium corpus collosum - new since prior admission  - Stroke Code CTH/CTP/CTA: Completed and reviewed by Neuro  - MRI Brain Non-con: Punctate acute infarct involving the dorsal aspect of chaparrita as well as a late subacute to chronic infarct involving the right splenium of the corpus callosum.  - F/u TTE w bubble study: EF 60%, G1DD, intact interatrial septum.  - EDSON - no ALTAGRACIA thrombus no PFO  - ILR interrogated- no events   - Neurochecks & Vitals q8 hrs  - C/w Aspirin 81mg and Plavix 75mg daily - may switch to Eliquis pending PRU  - C/w Atorvastatin decreased to 40mg daily  - Physiatry eval/Physical therapy/Occupational therapy/Speech and Swallow  - HbA1c 8.5, LDL 39  - scheduled for diagnostic cerebral angiogram on 4/23    #HTN   #HLD  #CAD  - Home meds are: amlodipine 10 mg daily, ramipril 10 mg daily, and metoprolol tartrate 50 mg bid  - Goal -160 per neuro   - Restarted home amlodipine 10 mg daily,  metoprolol tartrate 50 mg bid  - c/w lipitor     #DM A1C 8.5   - unclear if meds were fully reconciled on admission, will need to clarify home insulin regimen   - BG poorly controlled, but since pt is NPO for cerebral angiogram tomorrow, will only start low dose mealtime insulin   - 3u lipro tid w/ meals. Adjust regimen based on pts home dose       #) DVT PPX - lovenox     Dispo: scheduled for diagnostic cerebral angiogram tomorrow       Total time spent to complete patient's bedside assessment, review medical chart, discuss medical plan of care with covering medical team was more than 35 minutes  with >50% of time spent face to face with patient, discussion with patient/family and/or coordination of care. My note supersedes them medical resident note in case of discrepancy.      Madalyn Armendariz, DO

## 2024-04-22 NOTE — CONSULT NOTE ADULT - SUBJECTIVE AND OBJECTIVE BOX
Neuroendovascular Consult:   Consulted for: B/l carotid stenosis on CTA    HPI:  Patient is a 76yo M with PMH of HTN, DM, HLD, CAD (s/p stents), GERD, multiple strokes in the past (last in 2/2024), presented with diplopia. The patient woke this morning with diplopia that lasted for a few hours and resolved. He took a nap and woke up again at about 3:30 pm with diplopia again, that is still there. He also mentioned imbalance, and inability to walk without a walker. He states that he used to use a walker when he had his stroke in 2022, but he improved after rehab.   The patient was on aspirin and Brilinta but he had side effect on Brilinta that included breathing issues and diarrhea that caused to stop the medication and start him on clopidogrel about 3 days ago.  (19 Apr 2024 22:50)    Interval HPI: A neuroendovascular consult was placed for the finding of severe left carotid bulb stenosis and moderate right carotid bulb stenosis on CTA 4/19/24. No perfusion abnormalities with Tmax >6 s were reported on CTP, and <50% carotid stenosis was reported on US. A punctate dorsal pontine infarct and a right splenium of the corpus callosum infarct (subacute) as well as scattered chronic left frontal, occipital, and thalamic infarcts were seen on MRI 4/21/24. The patient reports to have been taking Plavix 75mg daily and Aspirin 81mg daily for many years, however, recently he was switched to Brilinta 90mg twice daily, which he was not able to tolerate (also not able to tolerate at 60mg) due to severe diarrhea x ~10 days. The patient switched back to Plavix and presented with acute infarcts a few days later. The patient is now on Plavix 75mg daily and Aspirin 81mg daily, awaiting a PRU test.     PAST MEDICAL & SURGICAL HISTORY:  Hypertension  Diabetes  Hyperlipidemia  GERD (gastroesophageal reflux disease)  Coronary artery disease  Diabetic polyneuropathy  No significant past surgical history    Pertinent PMHx     MEDICATIONS  (STANDING):  amLODIPine   Tablet 10 milliGRAM(s) Oral daily  aspirin enteric coated 81 milliGRAM(s) Oral daily  atorvastatin 40 milliGRAM(s) Oral at bedtime  clopidogrel Tablet 75 milliGRAM(s) Oral daily  dextrose 10% Bolus 125 milliLiter(s) IV Bolus once  dextrose 5%. 1000 milliLiter(s) (100 mL/Hr) IV Continuous <Continuous>  dextrose 5%. 1000 milliLiter(s) (50 mL/Hr) IV Continuous <Continuous>  dextrose 50% Injectable 12.5 Gram(s) IV Push once  dextrose 50% Injectable 25 Gram(s) IV Push once  enoxaparin Injectable 40 milliGRAM(s) SubCutaneous every 24 hours  glucagon  Injectable 1 milliGRAM(s) IntraMuscular once  influenza  Vaccine (HIGH DOSE) 0.7 milliLiter(s) IntraMuscular once  insulin lispro (ADMELOG) corrective regimen sliding scale   SubCutaneous three times a day before meals  magnesium oxide 400 milliGRAM(s) Oral two times a day with meals  melatonin 10 milliGRAM(s) Oral at bedtime  metoprolol tartrate 50 milliGRAM(s) Oral two times a day    MEDICATIONS  (PRN):  dextrose Oral Gel 15 Gram(s) Oral once PRN Blood Glucose LESS THAN 70 milliGRAM(s)/deciliter  polyethylene glycol 3350 17 Gram(s) Oral two times a day PRN Constipation  senna 2 Tablet(s) Oral at bedtime PRN Constipation    Allergies  No Known Allergies    Intolerances  eggs (Vomiting)    Social History:   Smoking: Yes [X]  No [ ]   __~5____pk yrs - quit 25 years ago  ETOH  Yes [ ]  No [ ]  Social [X]  DRUGS:  Yes [ ]  No [X]  if so what______________    FAMILY HISTORY:  Maternal history of diabetes mellitus (Mother)  Family history of heart attack (Mother)    Physical Exam:   Vital Signs Last 24 Hrs  T(C): 36.2 (22 Apr 2024 16:00), Max: 36.5 (21 Apr 2024 20:00)  T(F): 97.2 (22 Apr 2024 16:00), Max: 97.7 (21 Apr 2024 20:00)  HR: 87 (22 Apr 2024 16:00) (72 - 87)  BP: 151/88 (22 Apr 2024 16:00) (151/88 - 163/91)  BP(mean): 114 (22 Apr 2024 09:58) (112 - 120)  RR: 20 (22 Apr 2024 16:00) (18 - 20)  SpO2: 96% (22 Apr 2024 12:00) (95% - 96%)    Parameters below as of 22 Apr 2024 16:00  Patient On (Oxygen Delivery Method): room air    General:  NAD, appears stated age  Neuro: AAOx3 to person, place, time; following 2-step commands, moving all extremities AG with RUE/RLE proximal weakness (no drift/ present since prior infarcts), no observed dysmetria on FTN or HTS testing, no dysarthria or aphasia,  +left facial partial paralysis, +vertical skew deviation with left gaze palsy, +diplopia, PERRLA.    Radiology & Additional Studies:   Radiology imaging reviewed.     Assessment:   The patient is a 77-year-old male with a pMHx of HTN, CAD s/p stents, HLD, GERD, and prior strokes (2/2024) who presented with diplopia, NIHSS 4 on admission, and was found to have severe left carotid bulb stenosis and moderate right carotid bulb stenosis on CTA 4/19/24. No perfusion abnormalities with Tmax >6 s were reported on CTP, and <50% carotid stenosis was reported on US. A punctate dorsal pontine infarct and a right splenium of the corpus callosum infarct (subacute) as well as scattered chronic left frontal, occipital, and thalamic infarcts were seen on MRI 4/21/24. The risks vs. benefits of a diagnostic cerebral angiogram + possible carotid artery angioplasty and stenting were discussed with the patient and family at bedside. The patient was seen and examined by Dr. Crawley and the neuroendovascular ACP.     Suggestions:   - The patient is scheduled for a diagnostic cerebral angiogram tomorrow morning with Dr. Crawley.   - Coags/ CBC/ CMP will be needed this evening or early tomorrow AM pre procedure (scheduled transport at 6:15).   - NPO except medication after midnight tonight on normal saline IV as appropriate.  [id number] Neuroendovascular with additional questions/ concerns.      
HPI:  Patient is a 78yo M with PMH of HTN, DM, HLD, CAD (s/p stents), GERD, multiple strokes in the past (last in 2/2024), presented with diplopia. The patient woke this morning with diplopia that lasted for a few hours and resolved. He took a nap and woke up again at about 3:30 pm with diplopia again, that is still there. He also mentioned imbalance, and inability to walk without a walker. He states that he used to use a walker when he had his stroke in 2022, but he improved after rehab.   The patient was on aspirin and Brilinta but he had side effect on Brilinta that included breathing issues and diarrhea that caused to stop the medication and start him on clopidogrel about 3 days ago.  (19 Apr 2024 22:50)      PAST MEDICAL & SURGICAL HISTORY:  Hypertension      Diabetes      Hyperlipidemia      GERD (gastroesophageal reflux disease)      Coronary artery disease      Diabetic polyneuropathy      No significant past surgical history          Hospital Course:    TODAY'S SUBJECTIVE & REVIEW OF SYMPTOMS:     Constitutional WNL   Cardio WNL   Resp WNL   GI WNL  Heme WNL  Endo WNL  Skin WNL  MSK WNL  Neuro WNL  Cognitive WNL  Psych WNL      MEDICATIONS  (STANDING):  aspirin enteric coated 81 milliGRAM(s) Oral daily  atorvastatin 80 milliGRAM(s) Oral at bedtime  clopidogrel Tablet 75 milliGRAM(s) Oral daily  dextrose 10% Bolus 125 milliLiter(s) IV Bolus once  dextrose 5%. 1000 milliLiter(s) (100 mL/Hr) IV Continuous <Continuous>  dextrose 5%. 1000 milliLiter(s) (50 mL/Hr) IV Continuous <Continuous>  dextrose 50% Injectable 25 Gram(s) IV Push once  dextrose 50% Injectable 12.5 Gram(s) IV Push once  enoxaparin Injectable 40 milliGRAM(s) SubCutaneous every 24 hours  glucagon  Injectable 1 milliGRAM(s) IntraMuscular once  influenza  Vaccine (HIGH DOSE) 0.7 milliLiter(s) IntraMuscular once  insulin lispro (ADMELOG) corrective regimen sliding scale   SubCutaneous three times a day before meals  magnesium oxide 400 milliGRAM(s) Oral two times a day with meals  melatonin 10 milliGRAM(s) Oral at bedtime  metoprolol tartrate 50 milliGRAM(s) Oral two times a day    MEDICATIONS  (PRN):  dextrose Oral Gel 15 Gram(s) Oral once PRN Blood Glucose LESS THAN 70 milliGRAM(s)/deciliter  polyethylene glycol 3350 17 Gram(s) Oral two times a day PRN Constipation  senna 2 Tablet(s) Oral at bedtime PRN Constipation      FAMILY HISTORY:  Maternal history of diabetes mellitus (Mother)    Family history of heart attack (Mother)        Allergies    No Known Allergies    Intolerances    eggs (Vomiting)      SOCIAL HISTORY:    [  ] Etoh  [  ] Smoking  [  ] Substance abuse     Home Environment:  [ x ] Home Alone  [  ] Lives with Family  [  ] Home Health Aid    Dwelling:  [  ] Apartment  [  ] Private House  [  ] Adult Home  [  ] Skilled Nursing Facility      [  ] Short Term  [  ] Long Term  [ x ] Stairs-5-6 steps to enter       Elevator [  ]    FUNCTIONAL STATUS PTA: (Check all that apply)  Ambulation: [ x  ]Independent    [  ] Dependent     [  ] Non-Ambulatory  Assistive Device: [  ] SA Cane  [  ]  Q Cane  [ x ] Patient reports he didn't require; however, he used a walker at times.  [  ]  Wheelchair  ADL : [  ] Independent  [  ]  Dependent       Vital Signs Last 24 Hrs  T(C): 36.2 (20 Apr 2024 12:00), Max: 36.7 (19 Apr 2024 18:02)  T(F): 97.2 (20 Apr 2024 12:00), Max: 98.1 (19 Apr 2024 18:02)  HR: 94 (20 Apr 2024 14:02) (80 - 110)  BP: 140/84 (20 Apr 2024 14:02) (110/73 - 164/93)  BP(mean): 99 (20 Apr 2024 04:00) (99 - 99)  RR: 18 (20 Apr 2024 12:00) (18 - 18)  SpO2: 95% (20 Apr 2024 12:00) (95% - 98%)    Parameters below as of 20 Apr 2024 12:00  Patient On (Oxygen Delivery Method): room air          PHYSICAL EXAM: Alert & Oriented X3  GENERAL: NAD, well-groomed, well-developed  HEAD:  Atraumatic, Normocephalic  EYES: EOMI, PERRLA, conjunctiva and sclera clear  NECK: Supple, No JVD, Normal thyroid  CHEST/LUNG: Clear bilaterally; No rales, rhonchi, wheezing, or rubs  HEART: Regular rate and rhythm; No murmurs, rubs, or gallops  ABDOMEN: Soft, Nontender, Nondistended; Bowel sounds present  EXTREMITIES:  2+ Peripheral Pulses, No clubbing, cyanosis, or edema    NERVOUS SYSTEM:  Cranial Nerves 2-12 intact [  ] Abnormal  [ x ] decreased left 6th cranial nerve with no left eye abduction; some modest to mild  decreased left nasolabial fold  ROM: WFL all extremities [  ]  Abnormal [  ]  Motor Strength: WFL all extremities  [  ]  Abnormal [x  ] right hemiparesis 5-/5  Sensation: intact to light touch [ x ] Abnormal [  ]  Reflexes: Symmetric [  ]  Abnormal [  ]    FUNCTIONAL STATUS:  Bed Mobility: Independent [  ]  Supervision [  ]  Needs Assistance [  ]  N/A [  ]  Transfers: Independent [  ]  Supervision [  ]  Needs Assistance [  ]  N/A [  ]   Ambulation: Independent [  ]  Supervision [  ]  Needs Assistance [  ]  N/A [  ]  ADL: Independent [  ] Requires Assistance [  ] N/A [  ]      LABS:                        14.8   13.13 )-----------( 299      ( 19 Apr 2024 20:27 )             43.6     04-20    138  |  101  |  21<H>  ----------------------------<  202<H>  3.9   |  21  |  1.1    Ca    8.5      20 Apr 2024 06:07    TPro  8.0  /  Alb  4.7  /  TBili  0.8  /  DBili  x   /  AST  13  /  ALT  16  /  AlkPhos  128<H>  04-19    PT/INR - ( 19 Apr 2024 20:27 )   PT: 10.70 sec;   INR: 0.94 ratio         PTT - ( 19 Apr 2024 20:27 )  PTT:35.1 sec  Urinalysis Basic - ( 20 Apr 2024 06:07 )    Color: x / Appearance: x / SG: x / pH: x  Gluc: 202 mg/dL / Ketone: x  / Bili: x / Urobili: x   Blood: x / Protein: x / Nitrite: x   Leuk Esterase: x / RBC: x / WBC x   Sq Epi: x / Non Sq Epi: x / Bacteria: x        RADIOLOGY & ADDITIONAL STUDIES:    Assesment:

## 2024-04-23 ENCOUNTER — APPOINTMENT (OUTPATIENT)
Dept: CARDIOLOGY | Facility: CLINIC | Age: 79
End: 2024-04-23

## 2024-04-23 ENCOUNTER — APPOINTMENT (OUTPATIENT)
Dept: NEUROLOGY | Facility: HOSPITAL | Age: 79
End: 2024-04-23

## 2024-04-23 LAB
ANION GAP SERPL CALC-SCNC: 17 MMOL/L — HIGH (ref 7–14)
APTT BLD: 37.2 SEC — SIGNIFICANT CHANGE UP (ref 27–39.2)
BASOPHILS # BLD AUTO: 0.04 K/UL — SIGNIFICANT CHANGE UP (ref 0–0.2)
BASOPHILS NFR BLD AUTO: 0.4 % — SIGNIFICANT CHANGE UP (ref 0–1)
BUN SERPL-MCNC: 17 MG/DL — SIGNIFICANT CHANGE UP (ref 10–20)
CALCIUM SERPL-MCNC: 8.9 MG/DL — SIGNIFICANT CHANGE UP (ref 8.4–10.4)
CHLORIDE SERPL-SCNC: 102 MMOL/L — SIGNIFICANT CHANGE UP (ref 98–110)
CO2 SERPL-SCNC: 20 MMOL/L — SIGNIFICANT CHANGE UP (ref 17–32)
CREAT SERPL-MCNC: 1.1 MG/DL — SIGNIFICANT CHANGE UP (ref 0.7–1.5)
EGFR: 69 ML/MIN/1.73M2 — SIGNIFICANT CHANGE UP
EOSINOPHIL # BLD AUTO: 0.39 K/UL — SIGNIFICANT CHANGE UP (ref 0–0.7)
EOSINOPHIL NFR BLD AUTO: 4.3 % — SIGNIFICANT CHANGE UP (ref 0–8)
GLUCOSE SERPL-MCNC: 174 MG/DL — HIGH (ref 70–99)
HCT VFR BLD CALC: 41.1 % — LOW (ref 42–52)
HGB BLD-MCNC: 14.3 G/DL — SIGNIFICANT CHANGE UP (ref 14–18)
IMM GRANULOCYTES NFR BLD AUTO: 0.3 % — SIGNIFICANT CHANGE UP (ref 0.1–0.3)
INR BLD: 0.95 RATIO — SIGNIFICANT CHANGE UP (ref 0.65–1.3)
LYMPHOCYTES # BLD AUTO: 1.65 K/UL — SIGNIFICANT CHANGE UP (ref 1.2–3.4)
LYMPHOCYTES # BLD AUTO: 18.1 % — LOW (ref 20.5–51.1)
MAGNESIUM SERPL-MCNC: 1.9 MG/DL — SIGNIFICANT CHANGE UP (ref 1.8–2.4)
MCHC RBC-ENTMCNC: 29 PG — SIGNIFICANT CHANGE UP (ref 27–31)
MCHC RBC-ENTMCNC: 34.8 G/DL — SIGNIFICANT CHANGE UP (ref 32–37)
MCV RBC AUTO: 83.4 FL — SIGNIFICANT CHANGE UP (ref 80–94)
MONOCYTES # BLD AUTO: 1.01 K/UL — HIGH (ref 0.1–0.6)
MONOCYTES NFR BLD AUTO: 11.1 % — HIGH (ref 1.7–9.3)
NEUTROPHILS # BLD AUTO: 5.99 K/UL — SIGNIFICANT CHANGE UP (ref 1.4–6.5)
NEUTROPHILS NFR BLD AUTO: 65.8 % — SIGNIFICANT CHANGE UP (ref 42.2–75.2)
NRBC # BLD: 0 /100 WBCS — SIGNIFICANT CHANGE UP (ref 0–0)
PLATELET # BLD AUTO: 251 K/UL — SIGNIFICANT CHANGE UP (ref 130–400)
PMV BLD: 9.2 FL — SIGNIFICANT CHANGE UP (ref 7.4–10.4)
POTASSIUM SERPL-MCNC: 3.9 MMOL/L — SIGNIFICANT CHANGE UP (ref 3.5–5)
POTASSIUM SERPL-SCNC: 3.9 MMOL/L — SIGNIFICANT CHANGE UP (ref 3.5–5)
PROTHROM AB SERPL-ACNC: 10.8 SEC — SIGNIFICANT CHANGE UP (ref 9.95–12.87)
RBC # BLD: 4.93 M/UL — SIGNIFICANT CHANGE UP (ref 4.7–6.1)
RBC # FLD: 13.2 % — SIGNIFICANT CHANGE UP (ref 11.5–14.5)
SODIUM SERPL-SCNC: 139 MMOL/L — SIGNIFICANT CHANGE UP (ref 135–146)
WBC # BLD: 9.11 K/UL — SIGNIFICANT CHANGE UP (ref 4.8–10.8)
WBC # FLD AUTO: 9.11 K/UL — SIGNIFICANT CHANGE UP (ref 4.8–10.8)

## 2024-04-23 PROCEDURE — 36223 PLACE CATH CAROTID/INOM ART: CPT | Mod: 50

## 2024-04-23 PROCEDURE — 36225 PLACE CATH SUBCLAVIAN ART: CPT | Mod: 50

## 2024-04-23 PROCEDURE — 99233 SBSQ HOSP IP/OBS HIGH 50: CPT

## 2024-04-23 PROCEDURE — 99232 SBSQ HOSP IP/OBS MODERATE 35: CPT

## 2024-04-23 PROCEDURE — 99233 SBSQ HOSP IP/OBS HIGH 50: CPT | Mod: 25

## 2024-04-23 PROCEDURE — 76377 3D RENDER W/INTRP POSTPROCES: CPT | Mod: 26

## 2024-04-23 PROCEDURE — 76937 US GUIDE VASCULAR ACCESS: CPT | Mod: 26

## 2024-04-23 RX ORDER — INSULIN GLARGINE 100 [IU]/ML
30 INJECTION, SOLUTION SUBCUTANEOUS EVERY MORNING
Refills: 0 | Status: DISCONTINUED | OUTPATIENT
Start: 2024-04-24 | End: 2024-04-24

## 2024-04-23 RX ORDER — APIXABAN 2.5 MG/1
5 TABLET, FILM COATED ORAL EVERY 12 HOURS
Refills: 0 | Status: DISCONTINUED | OUTPATIENT
Start: 2024-04-23 | End: 2024-04-24

## 2024-04-23 RX ORDER — SODIUM CHLORIDE 9 MG/ML
1000 INJECTION INTRAMUSCULAR; INTRAVENOUS; SUBCUTANEOUS
Refills: 0 | Status: DISCONTINUED | OUTPATIENT
Start: 2024-04-23 | End: 2024-04-24

## 2024-04-23 RX ADMIN — MAGNESIUM OXIDE 400 MG ORAL TABLET 400 MILLIGRAM(S): 241.3 TABLET ORAL at 17:37

## 2024-04-23 RX ADMIN — Medication 10 MILLIGRAM(S): at 21:58

## 2024-04-23 RX ADMIN — AMLODIPINE BESYLATE 10 MILLIGRAM(S): 2.5 TABLET ORAL at 05:15

## 2024-04-23 RX ADMIN — Medication 3 UNIT(S): at 12:40

## 2024-04-23 RX ADMIN — Medication 50 MILLIGRAM(S): at 05:15

## 2024-04-23 RX ADMIN — CLOPIDOGREL BISULFATE 75 MILLIGRAM(S): 75 TABLET, FILM COATED ORAL at 11:07

## 2024-04-23 RX ADMIN — Medication 6: at 12:40

## 2024-04-23 RX ADMIN — ENOXAPARIN SODIUM 40 MILLIGRAM(S): 100 INJECTION SUBCUTANEOUS at 05:14

## 2024-04-23 RX ADMIN — Medication 6: at 17:37

## 2024-04-23 RX ADMIN — Medication 50 MILLIGRAM(S): at 17:37

## 2024-04-23 RX ADMIN — Medication 81 MILLIGRAM(S): at 11:07

## 2024-04-23 RX ADMIN — Medication 3 UNIT(S): at 17:38

## 2024-04-23 RX ADMIN — ATORVASTATIN CALCIUM 40 MILLIGRAM(S): 80 TABLET, FILM COATED ORAL at 21:58

## 2024-04-23 NOTE — CHART NOTE - NSCHARTNOTEFT_GEN_A_CORE
PACU ANESTHESIA ADMISSION NOTE      Procedure:   Post op diagnosis:      ____  Intubated  TV:______       Rate: ______      FiO2: ______    __x__  Patent Airway    __x__  Full return of protective reflexes    __x__  Full recovery from anesthesia / back to baseline status    Vitals:  T(C): 36.3 (04-23-24 @ 07:52), Max: 36.3 (04-22-24 @ 20:00)  HR: 83 (04-23-24 @ 07:52) (76 - 87)  BP: 146/87 (04-23-24 @ 07:52) (139/79 - 156/85)  RR: 20 (04-23-24 @ 07:52) (16 - 20)  SpO2: 97% (04-23-24 @ 07:52) (96% - 98%)    Mental Status:  __x__ Awake   ___x__ Alert   _____ Drowsy   _____ Sedated    Nausea/Vomiting:  __x__ NO  ______Yes,   See Post - Op Orders          Pain Scale (0-10):  ____0_    Treatment: ____ None    __x__ See Post - Op/PCA Orders    Post - Operative Fluids:   ____ Oral   __x__ See Post - Op Orders    Plan: Discharge:   ____Home       ___x__Floor     _____Critical Care    _____  Other:_________________    Comments: Patient had smooth intraoperative event, no anesthesia complication.  PACU Vital signs: HR:    78        BP:    123    / 72        RR:  17           O2 Sat:   94    %  room air   Temp 98

## 2024-04-23 NOTE — BRIEF OPERATIVE NOTE - OPERATION/FINDINGS
Procedure: Diagnostic Cerebral Angiogram   Contrast: Visipaque 320   IA medications: Heparin 2000 IU, Verapamil 5.0mg, Nitroglycerin 200 mcg  Implants placed: None  Complications: None    Preliminary Report: A diagnostic cerebral angiogram was performed using a right radial arteriotomy site. On preliminary review, the patient was not found to have carotid stenosis significant enough to warrant endovascular placement of a carotid stent at this time. The patient was also noted to have right V1 severe stenosis, which warrants further monitoring.  Please continue to monitor the arteriotomy site for signs of hematoma/ bleeding/ infection or for diminished or absent distal pulses or temperature/ color changes. Notify a provider if any of the above is noticed or with any additional questions/ concerns.   NIHSS pre procedure: 4  NIHSS post procedure: 4  Extremity: The right radial artery remains c/d/i with no sign of bleeding, hematoma formation,    Official IR neuro procedure note is to follow.      Procedure: Diagnostic Cerebral Angiogram   Contrast: Visipaque 320   IA medications: Heparin 2000 IU, Verapamil 5.0mg, Nitroglycerin 200 mcg  Implants placed: None  Complications: None    Preliminary Report: A diagnostic cerebral angiogram was performed using a right radial arteriotomy site. On preliminary review, the patient was not found to have carotid stenosis significant enough to warrant endovascular placement of a carotid stent at this time. The patient was also noted to have right V1 severe stenosis, which warrants further monitoring.  Please continue to monitor the arteriotomy site for signs of hematoma/ bleeding/ infection or for diminished or absent distal pulses or temperature/ color changes. Notify a provider if any of the above is noticed or with any additional questions/ concerns.   NIHSS pre procedure: 4  NIHSS post procedure: 4  Extremity: The right radial artery remains c/d/i with no sign of bleeding, hematoma formation, swelling, or changes in color/ temperature between b/l LE. Radial pulse intact to palpation b/l.     Official IR neuro procedure note is to follow.

## 2024-04-23 NOTE — BRIEF OPERATIVE NOTE - NSICDXBRIEFPOSTOP_GEN_ALL_CORE_FT
POST-OP DIAGNOSIS:  Bilateral carotid artery stenosis 23-Apr-2024 15:38:08  Lashawn Awan  Stenosis of intracranial vessel 23-Apr-2024 15:38:53  Lashawn Awan

## 2024-04-23 NOTE — BRIEF OPERATIVE NOTE - COMMENTS
Continue to follow post-procedural orders for 2 hours  No acute neuroendovascular intervention is warranted at this time   A TR band was applied to the right radial access site and inflated with 12 cc of air to be deflated 3cc q 15 min until fully deflated

## 2024-04-23 NOTE — PRE PROCEDURE NOTE - PRE PROCEDURE EVALUATION
Interventional Neuro Radiology  Pre-Procedure Note HERSON    HPI:  77-year-old male with a pMHx of HTN, CAD s/p stents, HLD, GERD, and prior strokes (2/2024) who presented with diplopia, NIHSS 4 on admission, and was found to have severe left carotid bulb stenosis and moderate right carotid bulb stenosis on CTA 4/19/24. No perfusion abnormalities with Tmax >6 s were reported on CTP, and <50% carotid stenosis was reported on US. A punctate dorsal pontine infarct and a right splenium of the corpus callosum infarct (subacute) as well as scattered chronic left frontal, occipital, and thalamic infarcts were seen on MRI 4/21/24.   NIHSS 5 for gaze palsy, dysarthria, and  facial asymmetry. The patient also has RUE/ RLE weakness and reduced right  strength, which is baseline from a prior stroke.     Allergies: eggs (Vomiting)  No Known Allergies    PAST MEDICAL & SURGICAL HISTORY:  Hypertension  Diabetes  Hyperlipidemia  GERD (gastroesophageal reflux disease)  Coronary artery disease  Diabetic polyneuropathy  No significant past surgical history    FAMILY HISTORY:  Maternal history of diabetes mellitus (Mother)  Family history of heart attack (Mother)    Current Medications: amLODIPine   Tablet 10 milliGRAM(s) Oral daily  aspirin enteric coated 81 milliGRAM(s) Oral daily  atorvastatin 40 milliGRAM(s) Oral at bedtime  clopidogrel Tablet 75 milliGRAM(s) Oral daily  dextrose 10% Bolus 125 milliLiter(s) IV Bolus once  dextrose 5%. 1000 milliLiter(s) IV Continuous <Continuous>  dextrose 5%. 1000 milliLiter(s) IV Continuous <Continuous>  dextrose 50% Injectable 12.5 Gram(s) IV Push once  dextrose 50% Injectable 25 Gram(s) IV Push once  dextrose Oral Gel 15 Gram(s) Oral once PRN  enoxaparin Injectable 40 milliGRAM(s) SubCutaneous every 24 hours  glucagon  Injectable 1 milliGRAM(s) IntraMuscular once  influenza  Vaccine (HIGH DOSE) 0.7 milliLiter(s) IntraMuscular once  insulin lispro (ADMELOG) corrective regimen sliding scale   SubCutaneous three times a day before meals  insulin lispro Injectable (ADMELOG) 3 Unit(s) SubCutaneous three times a day before meals  magnesium oxide 400 milliGRAM(s) Oral two times a day with meals  melatonin 10 milliGRAM(s) Oral at bedtime  metoprolol tartrate 50 milliGRAM(s) Oral two times a day  polyethylene glycol 3350 17 Gram(s) Oral two times a day PRN  senna 2 Tablet(s) Oral at bedtime PRN    Assessment/Plan:   This is a 79-year-old male who presents for evaluation of severe left and moderate right carotid bulb stenosis with posterior brain perfusion dependance on the ICA supply. Procedure, goals, risks, benefits and alternatives were discussed with patient and patient's family.  All questions were answered to best understanding.   Risks discussed include but are not limited to stroke, vessel injury, hemorrhage, and/or hematoma.  Patient demonstrates understanding of all risks involved with this procedure and wishes to continue.     Appropriate consent was obtained from patient and consent is in the patient's chart.

## 2024-04-23 NOTE — PROGRESS NOTE ADULT - SUBJECTIVE AND OBJECTIVE BOX
MARIA M CROFT  79y, Male  Allergy: eggs (Vomiting)  No Known Allergies    Hospital Day: 4d    Patient seen and examined earlier today. Feeling well post procedure, no complaints.     PMH/PSH:  PAST MEDICAL & SURGICAL HISTORY:  Hypertension      Diabetes      Hyperlipidemia      GERD (gastroesophageal reflux disease)      Coronary artery disease      Diabetic polyneuropathy      No significant past surgical history          LAST 24-Hr EVENTS:    VITALS:  T(F): 98 (04-23-24 @ 10:00), Max: 98 (04-23-24 @ 10:00)  HR: 84 (04-23-24 @ 17:32)  BP: 138/86 (04-23-24 @ 17:32) (118/68 - 156/85)  RR: 16 (04-23-24 @ 12:00)  SpO2: 99% (04-23-24 @ 12:00)        TESTS & MEASUREMENTS:  Weight (Kg): 98.4 (04-23-24 @ 07:52)  BMI (kg/m2): 29.4 (04-23)    04-22-24 @ 07:01  -  04-23-24 @ 07:00  --------------------------------------------------------  IN: 0 mL / OUT: 400 mL / NET: -400 mL                            14.3   9.11  )-----------( 251      ( 23 Apr 2024 06:12 )             41.1     PT/INR - ( 23 Apr 2024 06:12 )   PT: 10.80 sec;   INR: 0.95 ratio         PTT - ( 23 Apr 2024 06:12 )  PTT:37.2 sec  04-23    139  |  102  |  17  ----------------------------<  174<H>  3.9   |  20  |  1.1    Ca    8.9      23 Apr 2024 06:12  Mg     1.9     04-23              Culture - Urine (collected 04-19-24 @ 22:56)  Source: Clean Catch Clean Catch (Midstream)  Final Report (04-21-24 @ 07:17):    <10,000 CFU/mL Normal Urogenital Blanca      Urinalysis Basic - ( 23 Apr 2024 06:12 )    Color: x / Appearance: x / SG: x / pH: x  Gluc: 174 mg/dL / Ketone: x  / Bili: x / Urobili: x   Blood: x / Protein: x / Nitrite: x   Leuk Esterase: x / RBC: x / WBC x   Sq Epi: x / Non Sq Epi: x / Bacteria: x                  RADIOLOGY, ECG, & ADDITIONAL TESTS:      RECENT DIAGNOSTIC ORDERS:      MEDICATIONS:  MEDICATIONS  (STANDING):  amLODIPine   Tablet 10 milliGRAM(s) Oral daily  aspirin enteric coated 81 milliGRAM(s) Oral daily  atorvastatin 40 milliGRAM(s) Oral at bedtime  clopidogrel Tablet 75 milliGRAM(s) Oral daily  dextrose 10% Bolus 125 milliLiter(s) IV Bolus once  dextrose 5%. 1000 milliLiter(s) (50 mL/Hr) IV Continuous <Continuous>  dextrose 5%. 1000 milliLiter(s) (100 mL/Hr) IV Continuous <Continuous>  dextrose 50% Injectable 12.5 Gram(s) IV Push once  dextrose 50% Injectable 25 Gram(s) IV Push once  enoxaparin Injectable 40 milliGRAM(s) SubCutaneous every 24 hours  glucagon  Injectable 1 milliGRAM(s) IntraMuscular once  influenza  Vaccine (HIGH DOSE) 0.7 milliLiter(s) IntraMuscular once  insulin lispro (ADMELOG) corrective regimen sliding scale   SubCutaneous three times a day before meals  insulin lispro Injectable (ADMELOG) 3 Unit(s) SubCutaneous three times a day before meals  magnesium oxide 400 milliGRAM(s) Oral two times a day with meals  melatonin 10 milliGRAM(s) Oral at bedtime  metoprolol tartrate 50 milliGRAM(s) Oral two times a day  sodium chloride 0.9%. 1000 milliLiter(s) (50 mL/Hr) IV Continuous <Continuous>    MEDICATIONS  (PRN):  dextrose Oral Gel 15 Gram(s) Oral once PRN Blood Glucose LESS THAN 70 milliGRAM(s)/deciliter  polyethylene glycol 3350 17 Gram(s) Oral two times a day PRN Constipation  senna 2 Tablet(s) Oral at bedtime PRN Constipation      HOME MEDICATIONS:  acetaminophen 325 mg oral tablet (08-28)  amLODIPine 10 mg oral tablet (04-19)  aspirin 81 mg oral delayed release tablet (08-11)  atorvastatin 80 mg oral tablet (08-28)  citalopram 10 mg oral tablet (08-11)  enoxaparin (08-30)  famotidine 40 mg oral tablet (08-04)  hydrocortisone 1% topical cream (08-28)  insulin lispro 100 units/mL injectable solution (08-28)  Lantus 100 units/mL subcutaneous solution (08-28)  lisinopril 40 mg oral tablet (08-28)  magnesium oxide 400 mg oral tablet (08-28)  melatonin 10 mg oral tablet (08-28)  metoprolol tartrate 50 mg oral tablet (08-04)  Plavix 75 mg oral tablet (08-30)  polyethylene glycol 3350 oral powder for reconstitution (08-28)  ramipril 10 mg oral capsule (04-19)  senna leaf extract oral tablet (08-28)      PHYSICAL EXAM:  GEN: No acute distress  LUNGS: Clear to auscultation bilaterally   HEART: S1/S2 present. RRR.   ABD: Soft, non-tender, non-distended. Bowel sounds present  EXT: warm well perfused no edema   NEURO: AAOX3, facial droop noted

## 2024-04-23 NOTE — CHART NOTE - NSCHARTNOTESELECT_GEN_ALL_CORE
Cardiology
EDSON/Event Note
EDSON/Event Note
Electrophysiology/Event Note
Electrophysiology PA
PRU/Event Note
Transfer Note

## 2024-04-23 NOTE — PROGRESS NOTE ADULT - ASSESSMENT
Patient is a 78yo M with PMH of HTN, DM, HLD, CAD (s/p stents), GERD, multiple strokes in the past (last in 2/2024), presented with acute diplopia and his exam showed 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 CTH showed no acute findings, CTP no deficit, CTA showed same known stenosis and no LVO. He is out of the window for TNK, and no LVO to intervene on.    He has ILR that was interrogated on 3/25/2024 and showed no A. fib. Repeat interrogation (4/22) (-) for Afib. TTE (4/20)/EDSON (4/22)    #Acute dorsal pontine stroke   #Subacute Infarct R splenium corpus collosum - new since prior admission  - Stroke Code CTH/CTP/CTA: Completed and reviewed by Neuro  - MRI Brain Non-con: Punctate acute infarct involving the dorsal aspect of chaparrita as well as a late subacute to chronic infarct involving the right splenium of the corpus callosum.  - F/u TTE w bubble study: EF 60%, G1DD, intact interatrial septum.  - EDSON - no ALTAGRACIA thrombus no PFO  - ILR interrogated- no events   - Neurochecks & Vitals q8 hrs  - C/w Aspirin 81mg and Plavix 75mg daily - may switch to Eliquis pending PRU  - C/w Atorvastatin decreased to 40mg daily  - Physiatry eval/Physical therapy/Occupational therapy/Speech and Swallow  - HbA1c 8.5, LDL 39  - scheduled for diagnostic cerebral angiogram today     #HTN   #HLD  #CAD  - Home meds are: amlodipine 10 mg daily, ramipril 10 mg daily, and metoprolol tartrate 50 mg bid  - Goal -160 per neuro   - Restarted home amlodipine 10 mg daily,  metoprolol tartrate 50 mg bid  - c/w lipitor     #DM A1C 8.5   - Patient takes 30u Lantus q AM and Metformin 1000mg BID at home   - restart home Lantus 30u post procedure   - 3u lipro tid w/ meals. Adjust regimen based on glycemic control       #) DVT PPX - lovenox     Management as per Neurology, Medicine for comanagement       Total time spent to complete patient's bedside assessment, review medical chart, discuss medical plan of care with covering medical team was more than 35 minutes  with >50% of time spent face to face with patient, discussion with patient/family and/or coordination of care. My note supersedes them medical resident note in case of discrepancy.      Madalyn Armendariz, DO

## 2024-04-23 NOTE — PROGRESS NOTE ADULT - ATTENDING COMMENTS
79 year old man w/ PMH of ischemic stroke (2022, L. MCA/PCA infarction w/ residual RHP, currently on ASA 81mg + Plavix 75mg), HTN, DM, HLD, CAD s/p stents, presented w/ acute onset double vision and L. facial droop. Of note, patient w/ stroke 2022, was on ASA/Plavix, switched to ASA/Brillinta after 2024 MRI demonstrates punctate bilateral infarction, developed "watery stools" on Brillinta, and switched back to ASA and Plavix 3 days prior to admission.     On exam, fluent, fc; On primary gaze, OS is hypertropic; Cannot abduct OD past 45 degrees from midline and cannot abduct OS 30 degrees past midline; Mild L. facial droop w/ subtle decrease in forehead wrinkle; L. eye appears larger than the left. RUE drift, RLE drift, FNF dysmetria bilaterally, H2S ataxia on the left.     MR brain 8.3.24 infarction in the L. temporal occipital (PCA) and L. frontal (MCA).   MR brain .24 demonstrates diffusion restriction in the R. CR and subcortical white matter of the L. occipitotemporal lobe.  CTA h/n 4..24 severe carotid bifurcation and bulb stenosis; Moderate R. carotid bifurcation and bulb stenosis. L. Fetal PCA.   TTE neg  MR brain L. dorsal pontine infarction; Subacute to chronic infarction of the R. splenium of the corpus collosum   Bilateral carotid dopplers <50% stenosis bilaterally     Imp:   1, L. gaze palsy + L. 6N palsy + L. 7N palsy secondary to L. dorstal pontine infarction. Mechanism likely . Cannot rule out a central embolic source.   2. Symptomatic vs. Asymptomatic L. ?moderate-grade carotid stenosis - looks more like moderate stenosis on CTA (to my eye) and being read as <50% stenosis on doppler. Its possible that the L. cerebral infarction involving the L. MCA/PCA (given fetal L. PCA) may have been due to the carotid (non-stenotic carotid disease), though uncertain. The most recent subclinical infarction may have also been related to the L. carotid disease (although there was infarction on the contralateral side as well).    3. Asymptomatic mild-moderate-grade R. carotid stenosis     Plan:   Continue ASA 81mg + Plavix 75mg for 3w followed by ASA 81mg indefinitely. PRU; May need to . Maybe to GP IIb/IIIa inhibitor   EDSON given multiple infarctions possibly embolic in orgin   Continue statin   MR abdomen to eval renal mass - no mass; small renal cyst; cholelithiasis   ILR - needs interrogation   Will consider AMINA consult Monday to discuss necessity of revascularization     50 minutes spent on total encounter. The necessity of the time spent during the encounter on this date of service was due to:     Review of imaging and chart; obtaining history; examination of pt; discussion and coordination of care, and discussion of lifestyle modification and risk factor control.
Pt is a 78 yo M with PMHx of HTN, psoriatic arthritis, HLD, CAD s/p PCI, former smoker, prior scattered left MCA territory ischemic strokes 08/2022, right frontal periventricular and acute left posterior parietal periventricular ischemic strokes 02/2024, who presented with left CN VI and VII (lower neuron) palsy. NIHSS 3 (left upper/lower facial plegia)    Impr: left caudal tegmental pontine acute ischemic stroke and subacute in right splenium of corpus callosum  CTA head/neck with suggested severe left ICA stenosis  EDSON without clot/shunt  ILR interrogated, no afib noted  Prior to presentation, pt had just stopped brilinta due to side effects and restarted plavix (~3 days prior to stroke). Thus etiology of stroke possibly related to subtherapeutic plavix  DSA tomorrow for closer eval of left ICA (currently asymptomatic but has been symptomatic in the past  Will discuss further options regarding ATP vs AC for secondary stroke prevention pending DSA results
Pt is a 78 yo M with PMHx of HTN, psoriatic arthritis, HLD, CAD s/p PCI, former smoker, prior scattered left MCA territory ischemic strokes 08/2022, right frontal periventricular and acute left posterior parietal periventricular ischemic strokes 02/2024, who presented with left CN VI and VII (lower neuron) palsy. NIHSS 3 (left upper/lower facial plegia)    Impr: left caudal tegmental pontine acute ischemic stroke and subacute in right splenium of corpus callosum  CTA head/neck with suggested severe left ICA stenosis  EDSON without clot/shunt  ILR interrogated, no afib noted  Prior to presentation, pt had just stopped brilinta due to side effects and restarted plavix (~3 days prior to stroke). Thus etiology of stroke possibly related to subtherapeutic plavix  S/p DSA, no severe ICA stenosis noted, however R V1 noted with severe stenosis, possibly symptomatic.  PTA: ASA/plavix-> [id number] daily, d/c plavix, and start eliquis 5mg BID  Dispo planning

## 2024-04-23 NOTE — PRE-ANESTHESIA EVALUATION ADULT - NSANTHAIRWAYFT_ENT_ALL_CORE
FROM Cervical spine, TMD > 2 FB, Normal oral aperture.   Limited mouth opening secondary to the stroke

## 2024-04-23 NOTE — PROGRESS NOTE ADULT - ASSESSMENT
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 CTH showed no acute findings, CTP no deficit, CTA showed same known stenosis and no LVO. He is out of the window for TNK, and no LVO to intervene on.    He has ILR that was interrogated on 3/25/2024 and showed no A. fib. Repeat interrogation () (-) for Afib. TTE ()/EDSON (). PRU showed suboptimal treatment of plavix. Given side effects with Brilinta (SOB/GI distress), will consider transitioning to Eliquis and ASA therapy. DSA (): Carotid stenosis <50%, no stent placed, severe R V1 stenosis noted.      NEUROLOGY  #Acute dorsal pontine stroke despite ASA/PLVX therapy etiology  vs ESUS  #Subacute Infarct R splenium corpus collosum - new since prior admission  #Severe R V1 Stenosis, Carotid stenosis did not warrant stenting on DSA ()  - Stroke Code CTH/CTP/CTA: Completed and reviewed  - Neurochecks & Vitals q8 hrs  - C/w Aspirin 81mg and Plavix 75mg daily - may switch to Eliquis pending PRU/DSA  - C/w Atorvastatin decreased to 40mg daily  - C/w DVT PPX as below    CARDIOLOGY  #Hypertension  - Home meds are: amlodipine 10 mg daily, ramipril 10 mg daily, and metoprolol tartrate 50 mg bid  - Stroke Code EKG Results: Reviewed  - Goal -160  - Restarted home amlodipine 10 mg daily,  metoprolol tartrate 50 mg bid    #HLD - LDL 39  - high dose statin as above     PULMONOLOGY  - call provider if SPO2 < 94%    GATROENTEROLOGY  - Speech and Swallow Eval: Easy to chew  - Diet: Dash/CC - easy to chew     RENAL/ELECTROLYTES  - Replete K<4 and Mg <2    INFECTIOUS DISEASE  #Mild leukocytosis - no overt signs of infection - possibly reactive - resolved  - UA (-)  - Monitor     ENDOCRINE  #NID-DM  - A1C results: 8.5  - ISS      HEME/ONC  - DVT ppx: Lovenox     #Malignancy workup for renal mass - Renal Cysts  - [] MR Abdomen and renal mass protocol: Small R renal cyst - no suspicious mass    Dispo  - DGTF, Tele   - Physiatry eval (): Home with OP services  - Physical therapy Eval (): Home PT 3-5x/week

## 2024-04-23 NOTE — PROGRESS NOTE ADULT - SUBJECTIVE AND OBJECTIVE BOX
Neurovascular Progress Note     MARIA M CROFT 79y Male [id number]    Hospital Day: 4d     HPI:     HPI:  Patient is a 76yo M with PMH of HTN, DM, HLD, CAD (s/p stents), GERD, multiple strokes in the past (last in 2/2024), presented with diplopia. The patient woke this morning with diplopia that lasted for a few hours and resolved. He took a nap and woke up again at about 3:30 pm with diplopia again, that is still there. He also mentioned imbalance, and inability to walk without a walker. He states that he used to use a walker when he had his stroke in 2022, but he improved after rehab.   The patient was on aspirin and Brilinta but he had side effect on Brilinta that included breathing issues and diarrhea that caused to stop the medication and start him on clopidogrel about 3 days ago.  (19 Apr 2024 22:50)      Overnight events:  None    Subjective complaints:  Pt evaluated at bedside. Pt has no acute complaints.     Vital Signs  T(F): 98 (10:00), Max: 98 (10:00)  HR: 62 (12:00) (62 - 87)  BP: 118/72 (12:00) (118/68 - 156/85)  RR: 16 (12:00) (16 - 20)  SpO2: 99% (12:00) (96% - 99%)    Neurological Exam:   Mental status: Awake, alert and oriented to person, place, and time. Naming, repetition and comprehension intact. Attention/concentration intact. No dysarthria, no aphasia. Fund of knowledge appropriate.  Cranial nerves:  - Eyes: PERRL, Skew deviation on primary gaze. Left Eye: limited ABduction. Right eye: limited ADduction (less pronounced than left eye limitation), R sided torsional nystagmus on R gaze, Visual fields full  - Face: B/l V1-V3 sensation intact symmetrically, R facial droop, with limited furrowing on R brow   - ENT: Hearing intact to voice, tongue was midline  Motor: Possible slight RU&LE drift Normal tone and bulk. No abnormal movements.  Sensation: Intact to light touch , temperature, vibration, proprioception, no extinction  Coordination: Subtle dysmetria b/l UE FTN, and LLE HTS  Gait: Deferred  NIHSS: 8 (Partial gaze, 3-complete face, RU&LE drift, ataxia in 3 limbs)      Labs:  WBC 9.11 /HGB 14.3 /MCV 83.4 /HCT 41.1 / / 04-23 04-23    139  |  102  |  17  ----------------------------<  174<H>  3.9   |  20  |  1.1    Ca    8.9      23 Apr 2024 06:12  Mg     1.9     04-23        PT/INR - ( 23 Apr 2024 06:12 )   PT: 10.80 sec;   INR: 0.95 ratio         PTT - ( 23 Apr 2024 06:12 )  PTT:37.2 sec  Urinalysis Basic - ( 23 Apr 2024 06:12 )    Color: x / Appearance: x / SG: x / pH: x  Gluc: 174 mg/dL / Ketone: x  / Bili: x / Urobili: x   Blood: x / Protein: x / Nitrite: x   Leuk Esterase: x / RBC: x / WBC x   Sq Epi: x / Non Sq Epi: x / Bacteria: x        Medications:  amLODIPine   Tablet 10 milliGRAM(s) Oral daily  aspirin enteric coated 81 milliGRAM(s) Oral daily  atorvastatin 40 milliGRAM(s) Oral at bedtime  clopidogrel Tablet 75 milliGRAM(s) Oral daily  dextrose 10% Bolus 125 milliLiter(s) IV Bolus once  dextrose 5%. 1000 milliLiter(s) IV Continuous <Continuous>  dextrose 5%. 1000 milliLiter(s) IV Continuous <Continuous>  dextrose 50% Injectable 12.5 Gram(s) IV Push once  dextrose 50% Injectable 25 Gram(s) IV Push once  dextrose Oral Gel 15 Gram(s) Oral once PRN  enoxaparin Injectable 40 milliGRAM(s) SubCutaneous every 24 hours  glucagon  Injectable 1 milliGRAM(s) IntraMuscular once  influenza  Vaccine (HIGH DOSE) 0.7 milliLiter(s) IntraMuscular once  insulin lispro (ADMELOG) corrective regimen sliding scale   SubCutaneous three times a day before meals  insulin lispro Injectable (ADMELOG) 3 Unit(s) SubCutaneous three times a day before meals  magnesium oxide 400 milliGRAM(s) Oral two times a day with meals  melatonin 10 milliGRAM(s) Oral at bedtime  metoprolol tartrate 50 milliGRAM(s) Oral two times a day  polyethylene glycol 3350 17 Gram(s) Oral two times a day PRN  senna 2 Tablet(s) Oral at bedtime PRN  sodium chloride 0.9%. 1000 milliLiter(s) IV Continuous <Continuous>      Neuroimaging:

## 2024-04-24 ENCOUNTER — TRANSCRIPTION ENCOUNTER (OUTPATIENT)
Age: 79
End: 2024-04-24

## 2024-04-24 VITALS
OXYGEN SATURATION: 96 % | HEART RATE: 79 BPM | RESPIRATION RATE: 18 BRPM | DIASTOLIC BLOOD PRESSURE: 81 MMHG | TEMPERATURE: 98 F | SYSTOLIC BLOOD PRESSURE: 132 MMHG

## 2024-04-24 PROCEDURE — 99239 HOSP IP/OBS DSCHRG MGMT >30: CPT

## 2024-04-24 RX ORDER — APIXABAN 2.5 MG/1
1 TABLET, FILM COATED ORAL
Qty: 60 | Refills: 2
Start: 2024-04-24 | End: 2024-07-22

## 2024-04-24 RX ORDER — AMLODIPINE BESYLATE 2.5 MG/1
1 TABLET ORAL
Qty: 30 | Refills: 0
Start: 2024-04-24 | End: 2024-05-23

## 2024-04-24 RX ORDER — AMLODIPINE BESYLATE 2.5 MG/1
1 TABLET ORAL
Refills: 0 | DISCHARGE

## 2024-04-24 RX ORDER — ATORVASTATIN CALCIUM 80 MG/1
1 TABLET, FILM COATED ORAL
Qty: 30 | Refills: 2
Start: 2024-04-24 | End: 2024-07-22

## 2024-04-24 RX ADMIN — Medication 3 UNIT(S): at 12:05

## 2024-04-24 RX ADMIN — AMLODIPINE BESYLATE 10 MILLIGRAM(S): 2.5 TABLET ORAL at 05:53

## 2024-04-24 RX ADMIN — Medication 81 MILLIGRAM(S): at 12:04

## 2024-04-24 RX ADMIN — MAGNESIUM OXIDE 400 MG ORAL TABLET 400 MILLIGRAM(S): 241.3 TABLET ORAL at 08:10

## 2024-04-24 RX ADMIN — Medication 4: at 12:05

## 2024-04-24 RX ADMIN — Medication 3 UNIT(S): at 08:12

## 2024-04-24 RX ADMIN — INSULIN GLARGINE 30 UNIT(S): 100 INJECTION, SOLUTION SUBCUTANEOUS at 08:11

## 2024-04-24 RX ADMIN — Medication 50 MILLIGRAM(S): at 05:53

## 2024-04-24 RX ADMIN — APIXABAN 5 MILLIGRAM(S): 2.5 TABLET, FILM COATED ORAL at 05:53

## 2024-04-24 RX ADMIN — Medication 2: at 08:11

## 2024-04-24 NOTE — DISCHARGE NOTE PROVIDER - DISCHARGE DIET
Consistent Carbohydrate Diabetic Diets/Easy to Chew Diet DASH Diet/Consistent Carbohydrate Diabetic Diets

## 2024-04-24 NOTE — DISCHARGE NOTE PROVIDER - CARE PROVIDER_API CALL
Chantelle Irwin  Neurology  [address] [place] [address]  Phone: [phone]  Fax: [phone]  Follow Up Time: 2 weeks    Darion Sparrow.  Internal Medicine  [address] [place] [address], [address]  [place] [address]  Phone: [phone]  Fax: [phone]  Follow Up Time: 2 weeks   Chantelle Irwin  Neurology  [address] [place] [address]  Phone: [phone]  Fax: [phone]  Follow Up Time: 2 weeks    Darion Sparrow  Internal Medicine  [address] [place] [address], [address]  [place] [address]  Phone: [phone]  Fax: [phone]  Follow Up Time: 2 weeks    Andrzej Salazar  Ophthalmology  [facility] [place] [address]  Phone: [phone]  Fax: [phone]  Follow Up Time: 1 month

## 2024-04-24 NOTE — DISCHARGE NOTE PROVIDER - NSDCHHHOMEBOUND_GEN_ALL_CORE
Chest pain/weakness during/after ambulation   greater than 20 feet/Stroke/TBI (neurological/cognitive impairment)

## 2024-04-24 NOTE — DISCHARGE NOTE PROVIDER - NSDCFUSCHEDAPPT_GEN_ALL_CORE_FT
[facility] Physician [facility]  CARDIOLOGY [address] [facility]  Scheduled Appointment: 05/02/2024

## 2024-04-24 NOTE — SWALLOW BEDSIDE ASSESSMENT ADULT - SLP GENERAL OBSERVATIONS
Pt found laying in bed a&ox4. Pt denies any changes in speech/cognition. Pt reports difficulty during meal time 2/2 generalized weakness and facial droop. Pt w/ mild dysarthria
Pt found laying in bed a&ox4. Pt denies any changes in speech/cognition.

## 2024-04-24 NOTE — SWALLOW BEDSIDE ASSESSMENT ADULT - SWALLOW EVAL: RECOMMENDED FEEDING/EATING TECHNIQUES
alternate food with liquid/position upright (90 degrees)/small sips/bites
alternate food with liquid/position upright (90 degrees)/small sips/bites

## 2024-04-24 NOTE — DISCHARGE NOTE PROVIDER - PROVIDER TOKENS
PROVIDER:[TOKEN:[[id number]],FOLLOWUP:[2 weeks]],PROVIDER:[TOKEN:[[id number]],FOLLOWUP:[2 weeks]] PROVIDER:[TOKEN:[[id number]],FOLLOWUP:[2 weeks]],PROVIDER:[TOKEN:[[id number]],FOLLOWUP:[2 weeks]],PROVIDER:[TOKEN:[[id number]],FOLLOWUP:[1 month]]

## 2024-04-24 NOTE — DISCHARGE NOTE PROVIDER - CARE PROVIDERS DIRECT ADDRESSES
,[email or web address],DirectAddress_Unknown

## 2024-04-24 NOTE — SWALLOW BEDSIDE ASSESSMENT ADULT - SLP PERTINENT HISTORY OF CURRENT PROBLEM
Patient is a 76yo M with PMH of HTN, DM, HLD, CAD (s/p stents), GERD, multiple strokes in the past (last in 2/2024), presented with diplopia. The patient woke this morning with diplopia that lasted for a few hours and resolved. He took a nap and woke up again at about 3:30 pm with diplopia again, that is still there. He also mentioned imbalance, and inability to walk without a walker. He states that he used to use a walker when he had his stroke in 2022, but he improved after rehab. The patient was on aspirin and Brilinta but he had side effect on Brilinta that included breathing issues and diarrhea that caused to stop the medication and start him on clopidogrel about 3 days ago.
Patient is a 76yo M with PMH of HTN, DM, HLD, CAD (s/p stents), GERD, multiple strokes in the past (last in 2/2024), presented with diplopia. The patient woke this morning with diplopia that lasted for a few hours and resolved. He took a nap and woke up again at about 3:30 pm with diplopia again, that is still there. He also mentioned imbalance, and inability to walk without a walker. He states that he used to use a walker when he had his stroke in 2022, but he improved after rehab. The patient was on aspirin and Brilinta but he had side effect on Brilinta that included breathing issues and diarrhea that caused to stop the medication and start him on clopidogrel about 3 days ago.

## 2024-04-24 NOTE — PROGRESS NOTE ADULT - PROVIDER SPECIALTY LIST ADULT
Hospitalist
Internal Medicine
Neurology
Neurology
Internal Medicine
Neurology
Hospitalist
Neurology
Neurology

## 2024-04-24 NOTE — SWALLOW BEDSIDE ASSESSMENT ADULT - COMMENTS
Brain CT 4/19/24- IMPRESSION: Moderate stenosis at the right carotid bifurcation and bulb. Severe stenosis at the left carotid bifurcation and bulb. No evidence of major vascular occlusion or aneurysm. No perfusion abnormalities. No areas of core infarct or ischemic penumbra.  pending mri
Brain CT 4/19/24- IMPRESSION: Moderate stenosis at the right carotid bifurcation and bulb. Severe stenosis at the left carotid bifurcation and bulb. No evidence of major vascular occlusion or aneurysm. No perfusion abnormalities. No areas of core infarct or ischemic penumbra.  pending mri

## 2024-04-24 NOTE — DISCHARGE NOTE NURSING/CASE MANAGEMENT/SOCIAL WORK - PATIENT PORTAL LINK FT
You can access the FollowMyHealth Patient Portal offered by [facility] by registering at the following website: [email or web address]. By joining [facility]’s FollowMyHealth portal, you will also be able to view your health information using other applications (apps) compatible with our system.

## 2024-04-24 NOTE — DISCHARGE NOTE PROVIDER - NSDCCPCAREPLAN_GEN_ALL_CORE_FT
PRINCIPAL DISCHARGE DIAGNOSIS  Diagnosis: Stroke  Assessment and Plan of Treatment: You have been diagnosed with stroke, which is a condition that develops when part of the brain doesn't get enough blood and oxygen.  After diagnosis of a stroke, it is very important to manage your risk factors and prevent future strokes from occurring. On discharge you should do the following:  - Continue aspirin and Eliquis, blood thinners that improve blood flow to the brain. Plavix was not effective for you, and due to side effects with Brilinta we have chosen to change you to Eliquis for stroke prevention.   - Contiue Atorvastatin 40mg, a cholesterol medication that also helps to stabilize your blood vessels. We have decreased your dose, as your LDL was 39.   - Follow up in Stroke Clinic in 2 weeks.  - Your Loop Recorder was interrogated and you did not have any evidence of abnormal heart rhythm.   - Follow with a primary care physician for management of high blood pressure, high cholesterol, and high blood sugar as these risk factors increase stroke risk. Take medications for these conditions as indicated in your discharge medication list.   - Avoiding smoking, and second hand smoke.   Report to the emergency department if you have sudden onset severe headache, blurry/ double vision or vision loss, vertigo, numbness or weakness, slurred speech or difficulty walking as these can be signs of stroke reoccurence.  To view your hospital results create a Patient Portal at the following link: [email or web address] or search "Patient Portal [facility]" and follow the instructions on the FollowHealth page.        SECONDARY DISCHARGE DIAGNOSES  Diagnosis: Blurry vision  Assessment and Plan of Treatment:

## 2024-04-24 NOTE — PROGRESS NOTE ADULT - SUBJECTIVE AND OBJECTIVE BOX
Neuroendovascular Progress Note:     HPI:  Patient is a 76yo M with PMH of HTN, DM, HLD, CAD (s/p stents), GERD, multiple strokes in the past (last in 2/2024), presented with diplopia. The patient woke this morning with diplopia that lasted for a few hours and resolved. He took a nap and woke up again at about 3:30 pm with diplopia again, that is still there. He also mentioned imbalance, and inability to walk without a walker. He states that he used to use a walker when he had his stroke in 2022, but he improved after rehab.   The patient was on aspirin and Brilinta but he had side effect on Brilinta that included breathing issues and diarrhea that caused to stop the medication and start him on clopidogrel about 3 days ago.  (19 Apr 2024 22:50)    Patient is POD 1 s/p diagnostic cerebral angiogram which revealed carotid stenosis not significant enough to warrant recanalization with angioplasty or stenting / severe right V1 stenosis. Patient without complaints this AM. Right radial arteriotomy site CDI, dressing changed, pulses intact.       Past medical history:   Hypertension    Diabetes    Hyperlipidemia    GERD (gastroesophageal reflux disease)    Coronary artery disease    Diabetic polyneuropathy        Medications:   amLODIPine   Tablet 10 milliGRAM(s) Oral daily  apixaban 5 milliGRAM(s) Oral every 12 hours  aspirin enteric coated 81 milliGRAM(s) Oral daily  atorvastatin 40 milliGRAM(s) Oral at bedtime  dextrose 10% Bolus 125 milliLiter(s) IV Bolus once  dextrose 5%. 1000 milliLiter(s) IV Continuous <Continuous>  dextrose 5%. 1000 milliLiter(s) IV Continuous <Continuous>  dextrose 50% Injectable 12.5 Gram(s) IV Push once  dextrose 50% Injectable 25 Gram(s) IV Push once  glucagon  Injectable 1 milliGRAM(s) IntraMuscular once  influenza  Vaccine (HIGH DOSE) 0.7 milliLiter(s) IntraMuscular once  insulin glargine Injectable (LANTUS) 30 Unit(s) SubCutaneous every morning  insulin lispro (ADMELOG) corrective regimen sliding scale   SubCutaneous three times a day before meals  insulin lispro Injectable (ADMELOG) 3 Unit(s) SubCutaneous three times a day before meals  magnesium oxide 400 milliGRAM(s) Oral two times a day with meals  melatonin 10 milliGRAM(s) Oral at bedtime  metoprolol tartrate 50 milliGRAM(s) Oral two times a day  sodium chloride 0.9%. 1000 milliLiter(s) IV Continuous <Continuous>      Vitals:   T(F): 98 (04-24-24 @ 13:40), Max: 98 (04-24-24 @ 13:40)  HR: 79 (04-24-24 @ 13:40) (74 - 86)  BP: 132/81 (04-24-24 @ 13:40) (132/81 - 161/86)  RR: 18 (04-24-24 @ 13:40) (18 - 18)  SpO2: 96% (04-24-24 @ 13:40) (96% - 96%)    Labs:                         14.3   9.11  )-----------( 251      ( 23 Apr 2024 06:12 )             41.1     04-23    139  |  102  |  17  ----------------------------<  174<H>  3.9   |  20  |  1.1    Ca    8.9      23 Apr 2024 06:12  Mg     1.9     04-23      PT/INR - ( 23 Apr 2024 06:12 )   PT: 10.80 sec;   INR: 0.95 ratio         PTT - ( 23 Apr 2024 06:12 )  PTT:37.2 sec      Exam:   General - NAD   Neuro - AAO3, follows commands, partial gaze, right facial asymmetry, moving all extremities with mild RUE/RLE drift, sensation intact, no aphasia, no dysarthria  Extremity - right radial arteriotomy site CDI without evidence of bleeding hematoma or infection. Pulses intact.     Radiology:   Imaging reviewed per neuroendovascular ACP/attending.     Assessment:   The patient is a 77-year-old male with a pMHx of HTN, CAD s/p stents, HLD, GERD, and prior strokes (2/2024) who presented with diplopia, NIHSS 4 on admission, and was found to have severe left carotid bulb stenosis and moderate right carotid bulb stenosis on CTA 4/19/24. No perfusion abnormalities with Tmax >6 s were reported on CTP, and <50% carotid stenosis was reported on US. A punctate dorsal pontine infarct and a right splenium of the corpus callosum infarct (subacute) as well as scattered chronic left frontal, occipital, and thalamic infarcts were seen on MRI 4/21/24. Now POD 1 s/p diagnostic cerebral angiogram. Remains on aspirin and eliquis.     Suggestions:  No acute neuroendovascular intervention is warranted on this admission.   C/w statin  C/w aspirin and eliquis per stroke team   Patient to follow up with outpatient stroke team on discharge.   Management per medicine / stroke team.  [id number] neuroendovascular

## 2024-04-24 NOTE — SWALLOW BEDSIDE ASSESSMENT ADULT - SWALLOW EVAL: DIAGNOSIS
+ toleration of ETC and thin liquids w/o overt s/s of aspiration/penetration. +mild oral dysphagia 2/2 facial droop
+ toleration of regular and thin liquids w/o overt s/s of aspiration/penetration.

## 2024-04-24 NOTE — DISCHARGE NOTE PROVIDER - NSDCMRMEDTOKEN_GEN_ALL_CORE_FT
acetaminophen 325 mg oral tablet: 2 tab(s) orally every 6 hours, As needed, for pain or fever  amLODIPine 10 mg oral tablet: 1 tab(s) orally once a day  aspirin 81 mg oral delayed release tablet: 1 tab(s) orally once a day  citalopram 10 mg oral tablet: 0.5 tab(s) orally once a day  famotidine 40 mg oral tablet: 1 tab(s) orally once a day (at bedtime)  hydrocortisone 1% topical cream: Apply topically to affected area 2 times a day, As Needed  insulin lispro 100 units/mL injectable solution: 8 unit(s) subcutaneous 3 times a day (before meals), take just  before you eat your first bite  Lantus 100 units/mL subcutaneous solution: 30 unit(s) subcutaneous once a day (in the morning)  magnesium oxide 400 mg oral tablet: 1 tab(s) orally 2 times a day (with meals)  melatonin 10 mg oral tablet: 1 tab(s) orally once a day (at bedtime)  metoprolol tartrate 50 mg oral tablet: 1 tab(s) orally 2 times a day  polyethylene glycol 3350 oral powder for reconstitution: 17 gram(s) orally 1 or 2 times a day, As Needed  ramipril 10 mg oral capsule: 1 cap(s) orally once a day  senna leaf extract oral tablet: 1 or 2 tab(s) orally once a day (at bedtime), As Needed

## 2024-04-24 NOTE — SWALLOW BEDSIDE ASSESSMENT ADULT - SWALLOW EVAL: FUNCTIONAL LEVEL AT TIME OF EVAL
A&ox4. DENIES any changes in speech/cognition. Reports difficulty during mealtime 2/2 facial droop and weakness
A&ox4. DENIES any changes in speech/cognition

## 2024-04-24 NOTE — DISCHARGE NOTE PROVIDER - HOSPITAL COURSE
CC: Acute dorsal pontine stroke despite ASA/PLVX therapy     This 79y Male with PMH as above, presented with acute diplopia and his exam showed 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 CTH showed no acute findings, CTP no deficit, CTA showed same known stenosis and no LVO. He is out of the window for TNK, and no LVO to intervene on.    He has ILR that was interrogated on 3/25/2024 and showed no A. fib. Repeat interrogation (4/22) (-) for Afib. TTE (4/20)/EDSON (4/22). PRU showed suboptimal treatment of plavix. Given side effects with Brilinta (SOB/GI distress), will consider transitioning to Eliquis and ASA therapy. DSA (4/23): Carotid stenosis <50%, no stent placed, severe R V1 stenosis noted.     Presenting Symptoms: Diplopia  Admission NIHSS: 4 (Gaze, face, ataxia 2 limbs)  TNK: OOW  Thrombectomy: No LVO    Home antiplatelet/AC: ASA/PLVX (previously on Brillinta but had SOB and diarrhea)    CORE STROKE WORKUP:   [4/19] CTH: No Acute infarcts, Evolved chronic infarcts in the left frontal and left occipital lobes  [4/19] CTP: No deficit  [4/10] CTA H/N: Mod stenosis of R carotid bifurcation, severe sL carotid bifurcation  [4/20] MR Head: Acute Punctate dorsal pontine infarct, subacute-chronic infarct R splenium corpus collosum  [4/20] Echo: EF 60-65%, G1DD, no PFO, normal RA  Core Measures: LDL: 39      A1c: 8.5     TSH: 2.64    ADDITIONAL WORKUP:  [4/22] Loop: interrogated, no events  [4/22]   - Cartid Duplex (4/21): <50% Stenosis of either carotid  - EDSON (4/22): No PFO, no LA thrombus   - DSA: Right V1 severe stenosis, <50% stenosis of bilateral carotid arteries     Course complicated by:  - [4/21] MR Abdomen and renal mass protocol (ordered for possible malignancy): Small R renal cyst - no suspicious mass    ON DISCHARGE:   - Follow up in stroke clinic in 2 weeks   - Continue ASA and eliquis for stroke prevention CC: Acute dorsal pontine stroke despite ASA/PLVX therapy     This 79y Male with PMH as above, presented with acute diplopia and his exam showed 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 CTH showed no acute findings, CTP no deficit, CTA showed same known stenosis and no LVO. He is out of the window for TNK, and no LVO to intervene on.    He has ILR that was interrogated on 3/25/2024 and showed no A. fib. Repeat interrogation (4/22) (-) for Afib. TTE (4/20)/EDSON (4/22). PRU showed suboptimal treatment of plavix. Given side effects with Brilinta (SOB/GI distress), will consider transitioning to Eliquis and ASA therapy. DSA (4/23): Carotid stenosis <50%, no stent placed, severe R V1 stenosis noted.     Presenting Symptoms: Diplopia  Admission NIHSS: 4 (Gaze, face, ataxia 2 limbs)  TNK: OOW  Thrombectomy: No LVO    Home antiplatelet/AC: ASA/PLVX (previously on Brillinta but had SOB and diarrhea)    CORE STROKE WORKUP:   [4/19] CTH: No Acute infarcts, Evolved chronic infarcts in the left frontal and left occipital lobes  [4/19] CTP: No deficit  [4/10] CTA H/N: Mod stenosis of R carotid bifurcation, severe sL carotid bifurcation  [4/20] MR Head: Acute Punctate dorsal pontine infarct, subacute-chronic infarct R splenium corpus collosum  [4/20] Echo: EF 60-65%, G1DD, no PFO, normal RA  Core Measures: LDL: 39      A1c: 8.5     TSH: 2.64    ADDITIONAL WORKUP:  [4/22] Loop: interrogated, no events  [4/22]   - Cartid Duplex (4/21): <50% Stenosis of either carotid  - EDSON (4/22): No PFO, no LA thrombus   - DSA: Right V1 severe stenosis, <50% stenosis of bilateral carotid arteries     Course complicated by:  - [4/21] MR Abdomen and renal mass protocol (ordered for possible malignancy): Small R renal cyst - no suspicious mass    ON DISCHARGE:   - Follow up in stroke clinic in 2 weeks   - Continue ASA and eliquis for stroke prevention       Stroke attending attestation:  Pt is a 80 yo M with PMHx of HTN, psoriatic arthritis, HLD, CAD s/p PCI, former smoker, prior scattered left MCA territory ischemic strokes 08/2022, right frontal periventricular and acute left posterior parietal periventricular ischemic strokes 02/2024, who presented with left CN VI and VII (lower neuron) palsy. NIHSS 3 (left upper/lower facial plegia)    Impr: left caudal tegmental pontine acute ischemic stroke and subacute in right splenium of corpus callosum  CTA head/neck with suggested severe left ICA stenosis  EDSON without clot/shunt  ILR interrogated, no afib noted  Prior to presentation, pt had just stopped brilinta due to side effects and restarted plavix (~3 days prior to stroke). Thus etiology of stroke possibly related to subtherapeutic plavix  S/p DSA, no severe ICA stenosis noted, however R V1 noted with severe stenosis, possibly symptomatic.  PTA: ASA/plavix-> [id number] daily, d/c plavix, and started eliquis 5mg BID  D/c home with outpt therapy. F/u in stroke clinic, Ophthalmology and with AMINA.     Time spent: 45 min  Reason for time spent: Review of imaging and chart; obtaining history; examination of pt; discussion and coordination of care.

## 2024-05-02 ENCOUNTER — APPOINTMENT (OUTPATIENT)
Dept: CARDIOLOGY | Facility: CLINIC | Age: 79
End: 2024-05-02
Payer: MEDICARE

## 2024-05-02 ENCOUNTER — NON-APPOINTMENT (OUTPATIENT)
Age: 79
End: 2024-05-02

## 2024-05-02 PROCEDURE — 93298 REM INTERROG DEV EVAL SCRMS: CPT

## 2024-05-06 DIAGNOSIS — N28.89 OTHER SPECIFIED DISORDERS OF KIDNEY AND URETER: ICD-10-CM

## 2024-05-06 DIAGNOSIS — I25.10 ATHEROSCLEROTIC HEART DISEASE OF NATIVE CORONARY ARTERY WITHOUT ANGINA PECTORIS: ICD-10-CM

## 2024-05-06 DIAGNOSIS — G51.0 BELL'S PALSY: ICD-10-CM

## 2024-05-06 DIAGNOSIS — R29.704 NIHSS SCORE 4: ICD-10-CM

## 2024-05-06 DIAGNOSIS — Z79.02 LONG TERM (CURRENT) USE OF ANTITHROMBOTICS/ANTIPLATELETS: ICD-10-CM

## 2024-05-06 DIAGNOSIS — E11.42 TYPE 2 DIABETES MELLITUS WITH DIABETIC POLYNEUROPATHY: ICD-10-CM

## 2024-05-06 DIAGNOSIS — K21.9 GASTRO-ESOPHAGEAL REFLUX DISEASE WITHOUT ESOPHAGITIS: ICD-10-CM

## 2024-05-06 DIAGNOSIS — I10 ESSENTIAL (PRIMARY) HYPERTENSION: ICD-10-CM

## 2024-05-06 DIAGNOSIS — Z79.82 LONG TERM (CURRENT) USE OF ASPIRIN: ICD-10-CM

## 2024-05-06 DIAGNOSIS — I63.89 OTHER CEREBRAL INFARCTION: ICD-10-CM

## 2024-05-06 DIAGNOSIS — Z95.5 PRESENCE OF CORONARY ANGIOPLASTY IMPLANT AND GRAFT: ICD-10-CM

## 2024-05-06 DIAGNOSIS — R00.0 TACHYCARDIA, UNSPECIFIED: ICD-10-CM

## 2024-05-06 DIAGNOSIS — D72.828 OTHER ELEVATED WHITE BLOOD CELL COUNT: ICD-10-CM

## 2024-05-06 DIAGNOSIS — I65.23 OCCLUSION AND STENOSIS OF BILATERAL CAROTID ARTERIES: ICD-10-CM

## 2024-05-06 DIAGNOSIS — H51.21 INTERNUCLEAR OPHTHALMOPLEGIA, RIGHT EYE: ICD-10-CM

## 2024-05-06 DIAGNOSIS — Z79.4 LONG TERM (CURRENT) USE OF INSULIN: ICD-10-CM

## 2024-05-06 DIAGNOSIS — I65.01 OCCLUSION AND STENOSIS OF RIGHT VERTEBRAL ARTERY: ICD-10-CM

## 2024-05-06 DIAGNOSIS — H49.22 SIXTH [ABDUCENT] NERVE PALSY, LEFT EYE: ICD-10-CM

## 2024-05-06 DIAGNOSIS — E11.39 TYPE 2 DIABETES MELLITUS WITH OTHER DIABETIC OPHTHALMIC COMPLICATION: ICD-10-CM

## 2024-05-06 DIAGNOSIS — E78.5 HYPERLIPIDEMIA, UNSPECIFIED: ICD-10-CM

## 2024-05-06 DIAGNOSIS — L40.50 ARTHROPATHIC PSORIASIS, UNSPECIFIED: ICD-10-CM

## 2024-05-06 DIAGNOSIS — I69.351 HEMIPLEGIA AND HEMIPARESIS FOLLOWING CEREBRAL INFARCTION AFFECTING RIGHT DOMINANT SIDE: ICD-10-CM

## 2024-05-06 DIAGNOSIS — Z91.012 ALLERGY TO EGGS: ICD-10-CM

## 2024-05-06 DIAGNOSIS — Z95.818 PRESENCE OF OTHER CARDIAC IMPLANTS AND GRAFTS: ICD-10-CM

## 2024-05-09 ENCOUNTER — NON-APPOINTMENT (OUTPATIENT)
Age: 79
End: 2024-05-09

## 2024-05-10 ENCOUNTER — APPOINTMENT (OUTPATIENT)
Dept: NEUROLOGY | Facility: CLINIC | Age: 79
End: 2024-05-10
Payer: MEDICARE

## 2024-05-10 VITALS
OXYGEN SATURATION: 97 % | HEIGHT: 72 IN | SYSTOLIC BLOOD PRESSURE: 160 MMHG | WEIGHT: 217 LBS | BODY MASS INDEX: 29.39 KG/M2 | DIASTOLIC BLOOD PRESSURE: 93 MMHG | HEART RATE: 73 BPM | TEMPERATURE: 97.8 F

## 2024-05-10 DIAGNOSIS — I63.9 CEREBRAL INFARCTION, UNSPECIFIED: ICD-10-CM

## 2024-05-10 PROCEDURE — 99214 OFFICE O/P EST MOD 30 MIN: CPT

## 2024-05-10 RX ORDER — APIXABAN 5 MG/1
5 TABLET, FILM COATED ORAL
Qty: 60 | Refills: 3 | Status: ACTIVE | COMMUNITY

## 2024-05-10 NOTE — PHYSICAL EXAM
[General Appearance - Alert] : alert [General Appearance - In No Acute Distress] : in no acute distress [General Appearance - Well Nourished] : well nourished [General Appearance - Well Developed] : well developed [Oriented To Time, Place, And Person] : oriented to person, place, and time [Affect] : the affect was normal [Person] : oriented to person [Place] : oriented to place [Time] : oriented to time [Fluency] : fluency intact [Comprehension] : comprehension intact [Cranial Nerves Optic (II)] : visual acuity intact bilaterally,  visual fields full to confrontation, pupils equal round and reactive to light [Cranial Nerves Trigeminal (V)] : facial sensation intact symmetrically [Cranial Nerves Vestibulocochlear (VIII)] : hearing was intact bilaterally [Cranial Nerves Glossopharyngeal (IX)] : tongue and palate midline [Cranial Nerves Hypoglossal (XII)] : there was no tongue deviation with protrusion [Cranial Nerves Abducens - Left Only] : 6th cranial nerve palsy [Cranial Nerves Left Facial: House Grade ___(1-6)] : grade V (severe, 20%) facial nerve function [Motor Tone] : muscle tone was normal in all four extremities [Motor Strength] : muscle strength was normal in all four extremities [Paresis Pronator Drift Right-Sided] : no pronator drift on the right [Paresis Pronator Drift Left-Sided] : no pronator drift on the left [Sensation Tactile Decrease] : light touch was intact [Coordination - Dysmetria Impaired Finger-to-Nose Right Only] : present on the ride side [2+] : Patella left 2+ [FreeTextEntry4] : mild dysarthria

## 2024-05-10 NOTE — REVIEW OF SYSTEMS
[As Noted in HPI] : as noted in HPI [Eye Pain] : eye pain [Dry Eyes] : dryness of the eyes [Negative] : Cardiovascular

## 2024-05-10 NOTE — DISCUSSION/SUMMARY
[FreeTextEntry1] : Pt is a 80 yo M with PMHx of HTN, psoriatic arthritis, HLD, CAD s/p PCI, former smoker, prior scattered left MCA territory ischemic strokes 08/2022, who presents for stroke f/u.  # Subacute right frontal periventricular and acute left posterior parietal periventricular ischemic strokes: 02/2024. reviewed outside MRI brain imaging. Left parietal stroke is adjacent to prior L MCA territory ischemic strokes.   # Left caudal tegmental pontine ischemic stroke: Etiology ESUS. Given recurrent strokes on ASA/plavix and intolerance to brilinta, pt was transitioned to ASA/eliquis in the hospital, which he has been tolerating well so far.  NIHSS 5 (left facial plegia- 3, dysarthria- 1, RUE dysmetria- 1), mRS 2. LDL 39, A1c 8.5. CTA head/neck suggested left ICA severe stenosis, but not severe on DSA, however noted severe R V1 stenosis, no intervention performed.  - PTA: ASA/plavix-> ASA 81mg daily and eliquis 5mg BID - Continue Atorvastatin 40mg daily. Goal LDL <70 - Discussed lifestyle modification including diet/exercise, optimizing DM control - Continue PT/ST - eye patch, eye drops, f/u with Ophthalmology  RTC in 4 mo. [Goals and Counseling] : I have reviewed the goals of stroke risk factor modification. I counseled the patient on measures to reduce stroke risk, including the importance of medication compliance, risk factor control, exercise, healthy diet and avoidance of smoking. I reviewed stroke warning signs and symptoms and appropriate actions to take if such occur.

## 2024-05-10 NOTE — HISTORY OF PRESENT ILLNESS
[FreeTextEntry1] : Interval History: Pt presents today with his daughter in law for stroke f/u. Pt was admitted with another stroke on 4/19/24. He developed left CN VI and CN VII palsy, found to have a small left pontine stroke. At time of stroke, pt had switched to plavix 3 days prior from [facility]. Loop recorder without event, stable ICAD. States that he is having foreign body sensation in his left eye, improved with saline eye drops. He denies choking/coughing. Using walker to ambulate, had a fall the other day when he turned fast in the kitchen but denies head injury or LOC, was able to get back up on his own.   HPI: Pt is a 77 yo M with PMHx of HTN, psoriatic arthritis, HLD, CAD s/p PCI, former smoker, prior scattered l MCA territory ischemic strokes 08/2022, who presents with his friend and son with c/o new stroke. Pt was visiting his daughter in [place], he had difficulty with his balance (more than usual), when he got off the plane. A week later when he returned to [place], he had an outpt MRI brain completed which showed small right frontal subacute ischemic infarct and left posterior parietal acute ischemic infarct. Pt has been using a walker recently due to imbalance. He has been compliant with his medications including DAPT. Has not had follow up for loop recorder since placement after the stroke in 2022. Has an appointment with Cardiology, Dr. Denise coming up.

## 2024-05-15 ENCOUNTER — APPOINTMENT (OUTPATIENT)
Dept: NEUROLOGY | Facility: CLINIC | Age: 79
End: 2024-05-15
Payer: MEDICARE

## 2024-05-15 VITALS
HEART RATE: 93 BPM | TEMPERATURE: 97.2 F | OXYGEN SATURATION: 98 % | RESPIRATION RATE: 19 BRPM | HEIGHT: 72 IN | DIASTOLIC BLOOD PRESSURE: 76 MMHG | WEIGHT: 217 LBS | SYSTOLIC BLOOD PRESSURE: 153 MMHG | BODY MASS INDEX: 29.39 KG/M2

## 2024-05-15 PROCEDURE — 99214 OFFICE O/P EST MOD 30 MIN: CPT

## 2024-05-15 RX ORDER — ASPIRIN 81 MG
81 TABLET, DELAYED RELEASE (ENTERIC COATED) ORAL
Refills: 0 | Status: ACTIVE | COMMUNITY

## 2024-05-17 NOTE — ASSESSMENT
[FreeTextEntry1] : 79 year old man with previous R MCA stroke and more recent left pontine stroke with R vertebral artery stenosis. Will continue on anti-thrombotic regimen eliquis and asa as managed by Dr. Irwin.  If her suffers another event referrable to the posterior circulation, stent to the R vertebral artery could be considered.  He can follow up with me as needed.

## 2024-05-17 NOTE — PHYSICAL EXAM
[FreeTextEntry1] :  Awake and alert Fully conversant and atentive Pupils are equal, round, and reactive to light Extra ocular movements are intact; facial sensation intact; There is a moderate LMN weakness of the left face; there is dysarthria mild hemiparesis R arm and leg with increased tone Full sensation in the arms and legs Finger to nose intact b/l; no dysmetria Gait is narrow but cautious

## 2024-05-17 NOTE — HISTORY OF PRESENT ILLNESS
[FreeTextEntry1] : 79 year old man following after diagnostic angiogram several weeks ago as part of workup for small acute left pontine stroke. The angiogram showed stenosis at the origin of the right vertebral artery but no significant stenosis at either ICA. He returns today feeling well.  Still with slurring associated with his facial weakness suffered during the last stroke.  The right wrist DSA entry has healed without isisue.

## 2024-06-03 ENCOUNTER — APPOINTMENT (OUTPATIENT)
Dept: CARDIOLOGY | Facility: CLINIC | Age: 79
End: 2024-06-03

## 2024-06-06 ENCOUNTER — APPOINTMENT (OUTPATIENT)
Dept: CARDIOLOGY | Facility: CLINIC | Age: 79
End: 2024-06-06

## 2024-07-10 ENCOUNTER — APPOINTMENT (OUTPATIENT)
Dept: CARDIOLOGY | Facility: CLINIC | Age: 79
End: 2024-07-10

## 2024-08-02 RX ORDER — CLOPIDOGREL BISULFATE 75 MG/1
75 TABLET, FILM COATED ORAL
Qty: 60 | Refills: 3 | Status: ACTIVE | COMMUNITY
Start: 2024-08-02 | End: 1900-01-01

## 2024-08-12 ENCOUNTER — APPOINTMENT (OUTPATIENT)
Dept: NEUROLOGY | Facility: CLINIC | Age: 79
End: 2024-08-12

## 2024-09-12 ENCOUNTER — APPOINTMENT (OUTPATIENT)
Dept: NEUROLOGY | Facility: CLINIC | Age: 79
End: 2024-09-12
Payer: MEDICARE

## 2024-09-12 VITALS
HEIGHT: 72 IN | DIASTOLIC BLOOD PRESSURE: 80 MMHG | WEIGHT: 225 LBS | OXYGEN SATURATION: 98 % | BODY MASS INDEX: 30.48 KG/M2 | RESPIRATION RATE: 12 BRPM | HEART RATE: 96 BPM | SYSTOLIC BLOOD PRESSURE: 151 MMHG

## 2024-09-12 DIAGNOSIS — I65.22 OCCLUSION AND STENOSIS OF LEFT CAROTID ARTERY: ICD-10-CM

## 2024-09-12 DIAGNOSIS — I63.9 CEREBRAL INFARCTION, UNSPECIFIED: ICD-10-CM

## 2024-09-12 PROCEDURE — 99214 OFFICE O/P EST MOD 30 MIN: CPT

## 2024-09-12 RX ORDER — ASPIRIN 325 MG/1
325 TABLET, FILM COATED ORAL DAILY
Qty: 90 | Refills: 3 | Status: ACTIVE | COMMUNITY
Start: 2024-09-12 | End: 1900-01-01

## 2024-09-12 NOTE — PHYSICAL EXAM
[FreeTextEntry1] : Mental Status: Alert and Attentive, oriented to person, place, and time. Speech is fluent with intact naming, repetition, and comprehension, no dysarthria. Recent and remote memory intact. Insight and judgment were intact, and the affect was normal. Follows commands. Attention/concentration intact. Fund of knowledge appropriate.   Cranial Nerves: VFF to confrontation, EOMi w/o nystagmus, pupils equally round and reactive to light, V1-V3 intact bilaterally and symmetric, mild left eye ptosis, with mild lower facial weakness, hearing is bilaterally intact to finger rub, uvula is midline and soft palate rises symmetrically, head turning, and shoulder shrug are symmetric, tongue protrudes midline.   Motor: MRC grading 5/5 bilateral LUE/LLE, 4/5 RUE/RLE w/ right hand swelling (since prior stroke). Normal tone and bulk. No pronator drift.    Sensory exam: Sensation intact to light touch bilaterally in all extremities. No neglect or extinction on double simultaneous testing.   Coordination: No dysmetria on finger-to-nose.    Deep tendon reflexes: Biceps right 2+. Biceps left 2+. Brachioradialis right 2+. Brachioradialis left 2+. Patella right 2+. Patella left 2+.  NIHSS: 1 mRS: 1

## 2024-09-12 NOTE — END OF VISIT
SICU PLAN OF CARE    Dx: Complete AV block    Goals of Care: MAP > 65     Vital Signs (last 12 hours):   Temp:  [98.2 °F (36.8 °C)]   Pulse:  [69-90]   Resp:  [17-39]   BP: (111-146)/(56-65)   SpO2:  [90 %-97 %]      Neuro: AAOx4, Follows commands , and Moves all extremities spontaneously     Cardiac: NSR, HR 60-90    Respiratory: Room Air    Urine Output: Voids Spontaneously  450 mL/shift    Diet: NPO      Labs/Accuchecks: AM labs    Skin:  No new skin breakdown noted.  Patient turned q2h, bony prominences protected, and mattress inflated/working correctly.   Bradford Score: 23. If Bradford Score is 16 or less, complete 4EYES note each shift.    Shift Events:  No acute shift event.  See flowsheet for further assessment/details.  Family updated on current condition/plan of care, questions answered, and emotional support provided.  MD updated on current condition, vitals, labs, and gtts.      [Time Spent: ___ minutes] : I have spent [unfilled] minutes of time on the encounter which excludes teaching and separately reported services.

## 2024-09-12 NOTE — DISCUSSION/SUMMARY
[Goals and Counseling] : I have reviewed the goals of stroke risk factor modification. I counseled the patient on measures to reduce stroke risk, including the importance of medication compliance, risk factor control, exercise, healthy diet and avoidance of smoking. I reviewed stroke warning signs and symptoms and appropriate actions to take if such occur. [FreeTextEntry1] : Pt is a 80 yo M with PMHx of HTN, psoriatic arthritis, HLD, CAD s/p PCI, former smoker, prior scattered left MCA territory ischemic strokes 08/2022, who presents for stroke f/u.  # Subacute right frontal periventricular and acute left posterior parietal periventricular ischemic strokes: 02/2024. reviewed outside MRI brain imaging. Left parietal stroke is adjacent to prior L MCA territory ischemic strokes.  # Left caudal tegmental pontine ischemic stroke: 04/2024. Tolerating Clopidogrel so far.  (prior PRU on plavix 75mg was 195). NIHSS 1 (mild left facial weakness), mRS 1. CTA head/neck suggested left ICA severe stenosis, but not severe on DSA, however noted severe R V1 stenosis, no intervention performed. Discussed various options regarding antiplatelet/anticoagulation. Given intolerance of brilinta (both 60mg and 90mg doses), and high cost of eliquis/pradaxa/xarelto, favors continuing current DAPT regimen.   - Decrease Clopidogrel 150mg ->75 mg daily as there was essentially no difference in PRU at the higher dose - Increase ASA to 325 mg daily  - Continue Atorvastatin 40mg daily. Goal LDL <70 - B/L Carotid Duplex prior to next visit  RTC in 4 mo. [Antithrombotic therapy with ___] : antithrombotic therapy with  [unfilled]

## 2024-09-12 NOTE — HISTORY OF PRESENT ILLNESS
[FreeTextEntry1] : Interval history: Patient presents to office today accompanied by wife for stroke follow-up. States compliance with medications. Endorses some feelings of coldness and soft stools since starting Clopidogrel but are tolerable. Most recent PRU was 202. Denies any recent falls or new neurological symptoms.    HPI: Pt is a 77 yo M with PMHx of HTN, psoriatic arthritis, HLD, CAD s/p PCI, former smoker, prior scattered l MCA territory ischemic strokes 08/2022, who presents with his friend and son with c/o new stroke. Pt was visiting his daughter in [place], he had difficulty with his balance (more than usual), when he got off the plane. A week later when he returned to [place], he had an outpt MRI brain completed which showed small right frontal subacute ischemic infarct and left posterior parietal acute ischemic infarct. Pt has been using a walker recently due to imbalance. He has been compliant with his medications including DAPT. Has not had follow up for loop recorder since placement after the stroke in 2022. Has an appointment with Cardiology, Dr. Denise coming up.  05/2024: Pt presents today with his daughter in law for stroke f/u. Pt was admitted with another stroke on 4/19/24. He developed left CN VI and CN VII palsy, found to have a small left pontine stroke. At time of stroke, pt had switched to plavix 3 days prior from brilinta. Loop recorder without event, stable ICAD. States that he is having foreign body sensation in his left eye, improved with saline eye drops. He denies choking/coughing. Using walker to ambulate, had a fall the other day when he turned fast in the kitchen but denies head injury or LOC, was able to get back up on his own.

## 2024-10-07 ENCOUNTER — APPOINTMENT (OUTPATIENT)
Dept: ELECTROPHYSIOLOGY | Facility: CLINIC | Age: 79
End: 2024-10-07
Payer: MEDICARE

## 2024-10-07 VITALS
TEMPERATURE: 98 F | WEIGHT: 217 LBS | HEIGHT: 72 IN | BODY MASS INDEX: 29.39 KG/M2 | SYSTOLIC BLOOD PRESSURE: 159 MMHG | HEART RATE: 94 BPM | DIASTOLIC BLOOD PRESSURE: 92 MMHG

## 2024-10-07 DIAGNOSIS — I63.9 CEREBRAL INFARCTION, UNSPECIFIED: ICD-10-CM

## 2024-10-07 DIAGNOSIS — Z45.09 ENCOUNTER FOR ADJUSTMENT AND MANAGEMENT OF OTHER CARDIAC DEVICE: ICD-10-CM

## 2024-10-07 PROCEDURE — 93291 INTERROG DEV EVAL SCRMS IP: CPT

## 2024-10-07 PROCEDURE — 99213 OFFICE O/P EST LOW 20 MIN: CPT

## 2024-10-07 RX ORDER — METOPROLOL TARTRATE 50 MG/1
50 TABLET ORAL DAILY
Refills: 0 | Status: ACTIVE | COMMUNITY

## 2024-10-15 ENCOUNTER — OUTPATIENT (OUTPATIENT)
Dept: OUTPATIENT SERVICES | Facility: HOSPITAL | Age: 79
LOS: 1 days | End: 2024-10-15
Payer: MEDICARE

## 2024-10-15 ENCOUNTER — RESULT REVIEW (OUTPATIENT)
Age: 79
End: 2024-10-15

## 2024-10-15 DIAGNOSIS — I63.9 CEREBRAL INFARCTION, UNSPECIFIED: ICD-10-CM

## 2024-10-15 DIAGNOSIS — I65.22 OCCLUSION AND STENOSIS OF LEFT CAROTID ARTERY: ICD-10-CM

## 2024-10-15 PROCEDURE — 93880 EXTRACRANIAL BILAT STUDY: CPT

## 2024-10-15 PROCEDURE — 93880 EXTRACRANIAL BILAT STUDY: CPT | Mod: 26

## 2024-10-16 DIAGNOSIS — I65.22 OCCLUSION AND STENOSIS OF LEFT CAROTID ARTERY: ICD-10-CM

## 2024-10-16 DIAGNOSIS — I63.9 CEREBRAL INFARCTION, UNSPECIFIED: ICD-10-CM

## 2024-11-05 ENCOUNTER — NON-APPOINTMENT (OUTPATIENT)
Age: 79
End: 2024-11-05

## 2024-11-05 ENCOUNTER — RESULT CHARGE (OUTPATIENT)
Age: 79
End: 2024-11-05

## 2024-11-05 ENCOUNTER — APPOINTMENT (OUTPATIENT)
Dept: CARDIOLOGY | Facility: CLINIC | Age: 79
End: 2024-11-05
Payer: MEDICARE

## 2024-11-05 VITALS
HEIGHT: 72 IN | BODY MASS INDEX: 29.39 KG/M2 | DIASTOLIC BLOOD PRESSURE: 78 MMHG | SYSTOLIC BLOOD PRESSURE: 130 MMHG | WEIGHT: 217 LBS | HEART RATE: 90 BPM

## 2024-11-05 DIAGNOSIS — I65.22 OCCLUSION AND STENOSIS OF LEFT CAROTID ARTERY: ICD-10-CM

## 2024-11-05 DIAGNOSIS — E11.9 TYPE 2 DIABETES MELLITUS W/OUT COMPLICATIONS: ICD-10-CM

## 2024-11-05 DIAGNOSIS — E78.5 HYPERLIPIDEMIA, UNSPECIFIED: ICD-10-CM

## 2024-11-05 DIAGNOSIS — I25.10 ATHEROSCLEROTIC HEART DISEASE OF NATIVE CORONARY ARTERY W/OUT ANGINA PECTORIS: ICD-10-CM

## 2024-11-05 DIAGNOSIS — I10 ESSENTIAL (PRIMARY) HYPERTENSION: ICD-10-CM

## 2024-11-05 DIAGNOSIS — Z87.891 PERSONAL HISTORY OF NICOTINE DEPENDENCE: ICD-10-CM

## 2024-11-05 DIAGNOSIS — I63.9 CEREBRAL INFARCTION, UNSPECIFIED: ICD-10-CM

## 2024-11-05 DIAGNOSIS — Z78.9 OTHER SPECIFIED HEALTH STATUS: ICD-10-CM

## 2024-11-05 DIAGNOSIS — R53.1 WEAKNESS: ICD-10-CM

## 2024-11-05 PROCEDURE — 93000 ELECTROCARDIOGRAM COMPLETE: CPT

## 2024-11-05 PROCEDURE — 99214 OFFICE O/P EST MOD 30 MIN: CPT

## 2024-11-07 ENCOUNTER — NON-APPOINTMENT (OUTPATIENT)
Age: 79
End: 2024-11-07

## 2024-11-07 ENCOUNTER — APPOINTMENT (OUTPATIENT)
Dept: CARDIOLOGY | Facility: CLINIC | Age: 79
End: 2024-11-07
Payer: MEDICARE

## 2024-11-07 PROCEDURE — 93298 REM INTERROG DEV EVAL SCRMS: CPT

## 2024-12-12 ENCOUNTER — NON-APPOINTMENT (OUTPATIENT)
Age: 79
End: 2024-12-12

## 2024-12-12 ENCOUNTER — APPOINTMENT (OUTPATIENT)
Dept: CARDIOLOGY | Facility: CLINIC | Age: 79
End: 2024-12-12
Payer: MEDICARE

## 2024-12-12 PROCEDURE — 93298 REM INTERROG DEV EVAL SCRMS: CPT

## 2025-01-16 ENCOUNTER — NON-APPOINTMENT (OUTPATIENT)
Age: 80
End: 2025-01-16

## 2025-01-16 ENCOUNTER — APPOINTMENT (OUTPATIENT)
Dept: CARDIOLOGY | Facility: CLINIC | Age: 80
End: 2025-01-16
Payer: MEDICARE

## 2025-01-16 PROCEDURE — 93298 REM INTERROG DEV EVAL SCRMS: CPT

## 2025-02-03 ENCOUNTER — APPOINTMENT (OUTPATIENT)
Dept: CARDIOLOGY | Facility: CLINIC | Age: 80
End: 2025-02-03

## 2025-02-19 ENCOUNTER — APPOINTMENT (OUTPATIENT)
Dept: CARDIOLOGY | Facility: CLINIC | Age: 80
End: 2025-02-19

## 2025-02-24 ENCOUNTER — APPOINTMENT (OUTPATIENT)
Dept: NEUROLOGY | Facility: CLINIC | Age: 80
End: 2025-02-24
Payer: MEDICARE

## 2025-02-24 ENCOUNTER — NON-APPOINTMENT (OUTPATIENT)
Age: 80
End: 2025-02-24

## 2025-02-24 VITALS
HEIGHT: 72 IN | TEMPERATURE: 97.5 F | OXYGEN SATURATION: 99 % | SYSTOLIC BLOOD PRESSURE: 159 MMHG | BODY MASS INDEX: 29.39 KG/M2 | DIASTOLIC BLOOD PRESSURE: 88 MMHG | WEIGHT: 217 LBS | HEART RATE: 85 BPM

## 2025-02-24 DIAGNOSIS — I63.9 CEREBRAL INFARCTION, UNSPECIFIED: ICD-10-CM

## 2025-02-24 PROCEDURE — 99214 OFFICE O/P EST MOD 30 MIN: CPT

## 2025-02-24 RX ORDER — TIZANIDINE 4 MG/1
4 TABLET ORAL
Refills: 0 | Status: ACTIVE | COMMUNITY

## 2025-04-07 ENCOUNTER — NON-APPOINTMENT (OUTPATIENT)
Age: 80
End: 2025-04-07

## 2025-04-07 ENCOUNTER — APPOINTMENT (OUTPATIENT)
Dept: CARDIOLOGY | Facility: CLINIC | Age: 80
End: 2025-04-07
Payer: MEDICARE

## 2025-04-07 VITALS — DIASTOLIC BLOOD PRESSURE: 70 MMHG | SYSTOLIC BLOOD PRESSURE: 130 MMHG

## 2025-04-07 VITALS — BODY MASS INDEX: 29.39 KG/M2 | WEIGHT: 217 LBS | HEIGHT: 72 IN | TEMPERATURE: 97.6 F

## 2025-04-07 VITALS — SYSTOLIC BLOOD PRESSURE: 146 MMHG | HEART RATE: 79 BPM | DIASTOLIC BLOOD PRESSURE: 88 MMHG

## 2025-04-07 DIAGNOSIS — E78.5 HYPERLIPIDEMIA, UNSPECIFIED: ICD-10-CM

## 2025-04-07 DIAGNOSIS — E11.9 TYPE 2 DIABETES MELLITUS W/OUT COMPLICATIONS: ICD-10-CM

## 2025-04-07 DIAGNOSIS — I10 ESSENTIAL (PRIMARY) HYPERTENSION: ICD-10-CM

## 2025-04-07 DIAGNOSIS — I63.9 CEREBRAL INFARCTION, UNSPECIFIED: ICD-10-CM

## 2025-04-07 DIAGNOSIS — I65.22 OCCLUSION AND STENOSIS OF LEFT CAROTID ARTERY: ICD-10-CM

## 2025-04-07 PROCEDURE — 93000 ELECTROCARDIOGRAM COMPLETE: CPT

## 2025-04-07 PROCEDURE — 99214 OFFICE O/P EST MOD 30 MIN: CPT

## 2025-04-10 ENCOUNTER — APPOINTMENT (OUTPATIENT)
Dept: CARDIOLOGY | Facility: CLINIC | Age: 80
End: 2025-04-10
Payer: MEDICARE

## 2025-04-10 ENCOUNTER — NON-APPOINTMENT (OUTPATIENT)
Age: 80
End: 2025-04-10

## 2025-04-10 PROCEDURE — 93298 REM INTERROG DEV EVAL SCRMS: CPT

## 2025-05-15 ENCOUNTER — APPOINTMENT (OUTPATIENT)
Dept: CARDIOLOGY | Facility: CLINIC | Age: 80
End: 2025-05-15
Payer: MEDICARE

## 2025-05-15 ENCOUNTER — NON-APPOINTMENT (OUTPATIENT)
Age: 80
End: 2025-05-15

## 2025-05-15 PROCEDURE — 93298 REM INTERROG DEV EVAL SCRMS: CPT

## 2025-06-09 ENCOUNTER — RX RENEWAL (OUTPATIENT)
Age: 80
End: 2025-06-09

## 2025-06-19 ENCOUNTER — APPOINTMENT (OUTPATIENT)
Dept: CARDIOLOGY | Facility: CLINIC | Age: 80
End: 2025-06-19
Payer: MEDICARE

## 2025-06-19 ENCOUNTER — NON-APPOINTMENT (OUTPATIENT)
Age: 80
End: 2025-06-19

## 2025-06-19 PROCEDURE — 93298 REM INTERROG DEV EVAL SCRMS: CPT

## 2025-07-22 ENCOUNTER — APPOINTMENT (OUTPATIENT)
Dept: CARDIOLOGY | Facility: CLINIC | Age: 80
End: 2025-07-22
Payer: MEDICARE

## 2025-07-22 ENCOUNTER — NON-APPOINTMENT (OUTPATIENT)
Age: 80
End: 2025-07-22

## 2025-07-22 PROCEDURE — 93298 REM INTERROG DEV EVAL SCRMS: CPT

## 2025-08-06 ENCOUNTER — NON-APPOINTMENT (OUTPATIENT)
Age: 80
End: 2025-08-06

## 2025-08-25 ENCOUNTER — NON-APPOINTMENT (OUTPATIENT)
Age: 80
End: 2025-08-25

## 2025-08-25 ENCOUNTER — APPOINTMENT (OUTPATIENT)
Dept: CARDIOLOGY | Facility: CLINIC | Age: 80
End: 2025-08-25
Payer: MEDICARE

## 2025-08-25 PROCEDURE — 93298 REM INTERROG DEV EVAL SCRMS: CPT

## 2025-09-16 ENCOUNTER — RESULT CHARGE (OUTPATIENT)
Age: 80
End: 2025-09-16

## 2025-09-16 ENCOUNTER — APPOINTMENT (OUTPATIENT)
Dept: CARDIOLOGY | Facility: CLINIC | Age: 80
End: 2025-09-16
Payer: MEDICARE

## 2025-09-16 VITALS
WEIGHT: 217 LBS | BODY MASS INDEX: 29.39 KG/M2 | HEIGHT: 72 IN | HEART RATE: 88 BPM | DIASTOLIC BLOOD PRESSURE: 90 MMHG | SYSTOLIC BLOOD PRESSURE: 144 MMHG

## 2025-09-16 DIAGNOSIS — I10 ESSENTIAL (PRIMARY) HYPERTENSION: ICD-10-CM

## 2025-09-16 DIAGNOSIS — I63.9 CEREBRAL INFARCTION, UNSPECIFIED: ICD-10-CM

## 2025-09-16 DIAGNOSIS — E78.5 HYPERLIPIDEMIA, UNSPECIFIED: ICD-10-CM

## 2025-09-16 PROCEDURE — 93000 ELECTROCARDIOGRAM COMPLETE: CPT

## 2025-09-16 PROCEDURE — 99214 OFFICE O/P EST MOD 30 MIN: CPT
